# Patient Record
Sex: MALE | Race: BLACK OR AFRICAN AMERICAN | NOT HISPANIC OR LATINO | Employment: UNEMPLOYED | URBAN - METROPOLITAN AREA
[De-identification: names, ages, dates, MRNs, and addresses within clinical notes are randomized per-mention and may not be internally consistent; named-entity substitution may affect disease eponyms.]

---

## 2017-01-02 ENCOUNTER — AMBULATORY - HEALTHEAST (OUTPATIENT)
Dept: ADDICTION MEDICINE | Facility: CLINIC | Age: 37
End: 2017-01-02

## 2018-01-11 ENCOUNTER — RECORDS - HEALTHEAST (OUTPATIENT)
Dept: ADMINISTRATIVE | Facility: OTHER | Age: 38
End: 2018-01-11

## 2018-01-18 ENCOUNTER — TELEPHONE (OUTPATIENT)
Dept: FAMILY MEDICINE | Facility: CLINIC | Age: 38
End: 2018-01-18

## 2018-01-18 NOTE — TELEPHONE ENCOUNTER
[facility] phone call message- general phone call:    Reason for call: He has a bad dog bite on his hand and wants to come in and be seen now.    Return call needed: Yes    OK to leave a message on voice mail? Yes    Primary language: English      needed? No    Call taken on January 18, 2018 at 11:24 AM by Lily Gardiner

## 2018-01-18 NOTE — TELEPHONE ENCOUNTER
Pt was seen in ED 3-4 days ago for a dog bite injury to his arm. Today, their is drainage from the wound and it is open again. Pt rating pain over 10/10. Hand is swollen and would like to see MD right away. Advised pt that it would be best for him to be seen in ED again since pain is so severe. Pt states he was not prescribed any abx after he was discharged from ED. Phone disconnected. I called the pt again and was unable to leave a vm.     /WILMA Burger

## 2018-05-01 ENCOUNTER — OFFICE VISIT - HEALTHEAST (OUTPATIENT)
Dept: ADDICTION MEDICINE | Facility: CLINIC | Age: 38
End: 2018-05-01

## 2018-05-01 DIAGNOSIS — F12.20 SEVERE CANNABIS USE DISORDER (H): ICD-10-CM

## 2018-05-31 ENCOUNTER — AMBULATORY - HEALTHEAST (OUTPATIENT)
Dept: ADDICTION MEDICINE | Facility: CLINIC | Age: 38
End: 2018-05-31

## 2018-06-12 ENCOUNTER — AMBULATORY - HEALTHEAST (OUTPATIENT)
Dept: ADDICTION MEDICINE | Facility: CLINIC | Age: 38
End: 2018-06-12

## 2018-06-13 ENCOUNTER — OFFICE VISIT - HEALTHEAST (OUTPATIENT)
Dept: ADDICTION MEDICINE | Facility: CLINIC | Age: 38
End: 2018-06-13

## 2018-06-13 DIAGNOSIS — F12.20 SEVERE CANNABIS USE DISORDER (H): ICD-10-CM

## 2021-05-31 ENCOUNTER — RECORDS - HEALTHEAST (OUTPATIENT)
Dept: ADMINISTRATIVE | Facility: CLINIC | Age: 41
End: 2021-05-31

## 2021-06-08 NOTE — PROGRESS NOTES
[facility] CHEMICAL DEPENDENCY  DISCHARGE SUMMARY            NAME:  Macario Delatorre   Physician:     MRN:  [id number] :  Dioxn Hernandez   #:  [id number] Funding Source:  [employer], [employer]   Admit Date:  2016 Discharge Date: 12/15/16   :  1980 Days Completed: 24 of 90  hours of tx completed   Initial Diagnosis:    Patient Active Problem List   Diagnosis     Hypermobility Syndrome     Osteoarthrosis Of Multiple Sites     Pes Planus     Limb Pain     CKD (chronic kidney disease)     Arteriosclerosis of coronary artery     Gout     BP (high blood pressure)     Pharyngitis, acute     Severe cannabis use disorder    Final Diagnosis:    Same as initial   Discharge Address:  General delivery      Discharge Type:  At Staff Request (ASR)    Reasons for and circumstances of service termination:  Pt was non compliant with tx. His attendance was poor, he refused to maintain abstinence and he reported no interest in learning recovery skills and stated only attending due to court mandate. Due to lack of engagement and non compliance he was discharged ASR. Prognosis for cont recovery is poor.     Dimension/Course of Treatment/Individualized Care:   1.  Withdrawal Potential - Risk level - 1-minor at intake and Risk level-1-minor at Discharge  Pt made no efforts to become abstinent and was a daily cannabis user. No progress in this dimension.             2.  Biomedical Conditions and Complications - Risk level - 1-minor at intake and Risk level-1-minor at Discharge  Pt is dealing with multiple medical problems and has access to medical care. His medical problems were not a barrier to tx. Med care deferred to med doctors.              3.  Emotional/Behavioral/Cognitive Conditions and Complications - Risk level -   2-moderate at intake and Risk level-2-moderate at Discharge  Pt did not make progress in this dimension and failed to make any changes in his life. He was unwilling to folllow  staff recommendations.           4.  Treatment Acceptance/Resistance - Risk Level - Risk level -   3-serious at intake and Risk level-3-serious at Discharge  Pt appears to be in precontemplation stage of change and was unwilling to meet tx expectations.            5.  Relapse/Continued Use/Continued Problem Potential - Risk level -Risk level -   3-serious at intake and Risk level-3-serious at Discharge  Pt did not make any changes in life and is not willing to give up drug use. No effort in this dimension made during tx.            6.  Recovery Environment - Risk level - Risk level -   3-serious at intake and Risk level-3-serious at Discharge    No changes in this dimension, no effort was made by pt to meet court recommendations or be law-abiding and no effort was made to build sober support group         Strengths: Youth, employment   and Needs and Services Provided:  [facility] outpt, psych care, court services, abstinence.              Program Involvement: Poor  Attendance: Poor  Ability to relate in group/   Other program activities: Poor  Assignment Completion: Poor  Overall Behavior: Poor  Reported Family/Significant   Other Involvement: Poor    Prognosis: Poor      Recommendations       Attend 12 Step Meetings, Obtain/Retain 12 Step Program Sponsor, Discharged to Other CD Services, Identify and Maintain a Sober Social, Network of Friends and Attend Aftercare    Mental Health Referral  Individual Therapy      Physical Health Referral:  Personal Physician                Counselor Name and Title:  Dixon Jurado Wawro          Date:  1/2/2017  Time:  4:35 PM

## 2021-06-17 NOTE — PROGRESS NOTES
Rule 25 Assessment  Background Information   1. Date of Assessment Request  2. Date of Assessment  5/1/2018   3. Date Service Authorized     4.   RANJANA Shahid MA   5.  Phone Number [phone]    6. Referent  [employer] 7. Assessment Site  [facility]     8. Client Name Macario Delatorre 9. Date of Birth  1980 Age  37 y.o. 10. Gender  male  11. PMI/ Insurance No.  [id number]   12. Client's Primary Language:  english 13. Do you require special accommodations, such as an  or assistance with written material? No   14. Current Address: [address] [place] [address]   15. Client Phone Numbers: [phone] (home)    16.  Alternate (cell) Phone Number:       17. Tell me what has happened to bring you here today.     I threw water on my PCA - then she reports me as an Alcoholic - I admit that I might smoke some weed, but I'm not an alcoholic.      18. Have you had other rule 25 assessments? Yes - I can here in [facility] about 1.5 ago    DIMENSION I - Acute Intoxication /Withdrawal Potential   1. Chemical use most recent 12 months outside a facility and other significant use history (client self-report)             X = Primary Drug Used   Age of First Use Most Recent Pattern of Use and Duration   Need enough information to show pattern (both frequency and amounts) and to show tolerance for each chemical that has a diagnosis   Date of last use and time, if needed   Withdrawal Potential? Requiring special care Method of use  (oral, smoked, snort, IV, etc)   [] Alcohol  denies      [x] Marijuana/Hashish 18 I some some weed - sometimes daily  Sometimes I might have an edible - I am trying to get on the medical cannabis program  I some to deal with chronic pain  few weeks ago na smoke   [] Cocaine/Crack        [] Meth/Amphetamines        [] Heroin        [] Other Opiates/Synthetics        [] Inhalants        [] Benzodiazepines        [] Hallucinogens        []  Barbiturates/Sedatives/Hypnotics        [] Over-the-Counter Drugs        [] Other        [x] Nicotine 18 4 cig per day today none smoke     2. Do you use greater amounts of alcohol/other drugs to feel intoxicated or achieve the desired effect? no.  Or use the same amount and get less of an effect? No (DSM)  Example: na    3A. Have you ever been to detox? no    3B. When was the first time? na    3C. How many times since then? na    3D. Date of most recent detox: na      4.  Withdrawal symptoms: Have you had any of the following withdrawal symptoms?  no  Past 12 months Recent (past 30 days)   None None     Notes:      's Visual Observations and Symptoms: cooperative  Based on the above information, is withdrawal likely to require attention as part of treatment participation?  no    Dimension I Ratings   Acute intoxication/Withdrawal potential - The placing authority must use the criteria in Dimension I to determine a client s acute intoxication and withdrawal potential.    RISK DESCRIPTIONS - Severity ratin  Client displays full functioning with good ability to tolerate and cope with withdrawal discomfort.  No signs or symptoms of intoxication or withdrawal or resolving signs or symptoms    REASONS SEVERITY WAS ASSIGNED (What about the amount of the person s use and date of most recent use and history of withdrawal problems suggests the potential of withdrawal symptoms requiring professional assistance? )    Ct cites no signs or symptoms of withdrawal and is able to manage any discomfort.        DIMENSION II - Biomedical Complications and Conditions   1. Do you have any current health/medical conditions?(Include any infectious diseases, allergies, or chronic or acute pain, history of chronic conditions)    I've had Gout since I was 17 yo  Diabetes - renal complications  Joint disorder  scoliosis  HBP    2. Do you have a health care provider? When was your most recent appointment? What concerns were  identified?    [facility]    3. If indicated by answers to items 1 or 2: How do you deal with these concerns? Is that working for you? If you are not receiving care for this problem, why not?    I can't get my medication because I had an old SW from the [facility] and he entered my dead fathers SS# and it has screwed things up  I can't get this straightened out      4A. List current medication(s) including over-the-counter or herbal supplements--including pain management:      Current Outpatient Prescriptions:      amitriptyline (ELAVIL) 10 MG tablet, Take 20 mg by mouth daily as needed (for gout). , Disp: , Rfl:      benzocaine-menthol (CEPACOL) 15-3.6 mg, Take 1 lozenge by mouth every hour as needed., Disp: 18 lozenge, Rfl: 0     chlorthalidone (HYGROTEN) 25 MG tablet, Take 1 tablet (25 mg total) by mouth daily., Disp: 30 tablet, Rfl: 0     clotrimazole (LOTRIMIN AF) 1 % cream, Apply to affected area twice daily for 14 days., Disp: 30 g, Rfl: 0     furosemide (LASIX) 20 MG tablet, Take 1 tablet (20 mg total) by mouth daily., Disp: 3 tablet, Rfl: 0     GABAPENTIN ORAL, Take 600 mg by mouth daily as needed (for gout). , Disp: , Rfl:      HYDROcodone-acetaminophen 5-325 mg per tablet, Take 1 tablet by mouth every 6 (six) hours as needed for pain., Disp: 12 tablet, Rfl: 0     HYDROcodone-acetaminophen 5-325 mg per tablet, Take 1 tablet by mouth every 4 (four) hours as needed for pain., Disp: 10 tablet, Rfl: 0     ketorolac (TORADOL) 10 mg tablet, Take 1 tablet (10 mg total) by mouth every 6 (six) hours as needed for pain., Disp: 8 tablet, Rfl: 0     losartan (COZAAR) 25 MG tablet, Take 1 tablet (25 mg total) by mouth daily., Disp: 30 tablet, Rfl: 0     oxyCODONE (ROXICODONE) 5 MG immediate release tablet, Take 1 tablet (5 mg total) by mouth every 6 (six) hours as needed for pain., Disp: 12 tablet, Rfl: 0     potassium chloride (KLOR-CON) 10 MEQ CR tablet, Take 1 tablet (10 mEq total) by  mouth daily., Disp: 3 tablet, Rfl: 0      4B. Do you follow current medical recommendations/take medications as prescribed?   no    4C. When did you last take your medication?  I can't get all of them.      5. Has a health care provider/healer ever recommended that you reduce or quit alcohol/drug use?  No (DSM)    6. Are you pregnant?  NA      6B.  Receiving prenatal care?  na      6C.  When is your baby due?      7. Have you had any injuries, assaults/violence towards you, accidents, health related issues, overdose(s) or hospitalizations related to your use of alcohol or other drugs:  no    8. Do you have any specific physical needs/accommodations? no    Dimension II Ratings   Biomedical Conditions and Complications - The placing authority must use the criteria in Dimension II to determine a client s biomedical conditions and complications.   RISK DESCRIPTIONS - Severity ratin  Client has difficulty tolerating and coping with physical problems or has other biomedical problems that interfere with recovery and treatment.  Client neglects or does not seek care for serious biomedical problems.    REASONS SEVERITY WAS ASSIGNED (What physical/medical problems does this person have that would inhibit his or her ability to participate in treatment? What issues does he or she have that require assistance to address?)    Patient reports multiple health concerns, reports having a primary care physician (Dr. Moreira, [facility]), but is unclear about how he is currently treating these concerns (other than reporting self-medicating with marijuana). Patient is unclear if he is currently taking medication for physical health concerns, reports history of hospitalization for physical health concerns. Ct states that carmen can no longer access his Rx plan because his former SW put his  father SS# on his record                           DIMENSION III - Emotional, Behavioral, Cognitive Conditions and  "Complications   1. (Optional) Tell me what it was like growing up in your family. (substance use, mental health, discipline, abuse, support)     difficult    2.  When was the last time that you had significant problems  Past month 2-12 months ago 1+ years ago Never   A. With feeling very trapped, lonely, sad, blue, depressed or hopelessabout the future? []  []  [x] []     B. With sleep trouble, such as bad dreams, sleeping restlessly, or fallingasleep during the day? [] [] [] [x]   C. With feeling very anxious, nervous, tense, scared, panicked, or likesomething bad was going to happen? [x] [x] [] []   D. With becoming very distressed and upset when something remindedyou of the past? [x] [] [] []   E. With thinking about ending your life or committing suicide?  [] [] [x] []     3.  When was the last time that you did the following things two or more times? Past month 2-12 months ago 1+ years ago Never   A. Lied or conned to get things you wanted or to avoid having to do something? [] [] [x] []   B. Had a hard time paying attention at school, work, or home? [] [] [x] []   C. Had a hard time listening to instructions at school, work, or home?  [] [] [x] []   D. Were a bully or threatened other people? [] [] [] [x]   E. Started physical fights with other people? [] [x] [] []     Note: These questions are from the Global Appraisal of Individual Needs--Short Screener. Any item marked  past month  or  2 to 12 months ago  will be scored with a severity rating of at least 2.  For each item that has occurred in the past month or past year ask follow up questions to determine how often the person has felt this way or has the behavior occurred? How recently? How has it affected their daily living? And, whether they were using or in withdrawal at the time?    \"I hate dumb people\"  Subsequent to mental health issues - currently un medicated  Subsequent to perceived threats    4A. If the person has answered item 2E with  in the " past year  or  the past month , ask about frequency and history of suicide in the family or someone close and whether they were under the influence.    Any history of suicide in your family? Or someone close to you?  no      4B. If the person answered item 2E  in the past month  ask about  intent, plan, means and access and any other follow-up information  to determine imminent risk. Document any actions taken to intervene  on any identified imminent risk.     PANSI administered - low risk for suicide.  No thoughts, means or plan noted at the time of evaluation       5A. Have you ever been diagnosed with a mental health problem?  yes  5B. Are you receiving care for any mental health issues? no  If yes, what is the focus of that care or treatment?  Are you satisfied with the service?  Most recent appointment?  How has it been helpful?    Prior - Schizophrenia  Bipolar Disorder - anger issues    6A.  Have you been prescribed medications for emotional/psychological problems?  yes  6B.  Current mental health medication(s) If these medications are listed for Dimension II, reference item II-5.   6C.  Are you taking your medications as instructed?  No  I can't get them  7A. Does your MH provider know about your use?  No provider  7B.  What does he or she have to say about it? (DSM)  na    8A. Have you ever been verbally, emotionally, physically or sexually abused? no   Follow up questions to learn current risk, continuing emotional impact.  na  8B. Have you received counseling for abuse?  no    9A. Have you ever experienced or been part of a group that experienced community violence, historical trauma, rape or assault? no  9B.  How has that affected you?    9C.  Have you received counseling for that?  no    10A. : no  10B.  Exposure to Combat?  no    11. Do you have problems with any of the following things in your daily life?  In relationships with others      Note: If the person has any of the above problems, how  do they deal with them, have they developed coping mechanisms?  Have they received treatment?  Follow up with items 12, 13, and 14. If none of the issues in item 11 are a problem for the person, skip to item 15.    Some some weed and chill out    12. Have you been diagnosed with traumatic brain injury or Alzheimer s?  no    13.  If the answer to #12 is no, ask the following questions:    Have you ever hit your head or been hit on the head? no    Were you ever seen in the Emergency Room, hospital, or by a doctor because of an injury to your head? no    Have you had any significant illness that affected your brain (brain tumor, meningitis, West Nile Virus, stroke or seizure, heart attack, near drowning or near suffocation)?  no    14.  If the answer to # 12 is yes, ask if any of the problems identified in #11 occurred since the head injury or loss of oxygen no    15A. Highest grade of school completed:    15B. Do you have a learning disability? no  15C. Did you ever have tutoring in Math or English? no.  15D. Have you ever been diagnosed with Fetal Alcohol Effects or Fetal Alcohol Syndrome? no    Explain:      16. If yes to item 15 B, C, or D: How has this affected your use or been affected by your use?     na    Dimension III Ratings   Emotional/Behavioral/Cognitive - The placing authority must use the criteria in Dimension III to determine a client s emotional, behavioral, and cognitive conditions and complications.   RISK DESCRIPTIONS - Severity ratin  Client has difficulty with impulse control and lacks coping skills.  Client has thoughts of suicide or harm to others without means; however, the thoughts may interfere with participation in some treatment activities.  Client has difficulty functioning in significant life areas.  Client has moderate symptoms of emotional, behavioral, or cognitive problems.  Client is able to participate in most treatment activities.    REASONS SEVERITY WAS ASSIGNED - What  current issues might with thinking, feelings or behavior pose barriers to participation in a treatment program? What coping skills or other assets does the person have to offset those issues? Are these problems that can be initially accommodated by a treatment provider? If not, what specialized skills or attributes must a provider have?    Patient reports history of mental health diagnoses of schizophrenia and bipolar disorder, reports no current mental health services (initially reported having a , later stated that it was through the welfare department). Patient describes feeling that he always has to be on the defense, also describes beliefs that others can see his thoughts. Patient denies any current SI/HI.          DIMENSION IV - Readiness for Change   1. You ve told me what brought you here today. (first section) What do you think the problem really is? Legal - probation want me to stop smoking weed    2. Tell me how things are going. Ask enough questions to determine whether the person has use related problems or assets that can be built upon in the following areas: Family/friends/relationships; Legal; Financial; Emotional; Educational; Recreational/ leisure; Vocational/employment; Living arrangements (DSM)     I'm on SSDI, I have probation, PCA  And a lot of medical issues  No Case management services -     3. What activities have you engaged in when using alcohol/other drugs that could be hazardous to you or others (i.e. driving a car/motorcycle/boat, operating machinery, unsafe sex, sharing needles for drugs or tattoos, etc     none    4. How much time do you spend getting, using or getting over using alcohol or drugs? (DSM)     I smoke when my pain get too bad  I don't sit around smoking weed all day    5. Reasons for drinking/drug use (Use the space below to record answers. It may not be necessary to ask each item.)  Like the feeling    Trying to forget problems    To cope with stress y   To  relieve physical pain y   To cope with anxiety    To cope with depression    To relax or unwind    Makes it easier to talk with people    Partner encourages use    Most friends drink or use    To cope with family problems    Afraid of withdrawal symptoms/to feel better    Other (specify)      A. What concerns other people about your alcohol or drug use/Has anyone told you that you use too much? What did they say? (DSM)    PO said that it is against the law    B. What did you think about that/ do you think you have a problem with alcohol or drug use?     I don't think it a problem - it the law's problem    6. What changes are you willing to make? What substance are you willing to stop using? How are you going to do that? Have you tried that before? What interfered with your success with that goal?     I can try to stop - I'd like to get in to the medical cannabis program    7. What would be helpful to you in making this change?     I'd like to get this insurance straightened out    Dimension IV Ratings   Readiness for Change - The placing authority must use the criteria in Dimension IV to determine a client s readiness for change.   RISK DESCRIPTIONS - Severity ratin  Client displays verbal compliance, but lacks consistent behaviors, has low motivation for change; and is passively involved in treatment.    REASONS SEVERITY WAS ASSIGNED - (What information did the person provide that supports your assessment of his or her readiness to change? How aware is the person of problems caused by continued use? How willing is she or he to make changes? What does the person feel would be helpful? What has the person been able to do without help?)    Patient reports only motivation for treatment is from legal concerns, feels that marijuana is the only way that he is able to cope with physical problems. Patient does not feel that there is any problems associated with his use.          DIMENSION V - Relapse, Continued Use, and  Continued Problem Potential   1. In what ways have you tried to control, cut-down or quit your use? If you have had periods of sobriety, how did you accomplish that? What was helpful? What happened to prevent you from continuing your sobriety? (DSM)     I've had a few months in the past without use      2. Have you experienced cravings? If yes, ask follow up questions to determine if the person recognizes triggers and if the person has had any success in dealing with them.     No - it the pain that brings me back    3A. Have you been treated for alcohol/other drug abuse/dependence? yes  3B.  Number of times (Lifetime) (over what period):  2  3C.  Number of times completed treatment (Lifetime):  0  3D.  During the past 3 years have you participated in outpatient and/or residential?  yes  3E.  When and where?  Last one was here at [facility]  3F.  What was helpful?  What was not?    4. Support group participation: Have you/do you attend support group meetings to reduce/stop your alcohol/drug use? How recently? What was your experience? Are you willing to restart? If the person has not participated, is he or she willing?     No AA    5. What would assist you in staying sober/straight? [facility]     Dimension V Ratings   Relapse/Continued Use/Continued problem potential - The placing authority must use the criteria in Dimension V to determine a client s relapse, continued use, and continued problem potential.   RISK DESCRIPTIONS - Severity rating:  3  Alesha has poor recognition and understanding of relapse and recidivism issues and displays moderately high vulnerability for further substance use or mental health problems.  Client has few coping skills and rarely applies coping skills.    REASONS SEVERITY WAS ASSIGNED - (What information did the person provide that indicates his or her understanding of relapse issues? What about the person s experience indicates how prone he or she is to relapse? What coping skills does  the person have that decrease relapse potential?)      Patient reports current abstinence for past few weeks but is also unclear about his date of last use. Patient reports 2 prior treatment, state he did not complete.          DIMENSION VI - Recovery Environment   1. Are you employed/attending school? Tell me about that.     [employer]    2A. Describe a typical day; evening for you. Work, school, social, leisure, volunteer, spiritual practices. Include time spent obtaining, using, recovering from drugs or alcohol. (DSM)     Mostly dealing with my medical stuff  I visit with my family    2B. How often do you spend more time than you planned using or use more than you planned? (DSM)     I plan to when I do    3. How important is using to your social connections? Do many of your family or friends use?     many    4A. Are you currently in a significant relationship?  no  4B.  If yes, how long?    4C. Sexual Orientation:  hetro    5A. Who do you live with? alone.  5B. Tell me about their alcohol/drug use and mental health issues. na.  5C. Are you concerned for your safety there? no  5D. Are you concerned about the safety of anyone else who lives with you? no    6A. Do you have children who live with you? no  If the person lives with children, ask follow-up questions to determine the person's relationship and responsibility, both legal and care giving; and what arrangements are made for supervision for the children when the person is not available.          6B. Do you have children who do not live with you?  son  If yes, ask follow up questions to learn where the children are, who has custody and what the person't relaltionship and responsibility is with these children and what hopes the person has for his or her future with these children.    7A. Who supports you in making changes in your alcohol or drug use? What are they willing to do to support you? Who is upset or angry about you making changes in your alcohol or drug  use? How big a problem is this for you?      PO is angry about it  Wants me to get into Anger MGT groups too    7B. This table is provided to record information about the person s relationships and available support It is not necessary to ask each item; only to get a comprehensive picture of their support system.  How often can you count on the following people when you need someone?   Partner / Spouse    Parent(s)/Aunt(s)/Uncle(s)/Grandparents    Sibling(s)/Cousin(s) Usually supportive   Child(lori) Usually supportive   Other relative(s) Usually supportive   Friend(s)/neighbor(s) Usually supportive   Child(lori) s father(s)/mother(s)    Support group member(s)    Community of [employer] members    /counselor/therapist/healer    Other (specify)      8A. What is your current living situation?     [address]    8B. What is your long term plan for where you will be living?    same    8C. Tell me about your living environment/neighborhood? Ask enough follow up questions to determine safety, criminal activity, availability of alcohol and drugs, supportive or antagonistic to the person making changes.      It's OK    9. Criminal justice history: Gather current/recent history and any significant history related to substance use--Arrests? Convictions? Circumstances? Alcohol or drug involvement? Sentences? Still on probation or parole? Expectations of the court? Current court order? Any sex offenses - lifetime? What level? (DSM)    Currently on Probation through [facility]    10. What obstacles exist to participating in treatment? (Time off work, childcare, funding, transportation, pending snf time, living situation)    NA    Dimension VI Ratings   Recovery environment - The placing authority must use the criteria in Dimension VI to determine a client s recovery environment.   RISK DESCRIPTIONS - Severity rating: 3  Client is not engaged in structured, meaningful activity and the client's peers, family , significant  other, and living environment are unsupportive, or there is significant criminal justice system involvement.    REASONS SEVERITY WAS ASSIGNED - (What support does the person have for making changes? What structure/stability does the person have in his or her daily life that willincrease the likelihood that changes can be sustained? What problems exist in the person s environment that will jeopardize getting/staying clean and sober?) Ct has criminal justice involvement with current PV.  Ct is on SSDI and lives in apartment on his own through [address].  Ct states he has family and friends, but acknowledges that he does not like to be around stupid people and does not want to go to AA groups.  Ct does not currently have a  .           Client Choice/Exceptions     Would you like services specific to language, age, gender, culture, Lutheran preference, race, ethnicity, sexual orientation or disability?  no    If yes, specify:      What particular treatment choices and options would you like to have?  none    Do you have a preference for a particular treatment program?  none      .    DSM-V Criteria for Substance Abuse  Instructions  Determine whether the client currently meets the criteria for a Substance Use Disorder using the diagnostic criteria in the DSM-V, pp. 481-589. Current means during the most recent 12 months outside a facility that controls access to substances.    Category of substance Severity ICD-10 Code/DSM V Code   []  Alcohol Use Disorder [] Mild  [] Moderate  [] Severe [] (F10.10) (305.00)  [] (F10.20) (303.90)  [] (F10.20) (303.90)   [x]  Cannabis Use Disorder [] Mild  [] Moderate  [x] Severe [] (F12.10) (305.20)  [] (F12.20) (304.30)  [x] (F12.20) (304.30)   [] Hallucinogen Use Disorder [] Mild  [] Moderate  [] Severe [] (F16.10) (305.30)  [] (F16.20) (304.50)  [] (F16.20) (304.50)   []  Inhalant Use Disorder [] Mild  [] Moderate  [] Severe [] (F18.10) (305.90)  [] (F18.20)  (304.60)  [] (F18.20) (304.60)   []  Opioid Use Disorder [] Mild  [] Moderate  [] Severe [] (F11.10) (305.50)  [] (F11.20) (304.00)  [] (F11.20) (304.00)   []  Sedative, Hypnotic, or Anxiolytic Use Disorder [] Mild  [] Moderate  [] Severe [] (F13.10) (305.40)   [] (F13.20) (304.10)  [] (F13.20) (304.10)   []  Stimulant Related Disorders [] Mild  [] Moderate  [] Severe [] (F15.10) (305.70) Amphetamine type substance  [] (F14.10) (305.60) Cocaine  [] (F15.10) (305.70) Other or unspecified stimulant  [] (F15.20) (304.40) Amphetamine type substance  [] (F14.20) (304.20) Cocaine  [] (F15.20) (304.40) Other or unspecified stimulant  [] (F15.20) (304.40) Amphetamine type substance  [] (F14.20) (304.20) Cocaine  [] (F15.20) (304.40) Other or unspecified stimulant   []  Tobacco use Disorder [] Mild  [] Moderate  [] Severe [] (Z72.0) (305.1)  [] (F17.200) (305.1)  [] (F17.200) (305.1)   []  Other (or unknown) Substance Use Disorder [] Mild  [] Moderate  [] Severe [] (F19.10) (305.90)  [] (F19.20) (304.90)  [] (F19.20) (304.90)       Suggested Level of Care Necessary for Recovery  []  Inpatient  []  Extended Care []  Residential [x]  Outpatient  []  None            Collateral Contact Summary   Number of contacts made:    Contact with referring person:       If court related records were reviewed, summarize here:       []   Information from collateral contacts supported/largely agreed with information from the client and associated risk ratings.   []   Information from collateral contacts was significantly different from information from the client and lead to different risk ratings.      Summarize new information here:      Rule 25 Assessment Summary and Plan   's Recommendation    Ct meets criteria for Cannabis Use Disorder Severe  (F12.20) (304.30)  Ct is recommended to Service Coordination - [facility]            Collateral Contacts     Please duplicate this page for each contact.  If this includes  information which is sensitive and not public, separate this page from the rest of the assessment before sharing.  Retain the page in the assessment file.   Name    [facility] x 4 Relationship    Medical Records Phone Number     Releases           Information Provided:      Reviewed latest H&P, prior med list, and past treatment notes    Collateral Contacts     Name       Relationship     Phone Number     Releases           Information Provided:        Staff Name and Title:  Krista Walter MA [facility]      Date:  5/14/2018  Time:  11:48 AM

## 2021-06-18 NOTE — PROGRESS NOTES
D) Referral to CD outpatient/Service Coordination received today  A) Called patient's existing phone number but the automated tape said the number you have dialed is in correct.  Tried calling three times but unable to leave any messages.

## 2021-06-18 NOTE — PROGRESS NOTES
D[facility] said [facility]'s 651 phone number is no longer working.  PO provided with patient's new phone number [phone].    A) PO urged to scheduled intake.    R) This writer called the patient at 8:45 am to schedule intake.  Pt said he is not a morning person and cannot schedule anything.  This writer asked patient if he would like to call back later to schedule. Pt said he cannot do so because this writer's phone number was not showing up on his cell phone screen. This writer told him she would give him her office phone number.  Pt said he has no pen or paper to write.    T) This writer offered to call patient in the afternoon to schedule intake

## 2021-06-18 NOTE — PROGRESS NOTES
D) Patient came to intake 30 minutes late and walked out out.    A) Patient seemed he was guarded and he became easily agitated.  Patient said he does not like paperwork and assessment because he has done them many times.  Pt said [facility]   Pateint said he would not say things that will get him into trouble ([facility], psych unit).   While patient denied any suicidal ideation or plan, patient said he did not want to complete PANSI because he would be lying.      Patient called his  and inforemd him that he was walking out of the intake.  Updated .

## 2022-11-10 ENCOUNTER — OFFICE VISIT (OUTPATIENT)
Dept: FAMILY MEDICINE | Facility: CLINIC | Age: 42
End: 2022-11-10
Payer: MEDICARE

## 2022-11-10 VITALS
RESPIRATION RATE: 16 BRPM | OXYGEN SATURATION: 98 % | SYSTOLIC BLOOD PRESSURE: 144 MMHG | DIASTOLIC BLOOD PRESSURE: 99 MMHG | HEART RATE: 86 BPM | TEMPERATURE: 100.1 F

## 2022-11-10 DIAGNOSIS — J11.1 INFLUENZA: Primary | ICD-10-CM

## 2022-11-10 DIAGNOSIS — J12.89 OTHER VIRAL PNEUMONIA: ICD-10-CM

## 2022-11-10 DIAGNOSIS — I10 HYPERTENSION, UNSPECIFIED TYPE: ICD-10-CM

## 2022-11-10 LAB
FLUAV RNA SPEC QL NAA+PROBE: NEGATIVE
FLUBV RNA RESP QL NAA+PROBE: NEGATIVE
RSV RNA SPEC NAA+PROBE: NEGATIVE
SARS-COV-2 RNA RESP QL NAA+PROBE: POSITIVE

## 2022-11-10 PROCEDURE — 87637 SARSCOV2&INF A&B&RSV AMP PRB: CPT | Performed by: STUDENT IN AN ORGANIZED HEALTH CARE EDUCATION/TRAINING PROGRAM

## 2022-11-10 PROCEDURE — 99203 OFFICE O/P NEW LOW 30 MIN: CPT | Mod: CS | Performed by: STUDENT IN AN ORGANIZED HEALTH CARE EDUCATION/TRAINING PROGRAM

## 2022-11-10 RX ORDER — ONDANSETRON 4 MG/1
4 TABLET, FILM COATED ORAL EVERY 8 HOURS PRN
Qty: 12 TABLET | Refills: 0 | Status: ON HOLD | OUTPATIENT
Start: 2022-11-10 | End: 2024-08-08

## 2022-11-10 NOTE — PROGRESS NOTES
Assessment and Plan    harmony was seen today for flu.  Cough, runny/congested nose, arthralgia myalgia, for 1 week.  His symptoms consistent with influenza infection, but possibility of other viral infection.  His vital sign revealed high blood pressure, and temp of 100.1.  Patient does not have a history of high blood pressure.  Recommended checking blood pressure for 1 week and presented the results to the clinic.    Diagnoses and all orders for this visit:    Influenza  -     Influenza A & B Antigen - Clinic Collect  -     Symptomatic; Unknown Influenza A/B & SARS-CoV2 (COVID-19) Virus PCR Multiplex Nasopharyngeal  -     ondansetron (ZOFRAN) 4 MG tablet; Take 1 tablet (4 mg) by mouth every 8 hours as needed for nausea    Other viral pneumonia  -     Symptomatic; Unknown Influenza A/B & SARS-CoV2 (COVID-19) Virus PCR Multiplex Nasopharyngeal    Hypertension, unspecified type                 Options for treatment and follow-up care were reviewed with the patient. Patient engaged in the decision making process and verbalized understanding of the options discussed and agreed with the final plan.    Patient was staffed with attending physician MD Lianna Bradley MD PGY3           HPI       Macario Delatorre is a 42 year old year old male w/ PMH of   Patient Active Problem List   Diagnosis     Back pain     Health care maintenance     Mental retardation     Headaches, tension     Bilateral Ankle Pain    who presents for cough, postacute is vomiting, sore throat, subjective fever, runny/congested nose for 1 week.  His symptoms associated with body ache, joint pain, and loose stool.  Denies having change in his smell/taste.  Patient did not receive flu vaccine COVID vaccine.  Unknown history for illness contact.    +++++++      Problem, Medication and Allergy Lists were   reviewed and updated if needed.     Patient Active Problem List    Diagnosis Date Noted     Bilateral Ankle Pain 03/27/2014      Priority: Medium     Patient had sever bilateral ankle pain and swelling 3/27. Sees Dr. Howe.       Headaches, tension 09/23/2013     Priority: Medium     Problem list name updated by automated process. Provider to review and confirm       Mental retardation 05/16/2013     Priority: Medium     Per patient.  Is on disability for this       Back pain 05/01/2013     Priority: Medium     Health care maintenance 05/01/2013     Priority: Medium       Patient is new         Review of Systems:   10 point ROS negative other than as specified above.         Physical Exam:   BP (!) 144/99   Pulse 86   Temp 100.1  F (37.8  C) (Oral)   Resp 16   SpO2 98%     Vital signs normal except BP     Exam:  Constitutional: healthy, alert, no distress, and cooperative  Head: Normocephalic. No masses, lesions, tenderness or abnormalities  Neck: Neck supple. No adenopathy.  ENT: throat normal without erythema or exudate  Cardiovascular: RRR w/o audible murmur  Respiratory: bilateral clear lungs w/o wheezing, crackles or rhonchi; breathing comfortably on RA  Gastrointestinal: Abdomen soft, non-tender. BS normal.  : Deferred  Musculoskeletal: extremities normal- no gross deformities noted, gait normal, and normal muscle tone  Skin: no suspicious lesions or rashes  Neurologic: grossly normal CN; normal strength, sensation, & tone  Psychiatric: mentation appears normal and affect normal/bright        Results:    Results from this visitNo results found for any visits on 11/10/22.

## 2022-11-10 NOTE — PROGRESS NOTES
Preceptor Attestation:   Patient seen, evaluated and discussed with the resident. I have verified the content of the note, which accurately reflects my assessment of the patient and the plan of care.   Supervising Physician:  George Moreira MD

## 2024-01-16 ENCOUNTER — APPOINTMENT (OUTPATIENT)
Dept: ULTRASOUND IMAGING | Facility: CLINIC | Age: 44
End: 2024-01-16
Attending: EMERGENCY MEDICINE
Payer: MEDICARE

## 2024-01-16 ENCOUNTER — APPOINTMENT (OUTPATIENT)
Dept: GENERAL RADIOLOGY | Facility: CLINIC | Age: 44
End: 2024-01-16
Attending: EMERGENCY MEDICINE
Payer: MEDICARE

## 2024-01-16 ENCOUNTER — APPOINTMENT (OUTPATIENT)
Dept: CT IMAGING | Facility: CLINIC | Age: 44
End: 2024-01-16
Attending: EMERGENCY MEDICINE
Payer: MEDICARE

## 2024-01-16 ENCOUNTER — HOSPITAL ENCOUNTER (EMERGENCY)
Facility: CLINIC | Age: 44
Discharge: HOME | End: 2024-01-16
Attending: EMERGENCY MEDICINE | Admitting: EMERGENCY MEDICINE
Payer: MEDICARE

## 2024-01-16 VITALS
BODY MASS INDEX: 32.4 KG/M2 | TEMPERATURE: 99 F | DIASTOLIC BLOOD PRESSURE: 107 MMHG | RESPIRATION RATE: 16 BRPM | HEIGHT: 78 IN | OXYGEN SATURATION: 100 % | HEART RATE: 97 BPM | SYSTOLIC BLOOD PRESSURE: 169 MMHG | WEIGHT: 280 LBS

## 2024-01-16 DIAGNOSIS — K29.80 DUODENITIS: ICD-10-CM

## 2024-01-16 DIAGNOSIS — R10.11 RIGHT UPPER QUADRANT ABDOMINAL PAIN: ICD-10-CM

## 2024-01-16 LAB
ALBUMIN SERPL BCG-MCNC: 4.1 G/DL (ref 3.5–5.2)
ALP SERPL-CCNC: 47 U/L (ref 40–150)
ALT SERPL W P-5'-P-CCNC: 6 U/L (ref 0–70)
ANION GAP SERPL CALCULATED.3IONS-SCNC: 16 MMOL/L (ref 7–15)
AST SERPL W P-5'-P-CCNC: 16 U/L (ref 0–45)
BASOPHILS # BLD AUTO: 0 10E3/UL (ref 0–0.2)
BASOPHILS NFR BLD AUTO: 1 %
BILIRUB SERPL-MCNC: 0.3 MG/DL
BUN SERPL-MCNC: 35.8 MG/DL (ref 6–20)
CALCIUM SERPL-MCNC: 8.2 MG/DL (ref 8.6–10)
CHLORIDE SERPL-SCNC: 98 MMOL/L (ref 98–107)
CREAT SERPL-MCNC: 13.8 MG/DL (ref 0.67–1.17)
DEPRECATED HCO3 PLAS-SCNC: 29 MMOL/L (ref 22–29)
EGFRCR SERPLBLD CKD-EPI 2021: 4 ML/MIN/1.73M2
EOSINOPHIL # BLD AUTO: 0.2 10E3/UL (ref 0–0.7)
EOSINOPHIL NFR BLD AUTO: 4 %
ERYTHROCYTE [DISTWIDTH] IN BLOOD BY AUTOMATED COUNT: 14 % (ref 10–15)
FLUAV RNA SPEC QL NAA+PROBE: NEGATIVE
FLUBV RNA RESP QL NAA+PROBE: NEGATIVE
GLUCOSE SERPL-MCNC: 106 MG/DL (ref 70–99)
HCT VFR BLD AUTO: 32.3 % (ref 40–53)
HGB BLD-MCNC: 10 G/DL (ref 13.3–17.7)
IMM GRANULOCYTES # BLD: 0 10E3/UL
IMM GRANULOCYTES NFR BLD: 0 %
LIPASE SERPL-CCNC: 97 U/L (ref 13–60)
LYMPHOCYTES # BLD AUTO: 1.7 10E3/UL (ref 0.8–5.3)
LYMPHOCYTES NFR BLD AUTO: 34 %
MCH RBC QN AUTO: 29.3 PG (ref 26.5–33)
MCHC RBC AUTO-ENTMCNC: 31 G/DL (ref 31.5–36.5)
MCV RBC AUTO: 95 FL (ref 78–100)
MONOCYTES # BLD AUTO: 0.4 10E3/UL (ref 0–1.3)
MONOCYTES NFR BLD AUTO: 8 %
NEUTROPHILS # BLD AUTO: 2.8 10E3/UL (ref 1.6–8.3)
NEUTROPHILS NFR BLD AUTO: 53 %
NRBC # BLD AUTO: 0 10E3/UL
NRBC BLD AUTO-RTO: 0 /100
PLATELET # BLD AUTO: 233 10E3/UL (ref 150–450)
POTASSIUM SERPL-SCNC: 4.2 MMOL/L (ref 3.4–5.3)
PROT SERPL-MCNC: 7.3 G/DL (ref 6.4–8.3)
RBC # BLD AUTO: 3.41 10E6/UL (ref 4.4–5.9)
RSV RNA SPEC NAA+PROBE: NEGATIVE
SARS-COV-2 RNA RESP QL NAA+PROBE: NEGATIVE
SODIUM SERPL-SCNC: 143 MMOL/L (ref 135–145)
WBC # BLD AUTO: 5.2 10E3/UL (ref 4–11)

## 2024-01-16 PROCEDURE — 36415 COLL VENOUS BLD VENIPUNCTURE: CPT | Performed by: EMERGENCY MEDICINE

## 2024-01-16 PROCEDURE — 76705 ECHO EXAM OF ABDOMEN: CPT

## 2024-01-16 PROCEDURE — 80053 COMPREHEN METABOLIC PANEL: CPT | Performed by: EMERGENCY MEDICINE

## 2024-01-16 PROCEDURE — 71046 X-RAY EXAM CHEST 2 VIEWS: CPT | Mod: 26 | Performed by: STUDENT IN AN ORGANIZED HEALTH CARE EDUCATION/TRAINING PROGRAM

## 2024-01-16 PROCEDURE — 83690 ASSAY OF LIPASE: CPT | Performed by: EMERGENCY MEDICINE

## 2024-01-16 PROCEDURE — 71046 X-RAY EXAM CHEST 2 VIEWS: CPT

## 2024-01-16 PROCEDURE — 74176 CT ABD & PELVIS W/O CONTRAST: CPT | Mod: MG

## 2024-01-16 PROCEDURE — 85025 COMPLETE CBC W/AUTO DIFF WBC: CPT | Performed by: EMERGENCY MEDICINE

## 2024-01-16 PROCEDURE — 99284 EMERGENCY DEPT VISIT MOD MDM: CPT | Mod: 25 | Performed by: EMERGENCY MEDICINE

## 2024-01-16 PROCEDURE — 76705 ECHO EXAM OF ABDOMEN: CPT | Mod: 26 | Performed by: STUDENT IN AN ORGANIZED HEALTH CARE EDUCATION/TRAINING PROGRAM

## 2024-01-16 PROCEDURE — G1010 CDSM STANSON: HCPCS | Performed by: RADIOLOGY

## 2024-01-16 PROCEDURE — 87637 SARSCOV2&INF A&B&RSV AMP PRB: CPT | Mod: GZ | Performed by: EMERGENCY MEDICINE

## 2024-01-16 PROCEDURE — 74176 CT ABD & PELVIS W/O CONTRAST: CPT | Mod: 26 | Performed by: RADIOLOGY

## 2024-01-16 PROCEDURE — 250N000013 HC RX MED GY IP 250 OP 250 PS 637: Performed by: EMERGENCY MEDICINE

## 2024-01-16 PROCEDURE — 99284 EMERGENCY DEPT VISIT MOD MDM: CPT | Performed by: EMERGENCY MEDICINE

## 2024-01-16 RX ORDER — OXYCODONE HYDROCHLORIDE 5 MG/1
5 TABLET ORAL ONCE
Status: COMPLETED | OUTPATIENT
Start: 2024-01-16 | End: 2024-01-16

## 2024-01-16 RX ORDER — PANTOPRAZOLE SODIUM 40 MG/1
40 TABLET, DELAYED RELEASE ORAL DAILY
Qty: 30 TABLET | Refills: 0 | Status: SHIPPED | OUTPATIENT
Start: 2024-01-16 | End: 2024-02-15

## 2024-01-16 RX ORDER — PANTOPRAZOLE SODIUM 40 MG/1
40 TABLET, DELAYED RELEASE ORAL ONCE
Status: COMPLETED | OUTPATIENT
Start: 2024-01-16 | End: 2024-01-16

## 2024-01-16 RX ADMIN — PANTOPRAZOLE SODIUM 40 MG: 40 TABLET, DELAYED RELEASE ORAL at 21:34

## 2024-01-16 RX ADMIN — OXYCODONE HYDROCHLORIDE 5 MG: 5 TABLET ORAL at 20:52

## 2024-01-16 ASSESSMENT — ACTIVITIES OF DAILY LIVING (ADL)
ADLS_ACUITY_SCORE: 35
ADLS_ACUITY_SCORE: 35

## 2024-01-16 NOTE — ED TRIAGE NOTES
"Triage Assessment & Note:    BP (!) 160/114   Pulse 107   Temp 98  F (36.7  C)   Resp 16   Ht 1.981 m (6' 6\")   Wt 127 kg (280 lb)   SpO2 97%   BMI 32.36 kg/m        Patient presents with: Dialysis (T, Th, Sat) pt comes ambulatory to triage with reports of R rib pain and wanting dialysis port looked at. No reports of fever, cough, SOB, CP, or travel.     Home Treatments/Remedies: Home medications    Febrile / Afebrile: afebrile    Duration of C/o: 6 days    Shannan Bales RN  January 16, 2024            "

## 2024-01-17 NOTE — ED PROVIDER NOTES
ED Provider Note  [facility]      History     Chief Complaint   Patient presents with    Rib Pain     right     HPI  Macario Delatorre is a 43 year old male who presents to the emergency department for right upper quadrant abdominal pain is been present for the past 6 days.  He reports it has been constant.  He states it is worse when he coughs.  He states he has had an occasional cough which is largely nonproductive.  He denies any chest pain.  No fever.  No dyspnea.  He states he vomited once a few days ago.  He reports normal bowel movements.  He did have a full dialysis run today and reports minimal urine output.  He denies a history of abdominal surgery.  He denies any fevers.      Past Medical History  Past Medical History:   Diagnosis Date    Chlamydia     history of    Flatfoot     feet pes plannus    Mental retardation     mild    Migraine headache     Pain disorder 11/6/11    upset about no pain meds  incedent report filerd by RN    Tobacco abuse     Undescended testicle      History reviewed. No pertinent surgical history.  pantoprazole (PROTONIX) 40 MG EC tablet  allopurinol (ZYLOPRIM) 300 MG tablet  amitriptyline (ELAVIL) 50 MG tablet  cephALEXin (KEFLEX) 500 MG capsule  gabapentin (NEURONTIN) 300 MG capsule  hydrOXYzine (VISTARIL) 50 MG capsule  LIDODERM 5 % patch  omeprazole 20 MG tablet  ondansetron (ZOFRAN) 4 MG tablet  ORDER FOR DME  ORDER FOR DME  OXYCODONE HCL PO      Allergies   Allergen Reactions    Bee Venom      Bee stings swelling and short of breath    Pcn [Penicillins]      Noted in 8/20/08 ER,hives,headaches,throat irritation    Trazodone      Abdominal pain    Tylenol [Acetaminophen] GI Disturbance     Noted in 8/20/08 ER    Ibuprofen GI Disturbance     Noted in 8/20/08 ER  Headaches and abdominal pain     Family History  Family History   Problem Relation Age of Onset    Diabetes No family hx of     Cancer No family hx of     Heart Disease No family hx of      "Diabetes Mother     Heart Failure Father     Diabetes Type 2  Father     Kidney Disease Father      Social History   Social History     Tobacco Use    Smoking status: Some Days     Packs/day: .1     Types: Cigarettes    Smokeless tobacco: Never    Tobacco comments:     smokes about 1 cigarette a day   Substance Use Topics    Alcohol use: No     Comment: holidays and birthday - wine    Drug use: No     Comment: Drug use: Chart review shows + THC, pt denies         A medically appropriate review of systems was performed with pertinent positives and negatives noted in the HPI, and all other systems negative.    Physical Exam   BP: (!) 160/114  Pulse: 107  Temp: 98  F (36.7  C)  Resp: 16  Height: 198.1 cm (6' 6\")  Weight: 127 kg (280 lb)  SpO2: 97 %  Physical Exam  Vitals and nursing note reviewed.   Constitutional:       General: He is not in acute distress.     Appearance: Normal appearance. He is not diaphoretic.   HENT:      Head: Atraumatic.   Eyes:      General: No scleral icterus.     Pupils: Pupils are equal, round, and reactive to light.   Cardiovascular:      Rate and Rhythm: Normal rate and regular rhythm.      Pulses: Normal pulses.      Heart sounds: Normal heart sounds.   Pulmonary:      Effort: No respiratory distress.      Breath sounds: Normal breath sounds.   Abdominal:      General: Bowel sounds are normal.      Palpations: Abdomen is soft.      Tenderness: There is abdominal tenderness in the right upper quadrant.   Musculoskeletal:         General: No tenderness. Normal range of motion.   Skin:     General: Skin is warm.      Findings: No rash.   Neurological:      General: No focal deficit present.      Mental Status: He is alert.      Motor: No weakness.      Coordination: Coordination normal.           ED Course, Procedures, & Data      Procedures                      Results for orders placed or performed during the hospital encounter of 01/16/24   XR Chest 2 Views     Status: None    Narrative "    EXAM: XR CHEST 2 VIEWS 1/16/2024 6:28 PM     HISTORY: cough       COMPARISON: 11/3/2011     FINDINGS: Right IJ central venous catheter tip projects over the low  SVC. Cardiomediastinal silhouette is within normal limits. Streaky  bibasilar opacities. No pleural effusion or pneumothorax.       Impression    IMPRESSION: Streaky bibasilar opacities may represent atelectasis,  edema versus atypical infection    I have personally reviewed the examination and initial interpretation  and I agree with the findings.    AISHA WHITFIELD MD         SYSTEM ID:  [id number]   US Abdomen Limited (RUQ)     Status: None (Preliminary result)    Impression    RESIDENT PRELIMINARY INTERPRETATION  IMPRESSION:   1. Normal sonographic appearance of the liver and gallbladder. No  evidence of cholelithiasis or cholecystitis.  2. Small echogenic right kidney consistent with medical renal disease.   CT Abdomen Pelvis w/o Contrast     Status: None    Narrative    EXAM: CT ABDOMEN PELVIS W/O CONTRAST  LOCATION: [facility]  DATE: 1/16/2024    INDICATION: Right-sided abdominal pain.  COMPARISON: 07/23/2017.  TECHNIQUE: CT scan of the abdomen and pelvis was performed without IV contrast. Multiplanar reformats were obtained. Dose reduction techniques were used.  CONTRAST: None.    FINDINGS:      Absence of intravenous contrast limits the sensitivity of this examination for detection of infectious/inflammatory change, post traumatic abnormalities, vascular abnormalities, and visceral lesions.    LOWER CHEST: Unchanged 4 mm right lower lobe pulmonary nodule; no further follow-up recommended. Small left lower lobe calcified granuloma. Central venous catheter tip was imaged in the right atrium. Atherosclerosis of coronary arteries.    HEPATOBILIARY: Liver is normal in attenuation and size.    Gallbladder is normal.    No intrahepatic or intrahepatic biliary ductal dilatation.    PANCREAS: Normal  attenuation. No peripancreatic inflammatory fat stranding.    SPLEEN: Normal attenuation. Normal size.    ADRENAL GLANDS: Normal.    KIDNEYS: Mild bilateral renal atrophy, new from prior. Low-attenuation subcentimeter renal lesion(s). These are compatible with small benign cysts and no specific imaging evaluation or follow-up is recommended.    No nephroureterolithiasis.    Mildly thick-walled urinary bladder, likely due to incomplete distention.    PELVIC ORGANS: Mild prostatomegaly.    BOWEL: Possible mild wall thickening of the second and third portions of the duodenum, poorly assessed by the noncontrast technique. Normal appendix. Colonic diverticulosis. Mild distention of the stomach with ingested content.    No intraperitoneal free fluid or free air.    LYMPH NODES: No suspicious abdominal or pelvic lymphadenopathy.    VASCULATURE: No abdominal aortic aneurysm. Minimal atherosclerotic calcifications.    MUSCULOSKELETAL: No suspicious abnormality.    OTHER: No additionally significant abnormalities.      Impression    IMPRESSION:   1.  Possible mild wall thickening of the second and third portions of the duodenum, poorly assessed by the noncontrast technique. A mild duodenitis could be considered.  2.  Normal appendix.  3.  Colonic diverticulosis.  4.  Mild bilateral renal atrophy, new from prior.       Comprehensive metabolic panel     Status: Abnormal   Result Value Ref Range    Sodium 143 135 - 145 mmol/L    Potassium 4.2 3.4 - 5.3 mmol/L    Carbon Dioxide (CO2) 29 22 - 29 mmol/L    Anion Gap 16 (H) 7 - 15 mmol/L    Urea Nitrogen 35.8 (H) 6.0 - 20.0 mg/dL    Creatinine 13.80 (H) 0.67 - 1.17 mg/dL    GFR Estimate 4 (L) >60 mL/min/1.73m2    Calcium 8.2 (L) 8.6 - 10.0 mg/dL    Chloride 98 98 - 107 mmol/L    Glucose 106 (H) 70 - 99 mg/dL    Alkaline Phosphatase 47 40 - 150 U/L    AST 16 0 - 45 U/L    ALT 6 0 - 70 U/L    Protein Total 7.3 6.4 - 8.3 g/dL    Albumin 4.1 3.5 - 5.2 g/dL    Bilirubin Total 0.3 <=1.2  mg/dL   Lipase     Status: Abnormal   Result Value Ref Range    Lipase 97 (H) 13 - 60 U/L   Symptomatic Influenza A/B, RSV, & SARS-CoV2 PCR (COVID-19) Nasopharyngeal     Status: Normal    Specimen: Nasopharyngeal; Swab   Result Value Ref Range    Influenza A PCR Negative Negative    Influenza B PCR Negative Negative    RSV PCR Negative Negative    SARS CoV2 PCR Negative Negative    Narrative    Testing was performed using the Xpert Xpress CoV2/Flu/RSV Assay on the [employer] Gene[employer]pert Instrument. This test should be ordered for the detection of SARS-CoV-2, influenza, and RSV viruses in individuals who meet clinical and/or epidemiological criteria. Test performance is unknown in asymptomatic patients. This test is for in vitro diagnostic use under the FDA EUA for laboratories certified under CLIA to perform high or moderate complexity testing. This test has not been FDA cleared or approved. A negative result does not rule out the presence of PCR inhibitors in the specimen or target RNA in concentration below the limit of detection for the assay. If only one viral target is positive but coinfection with multiple targets is suspected, the sample should be re-tested with another FDA cleared, approved, or authorized test, if coinfection would change clinical management. This test was validated by the [facility]. These laboratories are certified under the Clinical Laboratory Improvement Amendments of 1988 (CLIA-88) as qualified to perform high complexity laboratory testing.   CBC with platelets and differential     Status: Abnormal   Result Value Ref Range    WBC Count 5.2 4.0 - 11.0 10e3/uL    RBC Count 3.41 (L) 4.40 - 5.90 10e6/uL    Hemoglobin 10.0 (L) 13.3 - 17.7 g/dL    Hematocrit 32.3 (L) 40.0 - 53.0 %    MCV 95 78 - 100 fL    MCH 29.3 26.5 - 33.0 pg    MCHC 31.0 (L) 31.5 - 36.5 g/dL    RDW 14.0 10.0 - 15.0 %    Platelet Count 233 150 - 450 10e3/uL    % Neutrophils 53 %    % Lymphocytes 34 %    %  Monocytes 8 %    % Eosinophils 4 %    % Basophils 1 %    % Immature Granulocytes 0 %    NRBCs per 100 WBC 0 <1 /100    Absolute Neutrophils 2.8 1.6 - 8.3 10e3/uL    Absolute Lymphocytes 1.7 0.8 - 5.3 10e3/uL    Absolute Monocytes 0.4 0.0 - 1.3 10e3/uL    Absolute Eosinophils 0.2 0.0 - 0.7 10e3/uL    Absolute Basophils 0.0 0.0 - 0.2 10e3/uL    Absolute Immature Granulocytes 0.0 <=0.4 10e3/uL    Absolute NRBCs 0.0 10e3/uL   CBC with platelets differential     Status: Abnormal    Narrative    The following orders were created for panel order CBC with platelets differential.  Procedure                               Abnormality         Status                     ---------                               -----------         ------                     CBC with platelets and d...[[id number]]  Abnormal            Final result                 Please view results for these tests on the individual orders.     Medications   oxyCODONE (ROXICODONE) tablet 5 mg (5 mg Oral $Given 1/16/24 2052)   pantoprazole (PROTONIX) EC tablet 40 mg (40 mg Oral $Given 1/16/24 2134)     Labs Ordered and Resulted from Time of ED Arrival to Time of ED Departure   COMPREHENSIVE METABOLIC PANEL - Abnormal       Result Value    Sodium 143      Potassium 4.2      Carbon Dioxide (CO2) 29      Anion Gap 16 (*)     Urea Nitrogen 35.8 (*)     Creatinine 13.80 (*)     GFR Estimate 4 (*)     Calcium 8.2 (*)     Chloride 98      Glucose 106 (*)     Alkaline Phosphatase 47      AST 16      ALT 6      Protein Total 7.3      Albumin 4.1      Bilirubin Total 0.3     LIPASE - Abnormal    Lipase 97 (*)    CBC WITH PLATELETS AND DIFFERENTIAL - Abnormal    WBC Count 5.2      RBC Count 3.41 (*)     Hemoglobin 10.0 (*)     Hematocrit 32.3 (*)     MCV 95      MCH 29.3      MCHC 31.0 (*)     RDW 14.0      Platelet Count 233      % Neutrophils 53      % Lymphocytes 34      % Monocytes 8      % Eosinophils 4      % Basophils 1      % Immature Granulocytes 0      NRBCs per 100  WBC 0      Absolute Neutrophils 2.8      Absolute Lymphocytes 1.7      Absolute Monocytes 0.4      Absolute Eosinophils 0.2      Absolute Basophils 0.0      Absolute Immature Granulocytes 0.0      Absolute NRBCs 0.0     INFLUENZA A/B, RSV, & SARS-COV2 PCR - Normal    Influenza A PCR Negative      Influenza B PCR Negative      RSV PCR Negative      SARS CoV2 PCR Negative       CT Abdomen Pelvis w/o Contrast   Final Result   IMPRESSION:    1.  Possible mild wall thickening of the second and third portions of the duodenum, poorly assessed by the noncontrast technique. A mild duodenitis could be considered.   2.  Normal appendix.   3.  Colonic diverticulosis.   4.  Mild bilateral renal atrophy, new from prior.            US Abdomen Limited (RUQ)   Preliminary Result   RESIDENT PRELIMINARY INTERPRETATION   IMPRESSION:    1. Normal sonographic appearance of the liver and gallbladder. No   evidence of cholelithiasis or cholecystitis.   2. Small echogenic right kidney consistent with medical renal disease.      XR Chest 2 Views   Final Result   IMPRESSION: Streaky bibasilar opacities may represent atelectasis,   edema versus atypical infection      I have personally reviewed the examination and initial interpretation   and I agree with the findings.      AISHA WHITFIELD MD            SYSTEM ID:  [id number]         Reviewed dialysis encounter from earlier today  Reviewed interventional radiology note for tunneled dialysis catheter placement in the [facility] system.  Reviewed previous lab results including CBC, comprehensive metabolic panel    Critical care was not performed.     Medical Decision Making  The patient's presentation was of moderate complexity (an undiagnosed new problem with uncertain diagnosis).    The patient's evaluation involved:  review of external note(s) from 2 sources (see separate area of note for details)  review of 2 test result(s) ordered prior to this encounter (see separate area of note for  details)  ordering and/or review of 3+ test(s) in this encounter (see separate area of note for details)    The patient's management necessitated moderate risk (prescription drug management including medications given in the ED).    Assessment & Plan    43 year old male with history of dialysis dependent renal failure to the emergency department with right upper quadrant abdominal pain for the past several days.  He has focal tenderness in the right upper quadrant without guarding or rebound on exam.  Differential includes occult cholecystitis, biliary obstruction including choledocholithiasis, acute pancreatitis, gastritis, perforated viscus, peptic ulcer disease, bowel obstruction.  Patient's labs are baseline.  He does not have leukocytosis or significantly elevated liver enzymes.  His lipase is mildly but not significantly elevated.  Right upper quadrant ultrasound does not reveal any evidence for acute cholecystitis or gallstones or biliary obstruction.  Subsequent noncontrast abdomen/pelvis CT reveals findings concerning for mild duodenitis.  Patient reports that he is not currently on any PPIs or H2 blockers.  Protonix initiated here in the emergency department.  Patient be discharged home on a 1 month course of Protonix.  Primary care follow-up in the next week.  May need GI referral if ongoing symptoms.  Return precautions provided.    I have reviewed the nursing notes. I have reviewed the findings, diagnosis, plan and need for follow up with the patient.    Discharge Medication List as of 1/16/2024  9:23 PM        START taking these medications    Details   pantoprazole (PROTONIX) 40 MG EC tablet Take 1 tablet (40 mg) by mouth daily for 30 days, Disp-30 tablet, R-0, Local Print             Final diagnoses:   Right upper quadrant abdominal pain   Duodenitis     Chart documentation was completed with Dragon voice-recognition software. Even though reviewed, this chart may still contain some grammatical,  spelling, and word errors.     Darion Stock Md    [facility] EMERGENCY DEPARTMENT  1/16/2024     Darion Stock MD  01/16/24 5037

## 2024-07-10 ENCOUNTER — APPOINTMENT (OUTPATIENT)
Dept: CT IMAGING | Facility: HOSPITAL | Age: 44
DRG: 640 | End: 2024-07-10
Attending: EMERGENCY MEDICINE
Payer: MEDICARE

## 2024-07-10 ENCOUNTER — HOSPITAL ENCOUNTER (INPATIENT)
Facility: HOSPITAL | Age: 44
LOS: 8 days | Discharge: NURSING, REHAB OR LONG-TERM CARE | DRG: 640 | End: 2024-07-18
Attending: EMERGENCY MEDICINE | Admitting: FAMILY MEDICINE
Payer: MEDICARE

## 2024-07-10 ENCOUNTER — APPOINTMENT (OUTPATIENT)
Dept: RADIOLOGY | Facility: HOSPITAL | Age: 44
DRG: 640 | End: 2024-07-10
Attending: EMERGENCY MEDICINE
Payer: MEDICARE

## 2024-07-10 DIAGNOSIS — I10 PRIMARY HYPERTENSION: ICD-10-CM

## 2024-07-10 DIAGNOSIS — Z91.158: ICD-10-CM

## 2024-07-10 DIAGNOSIS — E11.42 DIABETIC POLYNEUROPATHY ASSOCIATED WITH TYPE 2 DIABETES MELLITUS (H): ICD-10-CM

## 2024-07-10 DIAGNOSIS — R55 SYNCOPE AND COLLAPSE: ICD-10-CM

## 2024-07-10 DIAGNOSIS — N18.6 ESRD (END STAGE RENAL DISEASE) ON DIALYSIS (H): ICD-10-CM

## 2024-07-10 DIAGNOSIS — M54.50 BILATERAL LOW BACK PAIN, UNSPECIFIED CHRONICITY, UNSPECIFIED WHETHER SCIATICA PRESENT: ICD-10-CM

## 2024-07-10 DIAGNOSIS — E87.5 HYPERKALEMIA: ICD-10-CM

## 2024-07-10 DIAGNOSIS — R07.9 CHEST PAIN, UNSPECIFIED TYPE: ICD-10-CM

## 2024-07-10 DIAGNOSIS — K05.10 GINGIVITIS: Primary | ICD-10-CM

## 2024-07-10 DIAGNOSIS — Z99.2 ESRD (END STAGE RENAL DISEASE) ON DIALYSIS (H): ICD-10-CM

## 2024-07-10 LAB
ABO/RH(D): NORMAL
ABO/RH(D): NORMAL
ALBUMIN UR-MCNC: 200 MG/DL
AMPHETAMINES UR QL SCN: NORMAL
ANION GAP SERPL CALCULATED.3IONS-SCNC: 24 MMOL/L (ref 7–15)
ANTIBODY SCREEN: NEGATIVE
APPEARANCE UR: CLEAR
BARBITURATES UR QL SCN: NORMAL
BASOPHILS # BLD AUTO: 0 10E3/UL (ref 0–0.2)
BASOPHILS NFR BLD AUTO: 0 %
BENZODIAZ UR QL SCN: NORMAL
BILIRUB UR QL STRIP: NEGATIVE
BUN SERPL-MCNC: 81 MG/DL (ref 6–20)
BZE UR QL SCN: NORMAL
CALCIUM SERPL-MCNC: 9 MG/DL (ref 8.6–10)
CANNABINOIDS UR QL SCN: NORMAL
CHLORIDE SERPL-SCNC: 99 MMOL/L (ref 98–107)
COLOR UR AUTO: COLORLESS
CREAT SERPL-MCNC: 30.16 MG/DL (ref 0.67–1.17)
DEPRECATED HCO3 PLAS-SCNC: 21 MMOL/L (ref 22–29)
EGFRCR SERPLBLD CKD-EPI 2021: 2 ML/MIN/1.73M2
EOSINOPHIL # BLD AUTO: 0.1 10E3/UL (ref 0–0.7)
EOSINOPHIL NFR BLD AUTO: 2 %
ERYTHROCYTE [DISTWIDTH] IN BLOOD BY AUTOMATED COUNT: 15.5 % (ref 10–15)
ETHANOL SERPL-MCNC: <0.01 G/DL
FENTANYL UR QL: NORMAL
GLUCOSE BLDC GLUCOMTR-MCNC: 100 MG/DL (ref 70–99)
GLUCOSE BLDC GLUCOMTR-MCNC: 102 MG/DL (ref 70–99)
GLUCOSE BLDC GLUCOMTR-MCNC: 122 MG/DL (ref 70–99)
GLUCOSE BLDC GLUCOMTR-MCNC: 124 MG/DL (ref 70–99)
GLUCOSE BLDC GLUCOMTR-MCNC: 62 MG/DL (ref 70–99)
GLUCOSE BLDC GLUCOMTR-MCNC: 77 MG/DL (ref 70–99)
GLUCOSE BLDC GLUCOMTR-MCNC: 84 MG/DL (ref 70–99)
GLUCOSE BLDC GLUCOMTR-MCNC: 89 MG/DL (ref 70–99)
GLUCOSE BLDC GLUCOMTR-MCNC: 97 MG/DL (ref 70–99)
GLUCOSE SERPL-MCNC: 88 MG/DL (ref 70–99)
GLUCOSE UR STRIP-MCNC: 150 MG/DL
HBA1C MFR BLD: 4.4 %
HCT VFR BLD AUTO: 29.9 % (ref 40–53)
HGB BLD-MCNC: 9.6 G/DL (ref 13.3–17.7)
HGB UR QL STRIP: ABNORMAL
HYALINE CASTS: 3 /LPF
IMM GRANULOCYTES # BLD: 0 10E3/UL
IMM GRANULOCYTES NFR BLD: 0 %
KETONES UR STRIP-MCNC: NEGATIVE MG/DL
LACTATE SERPL-SCNC: 1.1 MMOL/L (ref 0.7–2)
LEUKOCYTE ESTERASE UR QL STRIP: NEGATIVE
LYMPHOCYTES # BLD AUTO: 1.4 10E3/UL (ref 0.8–5.3)
LYMPHOCYTES NFR BLD AUTO: 21 %
MAGNESIUM SERPL-MCNC: 2.7 MG/DL (ref 1.7–2.3)
MCH RBC QN AUTO: 28.7 PG (ref 26.5–33)
MCHC RBC AUTO-ENTMCNC: 32.1 G/DL (ref 31.5–36.5)
MCV RBC AUTO: 89 FL (ref 78–100)
MONOCYTES # BLD AUTO: 0.4 10E3/UL (ref 0–1.3)
MONOCYTES NFR BLD AUTO: 6 %
NEUTROPHILS # BLD AUTO: 4.6 10E3/UL (ref 1.6–8.3)
NEUTROPHILS NFR BLD AUTO: 70 %
NITRATE UR QL: NEGATIVE
NRBC # BLD AUTO: 0 10E3/UL
NRBC BLD AUTO-RTO: 0 /100
OPIATES UR QL SCN: NORMAL
PCP QUAL URINE (ROCHE): NORMAL
PH UR STRIP: 8 [PH] (ref 5–7)
PLATELET # BLD AUTO: 234 10E3/UL (ref 150–450)
POTASSIUM SERPL-SCNC: 5.6 MMOL/L (ref 3.4–5.3)
POTASSIUM SERPL-SCNC: 6.3 MMOL/L (ref 3.4–5.3)
PROCALCITONIN SERPL IA-MCNC: 0.64 NG/ML
RBC # BLD AUTO: 3.35 10E6/UL (ref 4.4–5.9)
RBC URINE: 1 /HPF
SODIUM SERPL-SCNC: 144 MMOL/L (ref 135–145)
SP GR UR STRIP: 1.01 (ref 1–1.03)
SPECIMEN EXPIRATION DATE: NORMAL
SPECIMEN EXPIRATION DATE: NORMAL
SQUAMOUS EPITHELIAL: 1 /HPF
TROPONIN T SERPL HS-MCNC: 76 NG/L
TROPONIN T SERPL HS-MCNC: 77 NG/L
UROBILINOGEN UR STRIP-MCNC: <2 MG/DL
WBC # BLD AUTO: 6.5 10E3/UL (ref 4–11)
WBC URINE: 2 /HPF

## 2024-07-10 PROCEDURE — 85041 AUTOMATED RBC COUNT: CPT | Performed by: EMERGENCY MEDICINE

## 2024-07-10 PROCEDURE — 5A1D70Z PERFORMANCE OF URINARY FILTRATION, INTERMITTENT, LESS THAN 6 HOURS PER DAY: ICD-10-PCS | Performed by: INTERNAL MEDICINE

## 2024-07-10 PROCEDURE — 84484 ASSAY OF TROPONIN QUANT: CPT | Performed by: EMERGENCY MEDICINE

## 2024-07-10 PROCEDURE — 250N000013 HC RX MED GY IP 250 OP 250 PS 637

## 2024-07-10 PROCEDURE — 36415 COLL VENOUS BLD VENIPUNCTURE: CPT

## 2024-07-10 PROCEDURE — 250N000012 HC RX MED GY IP 250 OP 636 PS 637: Performed by: EMERGENCY MEDICINE

## 2024-07-10 PROCEDURE — 80307 DRUG TEST PRSMV CHEM ANLYZR: CPT | Performed by: EMERGENCY MEDICINE

## 2024-07-10 PROCEDURE — 84145 PROCALCITONIN (PCT): CPT | Performed by: EMERGENCY MEDICINE

## 2024-07-10 PROCEDURE — 90935 HEMODIALYSIS ONE EVALUATION: CPT

## 2024-07-10 PROCEDURE — 99223 1ST HOSP IP/OBS HIGH 75: CPT | Mod: AI

## 2024-07-10 PROCEDURE — 70450 CT HEAD/BRAIN W/O DYE: CPT | Mod: MA

## 2024-07-10 PROCEDURE — 80048 BASIC METABOLIC PNL TOTAL CA: CPT | Performed by: EMERGENCY MEDICINE

## 2024-07-10 PROCEDURE — 36415 COLL VENOUS BLD VENIPUNCTURE: CPT | Performed by: EMERGENCY MEDICINE

## 2024-07-10 PROCEDURE — 258N000003 HC RX IP 258 OP 636: Performed by: INTERNAL MEDICINE

## 2024-07-10 PROCEDURE — 71045 X-RAY EXAM CHEST 1 VIEW: CPT

## 2024-07-10 PROCEDURE — 84132 ASSAY OF SERUM POTASSIUM: CPT | Performed by: EMERGENCY MEDICINE

## 2024-07-10 PROCEDURE — 82077 ASSAY SPEC XCP UR&BREATH IA: CPT | Performed by: EMERGENCY MEDICINE

## 2024-07-10 PROCEDURE — 82962 GLUCOSE BLOOD TEST: CPT

## 2024-07-10 PROCEDURE — 258N000003 HC RX IP 258 OP 636: Performed by: EMERGENCY MEDICINE

## 2024-07-10 PROCEDURE — 120N000004 HC R&B MS OVERFLOW

## 2024-07-10 PROCEDURE — 258N000001 HC RX 258: Performed by: EMERGENCY MEDICINE

## 2024-07-10 PROCEDURE — 83036 HEMOGLOBIN GLYCOSYLATED A1C: CPT

## 2024-07-10 PROCEDURE — 999N000248 HC STATISTIC IV INSERT WITH US BY RN

## 2024-07-10 PROCEDURE — 83605 ASSAY OF LACTIC ACID: CPT

## 2024-07-10 PROCEDURE — 93005 ELECTROCARDIOGRAM TRACING: CPT | Performed by: STUDENT IN AN ORGANIZED HEALTH CARE EDUCATION/TRAINING PROGRAM

## 2024-07-10 PROCEDURE — 99291 CRITICAL CARE FIRST HOUR: CPT

## 2024-07-10 PROCEDURE — 81001 URINALYSIS AUTO W/SCOPE: CPT | Performed by: EMERGENCY MEDICINE

## 2024-07-10 PROCEDURE — 999N000285 HC STATISTIC VASC ACCESS LAB DRAW WITH PIV START

## 2024-07-10 PROCEDURE — 86900 BLOOD TYPING SEROLOGIC ABO: CPT | Performed by: EMERGENCY MEDICINE

## 2024-07-10 PROCEDURE — 83735 ASSAY OF MAGNESIUM: CPT | Performed by: EMERGENCY MEDICINE

## 2024-07-10 RX ORDER — DEXTROSE MONOHYDRATE 25 G/50ML
25-50 INJECTION, SOLUTION INTRAVENOUS
Status: DISCONTINUED | OUTPATIENT
Start: 2024-07-10 | End: 2024-07-18 | Stop reason: HOSPADM

## 2024-07-10 RX ORDER — CALCITRIOL 0.25 UG/1
0.25 CAPSULE, LIQUID FILLED ORAL DAILY
Status: ON HOLD | COMMUNITY
Start: 2024-05-30 | End: 2024-08-08

## 2024-07-10 RX ORDER — PREGABALIN 25 MG/1
25 CAPSULE ORAL DAILY
Status: ON HOLD | COMMUNITY
End: 2024-07-18

## 2024-07-10 RX ORDER — PREGABALIN 25 MG/1
25 CAPSULE ORAL DAILY
Status: DISCONTINUED | OUTPATIENT
Start: 2024-07-10 | End: 2024-07-18 | Stop reason: HOSPADM

## 2024-07-10 RX ORDER — ONDANSETRON 2 MG/ML
4 INJECTION INTRAMUSCULAR; INTRAVENOUS EVERY 6 HOURS PRN
Status: DISCONTINUED | OUTPATIENT
Start: 2024-07-10 | End: 2024-07-18 | Stop reason: HOSPADM

## 2024-07-10 RX ORDER — ONDANSETRON 4 MG/1
4 TABLET, ORALLY DISINTEGRATING ORAL EVERY 6 HOURS PRN
Status: DISCONTINUED | OUTPATIENT
Start: 2024-07-10 | End: 2024-07-18 | Stop reason: HOSPADM

## 2024-07-10 RX ORDER — POLYETHYLENE GLYCOL 3350 17 G/17G
17 POWDER, FOR SOLUTION ORAL 2 TIMES DAILY PRN
Status: DISCONTINUED | OUTPATIENT
Start: 2024-07-10 | End: 2024-07-18 | Stop reason: HOSPADM

## 2024-07-10 RX ORDER — METOPROLOL SUCCINATE 25 MG/1
25 TABLET, EXTENDED RELEASE ORAL 2 TIMES DAILY
Status: ON HOLD | COMMUNITY
Start: 2024-05-30 | End: 2024-07-18

## 2024-07-10 RX ORDER — CALCIUM ACETATE 667 MG/1
1334 CAPSULE ORAL
Status: ON HOLD | COMMUNITY
End: 2024-08-08

## 2024-07-10 RX ORDER — DOXYCYCLINE 100 MG/1
100 CAPSULE ORAL 2 TIMES DAILY
Status: DISCONTINUED | OUTPATIENT
Start: 2024-07-10 | End: 2024-07-18 | Stop reason: HOSPADM

## 2024-07-10 RX ORDER — PROCHLORPERAZINE 25 MG
25 SUPPOSITORY, RECTAL RECTAL EVERY 12 HOURS PRN
Status: DISCONTINUED | OUTPATIENT
Start: 2024-07-10 | End: 2024-07-18 | Stop reason: HOSPADM

## 2024-07-10 RX ORDER — NICOTINE POLACRILEX 4 MG
15-30 LOZENGE BUCCAL
Status: DISCONTINUED | OUTPATIENT
Start: 2024-07-10 | End: 2024-07-18 | Stop reason: HOSPADM

## 2024-07-10 RX ORDER — DOXYCYCLINE 100 MG/1
100 CAPSULE ORAL 2 TIMES DAILY
Status: ON HOLD | COMMUNITY
Start: 2024-07-08 | End: 2024-07-18

## 2024-07-10 RX ORDER — AMLODIPINE BESYLATE 10 MG/1
10 TABLET ORAL DAILY
COMMUNITY

## 2024-07-10 RX ORDER — PROCHLORPERAZINE MALEATE 5 MG
10 TABLET ORAL EVERY 6 HOURS PRN
Status: DISCONTINUED | OUTPATIENT
Start: 2024-07-10 | End: 2024-07-18 | Stop reason: HOSPADM

## 2024-07-10 RX ORDER — CLINDAMYCIN HCL 150 MG
300 CAPSULE ORAL 3 TIMES DAILY
Status: COMPLETED | OUTPATIENT
Start: 2024-07-10 | End: 2024-07-15

## 2024-07-10 RX ORDER — AMLODIPINE BESYLATE 5 MG/1
10 TABLET ORAL DAILY
Status: DISCONTINUED | OUTPATIENT
Start: 2024-07-10 | End: 2024-07-18 | Stop reason: HOSPADM

## 2024-07-10 RX ORDER — AMOXICILLIN 250 MG
2 CAPSULE ORAL 2 TIMES DAILY PRN
Status: DISCONTINUED | OUTPATIENT
Start: 2024-07-10 | End: 2024-07-18 | Stop reason: HOSPADM

## 2024-07-10 RX ORDER — RISPERIDONE 1 MG/1
1 TABLET ORAL AT BEDTIME
COMMUNITY

## 2024-07-10 RX ORDER — DEXTROSE MONOHYDRATE 25 G/50ML
25 INJECTION, SOLUTION INTRAVENOUS ONCE
Status: COMPLETED | OUTPATIENT
Start: 2024-07-10 | End: 2024-07-10

## 2024-07-10 RX ORDER — CLINDAMYCIN HCL 300 MG
300 CAPSULE ORAL 3 TIMES DAILY
Status: ON HOLD | COMMUNITY
Start: 2024-07-08 | End: 2024-07-18

## 2024-07-10 RX ORDER — LIDOCAINE 40 MG/G
CREAM TOPICAL
Status: DISCONTINUED | OUTPATIENT
Start: 2024-07-10 | End: 2024-07-18 | Stop reason: HOSPADM

## 2024-07-10 RX ORDER — METOPROLOL SUCCINATE 25 MG/1
25 TABLET, EXTENDED RELEASE ORAL 2 TIMES DAILY
Status: DISCONTINUED | OUTPATIENT
Start: 2024-07-10 | End: 2024-07-18 | Stop reason: HOSPADM

## 2024-07-10 RX ORDER — B COMPLEX, C NO.20/FOLIC ACID 1 MG
1 CAPSULE ORAL DAILY
COMMUNITY
Start: 2024-05-30

## 2024-07-10 RX ORDER — AMOXICILLIN 250 MG
1 CAPSULE ORAL 2 TIMES DAILY PRN
Status: DISCONTINUED | OUTPATIENT
Start: 2024-07-10 | End: 2024-07-18 | Stop reason: HOSPADM

## 2024-07-10 RX ADMIN — METOPROLOL SUCCINATE 25 MG: 25 TABLET, EXTENDED RELEASE ORAL at 21:57

## 2024-07-10 RX ADMIN — SODIUM CHLORIDE 10 UNITS: 9 INJECTION, SOLUTION INTRAVENOUS at 13:59

## 2024-07-10 RX ADMIN — CLINDAMYCIN HYDROCHLORIDE 300 MG: 150 CAPSULE ORAL at 22:59

## 2024-07-10 RX ADMIN — DEXTROSE MONOHYDRATE 25 ML: 25 INJECTION, SOLUTION INTRAVENOUS at 15:44

## 2024-07-10 RX ADMIN — DEXTROSE MONOHYDRATE 300 ML: 100 INJECTION, SOLUTION INTRAVENOUS at 14:02

## 2024-07-10 RX ADMIN — CLINDAMYCIN HYDROCHLORIDE 300 MG: 150 CAPSULE ORAL at 16:05

## 2024-07-10 RX ADMIN — DEXTROSE MONOHYDRATE 25 G: 25 INJECTION, SOLUTION INTRAVENOUS at 13:56

## 2024-07-10 RX ADMIN — SODIUM CHLORIDE 300 ML: 9 INJECTION, SOLUTION INTRAVENOUS at 20:29

## 2024-07-10 RX ADMIN — PREGABALIN 25 MG: 25 CAPSULE ORAL at 16:06

## 2024-07-10 ASSESSMENT — ACTIVITIES OF DAILY LIVING (ADL)
ADLS_ACUITY_SCORE: 35
DEPENDENT_IADLS:: CLEANING;COOKING;LAUNDRY;SHOPPING;MEAL PREPARATION;MEDICATION MANAGEMENT;TRANSPORTATION
ADLS_ACUITY_SCORE: 35
ADLS_ACUITY_SCORE: 21

## 2024-07-10 NOTE — ED NOTES
Bed: [place] [address]  Expected date:   Expected time:   Means of arrival: Ambulance  Comments:  SPFL Chest pain, weak legs

## 2024-07-10 NOTE — PHARMACY-ADMISSION MEDICATION HISTORY
Pharmacist Admission Medication History    Admission medication history is complete. The information provided in this note is only as accurate as the sources available at the time of the update.    Information Source(s): Patient, Hospital records, and CareEverywhere/SureScripts via in-person    Pertinent Information: It appears patient unfortunately has some social issues that make access to care difficult. Patient reported that he has no PCP or preferred pharmacy. He stated that he takes some medications but does not know the names of them. I reviewed a recent hospitalization at [facility] and an ED visit to [facility] as well. I used those two visits to update medication list. It is also noteworthy that the patient does not have any record of filling any of these medications for the last year. Suspect that patient struggles with compliance. At recent ER visit he received clindamycin for a dental infection and doxycycline for anal warts. He reported that he got some antibiotics and was not sure what they are and wasn't able to tell me if he was taking them.     Changes made to PTA medication list:  Added: amlodipine, calcitriol, phoslo, clinda, doxy, toprol, renalvite, lyrica, risperdal  Deleted: gabapentin, atarax, allopurinol  Changed: None    Allergies reviewed with patient and updates made in EHR: unable to assess    Medication History Completed By: ADIN VARGHESE RPH 7/10/2024 1:04 PM    PTA Med List   Medication Sig Note Last Dose    amLODIPine (NORVASC) 10 MG tablet Take 10 mg by mouth daily  Unknown    calcitRIOL (ROCALTROL) 0.25 MCG capsule Take 0.25 mcg by mouth daily  Unknown    calcium acetate (PHOSLO) 667 MG CAPS capsule Take 1,334 mg by mouth 3 times daily (with meals)  Unknown    clindamycin (CLEOCIN) 300 MG capsule Take 300 mg by mouth 3 times daily 7/10/2024: Prescribed 7/8 for dental infection Unknown    doxycycline hyclate (VIBRAMYCIN) 100 MG capsule Take 100 mg by  mouth 2 times daily 7/10/2024: Prescribed 7/8 for Anal warts Unknown    metoprolol succinate ER (TOPROL XL) 25 MG 24 hr tablet Take 25 mg by mouth 2 times daily  Unknown    multivitamin RENAL (TRIPHROCAPS) 1 capsule capsule Take 1 capsule by mouth daily  Unknown    ondansetron (ZOFRAN) 4 MG tablet Take 1 tablet (4 mg) by mouth every 8 hours as needed for nausea  Unknown    pregabalin (LYRICA) 25 MG capsule Take 25 mg by mouth daily  Unknown    risperiDONE (RISPERDAL) 1 MG tablet Take 1 mg by mouth at bedtime  Unknown

## 2024-07-10 NOTE — CONSULTS
Care Management Initial Consult    General Information  Assessment completed with: Patient, pt  Type of CM/SW Visit: Initial Assessment    Primary Care Provider verified and updated as needed: Yes   Readmission within the last 30 days: no previous admission in last 30 days      Reason for Consult: discharge planning  Advance Care Planning: Advance Care Planning Reviewed: no concerns identified, verified with patient     General Information Comments: living w/brother but can not return there unless he is able to care for himself, he would like to go to TCU or NH until he can care for himself    Communication Assessment  Patient's communication style: spoken language (English or Bilingual)             Cognitive  Cognitive/Neuro/Behavioral:                        Living Environment:   People in home: sibling(s)     Current living Arrangements: house      Able to return to prior arrangements: no  Living Arrangement Comments: homeless, staying w/brother but he can't return if he can't care for himself, which is not clear, as he has a PCA and RN visiting him at home. Pt states he doesn't have a doctor but has homecare svcs.    Family/Social Support:  Care provided by: self, other (see comments) (PCA)  Provides care for: no one  Marital Status: Single  PCA          Description of Support System: Involved, Supportive    Support Assessment: Adequate family and caregiver support, Adequate social supports, Lacks necessary supervision and assistance, Lacks adequate physical care, Lacks adequate emotional support    Current Resources:   Patient receiving home care services: No     Community Resources: PCA  Equipment currently used at home: wheelchair, manual  Supplies currently used at home: None    Employment/Financial:  Employment Status:          Financial Concerns: none   Referral to Financial Worker: No       Does the patient's insurance plan have a 3 day qualifying hospital stay waiver?  No    Lifestyle & Psychosocial  Needs:  Social Determinants of Health     Food Insecurity: No Food Insecurity (4/23/2024)    [facility] [employer] [facility]    Food Insecurity     Worried About Running Out of Food in the Last Year: 1   Depression: Not on file   Housing Stability: Low Risk  (4/23/2024)    Received from [facility] [employer] [facility]    Housing Stability     Unable to Pay for Housing in the Last Year: 1   Recent Concern: Housing Stability - Medium Risk (3/27/2024)    Received from [facility] [employer] [facility], [facility] [employer] [facility]    Housing Stability     Unable to Pay for Housing in the Last Year: 2   Tobacco Use: High Risk (1/16/2024)    Patient History     Smoking Tobacco Use: Some Days     Smokeless Tobacco Use: Never     Passive Exposure: Not on file   Financial Resource Strain: Low Risk  (4/23/2024)    Received from [facility] [employer] [facility]    Financial Resource Strain     Difficulty of Paying Living Expenses: 3     Difficulty of Paying Living Expenses: Not on file   Alcohol Use: Not on file   Transportation Needs: Unmet Transportation Needs (5/6/2024)    Received from [facility] [employer] [facility]    Transportation Needs     Lack of Transportation (Medical): 2   Physical Activity: Not on file   Interpersonal Safety: Not on file   Stress: Not on file   Social Connections: Socially Integrated (10/19/2023)    Received from [facility] [employer] [facility], [facility] [employer] [facility] [employer] [facility]    Social Connections     Frequency of Communication with Friends and Family: 0   Health Literacy: Not on file       Functional Status:  Prior to admission patient needed assistance:   Dependent ADLs:: Wheelchair-with assist, Transfers  Dependent IADLs:: Cleaning, Cooking, Laundry, Shopping, Meal Preparation, Medication Management, Transportation  Assesssment of Functional Status: Not at baseline with ADL  Functioning, Not at baseline with mobility, Not at  functional baseline, Needs placement in a SNF/TCF for rehabilitation, Needs assistance with transportation    Mental Health Status:  Mental Health Status: No Current Concerns       Chemical Dependency Status:                Values/Beliefs:  Spiritual, Cultural Beliefs, Anabaptist Practices, Values that affect care:                 Additional Information:  Assessed, living w/brother but can not return there unless he is able to care for himself, he would like to go to TCU or NH until he can care for himself. CM to follow for discharge planning and maybe placement.    Pt states he may need to go to TCU or NH. Unclear why he doesn't have a PCP and yet has homecare. States he is able to pivot but not stand or walk. States he uses wheelchair and when hospitalized he is transferred w/tavo lift or hover mat. States he needs help to find PCP. States he is not allow back to the [facility] TCU. Agreeable to TCU placement if recommended. States he will need transportation at discharge.    Hermelindo Cervantes RN

## 2024-07-10 NOTE — ED NOTES
Patients meal tray has arrived. Patient is being transported to dialysis now. Dialysis RN aware. P3 monitor tech notified.

## 2024-07-10 NOTE — ED NOTES
Pt BS second check was 77, asymptomatic- given иван crackers will recheck in 15 minutes. MD notified

## 2024-07-10 NOTE — ED PROVIDER NOTES
EMERGENCY DEPARTMENT ENCOUNTER      NAME: Macario Delatorre  AGE: 43 year old male  YOB: 1980  MRN: [id number]  EVALUATION DATE & TIME: 7/10/2024 11:27 AM    PCP: Elvi Marx    ED PROVIDER: Anita Ferguson M.D.      Chief Complaint   Patient presents with    Chest Pain         FINAL IMPRESSION:  1. Syncope and collapse    2. Chest pain, unspecified type    3. Noncompliance of patient with renal dialysis (H24)    4. ESRD (end stage renal disease) on dialysis (H)    5. Hyperkalemia          ED COURSE & MEDICAL DECISION MAKING:    ED Course as of 07/10/24 1432   Wed Jul 10, 2024   1148 Pt with exertional syncope event with last dialysis either last Friday (5d ago) or Saturday (4d ago) and followed by [facility] with examination WNL and normal VS, no rases or JVD, stating he doesn't have wheelchair friendly transportation method to get him to dialysis thus he misses it frequently, QRS slightly widened, on cardiac monitor and pending magnesium, electrolytes, troponin, will dialyze if hyperkalemic, tox panel pending and will admit for cardiac telemetry with syncope   1313 CT head and CXR reassuringly WNL and patient requesting food tray   1345 Per lab, K 6.3.    1345 [facility] paged to discuss   1357 Troponiin = 77 as expected with poor renal clearance, repeat 2h delta troponin ordered to trend for ACS r/o. I spoke with hosptialist with hyperkalemia with insulin and dextrose adminsitered and they accept admission to cardiac tele for ACS r/o and cardiac telemetry and pending nephrology consultation and likely dialysis while admitted   1422 I spoke [facility] nephrology Dr Boucher who will make sure patient can get onto dialysis schedule   1426 I spoke with Dr Louise from nephrology who will arrange dialysis       Pertinent Labs & Imaging studies reviewed. (See chart for details)      At the conclusion of the encounter I discussed the results of all of the tests and the disposition. The questions  "were answered. The patient or family acknowledged understanding and was agreeable with the care plan.     Critical Care time was 45 minutes for this patient excluding procedures.      MEDICATIONS GIVEN IN THE EMERGENCY:  Medications   glucose gel 15-30 g (has no administration in time range)     Or   dextrose 50 % injection 25-50 mL (has no administration in time range)     Or   glucagon injection 1 mg (has no administration in time range)   dextrose 10% BOLUS 300 mL (300 mLs Intravenous $New Bag 7/10/24 1402)   dextrose 50 % injection 25 g (25 g Intravenous $Given 7/10/24 7987)   insulin regular 1 unit/mL injection 10 Units (10 Units Intravenous $Given 7/10/24 0698)       NEW PRESCRIPTIONS STARTED AT TODAY'S ER VISIT  New Prescriptions    No medications on file          =================================================================    HPI      Macario Delatorre is a 43 year old male with PMHx of obesity, HTN, intellectual disability, ESRD on dialysis, and non compliant with dialysis, and CAD, who presents to the ED today via ambulance with chest pain.    Per chart review, the patient presented to [place] ED for 2 days of left-sided mouth pain and 2 months of pain under his testicles on 7/8/2024. Patient stated he was unsure if he came in to contact with \"something\" at [facility] or after using the bathroom. He was diagnosed with general / anal warts. He was given a dermatology follow up and was prescribed 100 mg of doxycycline.    Per EMS, patient reports chest pain that started at 10 AM and rated the pain at 8/10. EMS given patient 1 nitro and 324 mg of aspirin. Patient declined IV. Patient complains of bilateral leg weakness ongoing for 2-3 days and chest irritation. He has dialysis M/W/F and missed his dialysis treatment 2 days ago.    Patient reports he tried to get off his brother's couch last night to stand up and he collapsed, stating his legs gave out. He attempted to pull himself up to get back on the couch using " "both his arms, and reports he \"passed out\" from the exertion. States he attempted to pull himself up onto the couch using all his strength for about 7 minutes prior to syncopal episode. He isn't sure if he ended up falling asleep rather than passing out. States he woke up on the floor at ~ 0930 to 1000 this morning and realized he was still on the floor in the same position. He mentions as he was pulling himself up, he felt a strain to his chest wall and back. He endorses having back spasms and left-sided frontal chest wall irritation and pain that radiates to his left arm. Rates pain a 8 or 9 out of 10. He hasn't taken any medications prior to arrival for his symptoms. Reports he called EMS and notes he had his phone on him. Notes he was able to get off the floor by himself this morning. He denies new shortness of breath.     States he missed dialysis on Monday (7/8) and on Friday (7/5) and doesn't indicate the last time he received dialysis. He usually receives dialysis M/W/F. States he missed dialysis this week and last week because he doesn't have access to a vehicle with a wheelchair ramp and has contacted his insurance company regarding this, noting they stated they don't have vehicles with this accommodation. He gets around with a electric scooter and notes it is hard to get ~ with this because he was told not to use it when it rains. He has dialysis today scheduled at 3 PM to 7 PM today. He denies a hx of abnormal heart rhythm.    Patient's Nephrologist is Dr. Kruger.    No other reported complaints or concerns at this time.      REVIEW OF SYSTEMS   All other systems reviewed and are negative except as noted above in HPI.    PAST MEDICAL HISTORY:  Past Medical History:   Diagnosis Date    Chlamydia     history of    Flatfoot     feet pes plannus    Mental retardation     mild    Migraine headache     Pain disorder 11/6/11    upset about no pain meds  incedent report filerd by RN    Tobacco abuse     " Undescended testicle        PAST SURGICAL HISTORY:  No past surgical history on file.    CURRENT MEDICATIONS:    amLODIPine (NORVASC) 10 MG tablet  calcitRIOL (ROCALTROL) 0.25 MCG capsule  calcium acetate (PHOSLO) 667 MG CAPS capsule  clindamycin (CLEOCIN) 300 MG capsule  doxycycline hyclate (VIBRAMYCIN) 100 MG capsule  metoprolol succinate ER (TOPROL XL) 25 MG 24 hr tablet  multivitamin RENAL (TRIPHROCAPS) 1 capsule capsule  ondansetron (ZOFRAN) 4 MG tablet  pregabalin (LYRICA) 25 MG capsule  risperiDONE (RISPERDAL) 1 MG tablet        ALLERGIES:  Allergies   Allergen Reactions    Bee Venom      Bee stings swelling and short of breath    Pcn [Penicillins]      Noted in 8/20/08 ER,hives,headaches,throat irritation    Trazodone      Abdominal pain    Tylenol [Acetaminophen] GI Disturbance     Noted in 8/20/08 ER    Ibuprofen GI Disturbance     Noted in 8/20/08 ER  Headaches and abdominal pain       FAMILY HISTORY:  Family History   Problem Relation Age of Onset    Diabetes No family hx of     Cancer No family hx of     Heart Disease No family hx of     Diabetes Mother     Heart Failure Father     Diabetes Type 2  Father     Kidney Disease Father        SOCIAL HISTORY:   Social History     Socioeconomic History    Marital status: Single   Tobacco Use    Smoking status: Some Days     Current packs/day: 0.10     Types: Cigarettes    Smokeless tobacco: Never    Tobacco comments:     smokes about 1 cigarette a day   Substance and Sexual Activity    Alcohol use: No     Comment: holidays and birthday - wine    Drug use: No     Comment: Drug use: Chart review shows + THC, pt denies    Sexual activity: Yes   Social History Narrative    5/21/2016 - Works at an [employer]     Social Determinants of Health     Financial Resource Strain: Low Risk  (4/23/2024)    Received from [facility]    Financial Resource Strain     Difficulty of Paying Living Expenses: 3   Food Insecurity: No Food  "Insecurity (4/23/2024)    Received from [facility]    Food Insecurity     Worried About Running Out of Food in the Last Year: 1   Transportation Needs: Unmet Transportation Needs (5/6/2024)    Received from [facility]    Transportation Needs     Lack of Transportation (Medical): 2   Social Connections: Socially Integrated (10/19/2023)    Received from [facility], [facility]    Social Connections     Frequency of Communication with Friends and Family: 0   Housing Stability: Low Risk  (4/23/2024)    Received from [facility]    Housing Stability     Unable to Pay for Housing in the Last Year: 1   Recent Concern: Housing Stability - Medium Risk (3/27/2024)    Received from [facility], [facility]    Housing Stability     Unable to Pay for Housing in the Last Year: 2       VITALS:  Patient Vitals for the past 24 hrs:   BP Temp Temp src Pulse Resp SpO2 Height   07/10/24 1423 -- -- -- 87 -- 100 % --   07/10/24 1349 -- -- -- 79 11 100 % --   07/10/24 1334 -- -- -- 83 (!) 50 100 % --   07/10/24 1331 -- -- -- 82 (!) 36 (!) 84 % --   07/10/24 1315 (!) 171/101 -- -- 85 28 100 % --   07/10/24 1131 (!) 157/90 -- -- 86 16 100 % --   07/10/24 1130 -- -- -- -- 14 -- 1.981 m (6' 6\")   07/10/24 1127 -- 98.1  F (36.7  C) Oral 85 -- 100 % --       PHYSICAL EXAM    GENERAL: Awake, alert.  In no acute distress.   HEENT: Normocephalic, atraumatic.  Pupils equal, round and reactive.  Conjunctiva normal.  EOMI. Dy cracked lips.  NECK: No stridor or apparent deformity.  PULMONARY: Symmetrical breath sounds without distress.  Lungs clear to auscultation bilaterally without wheezes, rhonchi or rales.  CARDIO: Regular rate and rhythm.  No significant murmur, rub or gallop.  Radial pulses strong and " symmetrical.  ABDOMINAL: Abdomen soft, non-distended and non-tender to palpation.  No CVAT, no palpable hepatosplenomegaly.  EXTREMITIES: No lower extremity swelling or edema. Left upper arm AV fistula present.  NEURO: Alert and oriented to person, place and time.  Cranial nerves grossly intact.  No focal motor deficit.  PSYCH: Normal mood and affect  SKIN: No rashes      LAB:  All pertinent labs reviewed and interpreted.  Results for orders placed or performed during the hospital encounter of 07/10/24   XR Chest Port 1 View    Impression    IMPRESSION: Low lung volumes with bibasilar atelectasis. No pleural effusion or pneumothorax. Stable heart size and mediastinal contours.   CT Head w/o Contrast    Impression    IMPRESSION:  1.  No acute intracranial injury, hemorrhage, mass, or CT evidence of recent ischemia.  2.  Opacification of the right maxillary sinus probably due to a polyp, but is not specific.     Basic metabolic panel   Result Value Ref Range    Sodium 144 135 - 145 mmol/L    Potassium 6.3 (HH) 3.4 - 5.3 mmol/L    Chloride 99 98 - 107 mmol/L    Carbon Dioxide (CO2) 21 (L) 22 - 29 mmol/L    Anion Gap 24 (H) 7 - 15 mmol/L    Urea Nitrogen 81.0 (H) 6.0 - 20.0 mg/dL    Creatinine 30.16 (H) 0.67 - 1.17 mg/dL    GFR Estimate 2 (L) >60 mL/min/1.73m2    Calcium 9.0 8.6 - 10.0 mg/dL    Glucose 88 70 - 99 mg/dL   Result Value Ref Range    Procalcitonin 0.64 (H) <0.50 ng/mL   Result Value Ref Range    Troponin T, High Sensitivity 77 (H) <=22 ng/L   Result Value Ref Range    Magnesium 2.7 (H) 1.7 - 2.3 mg/dL   Ethyl Alcohol Level   Result Value Ref Range    Alcohol ethyl <0.01 <=0.01 g/dL   CBC with platelets and differential   Result Value Ref Range    WBC Count 6.5 4.0 - 11.0 10e3/uL    RBC Count 3.35 (L) 4.40 - 5.90 10e6/uL    Hemoglobin 9.6 (L) 13.3 - 17.7 g/dL    Hematocrit 29.9 (L) 40.0 - 53.0 %    MCV 89 78 - 100 fL    MCH 28.7 26.5 - 33.0 pg    MCHC 32.1 31.5 - 36.5 g/dL    RDW 15.5 (H) 10.0 - 15.0 %     Platelet Count 234 150 - 450 10e3/uL    % Neutrophils 70 %    % Lymphocytes 21 %    % Monocytes 6 %    % Eosinophils 2 %    % Basophils 0 %    % Immature Granulocytes 0 %    NRBCs per 100 WBC 0 <1 /100    Absolute Neutrophils 4.6 1.6 - 8.3 10e3/uL    Absolute Lymphocytes 1.4 0.8 - 5.3 10e3/uL    Absolute Monocytes 0.4 0.0 - 1.3 10e3/uL    Absolute Eosinophils 0.1 0.0 - 0.7 10e3/uL    Absolute Basophils 0.0 0.0 - 0.2 10e3/uL    Absolute Immature Granulocytes 0.0 <=0.4 10e3/uL    Absolute NRBCs 0.0 10e3/uL   Glucose by meter   Result Value Ref Range    GLUCOSE BY METER POCT 124 (H) 70 - 99 mg/dL   Adult Type and Screen   Result Value Ref Range    ABO/RH(D) O POS     Antibody Screen Negative Negative    SPECIMEN EXPIRATION DATE [id number]        RADIOLOGY:  Reviewed all pertinent imaging. Please see official radiology report.  XR Chest Port 1 View   Final Result   IMPRESSION: Low lung volumes with bibasilar atelectasis. No pleural effusion or pneumothorax. Stable heart size and mediastinal contours.      CT Head w/o Contrast   Final Result   IMPRESSION:   1.  No acute intracranial injury, hemorrhage, mass, or CT evidence of recent ischemia.   2.  Opacification of the right maxillary sinus probably due to a polyp, but is not specific.               EKG:    Reviewed and interpreted as: 7/10/2024. 11:29:27. Rate: 86 BPM. QRS duration: 108 ms. Sinus rhythm. When compared with ECG of 7/23/2017 19:27, similar appearing except QRS wider.      I have independently reviewed and interpreted the EKG(s) documented above.        I, Anali Chaparro, am serving as a scribe to document services personally performed by Dr. Anita Ferguson based on my observation and the provider's statements to me. I, Anita Ferguson MD attest that Anali Chaparro is acting in a scribe capacity, has observed my performance of the services and has documented them in accordance with my direction.       Anita Ferguson MD  07/10/24 0831

## 2024-07-10 NOTE — ED NOTES
Pt desats to 76% on room air while sleeping, pt states he has a hx of sleep apnea. Placed on 2 LNC while sleeping sats > 92 % at this time.

## 2024-07-10 NOTE — ED NOTES
BG now 102. Meal tray ordered. Will transport to dialysis and bring tray to patient there when delivered.

## 2024-07-10 NOTE — ED NOTES
Pt reports he fell last night, he felt his legs gave out he fell to his knees, than pulled with his arms and that is when his chest started to hurt; pt reports that the pain is on the left side but it was on the right. Pt also complains of pain in his spine.    details… regular rate and rhythm/S1 S2 present/no gallops/no murmur

## 2024-07-10 NOTE — ED NOTES
[facility] paging house regarding BG of 62. Patient is asymptomatic.  House officer returned page and notified of BG and treatment

## 2024-07-10 NOTE — ED NOTES
RN attempted IV start. Pt repeatedly states he needs IV US, Pt skin is hypersensitive, painful to touch.

## 2024-07-10 NOTE — H&P
[facility]    History and Physical - Hospitalist Service       Date of Admission:  7/10/2024    Assessment & Plan      Macario Delatorre is a 43 year old male admitted on 7/10/2024 for syncopal episode and chest pain. He has a PMH of ESRD on M/W/F dialysis, HTN, DM2, sleep apnea, bipolar/schizophrenia, and cognitive impairment. Found to have K of 6.3 in setting of missed dialysis. Planning dialysis run tonight.     ESRD on dialysis M/W/F  Hyperkalemia  Started dialysis 11/2023. Follows with Dr. Kruger/[facility]. Typically dialyzes MWF at [facility] using left arm AVF. Missed at least 7/8/24 and 7/10/24 sessions of dialysis. Has history of frequently missed sessions and shortened treatments. In ED, K 6.3 and Cr 30.16. Given 10 units of insulin with dextrose in the ED to shift.   - dialysis run today  - low K diet  - daily BMP    Possible syncopal episode  Chest pain  Leg weakness  Reports possible syncopal episode vs mechanical slump from couch due to leg weakness (uses scooter at baseline) on 7/9. Also with bilateral rib pain and then central chest pain extending to dialysis fistula after pulling himself up from the ground. No shortness of breath. EKG unremarkable. Trop 77 in setting of ESRD. Unable to complete MSK exam though story most consistent with MSK strain.   - repeat trop pending  - continue tele  - PT/OT/CM    Anion gap metabolic acidosis  Anion gap of 24 with CO2 of 21. Most likely in setting of ESRD though considering other causes. EtOH negative.   - lactate pending    Normocytic anemia, chronic  Hgb of 9.6 initially with prior lab 10 in 1/2024. MCV of 89. Suspect anemia of chronic disease. Of note also some blood in urine and report of episode of blood when wiping so could consider iron deficiency anemia secondary to blood loss.   - daily CBC    HTN, uncontrolled  Unclear what he takes for PTA medications. -170s/90s-100s on admission. Suspect some  component of hypervolemia 2/2 missed dialysis run.   - dialysis as above  - PTA amlodipine 10 daily  - PTA metoprolol 25 BID    DM2  Hypoglycemia 2/2 insulin  Diabetic peripheral neuropathy  Reports history of diabetes. Does not check blood sugar at home nor take any medications for it. Glucose and albumin noted in his urine so I suspect poor control. Received 10 units of insulin in ED to shift K and had BG of 62. Given 25 of dextrose with resolution.    - Hgb A1c pending  - Lyrica 25 mg daily     Possible sleep apnea  Reports history of sleep apnea. Desat to 76 while sleeping that improved with 2L.  - NC PRN\    Gingival infection  Anal warts  Seen at [place] [facility] on 7/8/24. Was noted to have gingival infection and genital warts. Prescribed clindamycin and doxycycline at that time.   - PTA clindamycin 300 TID  - PTA doxycycline 100 BID     Bipolar disorder  Schizophrenia  Cognitive impairment  Patient reports history of bipolar and schizophrenia. Does not endorse routine psychiatric follow up or regular medication use. In [facility] system was started on risperidone 1 mg at bedtime though has not taken consistently. Stated he can escalate quickly if upset. 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         Diet: Combination Diet Regular Diet Adult; Renal Diet (dialysis)    DVT Prophylaxis: Pneumatic Compression Devices  Raphael Catheter: Not present  Fluids: PO  Lines: None     Cardiac Monitoring: ACTIVE order. Indication: Electrolyte Imbalance (24 hours)- Magnesium <1.3 mg/ml; Potassium < =2.8 or > 5.5 mg/ml  Code Status: Full Code    Clinically Significant Risk Factors Present on Admission        # Hyperkalemia: Highest K = 6.3 mmol/L in last 2 days, will monitor as appropriate      # Anion Gap Metabolic Acidosis: Highest Anion Gap = 24 mmol/L in last 2 days, will monitor and treat as appropriate           # Anemia: based on hgb <11           # Overweight: Estimated  "body mass index is 28.08 kg/m  as calculated from the following:    Height as of this encounter: 1.981 m (6' 6\").    Weight as of this encounter: 110.2 kg (242 lb 15.2 oz).       # Financial/Environmental Concerns: none         Disposition Plan      Expected Discharge Date: 07/12/2024                The patient's care was discussed with the Attending Physician, Dr. Roth .    Gisela Cooper MD  Hospitalist Service  [facility]  Securely message with [employer] (more info)  Text page via [employer] Paging/Directory   ______________________________________________________________________    Chief Complaint   Syncope, chest pain    History is obtained from the patient and chart review    History of Present Illness   Macario Delatorre is a 43 year old male with self reported history of ESRD on M/W/F dialysis, HTN, DM2, sleep apnea, bipolar/schizophrenia, and cognitive impairment who presents after syncopal episode and with chest pain.     Reports he tried to get up from the couch on 7/9 though felt his legs were weak. He then \"collapsed\" to the floor. Neither him nor his brother had the strength to get him up. He then remembers waking up on the floor this morning. He called EMS. While waiting for EMS, he pulled himself up somewhat on the edge of the couch. He then experienced a bilateral 8/10 rib pain and central chest pain that extended into his dialysis fistula. EMS gave 1 nitroglycerin and 324 of aspirin. He has not had this chest pain before. No shortness of breath. No nausea, vomiting, or diarrhea.     ESRD: He typically dialyzes M/W/F at [facility] in [facility]. He missed his typical 7/8 and 7/10 sessions due to transportation issues and [place]. He reports a narrowing of his fistula.     He says he does not check his blood sugars or take any medications for diabetes. When he feels shaky, he eats something. He knows he has a history of HTN though does not know what medications he takes, " if any. \\    He also did mention having an episode of blood when he wiped after a bowel movement.     Of note was recently seen at [place] ED on 7/8. Was noted to have gingival infection and genital warts. Prescribed clindamycin and doxycycline and given a dermatology referral.     [facility] ED course:  Vitally stable upon presentation. Desatted to 76 while sleeping though resolved with 2L. Lab work revealed K of 6.3, Cr of 30.16, Hgb of 9.6, Mg 2.7. EKG unremarkable. Troponin 77 with repeat pending. Also with procal of 0.64. UA with glucose, albumin, blood, and hyaline casts. CT head and chest xray unremarkable. UDS and alcohol negative. Started insulin and dextrose to shift K. Spoke with Nephrology who is coordinating dialysis run.     Past Medical History    Past Medical History:   Diagnosis Date    Chlamydia     history of    Flatfoot     feet pes plannus    Mental retardation     mild    Migraine headache     Pain disorder 11/6/11    upset about no pain meds  incedent report filerd by RN    Tobacco abuse     Undescended testicle      Past Surgical History   No past surgical history on file.    Prior to Admission Medications   Prior to Admission Medications   Prescriptions Last Dose Informant Patient Reported? Taking?   amLODIPine (NORVASC) 10 MG tablet Unknown  Yes Yes   Sig: Take 10 mg by mouth daily   calcitRIOL (ROCALTROL) 0.25 MCG capsule Unknown  Yes Yes   Sig: Take 0.25 mcg by mouth daily   calcium acetate (PHOSLO) 667 MG CAPS capsule Unknown  Yes Yes   Sig: Take 1,334 mg by mouth 3 times daily (with meals)   clindamycin (CLEOCIN) 300 MG capsule Unknown  Yes Yes   Sig: Take 300 mg by mouth 3 times daily   doxycycline hyclate (VIBRAMYCIN) 100 MG capsule Unknown  Yes Yes   Sig: Take 100 mg by mouth 2 times daily   metoprolol succinate ER (TOPROL XL) 25 MG 24 hr tablet Unknown  Yes Yes   Sig: Take 25 mg by mouth 2 times daily   multivitamin RENAL (TRIPHROCAPS) 1 capsule capsule Unknown  Yes Yes   Sig: Take  1 capsule by mouth daily   ondansetron (ZOFRAN) 4 MG tablet Unknown  No Yes   Sig: Take 1 tablet (4 mg) by mouth every 8 hours as needed for nausea   pregabalin (LYRICA) 25 MG capsule Unknown  Yes Yes   Sig: Take 25 mg by mouth daily   risperiDONE (RISPERDAL) 1 MG tablet Unknown  Yes Yes   Sig: Take 1 mg by mouth at bedtime      Facility-Administered Medications: None      Social History     Macario is staying with his brother. Notes his brother said he needs some space and may kick him out if he cannot take care of himself. Does not work as he is disabled per his report. Does not have a regular primary care doctor and does not always have access to medications. Transportation is a barrier for him as he uses a motorized scooter and doesn't have a vehicle that can accommodate this.     He reports no tobacco use or alcohol use. Admits to occasional marijuana use. No other recreational drug use.     Family History   I have reviewed this patient's family history and updated it with pertinent information if needed.  Family History   Problem Relation Age of Onset    Diabetes No family hx of     Cancer No family hx of     Heart Disease No family hx of     Diabetes Mother     Heart Failure Father     Diabetes Type 2  Father     Kidney Disease Father      Allergies   Allergies   Allergen Reactions    Bee Venom      Bee stings swelling and short of breath    Pcn [Penicillins]      Noted in 8/20/08 ER,hives,headaches,throat irritation    Trazodone      Abdominal pain    Tylenol [Acetaminophen] GI Disturbance     Noted in 8/20/08 ER    Ibuprofen GI Disturbance     Noted in 8/20/08 ER  Headaches and abdominal pain        Physical Exam   Vital Signs: Temp: 98.1  F (36.7  C) Temp src: Oral BP: (!) 171/101 Pulse: 90   Resp: 11 SpO2: 99 % O2 Device: Nasal cannula Oxygen Delivery: 2 LPM  Weight: 242 lbs 15.15 oz    Limited exam due to patient preference - does not like to be touched    Constitutional: awake, alert, conversant,  appears older than stated age  HEENT: poor dentition, moist mucous membranes  Eyes: lids and lashes normal, pupils equal, round and reactive to light, extra ocular muscles intact  Hematologic/Lymphatic: patient deferred exam  Respiratory: clear to auscultation bilaterally, no crackles or wheezing  Cardiovascular: regular rate and rhythm, normal S1 and S2, no S3 or S4, and no murmur noted  GI: normal bowel sounds; unable to palpate due to patient preference  Skin: right foot with band aid with possible underlying sore  Musculoskeletal: left arm with AV fistula; unable to assess further per patient preference  Neurologic: Awake, alert, oriented to name, place and time. Cranial nerves II-XII are grossly intact.      Medical Decision Making   Please see A&P for additional details of medical decision making.      Data   ------------------------- PAST 24 HR DATA REVIEWED -----------------------------------------------    I have personally reviewed the following data over the past 24 hrs:  Results for orders placed or performed during the hospital encounter of 07/10/24 (from the past 24 hour(s))   CT Head w/o Contrast    Narrative    EXAM: CT HEAD W/O CONTRAST  LOCATION: [facility]  DATE: 7/10/2024    INDICATION: Fall yesterday, unconsciousness.  COMPARISON: None.  TECHNIQUE: Routine CT Head without IV contrast. Multiplanar reformats. Dose reduction techniques were used.    FINDINGS:  INTRACRANIAL CONTENTS: No intracranial hemorrhage, extraaxial collection, or mass effect. No CT evidence of acute infarct. Normal parenchymal attenuation. Normal ventricles and sulci.     VISUALIZED ORBITS/SINUSES/MASTOIDS: No intraorbital abnormality. Soft tissue opacification of the right maxillary sinus with expansion of the antrum. No middle ear or mastoid effusion.    BONES/SOFT TISSUES: No acute abnormality.      Impression    IMPRESSION:  1.  No acute intracranial injury, hemorrhage, mass, or CT evidence of  recent ischemia.  2.  Opacification of the right maxillary sinus probably due to a polyp, but is not specific.     XR Chest Port 1 View    Narrative    EXAM: XR CHEST PORT 1 VIEW  LOCATION: [facility]  DATE: 7/10/2024    INDICATION: fall yesterday with chest discomfort  COMPARISON: Chest radiograph 4/14/2024      Impression    IMPRESSION: Low lung volumes with bibasilar atelectasis. No pleural effusion or pneumothorax. Stable heart size and mediastinal contours.   CBC with platelets differential    Narrative    The following orders were created for panel order CBC with platelets differential.  Procedure                               Abnormality         Status                     ---------                               -----------         ------                     CBC with platelets and d...[[id number]]  Abnormal            Final result                 Please view results for these tests on the individual orders.   ABO/Rh type and screen    Narrative    The following orders were created for panel order ABO/Rh type and screen.  Procedure                               Abnormality         Status                     ---------                               -----------         ------                     Adult Type and Screen[[id number]]                            Final result                 Please view results for these tests on the individual orders.   Basic metabolic panel   Result Value Ref Range    Sodium 144 135 - 145 mmol/L    Potassium 6.3 (HH) 3.4 - 5.3 mmol/L    Chloride 99 98 - 107 mmol/L    Carbon Dioxide (CO2) 21 (L) 22 - 29 mmol/L    Anion Gap 24 (H) 7 - 15 mmol/L    Urea Nitrogen 81.0 (H) 6.0 - 20.0 mg/dL    Creatinine 30.16 (H) 0.67 - 1.17 mg/dL    GFR Estimate 2 (L) >60 mL/min/1.73m2    Calcium 9.0 8.6 - 10.0 mg/dL    Glucose 88 70 - 99 mg/dL   Procalcitonin   Result Value Ref Range    Procalcitonin 0.64 (H) <0.50 ng/mL   Troponin T, High Sensitivity   Result Value Ref Range     Troponin T, High Sensitivity 77 (H) <=22 ng/L   Magnesium   Result Value Ref Range    Magnesium 2.7 (H) 1.7 - 2.3 mg/dL   Ethyl Alcohol Level   Result Value Ref Range    Alcohol ethyl <0.01 <=0.01 g/dL   CBC with platelets and differential   Result Value Ref Range    WBC Count 6.5 4.0 - 11.0 10e3/uL    RBC Count 3.35 (L) 4.40 - 5.90 10e6/uL    Hemoglobin 9.6 (L) 13.3 - 17.7 g/dL    Hematocrit 29.9 (L) 40.0 - 53.0 %    MCV 89 78 - 100 fL    MCH 28.7 26.5 - 33.0 pg    MCHC 32.1 31.5 - 36.5 g/dL    RDW 15.5 (H) 10.0 - 15.0 %    Platelet Count 234 150 - 450 10e3/uL    % Neutrophils 70 %    % Lymphocytes 21 %    % Monocytes 6 %    % Eosinophils 2 %    % Basophils 0 %    % Immature Granulocytes 0 %    NRBCs per 100 WBC 0 <1 /100    Absolute Neutrophils 4.6 1.6 - 8.3 10e3/uL    Absolute Lymphocytes 1.4 0.8 - 5.3 10e3/uL    Absolute Monocytes 0.4 0.0 - 1.3 10e3/uL    Absolute Eosinophils 0.1 0.0 - 0.7 10e3/uL    Absolute Basophils 0.0 0.0 - 0.2 10e3/uL    Absolute Immature Granulocytes 0.0 <=0.4 10e3/uL    Absolute NRBCs 0.0 10e3/uL   Adult Type and Screen   Result Value Ref Range    ABO/RH(D) O POS     Antibody Screen Negative Negative    SPECIMEN EXPIRATION DATE [id number]    Glucose by meter   Result Value Ref Range    GLUCOSE BY METER POCT 124 (H) 70 - 99 mg/dL   Urine Drug Screen    Narrative    The following orders were created for panel order Urine Drug Screen.  Procedure                               Abnormality         Status                     ---------                               -----------         ------                     Urine Drug Screen Panel[[id number]]      Normal              Final result                 Please view results for these tests on the individual orders.   UA with Microscopic reflex to Culture    Specimen: Urine, Clean Catch   Result Value Ref Range    Color Urine Colorless Colorless, Straw, Light Yellow, Yellow    Appearance Urine Clear Clear    Glucose Urine 150 (A) Negative mg/dL     Bilirubin Urine Negative Negative    Ketones Urine Negative Negative mg/dL    Specific Gravity Urine 1.009 1.001 - 1.030    Blood Urine 0.03 mg/dL (A) Negative    pH Urine 8.0 (H) 5.0 - 7.0    Protein Albumin Urine 200 (A) Negative mg/dL    Urobilinogen Urine <2.0 <2.0 mg/dL    Nitrite Urine Negative Negative    Leukocyte Esterase Urine Negative Negative    RBC Urine 1 <=2 /HPF    WBC Urine 2 <=5 /HPF    Squamous Epithelials Urine 1 <=1 /HPF    Hyaline Casts Urine 3 (H) <=2 /LPF    Narrative    Urine Culture not indicated   Urine Drug Screen Panel   Result Value Ref Range    Amphetamines Urine Screen Negative Screen Negative    Barbituates Urine Screen Negative Screen Negative    Benzodiazepine Urine Screen Negative Screen Negative    Cannabinoids Urine Screen Negative Screen Negative    Cocaine Urine Screen Negative Screen Negative    Fentanyl Qual Urine Screen Negative Screen Negative    Opiates Urine Screen Negative Screen Negative    PCP Urine Screen Negative Screen Negative   Glucose by meter   Result Value Ref Range    GLUCOSE BY METER POCT 77 70 - 99 mg/dL   Glucose by meter   Result Value Ref Range    GLUCOSE BY METER POCT 62 (L) 70 - 99 mg/dL

## 2024-07-10 NOTE — CONSULTS
NEPHROLOGY CONSULTATION - [facility]        REASON FOR CONSULTATION: ESRD    HISTORY OF PRESENT ILLNESS: 42 yo male with ESRD on dialysis MWF at [facility] using left arm AVF, HTN, CAD, cognitive impairment admit with chest pain.  Reports last dialysis was Wed, is poorly compliant with attending dialysis and frequently shortens treatments.  Also reports syncope last night.  He denies any sob.  BP high here.  He says he was told by dialysis unit to schedule a fistulagram for himself but when I called [facility] they denied any problems with fistula but did report he is particular about which staff cannulates him; they did not have concerns that warrant a fistulagram.    REVIEW OF SYSTEMS:Comprehensive review of systems obtained and otherwise negative.    Past Medical History:   Diagnosis Date    Chlamydia     history of    Flatfoot     feet pes plannus    Mental retardation     mild    Migraine headache     Pain disorder 11/6/11    upset about no pain meds  incedent report filerd by RN    Tobacco abuse     Undescended testicle        Social History     Socioeconomic History    Marital status: Single     Spouse name: Not on file    Number of children: Not on file    Years of education: Not on file    Highest education level: Not on file   Occupational History    Not on file   Tobacco Use    Smoking status: Some Days     Current packs/day: 0.10     Types: Cigarettes    Smokeless tobacco: Never    Tobacco comments:     smokes about 1 cigarette a day   Substance and Sexual Activity    Alcohol use: No     Comment: holidays and birthday - wine    Drug use: No     Comment: Drug use: Chart review shows + THC, pt denies    Sexual activity: Yes   Other Topics Concern    Not on file   Social History Narrative    5/21/2016 - Works at an [employer]     Social Determinants of Health     Financial Resource Strain: Low Risk  (4/23/2024)    Received from [facility]     Financial Resource Strain     Difficulty of Paying Living Expenses: 3     Difficulty of Paying Living Expenses: Not on file   Food Insecurity: No Food Insecurity (4/23/2024)    [facility]    Food Insecurity     Worried About Running Out of Food in the Last Year: 1   Transportation Needs: Unmet Transportation Needs (5/6/2024)    [facility]    Transportation Needs     Lack of Transportation (Medical): 2   Physical Activity: Not on file   Stress: Not on file   Social Connections: Socially Integrated (10/19/2023)    [facility]    Social Connections     Frequency of Communication with Friends and Family: 0   Interpersonal Safety: Not on file   Housing Stability: Low Risk  (4/23/2024)    Received from [facility]    Housing Stability     Unable to Pay for Housing in the Last Year: 1   Recent Concern: Housing Stability - Medium Risk (3/27/2024)    Received from [facility], [facility]    Housing Stability     Unable to Pay for Housing in the Last Year: 2       Family History   Problem Relation Age of Onset    Diabetes No family hx of     Cancer No family hx of     Heart Disease No family hx of     Diabetes Mother     Heart Failure Father     Diabetes Type 2  Father     Kidney Disease Father        Allergies   Allergen Reactions    Bee Venom      Bee stings swelling and short of breath    Pcn [Penicillins]      Noted in 8/20/08 ER,hives,headaches,throat irritation    Trazodone      Abdominal pain    Tylenol [Acetaminophen] GI Disturbance     Noted in 8/20/08 ER    Ibuprofen GI Disturbance     Noted in 8/20/08 ER  Headaches and abdominal pain       MEDICATIONS:  Current Facility-Administered Medications   Medication Dose Route Frequency  "Provider Last Rate Last Admin    dextrose 10% BOLUS 300 mL  300 mL Intravenous Once Anita Fergsuon MD 75 mL/hr at 07/10/24 1402 300 mL at 07/10/24 1402         PHYSICAL EXAM    BP (!) 171/101   Pulse 87   Temp 98.1  F (36.7  C) (Oral)   Resp 11   Ht 1.981 m (6' 6\")   SpO2 100%   BMI 32.36 kg/m          Gen: NAD  HEENT: No icterus, NC/AT  CARDIOVASCULAR: RR, no rub,  1+ edema  PULMONARY: CTA ant No cyanosis  GASTROINTESTINAL: soft, NT/ND, obese  MSK: Diffuse muscle atrophy, warm  NEURO: Alert, no gross focal findings  PSYCHIATRIC: Normal affect, answers questions appropriately  SKIN: dystrophic toenails, no jaundice, no rash.  Left arm AVF  LABORATORIES  Recent Labs   Lab 07/10/24  1253      POTASSIUM 6.3*   CHLORIDE 99   CO2 21*   BUN 81.0*   CR 30.16*   GFRESTIMATED 2*   LOVELY 9.0   MAG 2.7*       ASSESSMENT:   42 yo male with ESRD on dialysis MWF at [facility] using left arm AVF, HTN, CAD, cognitive impairment admit with chest pain.    PLAN:  ESRD - on dialysis MWF at [facility] using left arm AVF.  Missed dialysis for a week. History of very poor compliance with dialysis.  Will dialyze here today.  I discussed with dialysis nurse.  Hyperkalemia - due to missed dialysis  -will correct with dialysis today  -needs low K diet when eating  3. HTN - uncontrolled, hypervolemia from missed dialysis contributing  -re-eval bp after UF with dialysis   -resume home meds  4. Syncope - unclear if weakness then fell asleep on floor vs true syncope.    -cont telemetry  5. Chest pain - after pulling himself off floor  -cont with serial exam and troponin but seems likely musculoskeletal (although certainly many risk factors for CAD)      Carol Louise MD  [facility]  [phone]       "

## 2024-07-10 NOTE — ED TRIAGE NOTES
The patient arrives via [facility] from home where he lives independently. Pt receives dialysis M/W/F 245 to 1900, he missed Mondays dialysis. Pt had a loss of transportation. Awoke 10:00 am with chest pain.  mg given via EMS and one nitroglycerin with no change in his chest pain. EKG was inremarkable.no cardiac hx. Pt c/o bilateral leg weakness x 2-3 days.

## 2024-07-11 ENCOUNTER — APPOINTMENT (OUTPATIENT)
Dept: PHYSICAL THERAPY | Facility: HOSPITAL | Age: 44
DRG: 640 | End: 2024-07-11
Payer: MEDICARE

## 2024-07-11 ENCOUNTER — APPOINTMENT (OUTPATIENT)
Dept: OCCUPATIONAL THERAPY | Facility: HOSPITAL | Age: 44
DRG: 640 | End: 2024-07-11
Payer: MEDICARE

## 2024-07-11 PROCEDURE — 99232 SBSQ HOSP IP/OBS MODERATE 35: CPT | Mod: GC

## 2024-07-11 PROCEDURE — 250N000013 HC RX MED GY IP 250 OP 250 PS 637

## 2024-07-11 PROCEDURE — 97165 OT EVAL LOW COMPLEX 30 MIN: CPT | Mod: GO

## 2024-07-11 PROCEDURE — 250N000013 HC RX MED GY IP 250 OP 250 PS 637: Performed by: STUDENT IN AN ORGANIZED HEALTH CARE EDUCATION/TRAINING PROGRAM

## 2024-07-11 PROCEDURE — 250N000013 HC RX MED GY IP 250 OP 250 PS 637: Performed by: FAMILY MEDICINE

## 2024-07-11 PROCEDURE — 250N000011 HC RX IP 250 OP 636

## 2024-07-11 PROCEDURE — 97161 PT EVAL LOW COMPLEX 20 MIN: CPT | Mod: GP

## 2024-07-11 PROCEDURE — 120N000004 HC R&B MS OVERFLOW

## 2024-07-11 RX ORDER — NALOXONE HYDROCHLORIDE 0.4 MG/ML
0.4 INJECTION, SOLUTION INTRAMUSCULAR; INTRAVENOUS; SUBCUTANEOUS
Status: DISCONTINUED | OUTPATIENT
Start: 2024-07-11 | End: 2024-07-18 | Stop reason: HOSPADM

## 2024-07-11 RX ORDER — NALOXONE HYDROCHLORIDE 0.4 MG/ML
0.2 INJECTION, SOLUTION INTRAMUSCULAR; INTRAVENOUS; SUBCUTANEOUS
Status: DISCONTINUED | OUTPATIENT
Start: 2024-07-11 | End: 2024-07-18 | Stop reason: HOSPADM

## 2024-07-11 RX ORDER — HYDROMORPHONE HYDROCHLORIDE 2 MG/1
2 TABLET ORAL ONCE
Status: COMPLETED | OUTPATIENT
Start: 2024-07-11 | End: 2024-07-11

## 2024-07-11 RX ADMIN — HYDROMORPHONE HYDROCHLORIDE 2 MG: 2 TABLET ORAL at 00:35

## 2024-07-11 RX ADMIN — DOXYCYCLINE HYCLATE 100 MG: 100 CAPSULE ORAL at 20:08

## 2024-07-11 RX ADMIN — METOPROLOL SUCCINATE 25 MG: 25 TABLET, EXTENDED RELEASE ORAL at 09:20

## 2024-07-11 RX ADMIN — CLINDAMYCIN HYDROCHLORIDE 300 MG: 150 CAPSULE ORAL at 20:08

## 2024-07-11 RX ADMIN — AMLODIPINE BESYLATE 10 MG: 5 TABLET ORAL at 09:20

## 2024-07-11 RX ADMIN — METOPROLOL SUCCINATE 25 MG: 25 TABLET, EXTENDED RELEASE ORAL at 20:08

## 2024-07-11 RX ADMIN — DOXYCYCLINE HYCLATE 100 MG: 100 CAPSULE ORAL at 00:35

## 2024-07-11 RX ADMIN — DOXYCYCLINE HYCLATE 100 MG: 100 CAPSULE ORAL at 09:20

## 2024-07-11 RX ADMIN — CLINDAMYCIN HYDROCHLORIDE 300 MG: 150 CAPSULE ORAL at 13:08

## 2024-07-11 RX ADMIN — PREGABALIN 25 MG: 25 CAPSULE ORAL at 09:20

## 2024-07-11 RX ADMIN — CLINDAMYCIN HYDROCHLORIDE 300 MG: 150 CAPSULE ORAL at 09:25

## 2024-07-11 RX ADMIN — ONDANSETRON 4 MG: 4 TABLET, ORALLY DISINTEGRATING ORAL at 00:35

## 2024-07-11 ASSESSMENT — ACTIVITIES OF DAILY LIVING (ADL)
ADLS_ACUITY_SCORE: 30
ADLS_ACUITY_SCORE: 22
ADLS_ACUITY_SCORE: 30
ADLS_ACUITY_SCORE: 23
ADLS_ACUITY_SCORE: 30
ADLS_ACUITY_SCORE: 23
ADLS_ACUITY_SCORE: 30
ADLS_ACUITY_SCORE: 23
ADLS_ACUITY_SCORE: 30
ADLS_ACUITY_SCORE: 23
ADLS_ACUITY_SCORE: 30
ADLS_ACUITY_SCORE: 23
ADLS_ACUITY_SCORE: 30
ADLS_ACUITY_SCORE: 23

## 2024-07-11 NOTE — PROGRESS NOTES
"[facility]    Medicine Progress Note - Hospitalist Service    Date of Admission:  7/10/2024    Assessment & Plan      Macario Delatorre is a 43 year old male admitted on 7/10/2024 for syncopal episode and chest pain, fth have hyperkalemia likely 2/2 hypervolemia in setting of missing HD x2. He has a PMH of ESRD on M/W/F dialysis, HTN, DM2, sleep apnea, bipolar/schizophrenia, and cognitive impairment. HD completed 7/10 overnight with improvement of K.    Hyperkalemia, improving  ESRD on dialysis M/W/F  K 5.6 this AM, down from admission where it was 6.3. Patient was initially shifted w/insulin 10u w/dextrose in ED. Now s/p HD completed urgently 7/10, where 2L were also taken off. Missed at least 7/8/24 and 7/10/24 sessions of dialysis. Of note, started dialysis 11/2023 and follows with Dr. Kruger/[facility]. Typically dialyzes MWF at [facility] using left arm AVF. Has history of frequently missed sessions and shortened treatments. No events on cardiac tele.   - Nephrology following recs appreciated  - Daily BMP    Possible syncopal episode  Chest pain  Leg weakness  Reports possible syncopal episode vs mechanical slump after his legs gave out when he was trying to lift his brother's couch due to leg weakness (uses scooter at baseline) on 7/9. He also reports sudden onset of changing sternal chest pain radiating down his L arm when this happened. Persists currently. Trop 77 and 76 on repeat, EKG on admission w/o acute ischemic changes. He reports having \"several heart attacks and strokes\" between the ages of [age], but did not seek care. Patient reported hx of heart failure, but has not been worked up per chart rvw and he is not on medications for this. Sats wnl, lungs clear on auscultation - low concerns for pulmonary etiology. Could possibly be costochondritis from overexertion.   - Consider cardiac workup once hyperkalemia stable   - continue tele  - PT/OT consult, recs " "appreciated     Inconsistent healthcare   Homelessness  Transportation insecurity  Financial insecurity  Patient reports he has been \"out on the streets.\" May have a vehicle though not clear. Previously living with his brother, but was asked to leave d/t brother not being able to keep up with cares. He has PCA's but they have not been coming to see him. Of note, he has had little to no contact w/healthcare throughout his life d/t psychosocial struggles (homelessness, mistrust in healthcare system, financial insecurity, transportation insecurity, lack of support system).  - CM/SW consult for placement, recs appreciated    Anion gap metabolic acidosis  Anion gap of 24 with CO2 of 21. Most likely in setting of ESRD though considering other causes. EtOH negative. Lactate wnl. Afebrile, WBC wnl - low concerns for infection at this time. Patient refused labs this AM.   - BMP daily as able     Normocytic anemia, chronic  Hgb of 9.6 initially with prior lab 10 in 1/2024. MCV of 89. Suspect anemia of chronic disease in setting of ESRD. Denies hematuria this AM though this was reports of this on admission. Possible component of iron deficiency as well given difficult living situation. Patient refused labs this AM. No si/sx of acute hemorrhaging on exam.   - daily CBC as able     HTN, uncontrolled  Patient unsure what he takes for PTA medications. BP's persistently in the 150-170s/80s-100s. Suspect some component of hypervolemia 2/2 missed dialysis run.   - dialysis as above  - PTA amlodipine 10 daily  - PTA metoprolol 25 BID    T2DM  Diabetic peripheral neuropathy  Reports history of diabetes. Does not check blood sugar at home nor take any medications for it. A1c on admission 4.4, but unclear how accurate this is w/ESRD and ACD. Received 10 units of insulin in ED to shift K and had BG of 62. Given 25 of dextrose with resolution.    - Lyrica 25 mg daily   - Holding off on sliding scale at this time     Possible sleep " "apnea  Reports history of sleep apnea. Desat to 76 while sleeping that improved with 2L.  - NC PRN    Gingival infection  Anal warts  Seen at [place] ED on 7/8/24. Was noted to have gingival infection and genital warts. Prescribed clindamycin and doxycycline at that time.   - Continue PTA clindamycin 300 TID (7/8 - 7/15)  - Continue PTA doxycycline 100 BID (7/8 - 7/18)    Bipolar disorder  Schizophrenia  Cognitive impairment  Patient reports history of bipolar and schizophrenia. Does not endorse routine psychiatric follow up or regular medication use. In [facility] system was started on risperidone 1 mg at bedtime though has not taken consistently. Stated he can escalate quickly if upset. 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           Diet: Combination Diet Regular Diet Adult; Renal Diet (dialysis)    DVT Prophylaxis: Pneumatic Compression Devices  Raphael Catheter: Not present  Lines: None     Cardiac Monitoring: ACTIVE order. Indication: Electrolyte Imbalance (24 hours)- Magnesium <1.3 mg/ml; Potassium < =2.8 or > 5.5 mg/ml  Code Status: Full Code      Clinically Significant Risk Factors Present on Admission        # Hyperkalemia: Highest K = 6.3 mmol/L in last 2 days, will monitor as appropriate      # Anion Gap Metabolic Acidosis: Highest Anion Gap = 24 mmol/L in last 2 days, will monitor and treat as appropriate         # Anemia: based on hgb <11           # Overweight: Estimated body mass index is 26.39 kg/m  as calculated from the following:    Height as of this encounter: 1.981 m (6' 6\").    Weight as of this encounter: 103.6 kg (228 lb 6.3 oz).       # Financial/Environmental Concerns: none         Disposition Plan     Medically Ready for Discharge: Anticipated in 2-4 Days           The patient's care was discussed with the Attending Physician, Dr. Roth .    Gurjit Mensah MD  Hospitalist Service  [facility]  Securely " "message with [employer] (more info)  Text page via [facility] Paging/Directory   ______________________________________________________________________    Interval History   NAOE. Refusing labs. Easily agitated, but verbally redirectable.     Expresses a lot of frustration of not being heard w/healthcare professionals. Endorses chest pain radiating to L arm that is changing in nature. Denies dyspnea, abdominal pain, n/v. Reports hx of heart failure, multiple heart attacks/strokes in his 20s - 30s, but never sought care for this. Endorses BLE weakness - uses a scooter, his walker is \"messed up.\"    Was previously living with his brother, but he is no longer to keep up w/patient's cares and asked to leave. Has PCA's, but they have not been coming. He reports having 2 trucks and living out of them, but for some reason struggles w/transportation as too.     Physical Exam   Vital Signs: Temp: 98.6  F (37  C) Temp src: Oral BP: (!) 142/93 (RN NOTIFIED) Pulse: 83   Resp: 16 SpO2: 99 % O2 Device: None (Room air) Oxygen Delivery: 2 LPM  Weight: 228 lbs 6.34 oz    Physical Exam  Constitutional:       General: He is not in acute distress.     Appearance: Normal appearance.   HENT:      Head: Normocephalic and atraumatic.   Eyes:      General: No scleral icterus.     Pupils: Pupils are equal, round, and reactive to light.   Cardiovascular:      Rate and Rhythm: Normal rate and regular rhythm.      Heart sounds: No murmur heard.     No friction rub. No gallop.      Comments: LUE AVF w/bruit and palpable thrill  Pulmonary:      Effort: Pulmonary effort is normal. No respiratory distress.      Breath sounds: Normal breath sounds. No stridor. No wheezing or rales.   Abdominal:      Comments: Protuberant   Musculoskeletal:      Right lower leg: Edema present.      Left lower leg: Edema present.   Skin:     General: Skin is dry.   Neurological:      General: No focal deficit present.      Mental Status: He is alert and oriented to person, " place, and time. Mental status is at baseline.   Psychiatric:      Comments: Labile mood, calm, conversing appropriately          Data   ------------------------- PAST 24 HR DATA REVIEWED -----------------------------------------------

## 2024-07-11 NOTE — PROGRESS NOTES
Hemodialysis Treatment Note:    Total UF removed:   3000 ml    Total Treatment Time  3 hours    Access:   AV Fistula L upper arm: Thrill and bruit preesnt. No s/s of infections. Cannulated with 15g needles without complications. Access patent.  achieved.     Run Summary:   Urgent HD for high K of 6.3.  K2 bath 3hr treatment with goal initially set for 2.5kg per pt's demand, but adjusted by monitoring BP.  Ended up pulling 3kg. BP stayed high for the whole tx. Completed dialysis treatment without issues.  Report given to primary nurse, Swetha DILLON     Access (post dialysis) :   AV Fistula continued to be patent although venous pressure slightly elevated.  maintained during dialysis. Needles pulled and gauze applied with direct pressure, then secured with tape.  Hemostasis achieved within 10 min.     Interventions:  VS check q15min     Plans:  Per renal team.    WIL[facility] ....................7/10/2024 9:04 PM

## 2024-07-11 NOTE — PROGRESS NOTES
"CLINICAL NUTRITION SERVICES - ASSESSMENT NOTE     Nutrition Prescription    RECOMMENDATIONS FOR MDs/PROVIDERS TO ORDER:    Malnutrition Status:    Patient does not meet two of the criteria necessary for diagnosing malnutrition    Recommendations already ordered by Registered Dietitian (RD):    Future/Additional Recommendations:  Monitor po intake, weight, I/Os, labs.   Provide renal diet education as able      REASON FOR ASSESSMENT  Macario Delatorre is a/an 43 year old male assessed by the dietitian for Admission Nutrition Risk Screen for unintentional weight loss.     HPI: Pt admitted on 7/10/2024 for syncopal episode and chest pain, fth have hyperkalemia likely 2/2 hypervolemia in setting of missing HD x2. He has a PMH of ESRD on M/W/F dialysis, HTN, DM2, sleep apnea, bipolar/schizophrenia, and cognitive impairment. HD completed 7/10 overnight with improvement of K.     NUTRITION HISTORY  Met with pt at bedside this afternoon. Pt reports variable appetite and oral intakes. Pt notes times of poor appetite and times of good appetite over the last few months. Pt reports dislike for renal diet, states he would rather not eat at times. Pt reports usual weight of 220-260 lb.  Pt notes some nausea today.   NKFA    CURRENT NUTRITION ORDERS  Diet: Renal  Intake/Tolerance: Fair   Pt reporting po intakes variable PTA, good appetite and good intakes at times, will skip meals occasionally     LABS  Reviewed  7/10: K 5.6(H), BUN 81.0(H), Creat 30.16(H), Mg 2.7(H), BG     MEDICATIONS  Reviewed   Scheduled norvasc, cleocin, vibramycin, lyrica    ANTHROPOMETRICS  Height: 198.1 cm (6' 6\")  Most Recent Weight: 103.6 kg (228 lb 6.3 oz)    IBW: 97.3 kg  BMI: Overweight BMI 25-29.9  Weight History: Weight changes likely r/t fluid status with dialysis   Wt Readings from Last 10 Encounters:   07/11/24 103.6 kg (228 lb 6.3 oz)   01/16/24 127 kg (280 lb) - likely stated    10/23/16 99.8 kg (220 lb) - likely stated    09/27/16 126.6 kg " (279 lb) - likely stated    04/09/16 104.3 kg (230 lb) - likely stated    08/12/13 128.7 kg (283 lb 12.8 oz)   5/31/24  110.5 kg (243 lb 9.7 oz)  4/25/24  100.9 kg (222 lb 7.1 oz)   2/27/24  108.8 kg (239 lb 13.4 oz)   1/23/24  105.7 kg (233 lb) - likely stated   10/29/23  103.2 kg (227 lb 8 oz)   12/28/22  118.1 kg (260 lb 5.8 oz)     Dosing Weight: 98.8 kg adjusted wt    ASSESSED NUTRITION NEEDS  Estimated Energy Needs: 7405-6119 kcals/day (25 - 30 kcals/kg)  Justification: Maintenance  Estimated Protein Needs: 98 +/- grams protein/day (1 +/- grams of pro/kg)  Justification: Dialysis  Estimated Fluid Needs: Per provider pending fluid status    PHYSICAL FINDINGS/GI CONCERNS  See malnutrition section below.  Per Flowsheets:   Edema: Mild ankles, feet    MALNUTRITION:  % Weight Loss:  Weight loss does not meet criteria for malnutrition   % Intake:  Decreased intake does not meet criteria for malnutrition, variable po   Subcutaneous Fat Loss:  None observed  Muscle Loss:  None observed  Fluid Retention:  Mild ankles, feet    Malnutrition Diagnosis: Patient does not meet two of the above criteria necessary for diagnosing malnutrition    NUTRITION DIAGNOSIS   Food- and Nutrition- knowledge deficit related to ESRD as evidence by need for renal diet education.     INTERVENTIONS  Implementation  Emphasized adequate oral intakes to meet nutrition needs   Discussed process of ordering system, facility menu     Provide renal diet education as able     Goals  Pt to meet >75% nutritional needs   Electrolytes WNL   BG < 180      Monitoring/Evaluation  Progress toward goals will be monitored and evaluated per protocol.

## 2024-07-11 NOTE — PLAN OF CARE
Problem: Chronic Kidney Disease  Goal: Optimal Coping with Chronic Illness  Outcome: Progressing   Goal Outcome Evaluation:  Patient alert and oriented x 4. Pleasant and calm. He is very talkative with the hospitalist earlier today. Patient with no SOB. Slept on and off. No dialysis today.  involved since patient is homeless. NSR with 1st degree AVB. Good appetite. Call light in reach.

## 2024-07-11 NOTE — PROGRESS NOTES
NEPHROLOGY PROGRESS NOTE    Date of service: 07/11/24     CC: esrd      ASSESSMENT/RECOMMENDATIONS:  42 yo male with ESRD on dialysis MWF at [facility] using left arm AVF, HTN, CAD, cognitive impairment admit with chest pain.     ESRD - on dialysis MWF at [facility] using left arm AVF.  Missed dialysis for a week. History of very poor compliance with dialysis.  Will dialyze here today.  I discussed with dialysis nurse.  Hyperkalemia - due to missed dialysis  -HD MWF  -needs low K diet when eating  3. HTN - uncontrolled on admit, hypervolemia from missed dialysis contributing  -Bps better now  -resumed home meds and UF with HD ongoing  4. Syncope - unclear if weakness then fell asleep on floor vs true syncope.    -cont telemetry  5. Chest pain - after pulling himself off floor  -cont with serial exam and troponin but seems likely musculoskeletal (although certainly many risk factors for CAD)    Chao Lua MD  [facility]   Office: [phone]      S: Since yesterday, had HD yesterday. No acute events. Does not want to talk with me. Care discussed with RN.      Current Facility-Administered Medications   Medication Dose Route Frequency Provider Last Rate Last Admin    amLODIPine (NORVASC) tablet 10 mg  10 mg Oral Daily Gisela Cooper MD   10 mg at 07/11/24 0920    clindamycin (CLEOCIN) capsule 300 mg  300 mg Oral TID Gurjit Mensah MD   300 mg at 07/11/24 0925    glucose gel 15-30 g  15-30 g Oral Q15 Min PRN Anita Ferguson MD        Or    dextrose 50 % injection 25-50 mL  25-50 mL Intravenous Q15 Min PRN Anita Ferguson MD   25 mL at 07/10/24 1544    Or    glucagon injection 1 mg  1 mg Subcutaneous Q15 Min PRN Anita Ferguson MD        doxycycline hyclate (VIBRAMYCIN) capsule 100 mg  100 mg Oral BID Gurjit Mensah MD   100 mg at 07/11/24 0920    lidocaine (LMX4) cream   Topical Q1H PRN Gisela Cooper MD        lidocaine 1 % 0.1-1 mL  0.1-1 mL Other Q1H PRN  "Gisela Cooper MD        metoprolol succinate ER (TOPROL XL) 24 hr tablet 25 mg  25 mg Oral BID Gisela Cooper MD   25 mg at 07/11/24 0920    naloxone (NARCAN) injection 0.2 mg  0.2 mg Intravenous Q2 Min PRN Feliciano Grande MD        Or    naloxone (NARCAN) injection 0.4 mg  0.4 mg Intravenous Q2 Min PRN Feliciano Grande MD        Or    naloxone (NARCAN) injection 0.2 mg  0.2 mg Intramuscular Q2 Min PRN Feliciano Grande MD        Or    naloxone (NARCAN) injection 0.4 mg  0.4 mg Intramuscular Q2 Min PRN Feliciano Grande MD        ondansetron (ZOFRAN ODT) ODT tab 4 mg  4 mg Oral Q6H PRN Gisela Cooper MD   4 mg at 07/11/24 0035    Or    ondansetron (ZOFRAN) injection 4 mg  4 mg Intravenous Q6H PRN Gisela Cooper MD        polyethylene glycol (MIRALAX) Packet 17 g  17 g Oral BID PRN Gisela Cooper MD        pregabalin (LYRICA) capsule 25 mg  25 mg Oral Daily Marcel Roth MD   25 mg at 07/11/24 0920    prochlorperazine (COMPAZINE) injection 10 mg  10 mg Intravenous Q6H PRN Gisela Cooper MD        Or    prochlorperazine (COMPAZINE) tablet 10 mg  10 mg Oral Q6H PRN Gisela Cooper MD        Or    prochlorperazine (COMPAZINE) suppository 25 mg  25 mg Rectal Q12H PRN Gisela Cooper MD        senalden-docusate (SENOKOT-S/PERICOLACE) 8.6-50 MG per tablet 1 tablet  1 tablet Oral BID PRN Gisela Cooper MD        Or    senna-docusate (SENOKOT-S/PERICOLACE) 8.6-50 MG per tablet 2 tablet  2 tablet Oral BID PRN Gisela Cooper MD        sodium chloride (PF) 0.9% PF flush 3 mL  3 mL Intracatheter Q8H Gisela Cooper MD   3 mL at 07/11/24 0921    sodium chloride (PF) 0.9% PF flush 3 mL  3 mL Intracatheter q1 min prn Gisela Cooper MD            No interval change in SH, FH.    Physical Exam  BP (!) 152/80 (BP Location: Right arm)   Pulse 88   Temp 99.1  F (37.3  C) (Oral)   Resp 16   Ht 1.981 m (6' 6\")   Wt 103.6 kg (228 lb 6.3 oz)   SpO2 99%   BMI 26.39 kg/m    GENERAL: NAD  HEENT: NCAT, " "pupils equal, sclerae not icteric, MMM  NECK: Trachea midline.   LUNGS: no respiratory distress,  HEART:  + leg edema.   ABDOMEN: Soft, NT  PSYCH: Alert, not answering questions  NEURO:  moves all extremities  MUSC/SKEL: normal muscle mass, no joint effusions evident  SKIN: No rash   Access: L AVF    Lab Data:  Recent Labs   Lab Test 07/10/24  1633 07/10/24  1253 01/16/24  1837   POTASSIUM 5.6* 6.3* 4.2   CHLORIDE  --  99 98   ALBUMIN  --   --  4.1     Recent Labs   Lab Test 07/10/24  1253 01/16/24  1837   BUN 81.0* 35.8*     Recent Labs   Lab Test 07/10/24  1253 01/16/24  1837   WBC 6.5 5.2   HGB 9.6* 10.0*   MCV 89 95    233     No lab results found.    Invalid input(s): \"PTHINTACT\", \"[id number]\", \"[id number]\", \"PROTCREATUR\"    I reviewed the above labs  "

## 2024-07-11 NOTE — ED NOTES
"[facility] ED Handoff Report    ED Chief Complaint: chest pain    ED Diagnosis:  (R55) Syncope and collapse  Comment: syncope vs mechanical slump from couch due to leg weakness  Plan: admit    (R07.9) Chest pain, unspecified type  Comment: central chest pain that developed after patient pulling himself up.  and nitroglycerin given by EMS did not help   Plan: admit    (Z91.158) Noncompliance of patient with renal dialysis (H24)  Comment: dialysis M W F - missed on Monday due to loss of transportation  Plan: Dialysis in progress currently    (N18.6,  Z99.2) ESRD (end stage renal disease) on dialysis (H)  Comment:   Plan:     (E87.5) Hyperkalemia  Comment: K 6.3 on arrival. Shift with meds done in ED. Recheck was 5.6 prior to dialysis.   Plan: admit       PMH:    Past Medical History:   Diagnosis Date    Chlamydia     history of    Flatfoot     feet pes plannus    Mental retardation     mild    Migraine headache     Pain disorder 11/6/11    upset about no pain meds  incedent report filerd by RN    Tobacco abuse     Undescended testicle         Code Status:  Full Code     Falls Risk: Yes Band: Applied    Elimination Status: Continent: Yes     Activity Level: patient uses scooter at baseline. Patient has not been up and out of bed with this RN.     Patients Preferred Language:  English     Needed: No    Vital Signs:  BP (!) 186/88   Pulse 91   Temp 98.9  F (37.2  C) (Oral)   Resp 18   Ht 1.981 m (6' 6\")   Wt 110.2 kg (242 lb 15.2 oz)   SpO2 100%   BMI 28.08 kg/m       Cardiac Rhythm: SR    Pain Score:     Is the Patient Confused:  No    Last Food or Drink: 07/10/24 at 1730    Focused Assessment:  Patient is alert and oriented, able to make needs known. Chest pain remains, central anterior chest. Denies SOB, lung sounds clear.     Tests Performed: Done: Labs and Imaging    Treatments Provided:  see MAR    Family Dynamics/Concerns: No    Family Updated On Visitor Policy: No    Plan of Care " Communicated to Family: No    Who Was Updated about Plan of Care: will discuss updates with patient upon return from dialysis.  --have not done this as patient will be going up straight from dialysis. Please check with patient upon arrival to unit.     Belongings Sent with Patient:     Medications sent with patient:     Additional Information: Patient will be coming straight up from dialysis. Tech will check BG prior to transport.     RN: Swetha Brewer RN   7/10/2024 7:14 PM

## 2024-07-11 NOTE — PROGRESS NOTES
07/11/24 1100   Appointment Info   Signing Clinician's Name / Credentials (PT) AMBROCIO Martinez   Living Environment   People in Home sibling(s)  (Brother)   Current Living Arrangements house   Home Accessibility stairs to enter home   Number of Stairs, Main Entrance 1   Living Environment Comments Plan to be at brother's house   Self-Care   Equipment Currently Used at Home wheelchair, manual;other (see comments)  (Power scooter)   Fall history within last six months yes   Number of times patient has fallen within last six months 1   General Information   Onset of Illness/Injury or Date of Surgery 07/10/24   Referring Physician Gisela Cooper MD   Patient/Family Therapy Goals Statement (PT) None stated   Pertinent History of Current Problem (include personal factors and/or comorbidities that impact the POC) 43 year old male admitted on 7/10/2024 for syncopal episode and chest pain. He has a PMH of ESRD on M/W/F dialysis, HTN, DM2, sleep apnea, bipolar/schizophrenia, and cognitive impairment. Found to have K of 6.3 in setting of missed dialysis. Planning dialysis run tonight.   Existing Precautions/Restrictions fall   Cognition   Cognitive Status Comments Alert and maintains conversation   Pain Assessment   Patient Currently in Pain Yes, see Vital Sign flowsheet  (Pain to light touch of BLE)   Strength (Manual Muscle Testing)   Strength (Manual Muscle Testing) Able to perform L SLR;Able to perform R SLR;No deficits observed during functional mobility   Bed Mobility   Bed Mobility supine-sit;sit-supine   Supine-Sit Navarro (Bed Mobility) independent   Sit-Supine [place] (Bed Mobility) independent   Transfers   Comment, (Transfers) Not assessed. Pt declined to try transfers during evaluation.   Gait/Stairs (Locomotion)   Comment, (Gait/Stairs) Pt does not amb at baseline.   Sensory Examination   Sensory Perception patient reports no sensory changes   Sensory Perception Comments Baseline neuropathy in  BLE   Clinical Impression   Criteria for Skilled Therapeutic Intervention Yes, treatment indicated   PT Diagnosis (PT) Impaired mobility, transfers, and ambulation   Influenced by the following impairments Baseline BLE neuropathy, pain   Functional limitations due to impairments Home environment navigation   Clinical Presentation (PT Evaluation Complexity) stable   Clinical Presentation Rationale Presents as medically diagnosed   Clinical Decision Making (Complexity) low complexity   Planned Therapy Interventions (PT) transfer training;wheelchair management/propulsion training;risk factor education;progressive activity/exercise;ROM (range of motion);strengthening;patient/family education;bed mobility training;home exercise program   Risk & Benefits of therapy have been explained patient   PT Total Evaluation Time   PT Eval, Low Complexity Minutes (98947) 15   Physical Therapy Goals   PT Frequency Daily   PT Predicted Duration/Target Date for Goal Attainment 07/18/24   PT Goals Transfers   PT: Transfers Supervision/stand-by assist;Sit to/from stand;Bed to/from chair   PT Discharge Planning   PT Plan Standing pivot transfers, WC managment   PT Discharge Recommendation (DC Rec) Transitional Care Facility   PT Rationale for DC Rec Pt unable to progress with transfer due to pain and reported weakness. Will need futher therapy to address discharge   PT Brief overview of current status Ind with bed mobility   PT Equipment Needed at Discharge wheelchair   Total Session Time   Total Session Time (sum of timed and untimed services) 15       Co-signature completed by Emily Hooks, PT on 7/11/2024

## 2024-07-11 NOTE — PLAN OF CARE
Goal Outcome Evaluation:      Plan of Care Reviewed With: patient    Overall Patient Progress: improvingOverall Patient Progress: improving         Pt arrived to [facility] this evening from ED after a dialysis run. Pt A&O with some underlying cognitive impairments. Chest pain remains, cardiac monitor placed and cardiac workup done. Pt reports pain in both feet. Per pt, d/t baseline neuropathy, gout and edema. Very tender to touch. Patient ate sandwich and applesauce upon arrival. 2 RN skin check done. Right big toe bruising and genital warts noted.    Wallet, Lighter and THC vape pen among pt belongings. Pt refused to send to security. Pt stated issue at other hospitals with belongings not getting back to him, including cash. RN offered to count sandy with pt but pt refused. Educated pt on not using vape and/or lighter while in hospital. Pt states understanding but still wants them in his room. Placed in closed in belonging bag. Pt unable to walk at baseline and bed alarm on.     Pt brought home doxycycline 100mg capsule prescription. Sent to pharmacy for use while inpatient.     Plan: recheck K in am.

## 2024-07-11 NOTE — PROGRESS NOTES
"Care Management Follow Up    Length of Stay (days): 1    Expected Discharge Date: 07/12/2024     Concerns to be Addressed:    Care progression - discharge planning   Patient plan of care discussed at interdisciplinary rounds: Yes    Anticipated Discharge Disposition:  Transitional Care     Anticipated Discharge Services:  Transitional Care  Anticipated Discharge DME:  NA    Patient/family educated on Medicare website which has current facility and service quality ratings:  NA  Education Provided on the Discharge Plan:  Yes per team  Patient/Family in Agreement with the Plan:  Yes    Referrals Placed by CM/SW:  Yes  Private pay costs discussed: Not applicable    Additional Information:  Met with patient at bedside to discuss rehab discharge rec for Transitional Care. Patient in agreement and said, \"I don't care where you sent me as long as I am accepted.\"  Patient would like to start sending referrals to the [facility].   He knows he is a difficult patient to place and is OK to expand the search if needed.    Writer provided a list of local skilled nursing facilities [facility] (which includes the medicare.gov website) for patient and family to review.     Social Hx: \"Live w/brother, but cannot return there unless he is able to care for himself. Patient would like to go to TCU/NH until he can care for himself. Rehab rec TCU, referrals sent. Will need transportation.\"     RNCM to follow for medical progression, recommendations, and final discharge plan.     Moon Hernández RN     "

## 2024-07-11 NOTE — PROGRESS NOTES
OT Vitor     07/11/24 1000   Appointment Info   Signing Clinician's Name / Credentials (OT) Drea Kartik, OTR/L   Living Environment   People in Home sibling(s)   Current Living Arrangements house   Home Accessibility stairs to enter home   Living Environment Comments Pt currently houseless, planning to live with brother after he is DCd from hospital/TCU. Per pt, brother lives alone in house w/ staiurs, unclear how many stairs.   Self-Care   Equipment Currently Used at Home wheelchair, manual;wheelchair, power;other (see comments)  (electric scooter)   Fall history within last six months yes   Number of times patient has fallen within last six months 1   Activity/Exercise/Self-Care Comment Manual and electric w/c, electric scooter. Per pt, both w/c and scooters are currently broken. He reports he hasn't walked in years. Reports he has had PCA in past to help w/ ADLs/IADLs, but has not had a PCA in recent months.   Instrumental Activities of Daily Living (IADL)   IADL Comments Pt does not have assist at this time, but has not been able to be IND w/ IADLs. Does not drive.   General Information   Onset of Illness/Injury or Date of Surgery 07/10/24   Referring Physician Gisela Cooper MD   Additional Occupational Profile Info/Pertinent History of Current Problem 43 year old male admitted on 7/10/2024 for syncopal episode and chest pain. He has a PMH of ESRD on M/W/F dialysis, HTN, DM2, sleep apnea, bipolar/schizophrenia, and cognitive impairment. Found to have K of 6.3 in setting of missed dialysis. Planning dialysis run tonight.   Existing Precautions/Restrictions fall   Limitations/Impairments safety/cognitive   Cognitive Status Examination   Orientation Status orientation to person, place and time   Behavioral Issues other (see comments)   Cognitive Status Comments Pt initially appearing withdrawn, minimal verbal responses to therapist Qs, flat affect. Pt then opening up more during session, appearing more  relaxed throughout duration.   Sensory   Sensory Quick Adds sensation intact   Range of Motion Comprehensive   General Range of Motion no range of motion deficits identified   Strength Comprehensive (MMT)   General Manual Muscle Testing (MMT) Assessment no strength deficits identified   Comment, General Manual Muscle Testing (MMT) Assessment Pt reportng generalized BLE weakness, no UB deficits noted.   Muscle Tone Assessment   Muscle Tone Quick Adds No deficits were identified   Coordination   Upper Extremity Coordination No deficits were identified   Bed Mobility   Bed Mobility supine-sit;sit-supine;scooting/bridging   Scooting/Bridging [place] (Bed Mobility) independent   Supine-Sit [place] (Bed Mobility) modified independence   Sit-Supine [place] (Bed Mobility) modified independence   Assistive Device (Bed Mobility) bed rails   Comment (Bed Mobility) Pt reporting increased pain w/ activity.   Transfers   Transfers other (see comments)   Transfer Comments Pt completed scooting on EOB SBA, to mock/ prepare for SB transfer.   Balance   Balance Assessment sitting static balance   Sitting Balance: Static supervision   Position, Sitting Balance sitting edge of bed   Activities of Daily Living   BADL Assessment/Intervention lower body dressing   Lower Body Dressing Assessment/Training   Comment, (Lower Body Dressing) Did not directly observe today, pt SBA to pull up/adjust pants from supine position. Pt reporting he does not wear socks/shoes. Reporting donning pants would be more difficult d/t pain.   Clinical Impression   Criteria for Skilled Therapeutic Interventions Met (OT) Yes, treatment indicated   OT Diagnosis Impaired ADLs/IADLs   Influenced by the following impairments FTT, deconditioning, pain   OT Problem List-Impairments impacting ADL problems related to;activity tolerance impaired;cognition;mobility;strength   Assessment of Occupational Performance 3-5 Performance Deficits   Identified  Performance Deficits Dressing, toileting, func mobility, transfers   Planned Therapy Interventions (OT) ADL retraining;IADL retraining;strengthening;transfer training;progressive activity/exercise;home program guidelines   Clinical Decision Making Complexity (OT) problem focused assessment/low complexity   Risk & Benefits of therapy have been explained evaluation/treatment results reviewed;care plan/treatment goals reviewed;patient   OT Total Evaluation Time   OT Eval, Low Complexity Minutes (89003) 19   OT Goals   Therapy Frequency (OT) 5 times/week   OT Predicted Duration/Target Date for Goal Attainment 07/18/24   OT Goals Lower Body Dressing;Toilet Transfer/Toileting   OT: Lower Body Dressing Modified independent   OT: Toilet Transfer/Toileting Modified independent   OT Discharge Planning   OT Plan Tranfers (SPT vs SB) to w/c, functional transfers to chair/commode, LB dressing? (pt typically completes while supine).   OT Discharge Recommendation (DC Rec) Transitional Care Facility   OT Rationale for DC Rec Pt does not have safe housing available at this time and is below PLOF w/ ADLs/IADLs, would benefit from cont therapy in TCU setting to improve safety and IND before pt DC's to his brother's home (has stairs).   OT Brief overview of current status Mod IND - SBA bed mobility, scooting   Total Session Time   Total Session Time (sum of timed and untimed services) 19

## 2024-07-11 NOTE — PLAN OF CARE
Problem: Chronic Kidney Disease  Goal: Acceptable Pain Control  Outcome: Progressing  Intervention: Prevent or Manage Pain  Recent Flowsheet Documentation  Taken 7/11/2024 0130 by Kimi Butler RN  Pain Management Interventions:   declines   repositioned   rest   emotional support  Taken 7/11/2024 0035 by Kimi Butler RN  Pain Management Interventions:   medication (see MAR)   emotional support   repositioned   rest     Problem: Chest Pain  Goal: Resolution of Chest Pain Symptoms  Outcome: Progressing  Intervention: Manage Acute Chest Pain  Recent Flowsheet Documentation  Taken 7/10/2024 2340 by Kimi Butler RN  Chest Pain Intervention:   cardiac monitoring continued   activity minimized   Goal Outcome Evaluation:       Pt reporting 7/10 L anterior chest pain last evening - one time dose dilaudid given and effective. No more chest pain this AM. Pt SR w/ 1st degree AVB. Pt alert and oriented, but cognitive impairment noted. Pt rocks back and forth and agitated at times. Pt nonambulatory, but able to reposition self in bed. Bps high 140's-150's/90's.

## 2024-07-12 LAB
ANION GAP SERPL CALCULATED.3IONS-SCNC: 17 MMOL/L (ref 7–15)
ATRIAL RATE - MUSE: 81 BPM
BUN SERPL-MCNC: 68.2 MG/DL (ref 6–20)
CALCIUM SERPL-MCNC: 8.6 MG/DL (ref 8.6–10)
CHLORIDE SERPL-SCNC: 98 MMOL/L (ref 98–107)
CREAT SERPL-MCNC: 22.38 MG/DL (ref 0.67–1.17)
DEPRECATED HCO3 PLAS-SCNC: 22 MMOL/L (ref 22–29)
DIASTOLIC BLOOD PRESSURE - MUSE: NORMAL MMHG
EGFRCR SERPLBLD CKD-EPI 2021: 2 ML/MIN/1.73M2
ERYTHROCYTE [DISTWIDTH] IN BLOOD BY AUTOMATED COUNT: 15.2 % (ref 10–15)
GLUCOSE SERPL-MCNC: 92 MG/DL (ref 70–99)
HCT VFR BLD AUTO: 30.2 % (ref 40–53)
HGB BLD-MCNC: 9.3 G/DL (ref 13.3–17.7)
INTERPRETATION ECG - MUSE: NORMAL
MCH RBC QN AUTO: 27.4 PG (ref 26.5–33)
MCHC RBC AUTO-ENTMCNC: 30.8 G/DL (ref 31.5–36.5)
MCV RBC AUTO: 89 FL (ref 78–100)
P AXIS - MUSE: 31 DEGREES
PLATELET # BLD AUTO: 202 10E3/UL (ref 150–450)
POTASSIUM SERPL-SCNC: 6.1 MMOL/L (ref 3.4–5.3)
PR INTERVAL - MUSE: 284 MS
QRS DURATION - MUSE: 98 MS
QT - MUSE: 402 MS
QTC - MUSE: 466 MS
R AXIS - MUSE: -37 DEGREES
RBC # BLD AUTO: 3.39 10E6/UL (ref 4.4–5.9)
SODIUM SERPL-SCNC: 137 MMOL/L (ref 135–145)
SYSTOLIC BLOOD PRESSURE - MUSE: NORMAL MMHG
T AXIS - MUSE: 8 DEGREES
VENTRICULAR RATE- MUSE: 81 BPM
WBC # BLD AUTO: 6 10E3/UL (ref 4–11)

## 2024-07-12 PROCEDURE — 250N000011 HC RX IP 250 OP 636

## 2024-07-12 PROCEDURE — 250N000013 HC RX MED GY IP 250 OP 250 PS 637

## 2024-07-12 PROCEDURE — 80048 BASIC METABOLIC PNL TOTAL CA: CPT

## 2024-07-12 PROCEDURE — 36415 COLL VENOUS BLD VENIPUNCTURE: CPT

## 2024-07-12 PROCEDURE — 250N000013 HC RX MED GY IP 250 OP 250 PS 637: Performed by: FAMILY MEDICINE

## 2024-07-12 PROCEDURE — 90935 HEMODIALYSIS ONE EVALUATION: CPT

## 2024-07-12 PROCEDURE — 99231 SBSQ HOSP IP/OBS SF/LOW 25: CPT | Mod: GC

## 2024-07-12 PROCEDURE — 85014 HEMATOCRIT: CPT

## 2024-07-12 PROCEDURE — 120N000004 HC R&B MS OVERFLOW

## 2024-07-12 PROCEDURE — 93010 ELECTROCARDIOGRAM REPORT: CPT | Mod: HIP | Performed by: INTERNAL MEDICINE

## 2024-07-12 PROCEDURE — 93005 ELECTROCARDIOGRAM TRACING: CPT

## 2024-07-12 RX ORDER — ACETAMINOPHEN 325 MG/1
650 TABLET ORAL EVERY 6 HOURS PRN
Status: DISCONTINUED | OUTPATIENT
Start: 2024-07-12 | End: 2024-07-12

## 2024-07-12 RX ORDER — LIDOCAINE 4 G/G
1 PATCH TOPICAL DAILY PRN
Status: DISCONTINUED | OUTPATIENT
Start: 2024-07-12 | End: 2024-07-18 | Stop reason: HOSPADM

## 2024-07-12 RX ADMIN — METOPROLOL SUCCINATE 25 MG: 25 TABLET, EXTENDED RELEASE ORAL at 20:36

## 2024-07-12 RX ADMIN — PREGABALIN 25 MG: 25 CAPSULE ORAL at 12:59

## 2024-07-12 RX ADMIN — DOXYCYCLINE HYCLATE 100 MG: 100 CAPSULE ORAL at 12:58

## 2024-07-12 RX ADMIN — METOPROLOL SUCCINATE 25 MG: 25 TABLET, EXTENDED RELEASE ORAL at 12:59

## 2024-07-12 RX ADMIN — HYDROMORPHONE HYDROCHLORIDE 1 MG: 2 TABLET ORAL at 21:30

## 2024-07-12 RX ADMIN — CLINDAMYCIN HYDROCHLORIDE 300 MG: 150 CAPSULE ORAL at 15:07

## 2024-07-12 RX ADMIN — CLINDAMYCIN HYDROCHLORIDE 300 MG: 150 CAPSULE ORAL at 12:58

## 2024-07-12 RX ADMIN — HYDROMORPHONE HYDROCHLORIDE 1 MG: 2 TABLET ORAL at 20:36

## 2024-07-12 RX ADMIN — CLINDAMYCIN HYDROCHLORIDE 300 MG: 150 CAPSULE ORAL at 20:36

## 2024-07-12 RX ADMIN — AMLODIPINE BESYLATE 10 MG: 5 TABLET ORAL at 13:00

## 2024-07-12 RX ADMIN — DOXYCYCLINE HYCLATE 100 MG: 100 CAPSULE ORAL at 20:37

## 2024-07-12 RX ADMIN — ONDANSETRON 4 MG: 4 TABLET, ORALLY DISINTEGRATING ORAL at 20:36

## 2024-07-12 ASSESSMENT — ACTIVITIES OF DAILY LIVING (ADL)
ADLS_ACUITY_SCORE: 29
ADLS_ACUITY_SCORE: 30
ADLS_ACUITY_SCORE: 29
ADLS_ACUITY_SCORE: 31
ADLS_ACUITY_SCORE: 30
ADLS_ACUITY_SCORE: 29
ADLS_ACUITY_SCORE: 31
ADLS_ACUITY_SCORE: 29
ADLS_ACUITY_SCORE: 30
ADLS_ACUITY_SCORE: 29
ADLS_ACUITY_SCORE: 31
ADLS_ACUITY_SCORE: 30
ADLS_ACUITY_SCORE: 29
ADLS_ACUITY_SCORE: 30
ADLS_ACUITY_SCORE: 29
ADLS_ACUITY_SCORE: 30
ADLS_ACUITY_SCORE: 29

## 2024-07-12 NOTE — PLAN OF CARE
"Goal Outcome Evaluation:             Patient alert to self, place and situation.  Uncooperative with cares.  Allowed partial set of vital signs at midnight.  Declined allow vital signs at 0545, despite staff grouping cares with lab draw.  Patient told staff \"don't fuck with me when I'm sleeping.\"  Patient reported pain, but said there was nothing staff could do for him and would not describe pain or tell where it was located.  Patient wore telemetry until 0300, when he removed it and said he was done wearing it until he was awake in the morning.  Per report, patient removed peripheral IV during evening, saying he didn't want it any more.    EKG tech attempted to complete EKG, patient patient refused.  Will re approach at a later time.      K level 6.1, MD updated.  EKG ordered for this morning.  Problem: Adult Inpatient Plan of Care  Goal: Plan of Care Review  Description: The Plan of Care Review/Shift note should be completed every shift.  The Outcome Evaluation is a brief statement about your assessment that the patient is improving, declining, or no change.  This information will be displayed automatically on your shift  note.  Outcome: Progressing  Goal: Patient-Specific Goal (Individualized)  Description: You can add care plan individualizations to a care plan. Examples of Individualization might be:  \"Parent requests to be called daily at 9am for status\", \"I have a hard time hearing out of my right ear\", or \"Do not touch me to wake me up as it startles  me\".  Outcome: Progressing  Goal: Absence of Hospital-Acquired Illness or Injury  Outcome: Progressing  Intervention: Identify and Manage Fall Risk  Recent Flowsheet Documentation  Taken 7/12/2024 0028 by Mirian Harrell RN  Safety Promotion/Fall Prevention:   safety round/check completed   clutter free environment maintained  Intervention: Prevent Skin Injury  Recent Flowsheet Documentation  Taken 7/12/2024 0028 by Mirian Harrell RN  Body Position: " position changed independently  Intervention: Prevent Infection  Recent Flowsheet Documentation  Taken 7/12/2024 0028 by Mirian Harrell, RN  Infection Prevention: single patient room provided  Goal: Optimal Comfort and Wellbeing  Outcome: Progressing  Intervention: Monitor Pain and Promote Comfort  Recent Flowsheet Documentation  Taken 7/12/2024 0028 by Mirian Harrell, RN  Pain Management Interventions: rest  Goal: Readiness for Transition of Care  Outcome: Progressing

## 2024-07-12 NOTE — PROCEDURES
Potassium   Date Value Ref Range Status   07/12/2024 6.1 (HH) 3.4 - 5.3 mmol/L Final   10/17/2014 4.5 3.4 - 5.3 mmol/L Final     Hemoglobin   Date Value Ref Range Status   07/12/2024 9.3 (L) 13.3 - 17.7 g/dL Final   03/27/2014 13.8 13.3 - 17.7 g/dL Final     Creatinine   Date Value Ref Range Status   07/12/2024 22.38 (H) 0.67 - 1.17 mg/dL Final   10/17/2014 1.6 (H) 0.8 - 1.5 mg/dL Final     Urea Nitrogen   Date Value Ref Range Status   07/12/2024 68.2 (H) 6.0 - 20.0 mg/dL Final   10/17/2014 20.0 5.0 - 24.0 mg/dL Final     Sodium   Date Value Ref Range Status   07/12/2024 137 135 - 145 mmol/L Final   10/17/2014 147.0 (H) 133.0 - 144.0 mmol/L Final     INR   Date Value Ref Range Status   03/27/2014 1.0  Final       DIALYSIS PROCEDURE NOTE  Hepatitis status of previous patient on machine log was checked and verified ok to use with this patients hepatitis status.  Patient dialyzed for 30 min. on a K2 bath with no fluid removal.  A BFR of 350 ml/min was obtained using 15 gauge needles.      The treatment plan was discussed with Dr. Lua during the treatment.    Needle cannulation sites held x 10 min.     Meds  given: Lidocaine spray during 2nd arterial needle insertion.   Complications: Difficulty with arterial needle. Occasional jerking due to pain during needle insertion, pt. Refused lidocaine. Frequent arterial alarms, blood lines recirculated. Arterial site removed and reaccessed. Failed attempt to restart dialysis as blood lines were clotted, unable to return blood. EBL 200ml. New blood line set up and dialysis treatment continued. Within 30 minutes of initiation of treatment, dialysis machine turned off due to electrical circuit outage. Pt. Agitated and requested to terminate dialysis treatment. Dr. Lua aware.     Person educated: Patient. Knowledge base moderate. Barriers to learning: readiness to learn. Educated on procedure via explanation.     ICEBOAT? Timeout performed pre-treatment  I: Patient was  identified using 2 identifiers  C:  Consent Signed Yes  E: Equipment preventative maintenance is current and dialysis delivery system OK to use  B: Hepatitis B Surface Antigen: Negative; Draw Date: 1/16/24      Hepatitis B Surface Antibody: Immune; Draw Date: 12/2/23  O: Dialysis orders present and complete prior to treatment  A: Vascular access verified and assessed prior to treatment  T: Treatment was performed at a clinically appropriate time  ?: Patient was allowed to ask questions and address concerns prior to treatment  See Adult Hemodialysis flowsheet in [facility] for further details and post assessment.  Machine water alarm in place and functioning. Transducer pods intact and checked every 15min.   Pt returned via bed.  Chlorine/Chloramine water system checked every 4 hours.    Patient repositioned every 2 hours during the treatment.  Post treatment report given to ANALY Ann RN regarding no fluid removal and pt. Refusing blood pressures at termination of treatment.

## 2024-07-12 NOTE — PROGRESS NOTES
NEPHROLOGY PROGRESS NOTE    Date of service: 07/11/24     CC: esrd      ASSESSMENT/RECOMMENDATIONS:  42 yo male with ESRD on dialysis MWF at [facility] using left arm AVF, HTN, CAD, cognitive impairment admit with chest pain.     ESRD - on dialysis MWF at [facility] using left arm AVF.  Mi   -HD MWF and again tomorrow as issues with power loss in HD unit and needle issues.   Hyperkalemia - due to missed dialysis  -HD MWF  -needs low K diet when eating  3. HTN - uncontrolled on admit, hypervolemia from missed dialysis contributing  -resumed home meds and UF with HD ongoing  4. Syncope - unclear if weakness then fell asleep on floor vs true syncope.    -cont telemetry  5. Chest pain - after pulling himself off floor  -cont with serial exam and troponin but seems likely musculoskeletal (although certainly many risk factors for CAD)    Chao Lua MD  [facility]   Office: [phone]      S: Since yesterday, had HD earlier for only about 20min as power failure and needle issues with his access. Does not want to talk with me. Care discussed with Dr. Mensah.    Current Facility-Administered Medications   Medication Dose Route Frequency Provider Last Rate Last Admin    amLODIPine (NORVASC) tablet 10 mg  10 mg Oral Daily Gisela Cooper MD   10 mg at 07/11/24 0920    clindamycin (CLEOCIN) capsule 300 mg  300 mg Oral TID Gurjit Mensah MD   300 mg at 07/11/24 2008    glucose gel 15-30 g  15-30 g Oral Q15 Min PRN Anita Ferguson MD        Or    dextrose 50 % injection 25-50 mL  25-50 mL Intravenous Q15 Min PRN Anita Ferguson MD   25 mL at 07/10/24 1544    Or    glucagon injection 1 mg  1 mg Subcutaneous Q15 Min PRN Anita Ferguson MD        doxycycline hyclate (VIBRAMYCIN) capsule 100 mg  100 mg Oral BID Gurjit Mensah MD   100 mg at 07/11/24 2008    lidocaine (LMX4) cream   Topical Q1H PRN Gisela Cooper MD        lidocaine 1 % 0.1-1 mL  0.1-1 mL Other Q1H PRN Kenneth  MD Gisela        metoprolol succinate ER (TOPROL XL) 24 hr tablet 25 mg  25 mg Oral BID Gisela Cooper MD   25 mg at 07/11/24 2008    naloxone (NARCAN) injection 0.2 mg  0.2 mg Intravenous Q2 Min PRN Feliciano Grande MD        Or    naloxone (NARCAN) injection 0.4 mg  0.4 mg Intravenous Q2 Min PRN Feliciano Grande MD        Or    naloxone (NARCAN) injection 0.2 mg  0.2 mg Intramuscular Q2 Min PRN Feliciano Grande MD        Or    naloxone (NARCAN) injection 0.4 mg  0.4 mg Intramuscular Q2 Min PRN Feliciano Grande MD        No heparin via hemodialysis machine   Does not apply Once Chao Lua MD        ondansetron (ZOFRAN ODT) ODT tab 4 mg  4 mg Oral Q6H PRN Gisela Cooper MD   4 mg at 07/11/24 0035    Or    ondansetron (ZOFRAN) injection 4 mg  4 mg Intravenous Q6H PRN Gisela Cooper MD        polyethylene glycol (MIRALAX) Packet 17 g  17 g Oral BID PRN Gisela Cooper MD        pregabalin (LYRICA) capsule 25 mg  25 mg Oral Daily Marcel Rtoh MD   25 mg at 07/11/24 0920    prochlorperazine (COMPAZINE) injection 10 mg  10 mg Intravenous Q6H PRN Gisela Cooper MD        Or    prochlorperazine (COMPAZINE) tablet 10 mg  10 mg Oral Q6H PRN Gisela Cooper MD        Or    prochlorperazine (COMPAZINE) suppository 25 mg  25 mg Rectal Q12H PRN Gisela Cooper MD        senalden-docusate (SENOKOT-S/PERICOLACE) 8.6-50 MG per tablet 1 tablet  1 tablet Oral BID PRGisela Robles MD        Or    senna-docusate (SENOKOT-S/PERICOLACE) 8.6-50 MG per tablet 2 tablet  2 tablet Oral BID PRGisela Robles MD        sodium chloride (PF) 0.9% PF flush 3 mL  3 mL Intracatheter Q8H Gisela Cooper MD   3 mL at 07/11/24 0921    sodium chloride (PF) 0.9% PF flush 3 mL  3 mL Intracatheter q1 min prn Gisela Cooper MD        sodium chloride 0.9% BOLUS 100-150 mL  100-150 mL Intravenous Q15 Min PRN Chao Lua MD            No interval change in SH, FH.    Physical Exam  BP (!) 156/86 (BP  "Location: Right arm)   Pulse 82   Temp 98.6  F (37  C) (Oral)   Resp 18   Ht 1.981 m (6' 6\")   Wt 105 kg (231 lb 7.7 oz)   SpO2 99%   BMI 26.75 kg/m    GENERAL: NAD  HEENT: NCAT, sclerae not icteric, MMM  NECK: Trachea midline.   LUNGS: no respiratory distress,  HEART:  + leg edema.   ABDOMEN: Soft, NT  PSYCH: Alert, not answering questions  NEURO:  moves all extremities  MUSC/SKEL: normal muscle mass, no joint effusions evident  SKIN: No rash   Access: L AVF    Lab Data:  Recent Labs   Lab Test 07/12/24  0542 07/10/24  1633 07/10/24  1253 01/16/24  1837   POTASSIUM 6.1* 5.6* 6.3* 4.2   CHLORIDE 98  --  99 98   ALBUMIN  --   --   --  4.1     Recent Labs   Lab Test 07/12/24  0542 07/10/24  1253 01/16/24  1837   BUN 68.2* 81.0* 35.8*     Recent Labs   Lab Test 07/12/24  0542 07/10/24  1253 01/16/24  1837   WBC 6.0 6.5 5.2   HGB 9.3* 9.6* 10.0*   MCV 89 89 95    234 233     No lab results found.    Invalid input(s): \"PTHINTACT\", \"[id number]\", \"[id number]\", \"PROTCREATUR\"    I reviewed the above labs  "

## 2024-07-12 NOTE — PROGRESS NOTES
[facility]    Medicine Progress Note - Hospitalist Service    Date of Admission:  7/10/2024    Assessment & Plan      Macario Delatorre is a 43 year old male admitted on 7/10/2024 for syncopal episode and chest pain, fth have hyperkalemia likely 2/2 hypervolemia in setting of missing HD x2. He has a PMH of ESRD on M/W/F dialysis, HTN, DM2, sleep apnea, bipolar/schizophrenia, and cognitive impairment. HD completed 7/10 overnight with improvement of K, continuing on normal MWF schedule. Remains hospitalized for TCU placement given weakness and cognitive impairment.     Hyperkalemia, improving  ESRD on dialysis M/W/F  Improving w/urgent HD and shifting w/insulin+dextrose on admission and resuming normal MWF HD. Likely result of missing at least 7/8/24 and 7/10/24 sessions of dialysis. Of note, started dialysis 11/2023 and follows with Dr. Kruger/[facility]. Typically dialyzes MWF at [facility] using left arm AVF. Has history of frequently missed sessions and shortened treatments. No events on cardiac tele.   - Nephrology following, recs appreciated   - Low K diet    - HD MWF - shortened run today per patient's request, d/t patient being frustrated w/dialysis machine issues, staff, and pain w/fistula access. Repeat run Sat 7/13   - No indications for fistulogram, no issues w/access per discussion w/Dr. Lua  - Defer BMP to Mon 7/15 as long as patient stays stable and not indicated over weekend  - Can discontinue cardiac tele     Agitation   Bipolar disorder  Schizophrenia  Cognitive impairment  Patient refusing cares and can easily get aggressive with staff overnight. Patient reports history of bipolar and schizophrenia. Does not endorse routine psychiatric follow up or regular medication use. In [facility] system was started on risperidone 1 mg at bedtime though has not taken consistently. Stated he can escalate quickly if upset. 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   - Will discontinue cardiac tele  - Vitals BID  - Will limit lab draws over the weekend, recheck on Mon 7/15    Possible syncopal episode  Chest pain  Reports possible syncopal episode vs mechanical slump after his legs gave out when he was trying to lift his brother's couch due to leg weakness (uses scooter at baseline) on 7/9. He also reports sudden onset of changing sternal chest pain radiating down his L arm when this happened. Trop 77 and 76 on repeat, EKG on admission w/o acute ischemic changes. Suspect this was related to hyperkalemia and hypervolemia in setting of missing HD; possibly component of costochondritis and psychogenic. Cardiac monitoring w/o event. He reports having \"several heart attacks and strokes\" between the ages of [age], but did not seek care. Patient reported hx of heart failure, but has not been worked up per chart rvw and he is not on medications for this. Sats wnl, lungs clear on auscultation - low concerns for pulmonary etiology.   - Consider outpatient cardiac workup once hyperkalemia stable   - PT/OT consult, recs appreciated     Leg weakness  Ongoing since 2015 per patient, has been using canes/walkers/scooters. Says it's related to peripheral neuropathy and gout, but also reports refusing medication to assist with this because he hates them. No formal neuro workup.   - PT/OT following, recs appreciated  - TCU placement for weakness and ADLs    Inconsistent healthcare   Homelessness  Transportation insecurity  Financial insecurity  Patient reports he has been \"out on the streets.\" May have a vehicle though not clear. Previously living with his brother, but was asked to leave d/t brother not being able to keep up with cares. He has PCA's but they have not been coming to see him. Of note, he has had little to no contact w/healthcare throughout his life d/t psychosocial struggles (homelessness, mistrust in healthcare system, financial " insecurity, transportation insecurity, lack of support system).  - CM/SW consult for placement, recs appreciated  - Offered patient to follow-up at [facility], but he declined. Reinforced importance of finding a clinic/PCP he likes to workup his chronic conditions/concerns.     Anion gap metabolic acidosis  Anion gap of 24 with bicarb of 21 on admission, AG improved to 17 this AM, bicarb wnl. Most likely in setting of ESRD though considering other causes. EtOH negative. Lactate wnl. Remains afebrile, WBC wnl, and asymptomatic.  - BMP Mon 7/15 as long as patient is stable     Normocytic anemia, chronic  Hgb stable, at baseline 9-10. Suspect anemia of chronic disease in setting of ESRD. Denies hematuria this AM though this was reports of this on admission. Possible component of iron deficiency - suspect malnutrition w/difficult living situation. No si/sx of acute hemorrhaging on exam.   - CBC Mon 7/15 as long as patient remains stable    HTN, uncontrolled  Patient unsure what he takes for PTA medications. BP's persistently in the 150-170s/80s-100s. Suspect some component of hypervolemia 2/2 missed dialysis run.   - dialysis as above  - PTA amlodipine 10 daily  - PTA metoprolol 25 BID    T2DM  Diabetic peripheral neuropathy  Reports history of diabetes. Does not check blood sugar at home nor take any medications for it. A1c on admission 4.4, but unclear how accurate this is w/ESRD and ACD. Received 10 units of insulin in ED to shift K and had BG of 62. Given 25 of dextrose with resolution.    - Lyrica 25 mg daily   - Holding off on sliding scale at this time     Possible sleep apnea  Reports history of sleep apnea. Desat to 76 while sleeping that improved with 2L.  - NC PRN    Gingival infection  Anal warts  Seen at [place] ED on 7/8/24. Was noted to have gingival infection and genital warts. Prescribed clindamycin and doxycycline at that time.   - Continue PTA clindamycin 300 TID (7/8 - 7/15)  - Continue PTA  "doxycycline 100 BID (7/8 - 7/18)          Diet: Combination Diet Regular Diet Adult; Renal Diet (dialysis)    DVT Prophylaxis: Pneumatic Compression Devices  Raphael Catheter: Not present  Lines: None     Cardiac Monitoring: ACTIVE order. Indication: Electrolyte Imbalance (24 hours)- Magnesium <1.3 mg/ml; Potassium < =2.8 or > 5.5 mg/ml  Code Status: Full Code      Clinically Significant Risk Factors        # Hyperkalemia: Highest K = 6.3 mmol/L in last 2 days, will monitor as appropriate      # Anion Gap Metabolic Acidosis: Highest Anion Gap = 24 mmol/L in last 2 days, will monitor and treat as appropriate                       # Overweight: Estimated body mass index is 26.75 kg/m  as calculated from the following:    Height as of this encounter: 1.981 m (6' 6\").    Weight as of this encounter: 105 kg (231 lb 7.7 oz)., PRESENT ON ADMISSION       # Financial/Environmental Concerns: none         Disposition Plan     Medically Ready for Discharge: Anticipated in 2-4 Days           The patient's care was discussed with the Attending Physician, Dr. Syed .    Gurjit Mensah MD  Hospitalist Service  [facility]  Securely message with [employer] (more info)  Text page via [employer] Paging/Directory   ______________________________________________________________________    Interval History   NAOE. Agitated and aggressive towards staff, though verbally redirectable. HD this AM, frustrated w/fistula pain.     Angry about BLE weakness today - went into great detail about this discussed above.     Physical Exam   Vital Signs: Temp: 98.6  F (37  C) Temp src: Oral BP: (!) 156/86 (RN NOTIFIED) Pulse: 82   Resp: 18 SpO2: 99 % O2 Device: None (Room air)    Weight: 231 lbs 7.73 oz    Physical Exam  Constitutional:       General: He is not in acute distress.     Appearance: Normal appearance.   HENT:      Head: Normocephalic and atraumatic.   Eyes:      General: No scleral icterus.     Pupils: Pupils are equal, round, " and reactive to light.   Cardiovascular:      Comments: Appears well-perfused. LUE AVF w/bruit and palpable thrill  Pulmonary:      Effort: Pulmonary effort is normal. No respiratory distress.      Breath sounds: Normal breath sounds. No stridor. No wheezing or rales.   Abdominal:      Comments: Protuberant   Musculoskeletal:      Comments: BLE edema present, unable to assess pitting as patient refusing to be touched   Skin:     General: Skin is dry.   Neurological:      General: No focal deficit present.      Mental Status: He is alert and oriented to person, place, and time. Mental status is at baseline.   Psychiatric:      Comments: Labile mood, calm, conversing appropriately          Data   ------------------------- PAST 24 HR DATA REVIEWED -----------------------------------------------

## 2024-07-12 NOTE — PROGRESS NOTES
"Awoke pt to prepare him for dialysis transport. Pt irritable but was awake when entered room. Explained what I was there for & option to take meds he could take before hd. He preferred to take meds when he could eat a \"full meal\" overall cooperative but concerned with too many interruptions when he is trying to sleep. Interacted with pt after transport staff meeting increased irritability and push back from pt to transport with bed.   "

## 2024-07-12 NOTE — PROGRESS NOTES
"Care Management Follow Up    Length of Stay (days): 2    Expected Discharge Date: 07/12/2024     Concerns to be Addressed:    Care progression - discharge planning   Patient plan of care discussed at interdisciplinary rounds: Yes    Anticipated Discharge Disposition:  Transitional Care - [facility]     Anticipated Discharge Services:  Transitional Care - [facility]  Anticipated Discharge DME:  NA    Patient/family educated on Medicare website which has current facility and service quality ratings:  NA  Education Provided on the Discharge Plan:  Yes per team  Patient/Family in Agreement with the Plan:  Yes    Referrals Placed by CM/SW:  Yes  Private pay costs discussed: Not applicable    Additional Information:  Rec'd a message from [facility], asking if patient has 3 days inpatient stay?  Patient does not have the 3 days stay (admit 7/10). Called [facility] medical records, they do not have record of him being there for 3 days. Will check and follow up.     Completed PAS and patient will need an OBRA Level II assessment for developmental disability.   [id number]    Ruby with [facility] will need dialysis transport secured for one week.  Patient attends [facility] MWF with chair time of 1500 for 4 hrs (transport time [id number]). Confirmed with facility.   [address], [place] [address] [phone]  Called Medicaid [id number] to set up transport for dialysis, they are in training until 1000.    0910 sent message to update provider, Dr. BRENDEN Mensah, \"Will not be able to d/c today. I completed a pre-screening for the TCU and he triggered an OBRA Level II assessment for developmental disability. Will need to wait for the county to do the assessment. [facility] said they can take him Monday if he completed the assessment.\"     Social Hx: \"Live w/brother, but cannot return unless he is able to " "care for himself. Patient would like to go to TCU/NH until he can care for himself. [facility] dialysis MWF 1445. Accepted to [facility]. PAS completed. Will need Obra level 2 assessment. [facility] WC transport.\"      RNCM to follow for medical progression, recommendations, and final discharge plan.     Moon Hernández, RN     8078 rec'd a call from [place] with [facility] [phone].   Patient currently has no DD worker. Will have internal team do the assessment.  Per Admission, they have 9 business days to complete this assessment.   Will need H&P fax to [phone] and then can assign this patient to a screener who will be in contact with RNCM.    H&P faxed    1015 called Medicaid to set up transport for dialysis, they are in training until 1100.    1210 called Medicaid to set up transport for dialysis, they are closed [phone] for lunch.    Called [employer] [phone] and spoke with Kathrine.  Kathrine checked and patient has insurance coverage for Medicaid [place], but no special transportation.  Will need special transportation certificate for WC transport  Need to call Yue [phone] to arrange for special transport certificate  Erwin Delatorre    Called Yue at [facility] and left a message to return call    1400 Yue called from [facility] and opened up a case file for this patient.  Case ID #[id number]  Patient did get an auth for 10 days back in June  [facility] to search [email or web address] for agencies  Search for provider: Transportation services  Auth extended for 1 year (end 7/13/25)  Accommodate Electric scooter    Patient lived with brother at [address] [place] [address]      [employer] [phone] private pay  [employer] ([phone]) does not take straight MA, low reimbursement  [employer] ends at 1700    [facility] approved Transportation:  1) [employer] [phone] called and left a VM  2) [facility] [phone]   3) [employer] " "TRANSPORTATION SERVICE [phone]  4) [employer] [phone]  5) [employer] [phone]  6) [employer] [phone]  7) [employer] [phone]  8) [employer] [phone]  9) [facility] [phone]  10) [facility] FIRE DEPT [phone]  11) [facility] [phone]  12) [employer] [phone]  13) [employer] [phone]  14) [facility] [phone]  15) [facility] EMERGENCY MEDICAL [phone]  16) [employer] [phone]  17) [employer] [phone]  18) [employer] [phone]  19) [employer] [phone]  20) [facility] [employer]RE AND SAFETY [phone]  21) [employer] [phone]  22) [facility] [phone]  23) [facility] [phone]  24) [employer] [phone]    Called [facility] and spoke with Vani to check for other chair time. Per Vani, [facility] is not able to accommodate a different chair time, \"No spot open.\" She will leave a message for Doris wilkes  who's been trying to secure transportation for this patient.  "

## 2024-07-12 NOTE — PLAN OF CARE
Goal Outcome Evaluation:      Plan of Care Reviewed With: patient          Outcome Evaluation: pt started the shift irritable, rude to transport staff bringing him to dialysis. explained why nurses interrupted his sleep during noc & early am, pt verbalized it is very important he be able to rest at night. was cooperative with writer for putting monitor on for dialysis. took all meds scheduled after HD & had food in his stomach. pt optimistic about going to tcu soon & not happy he will be here tomorrow on his birthday, but accepting. only tolerated half hour of dialysis run, he reports staff were not receptive to his needs there.

## 2024-07-12 NOTE — SIGNIFICANT EVENT
"RN checked in on pt and he was in bathroom. RN returned to check on pt after 5 mins and he was back in bed. RN asked pt how he got back in bed because he didn't use the red string in the bathroom to alert staff that he was finished using bathroom. Pt stated that he crawled to and from the bathroom and that is also how he gets around at home because he has weakness in his legs. Writer asked pt why he didn't use call light. Pt stated \"it takes 10-15 minutes for any staff to respond after I hit the button and I'm not going to sit in my shit or urine.\" Pt assessed, no injuries noted and pt reports chronic pain in back, legs, and chest pain from \"lifting dead weight.\" Bed alarm wasn't on but is on now. Writer suggested using a bedside commode and pt refused, said that not using a real toilet will make him become nauseous from the smell of his BM. Pt encouraged to use call light. HO notified and Dr. Hooks returned call. He said no compass report necessary since pt is not injured and doing ok.   "

## 2024-07-13 LAB
ATRIAL RATE - MUSE: 93 BPM
DIASTOLIC BLOOD PRESSURE - MUSE: NORMAL MMHG
GLUCOSE BLDC GLUCOMTR-MCNC: 110 MG/DL (ref 70–99)
INTERPRETATION ECG - MUSE: NORMAL
P AXIS - MUSE: 17 DEGREES
PHOSPHATE SERPL-MCNC: 4 MG/DL (ref 2.5–4.5)
POTASSIUM SERPL-SCNC: 4 MMOL/L (ref 3.4–5.3)
PR INTERVAL - MUSE: 280 MS
QRS DURATION - MUSE: 106 MS
QT - MUSE: 358 MS
QTC - MUSE: 445 MS
R AXIS - MUSE: -36 DEGREES
SYSTOLIC BLOOD PRESSURE - MUSE: NORMAL MMHG
T AXIS - MUSE: 42 DEGREES
VENTRICULAR RATE- MUSE: 93 BPM

## 2024-07-13 PROCEDURE — 120N000004 HC R&B MS OVERFLOW

## 2024-07-13 PROCEDURE — 84100 ASSAY OF PHOSPHORUS: CPT | Performed by: FAMILY MEDICINE

## 2024-07-13 PROCEDURE — 99232 SBSQ HOSP IP/OBS MODERATE 35: CPT | Mod: GC

## 2024-07-13 PROCEDURE — 250N000011 HC RX IP 250 OP 636

## 2024-07-13 PROCEDURE — 84132 ASSAY OF SERUM POTASSIUM: CPT | Performed by: NURSE PRACTITIONER

## 2024-07-13 PROCEDURE — 250N000013 HC RX MED GY IP 250 OP 250 PS 637

## 2024-07-13 PROCEDURE — 93010 ELECTROCARDIOGRAM REPORT: CPT | Mod: HIP | Performed by: INTERNAL MEDICINE

## 2024-07-13 PROCEDURE — 250N000013 HC RX MED GY IP 250 OP 250 PS 637: Performed by: FAMILY MEDICINE

## 2024-07-13 PROCEDURE — 90935 HEMODIALYSIS ONE EVALUATION: CPT

## 2024-07-13 RX ORDER — ACETAMINOPHEN 325 MG/1
650 TABLET ORAL EVERY 4 HOURS PRN
Status: DISCONTINUED | OUTPATIENT
Start: 2024-07-13 | End: 2024-07-18 | Stop reason: HOSPADM

## 2024-07-13 RX ORDER — ACETAMINOPHEN 650 MG/1
650 SUPPOSITORY RECTAL EVERY 4 HOURS PRN
Status: DISCONTINUED | OUTPATIENT
Start: 2024-07-13 | End: 2024-07-18 | Stop reason: HOSPADM

## 2024-07-13 RX ORDER — CALCIUM ACETATE 667 MG/1
1334 CAPSULE ORAL
Status: DISCONTINUED | OUTPATIENT
Start: 2024-07-14 | End: 2024-07-18 | Stop reason: HOSPADM

## 2024-07-13 RX ORDER — B COMPLEX, C NO.20/FOLIC ACID 1 MG
1 CAPSULE ORAL DAILY
Status: DISCONTINUED | OUTPATIENT
Start: 2024-07-13 | End: 2024-07-13

## 2024-07-13 RX ORDER — VIT B COMP NO.3/FOLIC/C/BIOTIN 1 MG-60 MG
1 TABLET ORAL DAILY
Status: DISCONTINUED | OUTPATIENT
Start: 2024-07-14 | End: 2024-07-18 | Stop reason: HOSPADM

## 2024-07-13 RX ORDER — RISPERIDONE 1 MG/1
1 TABLET ORAL AT BEDTIME
Status: DISCONTINUED | OUTPATIENT
Start: 2024-07-13 | End: 2024-07-18 | Stop reason: HOSPADM

## 2024-07-13 RX ADMIN — CLINDAMYCIN HYDROCHLORIDE 300 MG: 150 CAPSULE ORAL at 20:54

## 2024-07-13 RX ADMIN — PREGABALIN 25 MG: 25 CAPSULE ORAL at 09:55

## 2024-07-13 RX ADMIN — HYDROMORPHONE HYDROCHLORIDE 1 MG: 2 TABLET ORAL at 20:54

## 2024-07-13 RX ADMIN — RISPERIDONE 1 MG: 1 TABLET, FILM COATED ORAL at 21:40

## 2024-07-13 RX ADMIN — DOXYCYCLINE HYCLATE 100 MG: 100 CAPSULE ORAL at 09:56

## 2024-07-13 RX ADMIN — ONDANSETRON 4 MG: 4 TABLET, ORALLY DISINTEGRATING ORAL at 20:55

## 2024-07-13 RX ADMIN — METOPROLOL SUCCINATE 25 MG: 25 TABLET, EXTENDED RELEASE ORAL at 20:54

## 2024-07-13 RX ADMIN — CLINDAMYCIN HYDROCHLORIDE 300 MG: 150 CAPSULE ORAL at 09:55

## 2024-07-13 RX ADMIN — DOXYCYCLINE HYCLATE 100 MG: 100 CAPSULE ORAL at 20:54

## 2024-07-13 RX ADMIN — AMLODIPINE BESYLATE 10 MG: 5 TABLET ORAL at 09:55

## 2024-07-13 RX ADMIN — METOPROLOL SUCCINATE 25 MG: 25 TABLET, EXTENDED RELEASE ORAL at 09:55

## 2024-07-13 RX ADMIN — CLINDAMYCIN HYDROCHLORIDE 300 MG: 150 CAPSULE ORAL at 14:25

## 2024-07-13 ASSESSMENT — ACTIVITIES OF DAILY LIVING (ADL)
ADLS_ACUITY_SCORE: 29
ADLS_ACUITY_SCORE: 31
ADLS_ACUITY_SCORE: 31
ADLS_ACUITY_SCORE: 29
ADLS_ACUITY_SCORE: 31
ADLS_ACUITY_SCORE: 29
ADLS_ACUITY_SCORE: 30
ADLS_ACUITY_SCORE: 29
ADLS_ACUITY_SCORE: 30
ADLS_ACUITY_SCORE: 29
ADLS_ACUITY_SCORE: 31
ADLS_ACUITY_SCORE: 29
ADLS_ACUITY_SCORE: 31
ADLS_ACUITY_SCORE: 29
ADLS_ACUITY_SCORE: 31
ADLS_ACUITY_SCORE: 29

## 2024-07-13 NOTE — PROGRESS NOTES
[facility]    Progress Note - Hospitalist Service       Date of Admission:  7/10/2024    Assessment & Plan   Macario Delatorre is a 43 year old male admitted on 7/10/2024 for syncopal episode and chest pain, fth have hyperkalemia likely 2/2 hypervolemia in setting of missing HD x2. He has a PMH of ESRD on M/W/F dialysis, HTN, DM2, sleep apnea, bipolar/schizophrenia, and cognitive impairment. HD completed 7/10 overnight with improvement of K, continuing on normal MWF schedule. Remains hospitalized for TCU placement given weakness and cognitive impairment.      Hyperkalemia-resolved  ESRD on dialysis M/W/F  Improving w/urgent HD and shifting w/insulin+dextrose on admission and resuming normal MWF HD. Likely result of missing at least 7/8/24 and 7/10/24 sessions of dialysis. Of note, started dialysis 11/2023 and follows with Dr. Kruger/[facility]. Typically dialyzes MWF at [facility] using left arm AVF. Has history of frequently missed sessions and shortened treatments. No events on cardiac tele.   - Nephrology following, recs appreciated              - Low K diet               - HD MWF - shortened run today per patient's request, d/t patient being frustrated w/dialysis machine issues, staff, and pain w/fistula access. Repeat fluid removal sat 7/13              - No indications for fistulogram, no issues w/access per discussion w/Dr. Lua  - Defer BMP to Mon 7/15 as long as patient stays stable and not indicated over weekend  - Can discontinue cardiac tele   - Repeat potassium 4.0  -PTA calcium acetate and multivitamins       Agitation   Bipolar disorder  Schizophrenia  Cognitive impairment  Patient refusing cares and can easily get aggressive with staff overnight. Patient reports history of bipolar and schizophrenia. Does not endorse routine psychiatric follow up or regular medication use. In [facility] system was started on risperidone 1 mg at bedtime though has not taken  "consistently. Stated he can escalate quickly if upset. 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   - Will discontinue cardiac tele  - Vitals BID  - Will limit lab draws over the weekend, recheck on Mon 7/15     Possible syncopal episode  Chest pain-improved  Reports possible syncopal episode vs mechanical slump after his legs gave out when he was trying to lift his brother's couch due to leg weakness (uses scooter at baseline) on 7/9. He also reports sudden onset of changing sternal chest pain radiating down his L arm when this happened. Trop 77 and 76 on repeat, EKG on admission w/o acute ischemic changes. Suspect this was related to hyperkalemia and hypervolemia in setting of missing HD; possibly component of costochondritis and psychogenic. Cardiac monitoring w/o event. He reports having \"several heart attacks and strokes\" between the ages of [age], but did not seek care. Patient reported hx of heart failure, but has not been worked up per chart rvw and he is not on medications for this. Sats wnl, lungs clear on auscultation - low concerns for pulmonary etiology.   - Consider outpatient cardiac workup once hyperkalemia stable   - PT/OT consult, recs appreciated      Leg weakness  Generalized body aches  Ongoing since 2015 per patient, has been using canes/walkers/scooters. Says it's related to peripheral neuropathy and gout, but also reports refusing medication to assist with this because he hates them. No formal neuro workup.   - PT/OT following, recs appreciated  - TCU placement for weakness and ADLs  - Dilaudid po 0.2 PRN  - Restart PTA pregabalin     Inconsistent healthcare   Homelessness  Transportation insecurity  Financial insecurity  Patient reports he has been \"out on the streets.\" May have a vehicle though not clear. Previously living with his brother, but was asked to leave d/t brother not being able to keep up with cares. He has PCA's but they " have not been coming to see him. Of note, he has had little to no contact w/healthcare throughout his life d/t psychosocial struggles (homelessness, mistrust in healthcare system, financial insecurity, transportation insecurity, lack of support system).  - CM/SW consult for placement, recs appreciated  - Offered patient to follow-up at [facility], but he declined. Reinforced importance of finding a clinic/PCP he likes to workup his chronic conditions/concerns.      Anion gap metabolic acidosis  Anion gap of 24 with bicarb of 21 on admission, AG improved to 17 this AM, bicarb wnl. Most likely in setting of ESRD though considering other causes. EtOH negative. Lactate wnl. Remains afebrile, WBC wnl, and asymptomatic.  - BMP Mon 7/15 as long as patient is stable      Normocytic anemia, chronic  Hgb stable, at baseline 9-10. Suspect anemia of chronic disease in setting of ESRD. Denies hematuria this AM though this was reports of this on admission. Possible component of iron deficiency - suspect malnutrition w/difficult living situation. No si/sx of acute hemorrhaging on exam.   - CBC Mon 7/15 as long as patient remains stable     HTN, uncontrolled  Patient unsure what he takes for PTA medications. BP's persistently in the 150-170s/80s-100s. Suspect some component of hypervolemia 2/2 missed dialysis run.   - dialysis as above  - PTA amlodipine 10 daily  - PTA metoprolol 25 BID     T2DM  Diabetic peripheral neuropathy  Reports history of diabetes. Does not check blood sugar at home nor take any medications for it. A1c on admission 4.4, but unclear how accurate this is w/ESRD and ACD. Received 10 units of insulin in ED to shift K and had BG of 62. Given 25 of dextrose with resolution.    - Lyrica 25 mg daily   - Holding off on sliding scale at this time      Possible sleep apnea  Reports history of sleep apnea. Desat to 76 while sleeping that improved with 2L.  - NC PRN     Gingival infection  Anal warts  Seen at  "[facility] on 7/8/24. Was noted to have gingival infection and genital warts. Prescribed clindamycin and doxycycline at that time.   - Continue PTA clindamycin 300 TID (7/8 - 7/15)  - Continue PTA doxycycline 100 BID (7/8 - 7/18)           Diet: Combination Diet Regular Diet Adult; Renal Diet (dialysis)    DVT Prophylaxis: Pneumatic Compression Devices  Raphael Catheter: Not present  Fluids: po  Lines: None     Cardiac Monitoring: None  Code Status: Full Code      Clinically Significant Risk Factors        # Hyperkalemia: Highest K = 6.1 mmol/L in last 2 days, will monitor as appropriate                        # Overweight: Estimated body mass index is 27.06 kg/m  as calculated from the following:    Height as of this encounter: 1.981 m (6' 6\").    Weight as of this encounter: 106.2 kg (234 lb 2.1 oz)., PRESENT ON ADMISSION     # Financial/Environmental Concerns: none         Disposition Plan     Expected Discharge Date: 07/15/2024                The patient's care was discussed with the Attending Physician, Dr. Syed .    Nelida Damico MD  Hospitalist Service  [facility]  Securely message with [email or web address] (more info)  Text page via [email or web address] Paging/Directory   ______________________________________________________________________    Interval History   Presented endorsing chest pain workup at that time showed reassuring EKG.  Patient reports that chest pain is still feels all over his body especially his legs.  Denies shortness of breath.  Plan for repeat HD today.     Physical Exam   Vital Signs:     BP: (!) 163/96 Pulse: 82   Resp: 18 SpO2: 98 % O2 Device: None (Room air)    Weight: 234 lbs 2.06 oz    General Appearance: Alert and oriented, NAD  Respiratory: No work of breathing, clear breath sounds bilaterally, no wheezing  Cardiovascular: S1, S2, no murmur  GI: Soft, nontender, no guarding.  Skin: Normal appearance, no visible rash  MSK: Trace lower limb edema bilaterally.  Psych: Alert and oriented x " 3, flat affect, appears calm.    Medical Decision Making             Data     I have personally reviewed the following data over the past 24 hrs:    N/A  \   N/A   / N/A     N/A N/A N/A /  110 (H)   4.0 N/A N/A \       Imaging results reviewed over the past 24 hrs:   No results found for this or any previous visit (from the past 24 hour(s)).

## 2024-07-13 NOTE — PROGRESS NOTES
NEPHROLOGY PROGRESS NOTE    Date of service: 07/11/24     CC: esrd      ASSESSMENT/RECOMMENDATIONS:  42 yo male with ESRD on dialysis MWF at [facility] using left arm AVF, HTN, CAD, cognitive impairment admit with chest pain.     ESRD - on dialysis MWF at [facility] using left arm AVF.  Mi   -HD MWF, had HD again 7/13/24 due to power loss in HD center.    Hyperkalemia - due to missed dialysis  -HD MWF, HD today   -Last K was 6.1, tells me his dialysis is today at 4pm, I ordered stat potassium lab.  If potassium elevated can get lokelma 10 g.      -Patient would like to be changed to regular diet especially his today is his birthday, change to regular once K is stable, he will regulate his diet, we will monitor his lab.    -Recheck K later today after dialysis, K ordered.    3. HTN - uncontrolled on admit, hypervolemia from missed dialysis contributing  -resumed home meds and UF with HD ongoing  -Focus on fluid removal.  4. Syncope - unclear if weakness then fell asleep on floor vs true syncope.    -cont telemetry  5. Chest pain - after pulling himself off floor  -cont with serial exam and troponin but seems likely musculoskeletal (although certainly many risk factors for CAD)   -reporting generalized pain.      Cecelia Chandler, DEBBIE, CNP, TONE  [facility]  Pager: [phone]        S: Since seen by our team, had HD yesteday for only 20 minutes due to power failure and needle issues with access difficulties.  HD later today.  Wants to be changed to regular diet, today [birth year] his birthday, discussed with RN, I order potassium, if that's normal we will consider changing him to regular diet.  No sob, dizziness, fever and chills.  Reports generalized pain.  In bed resting.        Current Facility-Administered Medications   Medication Dose Route Frequency Provider Last Rate Last Admin    amLODIPine (NORVASC) tablet 10 mg  10 mg Oral Daily Gisela Cooper MD   10 mg at 07/13/24 7957     clindamycin (CLEOCIN) capsule 300 mg  300 mg Oral TID Gurjit Mensah MD   300 mg at 07/13/24 0955    glucose gel 15-30 g  15-30 g Oral Q15 Min PRN Anita Ferguson MD        Or    dextrose 50 % injection 25-50 mL  25-50 mL Intravenous Q15 Min PRN Anita Ferguson MD   25 mL at 07/10/24 1544    Or    glucagon injection 1 mg  1 mg Subcutaneous Q15 Min PRN Anita Ferguson MD        doxycycline hyclate (VIBRAMYCIN) capsule 100 mg  100 mg Oral BID Gurjit Mensah MD   100 mg at 07/13/24 0956    Lidocaine (LIDOCARE) 4 % Patch 1 patch  1 patch Transdermal Daily PRN Abdifatah Hooks MD        lidocaine (LMX4) cream   Topical Q1H PRN Gisela Cooper MD        lidocaine 1 % 0.1-1 mL  0.1-1 mL Other Q1H PRN Gisela Cooper MD        metoprolol succinate ER (TOPROL XL) 24 hr tablet 25 mg  25 mg Oral BID Gisela Cooper MD   25 mg at 07/13/24 0955    naloxone (NARCAN) injection 0.2 mg  0.2 mg Intravenous Q2 Min PRN Feliciano Grande MD        Or    naloxone (NARCAN) injection 0.4 mg  0.4 mg Intravenous Q2 Min PRN Feliciano Grande MD        Or    naloxone (NARCAN) injection 0.2 mg  0.2 mg Intramuscular Q2 Min PRN Feliciano Grande MD        Or    naloxone (NARCAN) injection 0.4 mg  0.4 mg Intramuscular Q2 Min PRN Feliciano Grande MD        No heparin via hemodialysis machine   Does not apply Once Chao Lua MD        ondansetron (ZOFRAN ODT) ODT tab 4 mg  4 mg Oral Q6H PRN Gisela Cooper MD   4 mg at 07/12/24 2036    Or    ondansetron (ZOFRAN) injection 4 mg  4 mg Intravenous Q6H PRN Gisela Cooper MD        polyethylene glycol (MIRALAX) Packet 17 g  17 g Oral BID PRN Gisela Cooper MD        pregabalin (LYRICA) capsule 25 mg  25 mg Oral Daily Marcel Roth MD   25 mg at 07/13/24 0955    prochlorperazine (COMPAZINE) injection 10 mg  10 mg Intravenous Q6H PRN Gisela Cooper MD        Or    prochlorperazine (COMPAZINE) tablet 10 mg  10 mg Oral Q6H PRN Gisela Cooper MD        Or    procmaryorperazine  "(COMPAZINE) suppository 25 mg  25 mg Rectal Q12H PRN Gisela Cooper MD        senna-docusate (SENOKOT-S/PERICOLACE) 8.6-50 MG per tablet 1 tablet  1 tablet Oral BID PRN Gisela Cooper MD        Or    senna-docusate (SENOKOT-S/PERICOLACE) 8.6-50 MG per tablet 2 tablet  2 tablet Oral BID PRN Gisela Cooper MD        sodium chloride (PF) 0.9% PF flush 3 mL  3 mL Intracatheter Q8H Gisela Cooper MD   3 mL at 07/11/24 0921    sodium chloride (PF) 0.9% PF flush 3 mL  3 mL Intracatheter q1 min prn Gisela Cooper MD        sodium chloride 0.9% BOLUS 100-150 mL  100-150 mL Intravenous Q15 Min PRN Chao Lua MD            No interval change in SH, FH.    Physical Exam  BP (!) 163/96 (BP Location: Right arm, Patient Position: Sitting, Cuff Size: Adult Regular)   Pulse 82   Temp 98.7  F (37.1  C) (Temporal)   Resp 18   Ht 1.981 m (6' 6\")   Wt 106.2 kg (234 lb 2.1 oz)   SpO2 98%   BMI 27.06 kg/m    GENERAL: NAD  HEENT: NCAT, sclerae not icteric, MMM  NECK: Trachea midline.   LUNGS: no respiratory distress,  HEART:  + leg edema.   ABDOMEN: Soft, NT  PSYCH: Alert, not answering questions  NEURO:  moves all extremities  MUSC/SKEL: normal muscle mass, no joint effusions evident  SKIN: No rash   Access: L AVF    Lab Data:  Recent Labs   Lab Test 07/12/24  0542 07/10/24  1633 07/10/24  1253 01/16/24  1837   POTASSIUM 6.1* 5.6* 6.3* 4.2   CHLORIDE 98  --  99 98   ALBUMIN  --   --   --  4.1     Recent Labs   Lab Test 07/12/24  0542 07/10/24  1253 01/16/24  1837   BUN 68.2* 81.0* 35.8*     Recent Labs   Lab Test 07/12/24  0542 07/10/24  1253 01/16/24  1837   WBC 6.0 6.5 5.2   HGB 9.3* 9.6* 10.0*   MCV 89 89 95    234 233     No lab results found.    Invalid input(s): \"PTHINTACT\", \"[id number]\", \"[id number]\", \"PROTCREATUR\"    I reviewed the above labs  "

## 2024-07-13 NOTE — PLAN OF CARE
Problem: Infection  Goal: Absence of Infection Signs and Symptoms  Outcome: Progressing     Problem: Electrolyte Imbalance  Goal: Electrolyte Balance  Outcome: Progressing     Problem: Pain Chronic (Persistent)  Goal: Optimal Pain Control and Function  Outcome: Progressing  Intervention: Manage Persistent Pain  Recent Flowsheet Documentation  Taken 7/13/2024 0500 by Neeta Dobbs RN  Sleep/Rest Enhancement:   awakenings minimized   noise level reduced   room darkened  Medication Review/Management: medications reviewed  Taken 7/12/2024 2345 by Neeta Dobbs RN  Sleep/Rest Enhancement:   awakenings minimized   noise level reduced   room darkened  Medication Review/Management: medications reviewed  Intervention: Optimize Psychosocial Wellbeing  Recent Flowsheet Documentation  Taken 7/13/2024 0500 by Neeta Dobbs RN  Supportive Measures: active listening utilized  Taken 7/12/2024 2345 by Neeta Dobbs RN  Supportive Measures: active listening utilized     Goal Outcome Evaluation:    Pt is alert & Oriented, irritable. Particular about what he wants. Writer asked pt about pain, but he states there's no point because we won't give him what he wants.Pt slept for the most part of shift. Cares clustered, bed alarm on, call light within reach

## 2024-07-13 NOTE — PROGRESS NOTES
"Saw pt just now and asked to do his head to toe assessment before he goes down to dialysis at 4 PM and pt refused, said he is \"still sleeping.\" Asked if he wanted dilaudid for pain before dialysis and he refused. Will attempt to see him again before he goes down to dialysis.   "

## 2024-07-13 NOTE — PROGRESS NOTES
Care Management Follow Up    Length of Stay (days): 3    Expected Discharge Date: 07/15/2024     Concerns to be Addressed:    Care progression - discharge planning   Patient plan of care discussed at interdisciplinary rounds: Yes    Anticipated Discharge Disposition:  Transitional Care - [facility]     Anticipated Discharge Services:  Transitional Care - [facility]  Anticipated Discharge DME:  NA    Patient/family educated on Medicare website which has current facility and service quality ratings:  NA  Education Provided on the Discharge Plan:  Yes per team  Patient/Family in Agreement with the Plan:  Yes    Referrals Placed by CM/SW:  Yes  Private pay costs discussed: Not applicable    Additional Information:  Rec'd a message from [facility] with [email or web address], can accept patient but will need:   1) medicare number   2) dialysis chair transport set up   Please return call [phone].    Returned call to update [facility], patient accepted to another facility.     Called the MD approved Transportation:  1) [employer] [phone] called and left a VM  2) [facility] [phone] declined to provide transport  3) [employer] [facility] [phone] not WC accessible  4) [employer] [facility] [phone] VM not set up  5) [facility] [phone] No answer  6) [employer] [phone] temporally not WC accessible  7) [employer] [phone] not WC accessible  8) [employer] [phone] no WC accessible  9) [facility] [phone] NA  10) [facility] [phone] NA  11) [facility] [phone] currently no ramp for WC  12) [employer] [phone] called and left a message  13) [employer] [phone] called and left a message  14) [facility] [facility] [phone] not in service  15) [facility]  "EMERGENCY MEDICAL [phone] not in service  16) [facility] [employer] [phone] called and left a message  17) [employer] [phone] called and it's an adult   18) [employer] [phone] called and manager is not available  19) [employer] [phone] called and left a message  20) [facility] [employer] AND S[employer]Y [phone] NA  21) [employer] [phone] called and left a message  22) [facility] [phone] called and last ride is 1800  23) [facility] [employer] [facility] [employer] [phone]  called and left a message  24) [facility] [employer] [phone] called and left a message    Social Hx: \"Live w/brother, but cannot return unless he is able to care for himself. Patient would like to go to TCU/NH until he can care for himself. [facility] dialysis MWF 1445. Accepted to [facility]. PAS completed. Will need Obra level 2 assessment. [facility] WC transport.\"      RNCM to follow for medical progression, recommendations, and final discharge plan.     Moon Hernández RN     "

## 2024-07-13 NOTE — PROGRESS NOTES
Pt had stat potassium lab draw and refused it since he wanted it to be draw from the same arm as hemodialysis fistula (left arm). Dr. Syed notified and orders to collect blood for potassium during dialysis at 1600.

## 2024-07-13 NOTE — PLAN OF CARE
Problem: Adult Inpatient Plan of Care  Goal: Plan of Care Review  Description: The Plan of Care Review/Shift note should be completed every shift.  The Outcome Evaluation is a brief statement about your assessment that the patient is improving, declining, or no change.  This information will be displayed automatically on your shift  note.  Outcome: Progressing     Problem: Adult Inpatient Plan of Care  Goal: Absence of Hospital-Acquired Illness or Injury  Intervention: Identify and Manage Fall Risk  Recent Flowsheet Documentation  Taken 7/12/2024 1820 by Ana Anna RN  Safety Promotion/Fall Prevention:   clutter free environment maintained   increased rounding and observation   increase visualization of patient   lighting adjusted   nonskid shoes/slippers when out of bed   patient and family education   room near nurse's station   room organization consistent   safety round/check completed   supervised activity   toileting scheduled     Problem: Adult Inpatient Plan of Care  Goal: Optimal Comfort and Wellbeing  Intervention: Monitor Pain and Promote Comfort  Recent Flowsheet Documentation  Taken 7/12/2024 1820 by Ana Anna RN  Pain Management Interventions:   MD notified (comment)   distraction   quiet environment facilitated   Goal Outcome Evaluation:    A & O x 4, VSS, on RA. Pt complained of generalized pain and chest pain, HO notified and EKG ordered. Dilaudid PO also ordered and administered. Pt now sleeping. Cares clustered, bed alarm on.

## 2024-07-13 NOTE — PROGRESS NOTES
"Dr. Hooks messaged via [email or web address] that pt reports overall pain 9/10 and had no prn pain meds. Dr. Hooks placed orders but pt refused those orders and said dilaudid works well for him, but it also makes him nauseous so he needs to take zofran for it. Dr. Hooks messaged again regarding pt's response.     Addendum:  Dr. Hooks asked if pt would be willing to try hot/cold packs and lidocaine gel first. Pt repeated that dilaudid PO is what has worked best for him. He refused other interventions and said he feels he isn't being listened to because he is a \"black man.\" Pt said he knows his body best and doesn't appreciate that we're asking him to try other things and not giving him dilaudid.  "

## 2024-07-13 NOTE — SIGNIFICANT EVENT
"Significant Event Note    Time of event: 8:01 PM July 12, 2024    Description of event:  Paged by RN for pain. Per RN, has overall pain 9/10, chest pain from \"dead lifting\".    Macario Delatorre is a 43 year old male admitted on 7/10/2024 for syncopal episode and chest pain, fth have hyperkalemia likely 2/2 hypervolemia in setting of missing HD x2. He has a PMH of ESRD on M/W/F dialysis, HTN, DM2, sleep apnea, bipolar/schizophrenia, and cognitive impairment. HD completed 7/10 overnight with improvement of K, continuing on normal MWF schedule. Remains hospitalized for TCU placement given weakness and cognitive impairment.       On chart review, patient has had this pain before during this admission, worked up without concern for ischemic changes. Required dilauid PO 2mg 2 nights ago, none needed last night. Did not have any pain meds ordered at time of page.     Recommended to RN starting with tylenol, ice/heat, and lidocaine patch if desired by patient. Patient has history of GI upset from tylenol and would not like to take it, and RN reported that he feel ice/heat and lidocaine patch do not adequately address his pain.     Again recommended to RN exhausting these options before considering riskier medications like dilaudid, especially given his kidney function. RN had discussion with patient and said he is refusing anything other than PO dilaudid and feels like he is not being listened to because of his race. Patient does not appreciate that we are asking him to try other things.    Plan:  Will plan to give one time 1 mg dose PO dilaudid to help keep patient comfortable overnight with ongoing pain and will follow closely.    Discussed with: Dr. Emily Freed DO senior resident    Abdifatah Hooks MD PGY-1  House Officer     Addendum 9:04 PM  Paged by RN for intermittent chest pain for 20 minutes.     Went to see patient. Pain located in center of chest, radiating towards left should in pectoral distribution. Describes it " as a deep pain. Patient noted that 1 mg of PO dilaudid will not be enough for him. Had discussion with patient about the importance of being careful and safe with dilaudid given his kidney function. States that 2 mg he received 2 nights ago was adequate for his pain control.     O:  Visit Vitals  BP (!) 156/103   Pulse 82   Temp 98.7  F (37.1  C) (Temporal)   Resp 18    Exam:  General: Sitting in bed in no acute distress watching TV  CV: Regular rate and rhythm. No murmurs.   Pulm: Lungs CTAB  MSK: Reproducible tenderness to palpation over sternum and diffusely over the left ribs      Stat EKG unchanged from earlier today with sinus rhythm, 1st degree AV block. No signs of acute ischemia or T wave abnormalities. Had dialysis run today.     Plan:  Pain seems consistent with previous exam, likely MSK in nature. EKG reassuring that this is not likely ACS. After thorough discussion with patient about options, will give dilaudid tonight  - One time 2 mg PO dilaudid    Discussed with Dr. Abdiaziz DO senior resident    Abdifatah Hooks MD PGY-1  House Officer

## 2024-07-13 NOTE — PLAN OF CARE
Problem: Adult Inpatient Plan of Care  Goal: Plan of Care Review  Description: The Plan of Care Review/Shift note should be completed every shift.  The Outcome Evaluation is a brief statement about your assessment that the patient is improving, declining, or no change.  This information will be displayed automatically on your shift  note.  Outcome: Progressing  Flowsheets (Taken 7/13/2024 1443)  Plan of Care Reviewed With: patient     Goal: Optimal Comfort and Wellbeing  Outcome: Progressing     Goal: Readiness for Transition of Care  Outcome: Progressing     Problem: Comorbidity Management  Goal: Blood Glucose Levels Within Targeted Range  Outcome: Progressing  Intervention: Monitor and Manage Glycemia  Recent Flowsheet Documentation  Taken 7/13/2024 0730 by Jade Dexter RN  Medication Review/Management: medications reviewed  Goal: Blood Pressure in Desired Range  Outcome: Progressing  Intervention: Maintain Blood Pressure Management  Recent Flowsheet Documentation  Taken 7/13/2024 0730 by Jade Dexter RN  Medication Review/Management: medications reviewed     Problem: Chronic Kidney Disease  Goal: Optimal Coping with Chronic Illness  Intervention: Support Psychosocial Response  Recent Flowsheet Documentation  Taken 7/13/2024 0730 by Jade Dexter RN  Supportive Measures:   active listening utilized   decision-making supported   goal-setting facilitated   problem-solving facilitated     Problem: Chronic Kidney Disease  Goal: Electrolyte Balance  Outcome: Progressing    Goal: Fluid Balance  Outcome: Progressing     Goal: Optimal Functional Ability  Outcome: Progressing  Intervention: Optimize Functional Ability  Recent Flowsheet Documentation  Taken 7/13/2024 0730 by Jade Dexter RN  Activity Management: activity adjusted per tolerance  Environment Familiarity/Consistency: daily routine followed     Goal: Acceptable Pain Control  Outcome: Progressing  Intervention: Prevent or Manage  Pain  Recent Flowsheet Documentation  Taken 7/13/2024 0730 by Jade Dexter RN  Sleep/Rest Enhancement:   awakenings minimized   noise level reduced   room darkened   comfort measures     Goal: Minimize Renal Failure Effects  Outcome: Progressing  Intervention: Monitor and Support Renal Function  Recent Flowsheet Documentation  Taken 7/13/2024 0730 by Jade Dexter RN  Medication Review/Management: medications reviewed     Problem: Electrolyte Imbalance  Goal: Electrolyte Balance  Outcome: Progressing        Problem: Pain Chronic (Persistent)  Goal: Optimal Pain Control and Function  Outcome: Progressing  Intervention: Manage Persistent Pain  Recent Flowsheet Documentation  Taken 7/13/2024 0730 by Jade Dexter RN  Sleep/Rest Enhancement:   awakenings minimized   noise level reduced   room darkened   comfort measures  Medication Review/Management: medications reviewed  Intervention: Optimize Psychosocial Wellbeing  Recent Flowsheet Documentation  Taken 7/13/2024 0730 by Jade Dexter RN  Supportive Measures:   active listening utilized   decision-making supported   goal-setting facilitated   problem-solving facilitated     Goal Outcome Evaluation:      Plan of Care Reviewed With: patient      Pt is A/O x 4. Assist x 1. RA. Pt refused temperature check. BP elevated 163/96, pt also refused BP recheck. Med-surg. Refused to answer questions about pain at beginning of shift. After second assessment pt stated feeling pain all over his body and would take only medications that were permanently at his PRN list and not a one time dose.  notified, tylenol and dilaudid PRN placed. When asked pt if he wanted to take pain medications he refused it and said will take it later. Pt refused having dialysis this morning. Plan to have dialysis today at 4pm.

## 2024-07-13 NOTE — PROVIDER NOTIFICATION
Pt reported new intermittent chest pain located in midsternum, rates it moderately uncomfortable with increasing pressure. Dr. Hooks notified. EKG ordered.

## 2024-07-13 NOTE — PROGRESS NOTES
"Nutrition services called and said they will no longer be calling pt for his meal orders because he was \"rude and used foul language.\"  "

## 2024-07-14 LAB
ANION GAP SERPL CALCULATED.3IONS-SCNC: 19 MMOL/L (ref 7–15)
BUN SERPL-MCNC: 55 MG/DL (ref 6–20)
CALCIUM SERPL-MCNC: 8.9 MG/DL (ref 8.6–10)
CHLORIDE SERPL-SCNC: 99 MMOL/L (ref 98–107)
CREAT SERPL-MCNC: 16.35 MG/DL (ref 0.67–1.17)
DEPRECATED HCO3 PLAS-SCNC: 25 MMOL/L (ref 22–29)
EGFRCR SERPLBLD CKD-EPI 2021: 3 ML/MIN/1.73M2
ERYTHROCYTE [DISTWIDTH] IN BLOOD BY AUTOMATED COUNT: 15.6 % (ref 10–15)
GLUCOSE BLDC GLUCOMTR-MCNC: 95 MG/DL (ref 70–99)
GLUCOSE SERPL-MCNC: 152 MG/DL (ref 70–99)
HCT VFR BLD AUTO: 28.4 % (ref 40–53)
HGB BLD-MCNC: 9 G/DL (ref 13.3–17.7)
MCH RBC QN AUTO: 27.9 PG (ref 26.5–33)
MCHC RBC AUTO-ENTMCNC: 31.7 G/DL (ref 31.5–36.5)
MCV RBC AUTO: 88 FL (ref 78–100)
PLATELET # BLD AUTO: 182 10E3/UL (ref 150–450)
POTASSIUM SERPL-SCNC: 5.9 MMOL/L (ref 3.4–5.3)
RBC # BLD AUTO: 3.23 10E6/UL (ref 4.4–5.9)
SODIUM SERPL-SCNC: 143 MMOL/L (ref 135–145)
WBC # BLD AUTO: 6.3 10E3/UL (ref 4–11)

## 2024-07-14 PROCEDURE — 80048 BASIC METABOLIC PNL TOTAL CA: CPT

## 2024-07-14 PROCEDURE — 99231 SBSQ HOSP IP/OBS SF/LOW 25: CPT | Mod: GC

## 2024-07-14 PROCEDURE — 36415 COLL VENOUS BLD VENIPUNCTURE: CPT

## 2024-07-14 PROCEDURE — 250N000011 HC RX IP 250 OP 636

## 2024-07-14 PROCEDURE — 120N000004 HC R&B MS OVERFLOW

## 2024-07-14 PROCEDURE — 250N000013 HC RX MED GY IP 250 OP 250 PS 637

## 2024-07-14 PROCEDURE — 85027 COMPLETE CBC AUTOMATED: CPT

## 2024-07-14 PROCEDURE — 250N000013 HC RX MED GY IP 250 OP 250 PS 637: Performed by: FAMILY MEDICINE

## 2024-07-14 RX ADMIN — HYDROMORPHONE HYDROCHLORIDE 1 MG: 2 TABLET ORAL at 20:55

## 2024-07-14 RX ADMIN — Medication 1 TABLET: at 09:01

## 2024-07-14 RX ADMIN — CLINDAMYCIN HYDROCHLORIDE 300 MG: 150 CAPSULE ORAL at 13:05

## 2024-07-14 RX ADMIN — PROCHLORPERAZINE MALEATE 10 MG: 5 TABLET ORAL at 22:21

## 2024-07-14 RX ADMIN — ONDANSETRON 4 MG: 4 TABLET, ORALLY DISINTEGRATING ORAL at 09:13

## 2024-07-14 RX ADMIN — METOPROLOL SUCCINATE 25 MG: 25 TABLET, EXTENDED RELEASE ORAL at 09:02

## 2024-07-14 RX ADMIN — ONDANSETRON 4 MG: 4 TABLET, ORALLY DISINTEGRATING ORAL at 20:55

## 2024-07-14 RX ADMIN — HYDROMORPHONE HYDROCHLORIDE 1 MG: 2 TABLET ORAL at 09:13

## 2024-07-14 RX ADMIN — CALCIUM ACETATE 1334 MG: 667 CAPSULE ORAL at 13:05

## 2024-07-14 RX ADMIN — DOXYCYCLINE HYCLATE 100 MG: 100 CAPSULE ORAL at 20:56

## 2024-07-14 RX ADMIN — CLINDAMYCIN HYDROCHLORIDE 300 MG: 150 CAPSULE ORAL at 20:55

## 2024-07-14 RX ADMIN — METOPROLOL SUCCINATE 25 MG: 25 TABLET, EXTENDED RELEASE ORAL at 20:55

## 2024-07-14 RX ADMIN — PREGABALIN 25 MG: 25 CAPSULE ORAL at 09:01

## 2024-07-14 RX ADMIN — AMLODIPINE BESYLATE 10 MG: 5 TABLET ORAL at 09:02

## 2024-07-14 RX ADMIN — CLINDAMYCIN HYDROCHLORIDE 300 MG: 150 CAPSULE ORAL at 09:03

## 2024-07-14 RX ADMIN — CALCIUM ACETATE 1334 MG: 667 CAPSULE ORAL at 09:02

## 2024-07-14 RX ADMIN — DOXYCYCLINE HYCLATE 100 MG: 100 CAPSULE ORAL at 09:01

## 2024-07-14 RX ADMIN — RISPERIDONE 1 MG: 1 TABLET, FILM COATED ORAL at 20:57

## 2024-07-14 RX ADMIN — CALCIUM ACETATE 1334 MG: 667 CAPSULE ORAL at 20:55

## 2024-07-14 ASSESSMENT — ACTIVITIES OF DAILY LIVING (ADL)
ADLS_ACUITY_SCORE: 29
ADLS_ACUITY_SCORE: 30
ADLS_ACUITY_SCORE: 30
ADLS_ACUITY_SCORE: 29
ADLS_ACUITY_SCORE: 30
ADLS_ACUITY_SCORE: 29
ADLS_ACUITY_SCORE: 30
ADLS_ACUITY_SCORE: 29
ADLS_ACUITY_SCORE: 30
ADLS_ACUITY_SCORE: 29
ADLS_ACUITY_SCORE: 29
ADLS_ACUITY_SCORE: 30
ADLS_ACUITY_SCORE: 30
ADLS_ACUITY_SCORE: 29
ADLS_ACUITY_SCORE: 30
ADLS_ACUITY_SCORE: 30

## 2024-07-14 NOTE — PLAN OF CARE
Problem: Adult Inpatient Plan of Care  Goal: Plan of Care Review  Description: The Plan of Care Review/Shift note should be completed every shift.  The Outcome Evaluation is a brief statement about your assessment that the patient is improving, declining, or no change.  This information will be displayed automatically on your shift  note.  Outcome: Progressing  Flowsheets (Taken 7/14/2024 1128)  Plan of Care Reviewed With: patient     Goal: Absence of Hospital-Acquired Illness or Injury  Intervention: Prevent Infection  Recent Flowsheet Documentation  Taken 7/14/2024 0900 by Jade Dexter RN  Infection Prevention:   hand hygiene promoted   rest/sleep promoted   single patient room provided     Goal: Optimal Comfort and Wellbeing  Outcome: Progressing  Intervention: Monitor Pain and Promote Comfort  Recent Flowsheet Documentation  Taken 7/14/2024 0913 by Jade Dexter RN  Pain Management Interventions: medication (see MAR)    Goal: Readiness for Transition of Care  Outcome: Progressing     Problem: Chronic Kidney Disease  Goal: Optimal Coping with Chronic Illness  Outcome: Progressing  Intervention: Support Psychosocial Response  Recent Flowsheet Documentation  Taken 7/14/2024 0900 by Jade Dexter RN  Supportive Measures:   active listening utilized   decision-making supported   goal-setting facilitated   problem-solving facilitated  Goal: Electrolyte Balance  Outcome: Progressing  Goal: Fluid Balance  Outcome: Progressing  Goal: Optimal Functional Ability  Outcome: Progressing  Intervention: Optimize Functional Ability  Recent Flowsheet Documentation  Taken 7/14/2024 0900 by Jade Detxer RN  Activity Management: activity adjusted per tolerance  Environment Familiarity/Consistency: daily routine followed  Goal: Acceptable Pain Control  Outcome: Progressing  Intervention: Prevent or Manage Pain  Recent Flowsheet Documentation  Taken 7/14/2024 0913 by Jade Dexter RN  Pain  Management Interventions: medication (see MAR)  Taken 7/14/2024 0900 by Jade Dexter RN  Sleep/Rest Enhancement:   awakenings minimized   noise level reduced   room darkened   comfort measures  Goal: Minimize Renal Failure Effects  Outcome: Progressing  Intervention: Monitor and Support Renal Function  Recent Flowsheet Documentation  Taken 7/14/2024 0900 by Jade Dexter RN  Medication Review/Management: medications reviewed     Problem: Electrolyte Imbalance  Goal: Electrolyte Balance  Outcome: Progressing     Problem: Pain Chronic (Persistent)  Goal: Optimal Pain Control and Function  Outcome: Progressing  Intervention: Develop Pain Management Plan  Recent Flowsheet Documentation  Taken 7/14/2024 0913 by Jade Dexter RN  Pain Management Interventions: medication (see MAR)  Intervention: Manage Persistent Pain  Recent Flowsheet Documentation  Taken 7/14/2024 0900 by Jade Dexter RN  Sleep/Rest Enhancement:   awakenings minimized   noise level reduced   room darkened   comfort measures  Medication Review/Management: medications reviewed  Intervention: Optimize Psychosocial Wellbeing  Recent Flowsheet Documentation  Taken 7/14/2024 0900 by Jade Dexter RN  Supportive Measures:   active listening utilized   decision-making supported   goal-setting facilitated   problem-solving facilitated     Goal Outcome Evaluation:      Plan of Care Reviewed With: patient      Pt is A/O x 4. RA. BP better today: 148/93.  Med-surg. Pt also stated feeling better today. However, reported 9/10 lower back pain. Dilaudid given with a dose of Zofran. Pain went down only to 8/10. Offered pt some cold or warm packs, but he refused and said wants to take dilaudid when time for next dose at 1500. Plan to keep monitoring and go to TCU when ready.

## 2024-07-14 NOTE — PLAN OF CARE
"  Problem: Adult Inpatient Plan of Care  Goal: Plan of Care Review  Description: The Plan of Care Review/Shift note should be completed every shift.  The Outcome Evaluation is a brief statement about your assessment that the patient is improving, declining, or no change.  This information will be displayed automatically on your shift  note.  Outcome: Progressing     Problem: Adult Inpatient Plan of Care  Goal: Optimal Comfort and Wellbeing  Intervention: Monitor Pain and Promote Comfort  Recent Flowsheet Documentation  Taken 7/13/2024 2156 by Ana Anna, RN  Pain Management Interventions: MD notified (comment)  Taken 7/13/2024 2051 by Ana Anna, RN  Pain Management Interventions: medication (see MAR)   Goal Outcome Evaluation:    A & O x 4, refused vitals except for BP for his medications, on RA. Went to dialysis at 1600 H and returned at 2045 H. Reports generalized body pain and wants more dilaudid, HO notified (see my other note). Easily agitated, feels that his pain is not well controlled while inpatient and looking forward to go to TCU so he can \"get his medications in order.\" Bed alarm on.       "

## 2024-07-14 NOTE — PROGRESS NOTES
NEPHROLOGY PROGRESS NOTE    Date of service: 07/11/24     CC: esrd      ASSESSMENT/RECOMMENDATIONS:  42 yo male with ESRD on dialysis MWF at [facility] using left arm AVF, HTN, CAD, cognitive impairment admit with chest pain.     ESRD - on dialysis MWF at [facility] using left arm AVF.  Mi   -HD MWF, had HD again 7/13/24 due to power loss in HD center.    Hyperkalemia - due to missed dialysis  -HD MWF   -Resolved after dialysis.      -Patient insist on changing to regular diet especially since [birth year] is his birthday, changed to regular today, I educated him.      -Monitor.  Bmp tomorrow.    3. HTN - uncontrolled on admit, hypervolemia from missed dialysis contributing  -resumed home meds and UF with HD ongoing  -Focus on fluid removal, bp better today.  4. Syncope - unclear if weakness then fell asleep on floor vs true syncope.    -cont telemetry  -resolved.   5. Generalized pain - after pulling himself off floor  -cont with serial exam and troponin but seems likely musculoskeletal (although certainly many risk factors for CAD)   -Restarted lyrica and prn dilaudid.      Cecelia Chandler, DNP, CNP, TONE  [facility]  Pager: [phone]        S: Since last seen by our team, hyperkalemia has resolved.  Had HD for 3 hr with 3.5 kg of fluid removed.  In a better mood today, tells me he is feeling better.  No chest pain, dizziness, weakness, abd pain, fever and chills.             Current Facility-Administered Medications   Medication Dose Route Frequency Provider Last Rate Last Admin    acetaminophen (TYLENOL) tablet 650 mg  650 mg Oral Q4H PRN Nelida Damico MD        Or    acetaminophen (TYLENOL) Suppository 650 mg  650 mg Rectal Q4H PRN Nelida Damico MD        amLODIPine (NORVASC) tablet 10 mg  10 mg Oral Daily Gisela Cooper MD   10 mg at 07/14/24 0902    calcium acetate (PHOSLO) capsule 1,334 mg  1,334 mg Oral TID w/meals Nelida Damico MD   1,334 mg at 07/14/24 0902     clindamycin (CLEOCIN) capsule 300 mg  300 mg Oral TID Gurjit Mensah MD   300 mg at 07/14/24 0903    glucose gel 15-30 g  15-30 g Oral Q15 Min PRN Anita Ferguson MD        Or    dextrose 50 % injection 25-50 mL  25-50 mL Intravenous Q15 Min PRN Anita Ferguson MD   25 mL at 07/10/24 1544    Or    glucagon injection 1 mg  1 mg Subcutaneous Q15 Min PRN Anita Ferguson MD        doxycycline hyclate (VIBRAMYCIN) capsule 100 mg  100 mg Oral BID Gurjit Mensah MD   100 mg at 07/14/24 0901    HYDROmorphone (DILAUDID) half-tab 1 mg  1 mg Oral Q6H PRN Nelida Damico MD   1 mg at 07/14/24 0913    Lidocaine (LIDOCARE) 4 % Patch 1 patch  1 patch Transdermal Daily PRN Abdifatah Hooks MD        lidocaine (LMX4) cream   Topical Q1H PRN Gisela Cooper MD        lidocaine 1 % 0.1-1 mL  0.1-1 mL Other Q1H PRN Gisela Cooper MD        metoprolol succinate ER (TOPROL XL) 24 hr tablet 25 mg  25 mg Oral BID Gisela Cooper MD   25 mg at 07/14/24 0902    multivitamin RENAL (RENAVITE RX/NEPHROVITE) tablet 1 tablet  1 tablet Oral Daily Nelida Damico MD   1 tablet at 07/14/24 0901    naloxone (NARCAN) injection 0.2 mg  0.2 mg Intravenous Q2 Min PRN Feliciano Grande MD        Or    naloxone (NARCAN) injection 0.4 mg  0.4 mg Intravenous Q2 Min PRN Feliciano Grande MD        Or    naloxone (NARCAN) injection 0.2 mg  0.2 mg Intramuscular Q2 Min PRN Feliciano Grande MD        Or    naloxone (NARCAN) injection 0.4 mg  0.4 mg Intramuscular Q2 Min PRN Feliciano Grande MD        ondansetron (ZOFRAN ODT) ODT tab 4 mg  4 mg Oral Q6H PRN Gisela Cooper MD   4 mg at 07/14/24 0913    Or    ondansetron (ZOFRAN) injection 4 mg  4 mg Intravenous Q6H PRN Gisela Cooper MD        polyethylene glycol (MIRALAX) Packet 17 g  17 g Oral BID PRN Gisela Cooper MD        pregabalin (LYRICA) capsule 25 mg  25 mg Oral Daily Marcel Roth MD   25 mg at 07/14/24 0901    prochlorperazine (COMPAZINE) injection 10 mg  10 mg Intravenous Q6H PRN  "Gisela Cooper MD        Or    prochlorperazine (COMPAZINE) tablet 10 mg  10 mg Oral Q6H PRN Gisela Cooper MD        Or    prochlorperazine (COMPAZINE) suppository 25 mg  25 mg Rectal Q12H PRN Gisela Cooper MD        risperiDONE (risperDAL) tablet 1 mg  1 mg Oral At Bedtime Nelida Damico MD   1 mg at 07/13/24 2140    senna-docusate (SENOKOT-S/PERICOLACE) 8.6-50 MG per tablet 1 tablet  1 tablet Oral BID PRN Gisela Cooper MD        Or    senna-docusate (SENOKOT-S/PERICOLACE) 8.6-50 MG per tablet 2 tablet  2 tablet Oral BID PRGisela Robles MD        sodium chloride (PF) 0.9% PF flush 3 mL  3 mL Intracatheter Q8H Gisela Cooper MD   3 mL at 07/11/24 0921    sodium chloride (PF) 0.9% PF flush 3 mL  3 mL Intracatheter q1 min prn Gisela Cooper MD            No interval change in SH, FH.    Physical Exam  BP (!) 148/93   Pulse 81   Temp 98.6  F (37  C) (Temporal)   Resp 15   Ht 1.981 m (6' 6\")   Wt 106.2 kg (234 lb 2.1 oz)   SpO2 98%   BMI 27.06 kg/m    GENERAL: NAD  HEENT: NCAT, sclerae not icteric, MMM  NECK: Trachea midline.   LUNGS: no respiratory distress,  HEART:  + leg edema.   ABDOMEN: Soft, NT  PSYCH: Alert, not answering questions  NEURO:  moves all extremities  MUSC/SKEL: normal muscle mass, no joint effusions evident  SKIN: No rash   Access: L AVF    Lab Data:  Recent Labs   Lab Test 07/13/24  1919 07/12/24  0542 07/10/24  1633 07/10/24  1253 01/16/24  1837   POTASSIUM 4.0 6.1* 5.6* 6.3* 4.2   CHLORIDE  --  98  --  99 98   ALBUMIN  --   --   --   --  4.1     Recent Labs   Lab Test 07/12/24  0542 07/10/24  1253 01/16/24  1837   BUN 68.2* 81.0* 35.8*     Recent Labs   Lab Test 07/14/24  1018 07/12/24  0542 07/10/24  1253   WBC 6.3 6.0 6.5   HGB 9.0* 9.3* 9.6*   MCV 88 89 89    202 234     No lab results found.    Invalid input(s): \"PTHINTACT\", \"[id number]\", \"[id number]\", \"PROTCREATUR\"    I reviewed the above labs  "

## 2024-07-14 NOTE — PROGRESS NOTES
Hemodialysis Treatment Note:    Total UF removed:  3500 ml     Total Treatment Time:  3 hours    Access:   AV Fistula L upper arm: Thrill and bruit preesnt. No s/s of infections. Cannulated with 15g needles without complications. Access patent. Prescribed  achieved. Venous pressure elevated.     Run Summary:   K2 bath 3hr treatment with goal 3.5kg. Pt was hemodynamically stable during dialysis. 3.5 kg pulled as planned. Completed dialysis treatment without issues. K+ lab drawn at the end of dialysis (pt has been refusing lab draw)  - advised the primary nurse that K+ value will probably be lower than it is since it was drawn at dialysis. Report given to primary nurse, Taylor LOCKETT RN.      Access (post dialysis) :   AV Fistula continued to be patent.  maintained during dialysis. Needles pulled and gauze applied with direct pressure, then secured with tape.  Hemostasis achieved within 10 min.     Interventions:  VS check q15min     Plans:  Per renal team.    Rhett Khalil RN  [facility] ....................7/13/2024 8:10 PM

## 2024-07-14 NOTE — PROGRESS NOTES
[facility]    Medicine Progress Note - Hospitalist Service    Date of Admission:  7/10/2024    Assessment & Plan   Macario Delatorre is a 43 year old male admitted on 7/10/2024 for syncopal episode and chest pain, fth have hyperkalemia likely 2/2 hypervolemia in setting of missing HD x2. He has a PMH of ESRD on M/W/F dialysis, HTN, DM2, sleep apnea, bipolar/schizophrenia, and cognitive impairment. HD completed 7/10 overnight with improvement of K, continuing on normal MWF schedule. Remains hospitalized for TCU placement given weakness and cognitive impairment.      Hyperkalemia-resolved  ESRD on dialysis M/W/F  Improving w/urgent HD and shifting w/insulin+dextrose on admission and resuming normal MWF HD. Likely result of missing at least 7/8/24 and 7/10/24 sessions of dialysis. Of note, started dialysis 11/2023 and follows with Dr. Kruger/[facility]. Typically dialyzes MWF at [facility] using left arm AVF. Has history of frequently missed sessions and shortened treatments. No events on cardiac tele.   - Nephrology following, recs appreciated              - Low K diet               - HD MWF - Repeat fluid removal sat 7/13              - No indications for fistulogram, no issues w/access per discussion w/ Dr. Lua  - Defer BMP to Mon 7/15 as long as patient stays stable and not indicated over weekend  -PTA calcium acetate and multivitamins    Agitation   Bipolar disorder  Schizophrenia  Cognitive impairment  Patient refusing cares and can easily get aggressive with staff overnight. Patient reports history of bipolar and schizophrenia. Does not endorse routine psychiatric follow up or regular medication use. In [facility] system was started on risperidone 1 mg at bedtime though has not taken consistently. Stated he can escalate quickly if upset. 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   - Will discontinue cardiac tele  - Vitals  "BID  - Will limit lab draws over the weekend, recheck on Mon 7/15     Possible syncopal episode  Chest pain-improved  Reports possible syncopal episode vs mechanical slump after his legs gave out when he was trying to lift his brother's couch due to leg weakness (uses scooter at baseline) on 7/9. He also reports sudden onset of changing sternal chest pain radiating down his L arm when this happened. Trop 77 and 76 on repeat, EKG on admission w/o acute ischemic changes. Suspect this was related to hyperkalemia and hypervolemia in setting of missing HD; possibly component of costochondritis and psychogenic. Cardiac monitoring w/o event. He reports having \"several heart attacks and strokes\" between the ages of [age], but did not seek care. Patient reported hx of heart failure, but has not been worked up per chart rvw and he is not on medications for this. Sats wnl, lungs clear on auscultation - low concerns for pulmonary etiology.   - Consider outpatient cardiac workup once hyperkalemia stable   - PT/OT consult, recs appreciated      Leg weakness  Generalized body aches  Ongoing since 2015 per patient, has been using canes/walkers/scooters. Says it's related to peripheral neuropathy and gout, but also reports refusing medication to assist with this because he hates them. No formal neuro workup.   - PT/OT following, recs appreciated  - TCU placement for weakness and ADLs  - Dilaudid po 0.2 PRN  - Restart PTA pregabalin     Inconsistent healthcare   Homelessness  Transportation insecurity  Financial insecurity  Patient reports he has been \"out on the streets.\" May have a vehicle though not clear. Previously living with his brother, but was asked to leave d/t brother not being able to keep up with cares. He has PCA's but they have not been coming to see him. Of note, he has had little to no contact w/healthcare throughout his life d/t psychosocial struggles (homelessness, mistrust in healthcare system, financial " insecurity, transportation insecurity, lack of support system).  - CM/SW consult for placement, recs appreciated  - Offered patient to follow-up at [facility], but he declined. Reinforced importance of finding a clinic/PCP he likes to workup his chronic conditions/concerns.      Anion gap metabolic acidosis  Anion gap of 24 with bicarb of 21 on admission, AG improved to 17 this AM, bicarb wnl. Most likely in setting of ESRD though considering other causes. EtOH negative. Lactate wnl. Remains afebrile, WBC wnl, and asymptomatic.  - BMP Mon 7/15 as long as patient is stable      Normocytic anemia, chronic  Hgb stable, at baseline 9-10. Suspect anemia of chronic disease in setting of ESRD. Denies hematuria this AM though this was reports of this on admission. Possible component of iron deficiency - suspect malnutrition w/difficult living situation. No si/sx of acute hemorrhaging on exam.   - CBC Mon 7/15 as long as patient remains stable     HTN, uncontrolled  Patient unsure what he takes for PTA medications. BP's persistently in the 150-170s/80s-100s. Suspect some component of hypervolemia 2/2 missed dialysis run.   - dialysis as above  - PTA amlodipine 10 daily  - PTA metoprolol 25 BID     T2DM  Diabetic peripheral neuropathy  Reports history of diabetes. Does not check blood sugar at home nor take any medications for it. A1c on admission 4.4, but unclear how accurate this is w/ESRD and ACD. Received 10 units of insulin in ED to shift K and had BG of 62. Given 25 of dextrose with resolution.    - Lyrica 25 mg daily   - Holding off on sliding scale at this time      Possible sleep apnea  Reports history of sleep apnea. Desat to 76 while sleeping that improved with 2L.  - NC PRN     Gingival infection  Anal warts  Seen at [facility] on 7/8/24. Was noted to have gingival infection and genital warts. Prescribed clindamycin and doxycycline at that time.   - Continue PTA clindamycin 300 TID (7/8 - 7/15)  -  "Continue PTA doxycycline 100 BID (7/8 - 7/18)           Diet: Regular Diet Adult    DVT Prophylaxis: Pneumatic Compression Devices  Raphael Catheter: Not present  Lines: None     Cardiac Monitoring: None  Code Status: Full Code      Clinically Significant Risk Factors        # Hyperkalemia: Highest K = 5.9 mmol/L in last 2 days, will monitor as appropriate      # Anion Gap Metabolic Acidosis: Highest Anion Gap = 19 mmol/L in last 2 days, will monitor and treat as appropriate                   # Overweight: Estimated body mass index is 27.06 kg/m  as calculated from the following:    Height as of this encounter: 1.981 m (6' 6\").    Weight as of this encounter: 106.2 kg (234 lb 2.1 oz).      # Financial/Environmental Concerns: none         Disposition Plan     Medically Ready for Discharge: Anticipated in 2-4 Days           The patient's care was discussed with the Attending Physician, Dr. Syed .    Terrance Watkins MD  Hospitalist Service  [facility]  Securely message with [employer] (more info)  Text page via [employer] Paging/Directory   ______________________________________________________________________    Interval History   Uneventful night. Patient still endorsing all over body aches. No significant changes.     Physical Exam   Vital Signs: Temp: 98.6  F (37  C) Temp src: Temporal BP: (!) 148/93 Pulse: 81   Resp: 15 SpO2: 98 % O2 Device: None (Room air)    Weight: 234 lbs 2.06 oz    General Appearance:  Alert and oriented, NAD  Respiratory: No work of breathing, clear breath sounds bilaterally, no wheezing  Cardiovascular: S1, S2, no murmur  GI: Soft, nontender, no guarding.  Skin: Normal appearance, no visible rash  MSK:   Trace lower limb edema bilaterally.  Psych: Alert and oriented x 3, flat affect, appears calm.      Data   ------------------------- PAST 24 HR DATA REVIEWED -----------------------------------------------    I have personally reviewed the following data over the past 24 " hrs:    6.3  \   9.0 (L)   / 182     143 99 55.0 (H) /  152 (H)   5.9 (H) 25 16.35 (H) \       Imaging results reviewed over the past 24 hrs:   No results found for this or any previous visit (from the past 24 hour(s)).

## 2024-07-14 NOTE — PROVIDER NOTIFICATION
MARIJA paged about pt's chronic pain. Pt has 1 mg dilaudid PO PRN, he says he wants 2 mg because 1 mg doesn't do anything for his generalized body pain. He says if he doesn't receive regular 2 mg dilaudid orders for his pain, he's going to stop taking his meds altogether. He feels that his pain is undertreated and being ignored. He doesn't want to keep being given one-time doses of 2 mg dilaudid only

## 2024-07-15 LAB
ANION GAP SERPL CALCULATED.3IONS-SCNC: 18 MMOL/L (ref 7–15)
ATRIAL RATE - MUSE: 86 BPM
BUN SERPL-MCNC: 67.3 MG/DL (ref 6–20)
CALCIUM SERPL-MCNC: 9.3 MG/DL (ref 8.6–10)
CHLORIDE SERPL-SCNC: 98 MMOL/L (ref 98–107)
CREAT SERPL-MCNC: 18.7 MG/DL (ref 0.67–1.17)
DIASTOLIC BLOOD PRESSURE - MUSE: NORMAL MMHG
EGFRCR SERPLBLD CKD-EPI 2021: 3 ML/MIN/1.73M2
ERYTHROCYTE [DISTWIDTH] IN BLOOD BY AUTOMATED COUNT: 15.5 % (ref 10–15)
GLUCOSE SERPL-MCNC: 157 MG/DL (ref 70–99)
HCO3 SERPL-SCNC: 24 MMOL/L (ref 22–29)
HCT VFR BLD AUTO: 27 % (ref 40–53)
HGB BLD-MCNC: 8.6 G/DL (ref 13.3–17.7)
INTERPRETATION ECG - MUSE: NORMAL
MAGNESIUM SERPL-MCNC: 2.3 MG/DL (ref 1.7–2.3)
MCH RBC QN AUTO: 27.7 PG (ref 26.5–33)
MCHC RBC AUTO-ENTMCNC: 31.9 G/DL (ref 31.5–36.5)
MCV RBC AUTO: 87 FL (ref 78–100)
P AXIS - MUSE: 55 DEGREES
PLATELET # BLD AUTO: 181 10E3/UL (ref 150–450)
POTASSIUM SERPL-SCNC: 5.4 MMOL/L (ref 3.4–5.3)
PR INTERVAL - MUSE: 284 MS
QRS DURATION - MUSE: 108 MS
QT - MUSE: 394 MS
QTC - MUSE: 471 MS
R AXIS - MUSE: -48 DEGREES
RBC # BLD AUTO: 3.1 10E6/UL (ref 4.4–5.9)
SODIUM SERPL-SCNC: 140 MMOL/L (ref 135–145)
SYSTOLIC BLOOD PRESSURE - MUSE: NORMAL MMHG
T AXIS - MUSE: 72 DEGREES
VENTRICULAR RATE- MUSE: 86 BPM
WBC # BLD AUTO: 5 10E3/UL (ref 4–11)

## 2024-07-15 PROCEDURE — 90935 HEMODIALYSIS ONE EVALUATION: CPT

## 2024-07-15 PROCEDURE — 80048 BASIC METABOLIC PNL TOTAL CA: CPT

## 2024-07-15 PROCEDURE — 250N000013 HC RX MED GY IP 250 OP 250 PS 637

## 2024-07-15 PROCEDURE — 99232 SBSQ HOSP IP/OBS MODERATE 35: CPT | Mod: GC

## 2024-07-15 PROCEDURE — 120N000004 HC R&B MS OVERFLOW

## 2024-07-15 PROCEDURE — 85041 AUTOMATED RBC COUNT: CPT

## 2024-07-15 PROCEDURE — 250N000013 HC RX MED GY IP 250 OP 250 PS 637: Performed by: FAMILY MEDICINE

## 2024-07-15 PROCEDURE — 83735 ASSAY OF MAGNESIUM: CPT

## 2024-07-15 RX ADMIN — PROCHLORPERAZINE MALEATE 10 MG: 5 TABLET ORAL at 02:21

## 2024-07-15 RX ADMIN — HYDROMORPHONE HYDROCHLORIDE 1 MG: 2 TABLET ORAL at 02:17

## 2024-07-15 RX ADMIN — PREGABALIN 25 MG: 25 CAPSULE ORAL at 10:13

## 2024-07-15 RX ADMIN — Medication 1 TABLET: at 10:13

## 2024-07-15 RX ADMIN — AMLODIPINE BESYLATE 10 MG: 5 TABLET ORAL at 16:33

## 2024-07-15 RX ADMIN — CLINDAMYCIN HYDROCHLORIDE 300 MG: 150 CAPSULE ORAL at 16:31

## 2024-07-15 RX ADMIN — CALCIUM ACETATE 1334 MG: 667 CAPSULE ORAL at 10:13

## 2024-07-15 RX ADMIN — CALCIUM ACETATE 1334 MG: 667 CAPSULE ORAL at 16:33

## 2024-07-15 RX ADMIN — HYDROMORPHONE HYDROCHLORIDE 1 MG: 2 TABLET ORAL at 16:38

## 2024-07-15 RX ADMIN — DOXYCYCLINE HYCLATE 100 MG: 100 CAPSULE ORAL at 10:14

## 2024-07-15 RX ADMIN — CLINDAMYCIN HYDROCHLORIDE 300 MG: 150 CAPSULE ORAL at 10:13

## 2024-07-15 ASSESSMENT — ACTIVITIES OF DAILY LIVING (ADL)
ADLS_ACUITY_SCORE: 29
ADLS_ACUITY_SCORE: 29
ADLS_ACUITY_SCORE: 28
ADLS_ACUITY_SCORE: 29
ADLS_ACUITY_SCORE: 28
ADLS_ACUITY_SCORE: 28
ADLS_ACUITY_SCORE: 29
ADLS_ACUITY_SCORE: 28
ADLS_ACUITY_SCORE: 29
ADLS_ACUITY_SCORE: 28
ADLS_ACUITY_SCORE: 29

## 2024-07-15 NOTE — PROGRESS NOTES
Pt. Has asked to not be bothered at night. Will answer call light promptly and assess what I can then.

## 2024-07-15 NOTE — PROGRESS NOTES
[facility]    Medicine Progress Note - Hospitalist Service    Date of Admission:  7/10/2024    Assessment & Plan   Macario Delatorre is a 43 year old male admitted on 7/10/2024 for syncopal episode and chest pain, fth have hyperkalemia likely 2/2 hypervolemia in setting of missing HD x2. He has a PMH of ESRD on M/W/F dialysis, HTN, DM2, sleep apnea, bipolar/schizophrenia, and cognitive impairment. Patient has been accepted to TCU, but remains hospitalized as needs cognitive assessment by the Critical access hospital and arranging transportation prior to discharge.     Hyperkalemia-resolved  ESRD on dialysis M/W/F  Improving w/urgent HD and shifting w/insulin+dextrose on admission and resuming normal MWF HD. Likely result of missing at least 7/8/24 and 7/10/24 sessions of dialysis. Of note, started dialysis 11/2023 and follows with Dr. Kruger/[facility]. Typically dialyzes MWF at [facility] using left arm AVF. Has history of frequently missed sessions and shortened treatments. No events on cardiac tele.   - Nephrology following, recs appreciated              - Low K diet               - HD MWF - Repeat fluid removal sat 7/13              - No indications for fistulogram, no issues w/access per discussion w/ Dr. Lua  - Repeat BMP and CBC today   -PTA calcium acetate and multivitamins    Discharge planning  Leg weakness  Generalized body aches  Ongoing since 2015 per patient, has been using canes/walkers/scooters. Says it's related to peripheral neuropathy and gout, but also reports refusing medication to assist with this because he hates them. No formal neuro workup. Patient has been accepted to TCU to continue working w/PT and OT but needs cognitive assessment and arranging transportation prior to going. Awaiting assessment by Critical access hospital, could be as long as ~1wk.   - Dilaudid 1mg PO q6h PRN for severe pain, please avoid IV as able  - Continue PTA gabapentin   - CM/SW following, assistance  "appreciated   - PT/OT following, recs appreciated  - TCU placement for weakness and ADLs    Agitation   Bipolar disorder  Schizophrenia  Cognitive impairment  Patient refusing cares and can easily get aggressive with staff overnight. Patient reports history of bipolar and schizophrenia. Does not endorse routine psychiatric follow up or regular medication use. In [facility] system was started on risperidone 1 mg at bedtime though has not taken consistently. Stated he can escalate quickly if upset. 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   - Vitals BID  - Limit lab draw as able      Possible syncopal episode  Chest pain-resolved   Reports possible syncopal episode vs mechanical slump after his legs gave out when he was trying to lift his brother's couch due to leg weakness (uses scooter at baseline) on 7/9. He also reports sudden onset of changing sternal chest pain radiating down his L arm when this happened. Trop 77 and 76 on repeat, EKG on admission w/o acute ischemic changes. Suspect this was related to hyperkalemia and hypervolemia in setting of missing HD; possibly component of costochondritis and psychogenic. 48h cardiac monitoring w/o event. He reports having \"several heart attacks and strokes\" between the ages of [age], but did not seek care. Patient reported hx of heart failure, but has not been worked up per chart rvw and he is not on medications for this. Sats wnl, lungs clear on auscultation - low concerns for pulmonary etiology. Chest pain minimal this AM.   - Consider outpatient cardiac workup once hyperkalemia stable   - PT/OT following, recs appreciated      Inconsistent healthcare   Homelessness  Transportation insecurity  Financial insecurity  Patient reports he has been \"out on the streets.\" May have a vehicle though not clear. Previously living with his brother, but was asked to leave d/t brother not being able to keep up with cares. He has " PCA's but they have not been coming to see him. Of note, he has had little to no contact w/healthcare throughout his life d/t psychosocial struggles (homelessness, mistrust in healthcare system, financial insecurity, transportation insecurity, lack of support system).  - CM/SW consult for placement, recs appreciated  - Offered patient to follow-up at [facility], but he declined. Reinforced importance of finding a clinic/PCP he likes to workup his chronic conditions/concerns.      Anion gap metabolic acidosis  Anion gap of 24 with bicarb of 21 on admission, AG improved to 17 this AM, bicarb wnl. Most likely in setting of ESRD though considering other causes. EtOH negative. Lactate wnl. Remains afebrile, WBC wnl, and asymptomatic.  - Repeat BMP w/mag today      Normocytic anemia, chronic  Hgb stable, at baseline 9-10. Suspect anemia of chronic disease in setting of ESRD. Denies hematuria this AM though this was reports of this on admission. Possible component of iron deficiency - suspect malnutrition w/difficult living situation. No si/sx of acute hemorrhaging on exam.   - Repeat CBC today      HTN, uncontrolled  Patient unsure what he takes for PTA medications. BP's persistently in the 150-170s/80s-100s. Suspect some component of hypervolemia 2/2 missed dialysis run.   - dialysis as above  - PTA amlodipine 10 daily  - PTA metoprolol 25 BID     T2DM  Diabetic peripheral neuropathy  Reports history of diabetes. Does not check blood sugar at home nor take any medications for it. A1c on admission 4.4, but unclear how accurate this is w/ESRD and ACD. Received 10 units of insulin in ED to shift K and had BG of 62. Given 25 of dextrose with resolution.    - Lyrica 25 mg daily   - Holding off on sliding scale at this time      Possible sleep apnea  Reports history of sleep apnea. Desat to 76 while sleeping that improved with 2L.  - NC PRN     Gingival infection  Anal warts  Seen at [facility] on 7/8/24. Was noted  "to have gingival infection and genital warts. Prescribed clindamycin and doxycycline at that time.   - Continue PTA clindamycin 300 TID (7/8 - 7/15)  - Continue PTA doxycycline 100 BID (7/8 - 7/18)           Diet: Regular Diet Adult    DVT Prophylaxis: Pneumatic Compression Devices  Raphael Catheter: Not present  Lines: None     Cardiac Monitoring: None  Code Status: Full Code      Clinically Significant Risk Factors        # Hyperkalemia: Highest K = 5.9 mmol/L in last 2 days, will monitor as appropriate      # Anion Gap Metabolic Acidosis: Highest Anion Gap = 19 mmol/L in last 2 days, will monitor and treat as appropriate                   # Overweight: Estimated body mass index is 27.06 kg/m  as calculated from the following:    Height as of this encounter: 1.981 m (6' 6\").    Weight as of this encounter: 106.2 kg (234 lb 2.1 oz).        # Financial/Environmental Concerns: none         Disposition Plan     Medically Ready for Discharge: Anticipated in 2-4 Days           The patient's care was discussed with the Attending Physician, Dr. Roth .    Gurjit Mensah MD  Hospitalist Service  [facility]  Securely message with [employer] (more info)  Text page via [employer] Paging/Directory   ______________________________________________________________________    Interval History   NAOE. Continues to refuse cares and particular about staff. Did not want dialysis this morning. Ongoing BLE and lower back pain.     Physical Exam   Vital Signs:     BP: (!) 155/95 Pulse: 83   Resp: 18 SpO2: 98 % O2 Device: None (Room air)    Weight: 234 lbs 2.06 oz    General Appearance:  Alert and oriented, NAD  Respiratory: No work of breathing, clear breath sounds bilaterally, no wheezing  Cardiovascular: S1, S2, no murmur  GI: Protuberant   Skin: Normal appearance, no visible rash  MSK:   Trace lower limb edema bilaterally.  Psych: Alert and oriented x 3, flat affect, appears calm.      Data   ------------------------- " PAST 24 HR DATA REVIEWED -----------------------------------------------    I have personally reviewed the following data over the past 24 hrs:      Imaging results reviewed over the past 24 hrs:   No results found for this or any previous visit (from the past 24 hour(s)).

## 2024-07-15 NOTE — PROGRESS NOTES
"Care Management Follow Up    Length of Stay (days): 5    Expected Discharge Date: 07/15/2024     Concerns to be Addressed:   OBRA level II with [facility] for TCU.    Patient plan of care discussed at interdisciplinary rounds: Yes    Anticipated Discharge Disposition:  TCU     Anticipated Discharge Services:    Anticipated Discharge DME:      Patient/family educated on Medicare website which has current facility and service quality ratings:    Education Provided on the Discharge Plan:    Patient/Family in Agreement with the Plan:      Referrals Placed by CM/SW:    Private pay costs discussed: Not applicable    Additional Information:  Social Hx: \"Live w/brother, but cannot return unless he is able to care for himself. Patient would like to go to TCU/NH until he can care for himself. [facility]      Therapy recommendation is TCU. Pt in agreement with this plan.    Completed PAS and patient will need an OBRA Level II assessment for developmental disability.   [id number]. CM called Veronica from [facility] ( [phone]) and was told that the OBRA level II can take up to 9 business days which would be until 7/24. Was told that they will call CM once completed.    Pt needs transport set up to and from for Dialysis M/W/F. Pt attends [address] [address] [phone]. LISA called Yue at [facility] ([phone]) as she helps with transportation services for pt's with Medicaid [place] and was told that pt has an auth for 1 year and the Case ID is #[id number]. CM expressed to Yue that we have not found transport yet for this pt and Yue told CM to check with [facility] on the website  [facility].Counts include 234 beds at the [facility].[place]. for agencies outside of [facility]. LISA did this along with calling [facility] ([phone]), and 11 other agencies.     Called the [facility] approved Transportation:  1) [employer] [phone] called and left a , 7/15 they do not take " Medicaid.  2) [facility] [phone] declined to provide transport  3) [facility] [phone] not WC accessible  4) [facility] [employer] [phone] VM not set up  5) [facility] [phone] No answer, 7/15 no answer, no way to leave VM.  6) [facility] [phone] temporally not WC accessible  7) [employer] [phone] not WC accessible  8) [employer] [facility] [phone] no WC accessible  9) [facility] [phone] NA  10) [facility] [phone] NA  11) [facility] [phone] currently no ramp for WC  12) [employer] [phone] called and left a message, 7/15 they no longer take Medicaid.  13) [facility] [phone] called and left a message, 7/15 LVM  14) [facility] [phone] not in service  15) [facility] [phone] not in service  16) [facility] [phone] called and left a message, 7/15 they cannot accommodate WC.  17) [employer] [phone] called and it's an adult   18) [employer] [phone] called and manager is not available, 7/15 was told they will call CM back.  19) [employer] [phone] called and left a message, 7/15 was told they will check with schedule and call back.  20) [facility] [employer] [facility] [phone] NA  21) [employer] [phone] called and left a message, 7/15 unable to LVM due to VM being full.  22) [facility] [phone] called and last ride is 1800  23) [facility] [phone]  called and left a message. 7/15 LVM.  24) [employer] [phone] called and left a message. 7/15 M  25) [facility] [phone]) called and was told they would be able to accommodate this pt, but will need to email the details and they will get back to us. LISA then  emailed the details to [email or web address].      Pt has been medically accepted to [facility] TCU pending OBRA level II. 7/15 CM LVM with admissions letting them know that we did get transport but that we are still waiting on OBRA level II.    Niru Chatterjee RN

## 2024-07-15 NOTE — PLAN OF CARE
Problem: Chronic Kidney Disease  Goal: Optimal Coping with Chronic Illness  7/15/2024 1809 by Jade Smith RN  Outcome: Progressing  7/15/2024 1146 by Jade Smith RN  Outcome: Progressing  Goal: Electrolyte Balance  7/15/2024 1809 by Jade Smith RN  Outcome: Progressing  7/15/2024 1146 by Jade Smith RN  Outcome: Progressing  Goal: Fluid Balance  7/15/2024 1809 by Jade Smith RN  Outcome: Progressing  7/15/2024 1146 by Jade Smith RN  Outcome: Progressing  Goal: Optimal Functional Ability  7/15/2024 1809 by Jade Smith RN  Outcome: Progressing  7/15/2024 1146 by Jade Smith RN  Outcome: Progressing  Intervention: Optimize Functional Ability  Recent Flowsheet Documentation  Taken 7/15/2024 1641 by Jade Smith RN  Activity Management: activity adjusted per tolerance  Goal: Acceptable Pain Control  7/15/2024 1809 by Jade Smith RN  Outcome: Progressing  7/15/2024 1146 by Jade Smith RN  Outcome: Progressing  Goal: Minimize Renal Failure Effects  7/15/2024 1809 by Jade Smith RN  Outcome: Progressing  7/15/2024 1146 by Jade Smith RN  Outcome: Progressing  Intervention: Monitor and Support Renal Function  Recent Flowsheet Documentation  Taken 7/15/2024 1641 by Jade Smith RN  Medication Review/Management: medications reviewed  Taken 7/15/2024 1100 by Jade Smith RN  Medication Review/Management: medications reviewed   Goal Outcome Evaluation:             Had dialysis run as planned, tolerated well, eating well, no complaints

## 2024-07-15 NOTE — SIGNIFICANT EVENT
Woke patient to discuss dialysis schedule and do vitals, and give medications, he said no to 10am dialysis maybe later today and wouldn't let writer do vitals or take medications, Just rolled and went back to sleep.dialysis nurse updated

## 2024-07-15 NOTE — PROCEDURES
Potassium   Date Value Ref Range Status   07/15/2024 5.4 (H) 3.4 - 5.3 mmol/L Final   10/17/2014 4.5 3.4 - 5.3 mmol/L Final     Hemoglobin   Date Value Ref Range Status   07/15/2024 8.6 (L) 13.3 - 17.7 g/dL Final   03/27/2014 13.8 13.3 - 17.7 g/dL Final     Creatinine   Date Value Ref Range Status   07/15/2024 18.70 (H) 0.67 - 1.17 mg/dL Final   10/17/2014 1.6 (H) 0.8 - 1.5 mg/dL Final     Urea Nitrogen   Date Value Ref Range Status   07/15/2024 67.3 (H) 6.0 - 20.0 mg/dL Final   10/17/2014 20.0 5.0 - 24.0 mg/dL Final     Sodium   Date Value Ref Range Status   07/15/2024 140 135 - 145 mmol/L Final   10/17/2014 147.0 (H) 133.0 - 144.0 mmol/L Final     INR   Date Value Ref Range Status   03/27/2014 1.0  Final       DIALYSIS PROCEDURE NOTE  Hepatitis status of previous patient on machine log was checked and verified ok to use with this patients hepatitis status.  Patient dialyzed for 3 hrs. on a K2 bath with a net fluid removal of  3L.  A BFR of 350 ml/min was obtained using 15 gauge needles.      The treatment plan was discussed with Dr. Alvarado during the treatment.    Needle cannulation sites held (Arterial x 15 min, Venous x 10 min).      Meds  given: Lidocaine spray   Complications: Occasional arterial alarms relieved with repositioning.       Person educated: Patient. Knowledge base substantial. Barriers to learning: none. Educated on needle safety, procedure via explanation.      ICEBOAT? Timeout performed pre-treatment  I: Patient was identified using 2 identifiers  C:  Consent Signed Yes  E: Equipment preventative maintenance is current and dialysis delivery system OK to use  B: Hepatitis B Surface Antigen: Negative; Draw Date: 1/16/24      Hepatitis B Surface Antibody: Immune; Draw Date: 12/2/23  O: Dialysis orders present and complete prior to treatment  A: Vascular access verified and assessed prior to treatment  T: Treatment was performed at a clinically appropriate time  ?: Patient was allowed to ask  questions and address concerns prior to treatment  See Adult Hemodialysis flowsheet in [facility] for further details and post assessment.  Machine water alarm in place and functioning. Transducer pods intact and checked every 15min.   Chlorine/Chloramine water system checked every 4 hours.    Patient repositioned every 2 hours during the treatment.  Post treatment report given to ARMANDO Smith RN regarding 3L of fluid removed, last BP of 155/99, and patient pain rating of 7/10.

## 2024-07-15 NOTE — PLAN OF CARE
Problem: Adult Inpatient Plan of Care  Goal: Absence of Hospital-Acquired Illness or Injury  Intervention: Identify and Manage Fall Risk  Recent Flowsheet Documentation  Taken 7/14/2024 1545 by Lauren Benavides RN  Safety Promotion/Fall Prevention:   clutter free environment maintained   increased rounding and observation   increase visualization of patient   lighting adjusted   nonskid shoes/slippers when out of bed   patient and family education   room near nurse's station   room organization consistent   safety round/check completed   supervised activity   toileting scheduled  Intervention: Prevent Skin Injury  Recent Flowsheet Documentation  Taken 7/14/2024 1545 by Lauren Benavides RN  Body Position: position changed independently  Intervention: Prevent Infection  Recent Flowsheet Documentation  Taken 7/14/2024 1545 by Lauren Benavides RN  Infection Prevention:   hand hygiene promoted   rest/sleep promoted   single patient room provided     Problem: Comorbidity Management  Goal: Blood Glucose Levels Within Targeted Range  Intervention: Monitor and Manage Glycemia  Recent Flowsheet Documentation  Taken 7/14/2024 1545 by Lauren Benavides RN  Medication Review/Management: medications reviewed  Goal: Blood Pressure in Desired Range  Intervention: Maintain Blood Pressure Management  Recent Flowsheet Documentation  Taken 7/14/2024 1545 by Lauren Benavides RN  Medication Review/Management: medications reviewed     Problem: Chronic Kidney Disease  Goal: Optimal Coping with Chronic Illness  Intervention: Support Psychosocial Response  Recent Flowsheet Documentation  Taken 7/14/2024 1545 by Lauren Benavides RN  Supportive Measures:   active listening utilized   decision-making supported   goal-setting facilitated   problem-solving facilitated  Goal: Optimal Functional Ability  Intervention: Optimize Functional Ability  Recent Flowsheet Documentation  Taken 7/14/2024 1545 by Lauren Benavides RN  Activity  Management: activity adjusted per tolerance  Environment Familiarity/Consistency: daily routine followed  Goal: Absence of Anemia Signs and Symptoms  Intervention: Manage Signs of Anemia and Bleeding  Recent Flowsheet Documentation  Taken 7/14/2024 1545 by Lauren Benavides RN  Environmental Support:   calm environment promoted   personal routine supported   rest periods encouraged  Goal: Acceptable Pain Control  Intervention: Prevent or Manage Pain  Recent Flowsheet Documentation  Taken 7/14/2024 1545 by Lauren Benavides RN  Sleep/Rest Enhancement:   awakenings minimized   noise level reduced   room darkened   comfort measures  Goal: Minimize Renal Failure Effects  Intervention: Monitor and Support Renal Function  Recent Flowsheet Documentation  Taken 7/14/2024 1545 by Lauren Benavides RN  Medication Review/Management: medications reviewed     Problem: Pain Chronic (Persistent)  Goal: Optimal Pain Control and Function  Intervention: Manage Persistent Pain  Recent Flowsheet Documentation  Taken 7/14/2024 1545 by Lauren Benavides RN  Sleep/Rest Enhancement:   awakenings minimized   noise level reduced   room darkened   comfort measures  Medication Review/Management: medications reviewed  Intervention: Optimize Psychosocial Wellbeing  Recent Flowsheet Documentation  Taken 7/14/2024 1545 by Lauren Benavides RN  Supportive Measures:   active listening utilized   decision-making supported   goal-setting facilitated   problem-solving facilitated   Goal Outcome Evaluation:  VSS C/o pain x1 effectively relieved by prn analgesia. One large emesis at hs. Accepting of prn compazine. Good appetite. Utilizes call light appropriately and is able to verbalize needs effectively.

## 2024-07-15 NOTE — PROGRESS NOTES
Pt. Awake at around 0200 AM put light on for sandwich and cranberry, had pain pill and  requested nausea medication, he gets nauseated with pain medication.

## 2024-07-15 NOTE — PLAN OF CARE
Problem: Chronic Kidney Disease  Goal: Optimal Coping with Chronic Illness  Outcome: Progressing  Goal: Electrolyte Balance  Outcome: Progressing  Goal: Fluid Balance  Outcome: Progressing  Goal: Optimal Functional Ability  Outcome: Progressing  Goal: Acceptable Pain Control  Outcome: Progressing  Goal: Minimize Renal Failure Effects  Outcome: Progressing  Intervention: Monitor and Support Renal Function  Recent Flowsheet Documentation  Taken 7/15/2024 1100 by Jade Smith RN  Medication Review/Management: medications reviewed   Goal Outcome Evaluation:             Awoke at 10am, in agreement for dialysis at noon, discussed with dialysis nurse and planning for run at bedside,patient took medications and eating breakfast

## 2024-07-15 NOTE — PROGRESS NOTES
NEPHROLOGY PROGRESS NOTE    Date of service: 07/11/24     CC: esrd    Patient seen on dialysis at 1440 hrs. on 7/15/2024.  So far tolerating dialysis well, aiming for 3 L fluid removal.  Blood pressure is stable during dialysis.  Discussed with dialysis RN at the bedside during procedure.      ASSESSMENT/RECOMMENDATIONS:    42 yo male with ESRD on dialysis MWF at [facility] using left arm AVF, HTN, CAD, cognitive impairment admit with chest pain.     ESRD - on dialysis MWF at [facility] using left arm AVF.   HD MWF, had HD again 7/13/24 due to power loss in HD center.  Unfortunately, history of suboptimal dialysis compliance as outpatient.  Hyperkalemia.  Due to missed dialysis treatment.  Presented with potassium 6.3.  I will avoid potassium as long as patient continues dialysis as prescribed.  He refuses to follow dietary restrictions.  Hypertension.  Out-of-control on admission, patient was hypervolemic from missing dialysis treatment.  Medication compliance is very poor as well.  Adequate blood pressure control with ongoing medications and dialysis.  Syncopal episode.  Unclear if was agreeable syncope.  On telemetry.  No recent issues.  Schizophrenia.  Bipolar disorder.  Cognitive impairment.  Refuses care, aggressive behavior.  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  Diabetes mellitus type 2.  Multiple associated complications including end-stage renal disease, peripheral neuropathy.        Silvio Alvarado MD  Nephrology    Current Facility-Administered Medications   Medication Dose Route Frequency Provider Last Rate Last Admin    acetaminophen (TYLENOL) tablet 650 mg  650 mg Oral Q4H PRN Nelida Damico MD        Or    acetaminophen (TYLENOL) Suppository 650 mg  650 mg Rectal Q4H PRN Nelida Damico MD        amLODIPine (NORVASC) tablet 10 mg  10 mg Oral Daily Gisela Cooper MD   10 mg at 07/14/24 0902    calcium acetate (PHOSLO) capsule 1,334 mg  1,334 mg Oral  TID w/meals Nelida Damico MD   1,334 mg at 07/15/24 1013    clindamycin (CLEOCIN) capsule 300 mg  300 mg Oral TID Gurjit Mensah MD   300 mg at 07/15/24 1013    glucose gel 15-30 g  15-30 g Oral Q15 Min PRN Anita Ferguson MD        Or    dextrose 50 % injection 25-50 mL  25-50 mL Intravenous Q15 Min PRN Anita Ferguson MD   25 mL at 07/10/24 1544    Or    glucagon injection 1 mg  1 mg Subcutaneous Q15 Min PRN Anita Ferguson MD        doxycycline hyclate (VIBRAMYCIN) capsule 100 mg  100 mg Oral BID Gurjit Mensah MD   100 mg at 07/15/24 1014    HYDROmorphone (DILAUDID) half-tab 1 mg  1 mg Oral Q6H PRN Nelida Damico MD   1 mg at 07/15/24 0217    Lidocaine (LIDOCARE) 4 % Patch 1 patch  1 patch Transdermal Daily PRN Abdifatah Hooks MD        lidocaine (LMX4) cream   Topical Q1H PRN Gisela Cooper MD        lidocaine 1 % 0.1-1 mL  0.1-1 mL Other Q1H PRN Gisela Cooper MD        metoprolol succinate ER (TOPROL XL) 24 hr tablet 25 mg  25 mg Oral BID Gisela Cooper MD   25 mg at 07/14/24 2055    multivitamin RENAL (RENAVITE RX/NEPHROVITE) tablet 1 tablet  1 tablet Oral Daily Nelida Damico MD   1 tablet at 07/15/24 1013    naloxone (NARCAN) injection 0.2 mg  0.2 mg Intravenous Q2 Min PRN Feliciano Grande MD        Or    naloxone (NARCAN) injection 0.4 mg  0.4 mg Intravenous Q2 Min PRN Feliciano Grande MD        Or    naloxone (NARCAN) injection 0.2 mg  0.2 mg Intramuscular Q2 Min PRN Feliciano Grande MD        Or    naloxone (NARCAN) injection 0.4 mg  0.4 mg Intramuscular Q2 Min PRN Feliciano Grande MD        ondansetron (ZOFRAN ODT) ODT tab 4 mg  4 mg Oral Q6H PRN Gisela Cooper MD   4 mg at 07/14/24 2055    Or    ondansetron (ZOFRAN) injection 4 mg  4 mg Intravenous Q6H PRN Gisela Cooper MD        polyethylene glycol (MIRALAX) Packet 17 g  17 g Oral BID PRN Gisela Cooper MD        pregabalin (LYRICA) capsule 25 mg  25 mg Oral Daily Marcel Roth MD   25 mg at 07/15/24 1013    prochlorperazine  "(COMPAZINE) injection 10 mg  10 mg Intravenous Q6H PRGisela Robles MD        Or    prochlorperazine (COMPAZINE) tablet 10 mg  10 mg Oral Q6H PRGisela Robles MD   10 mg at 07/15/24 0221    Or    prochlorperazine (COMPAZINE) suppository 25 mg  25 mg Rectal Q12H PRGisela Robles MD        risperiDONE (risperDAL) tablet 1 mg  1 mg Oral At Bedtime Nelida Damico MD   1 mg at 07/14/24 2057    senna-docusate (SENOKOT-S/PERICOLACE) 8.6-50 MG per tablet 1 tablet  1 tablet Oral BID PRGisela Robles MD        Or    senna-docusate (SENOKOT-S/PERICOLACE) 8.6-50 MG per tablet 2 tablet  2 tablet Oral BID PRGisela Robles MD        sodium chloride (PF) 0.9% PF flush 3 mL  3 mL Intracatheter Q8H Gisela Cooper MD   3 mL at 07/11/24 0921    sodium chloride (PF) 0.9% PF flush 3 mL  3 mL Intracatheter q1 min prGisela Robles MD            Physical Exam  BP (!) 155/93   Pulse 87   Temp 98.4  F (36.9  C) (Axillary)   Resp 18   Ht 1.981 m (6' 6\")   Wt 106.2 kg (234 lb 2.1 oz)   SpO2 97%   BMI 27.06 kg/m    GENERAL: NAD  HEENT: NCAT, sclerae not icteric, MMM  NECK: Trachea midline.   LUNGS: no respiratory distress,  HEART:  + leg edema.   ABDOMEN: Soft, NT  PSYCH: Alert, not answering questions  NEURO:  moves all extremities  MUSC/SKEL: normal muscle mass, no joint effusions evident  SKIN: No rash   Access: L AVF    Lab Data:  Recent Labs   Lab Test 07/15/24  1245 07/14/24  1018 07/13/24  1919 07/12/24  0542 07/10/24  1253 01/16/24  1837   POTASSIUM 5.4* 5.9* 4.0 6.1*   < > 4.2   CHLORIDE 98 99  --  98   < > 98   ALBUMIN  --   --   --   --   --  4.1    < > = values in this interval not displayed.     Recent Labs   Lab Test 07/15/24  1245 07/14/24  1018 07/12/24  0542 07/10/24  1253   BUN 67.3* 55.0* 68.2* 81.0*     Recent Labs   Lab Test 07/15/24  1245 07/14/24  1018 07/12/24  0542   WBC 5.0 6.3 6.0   HGB 8.6* 9.0* 9.3*   MCV 87 88 89    182 202     No lab results found.    Invalid input(s): " "\"PTHINTACT\", \"[id number]\", \"[id number]\", \"PROTCREATUR\"    I reviewed the above labs  "

## 2024-07-15 NOTE — PLAN OF CARE
Goal Outcome Evaluation:    Pt. Has been  left undisturbed per agreement unless he wants to be bothered.  Labs will wait to draw or ask if can be drawn at dialysis.

## 2024-07-16 ENCOUNTER — APPOINTMENT (OUTPATIENT)
Dept: PHYSICAL THERAPY | Facility: HOSPITAL | Age: 44
DRG: 640 | End: 2024-07-16
Payer: MEDICARE

## 2024-07-16 PROCEDURE — 97542 WHEELCHAIR MNGMENT TRAINING: CPT | Mod: GP

## 2024-07-16 PROCEDURE — 250N000013 HC RX MED GY IP 250 OP 250 PS 637: Performed by: FAMILY MEDICINE

## 2024-07-16 PROCEDURE — 99232 SBSQ HOSP IP/OBS MODERATE 35: CPT | Mod: GC

## 2024-07-16 PROCEDURE — 250N000013 HC RX MED GY IP 250 OP 250 PS 637

## 2024-07-16 PROCEDURE — 120N000004 HC R&B MS OVERFLOW

## 2024-07-16 PROCEDURE — 97530 THERAPEUTIC ACTIVITIES: CPT | Mod: GP

## 2024-07-16 RX ADMIN — CALCIUM ACETATE 1334 MG: 667 CAPSULE ORAL at 20:47

## 2024-07-16 RX ADMIN — METOPROLOL SUCCINATE 25 MG: 25 TABLET, EXTENDED RELEASE ORAL at 11:29

## 2024-07-16 RX ADMIN — Medication 1 TABLET: at 11:28

## 2024-07-16 RX ADMIN — PROCHLORPERAZINE MALEATE 10 MG: 5 TABLET ORAL at 21:03

## 2024-07-16 RX ADMIN — DOXYCYCLINE HYCLATE 100 MG: 100 CAPSULE ORAL at 11:29

## 2024-07-16 RX ADMIN — METOPROLOL SUCCINATE 25 MG: 25 TABLET, EXTENDED RELEASE ORAL at 20:49

## 2024-07-16 RX ADMIN — HYDROMORPHONE HYDROCHLORIDE 1 MG: 2 TABLET ORAL at 21:03

## 2024-07-16 RX ADMIN — PROCHLORPERAZINE MALEATE 10 MG: 5 TABLET ORAL at 03:02

## 2024-07-16 RX ADMIN — CALCIUM ACETATE 1334 MG: 667 CAPSULE ORAL at 11:28

## 2024-07-16 RX ADMIN — DOXYCYCLINE HYCLATE 100 MG: 100 CAPSULE ORAL at 20:48

## 2024-07-16 RX ADMIN — HYDROMORPHONE HYDROCHLORIDE 1 MG: 2 TABLET ORAL at 03:02

## 2024-07-16 RX ADMIN — AMLODIPINE BESYLATE 10 MG: 5 TABLET ORAL at 11:29

## 2024-07-16 RX ADMIN — PREGABALIN 25 MG: 25 CAPSULE ORAL at 11:28

## 2024-07-16 RX ADMIN — RISPERIDONE 1 MG: 1 TABLET, FILM COATED ORAL at 20:54

## 2024-07-16 ASSESSMENT — ACTIVITIES OF DAILY LIVING (ADL)
ADLS_ACUITY_SCORE: 28

## 2024-07-16 NOTE — PLAN OF CARE
Problem: Adult Inpatient Plan of Care  Goal: Absence of Hospital-Acquired Illness or Injury  Intervention: Identify and Manage Fall Risk  Recent Flowsheet Documentation  Taken 7/16/2024 1208 by Jade Smith RN  Safety Promotion/Fall Prevention:   activity supervised   clutter free environment maintained   increased rounding and observation   lighting adjusted   nonskid shoes/slippers when out of bed   patient and family education   room organization consistent   supervised activity   Goal Outcome Evaluation:         Slept until noon, took morning medication when awake, does not want to be bothered until he awake, talk to dialysis about not scheduling until around noon tomorrow if he is still inpatient as that is his normal time he awakes

## 2024-07-16 NOTE — PLAN OF CARE
Goal Outcome Evaluation:      Plan of Care Reviewed With: patient    Overall Patient Progress: no changeOverall Patient Progress: no change       Irritable all shift. Refused all scheduled meds and assessment. Pt was left undisturbed as requested.

## 2024-07-16 NOTE — PROGRESS NOTES
"Care Management Follow Up    Length of Stay (days): 6    Expected Discharge Date: 07/18/2024     Concerns to be Addressed:    discharge planning, OBRA level II   Patient plan of care discussed at interdisciplinary rounds: Yes    Anticipated Discharge Disposition:  [facility] TCU.     Anticipated Discharge Services:  TCU  Anticipated Discharge DME:      Patient/family educated on Medicare website which has current facility and service quality ratings:    Education Provided on the Discharge Plan:    Patient/Family in Agreement with the Plan:      Referrals Placed by CM/SW:  TCU  Private pay costs discussed: Not applicable    Additional Information:  Social Hx: \"Live w/brother, but cannot return unless he is able to care for himself. Patient would like to go to TCU/NH until he can care for himself. [facility] dialysis MWF 1445.       Therapy recommendation is TCU. Pt in agreement with this plan.     Completed PAS and patient will need an OBRA Level II assessment for developmental disability.   [id number]. LISA called Veronica from [employer] ( [phone]) and was told that the OBRA level II can take up to 9 business days to complete. Was called this morning by Alysia from [employer] and was told that she will be out tomorrow at some point to do the assessment and she will schedule it at 0930 but it could be anytime tomorrow.    LISA also go a call from [employer] [phone]) and was told they can start on Friday 7/19 with a  time of 1430 and return time [id number]. LISA confirmed that this was still the plan and gave them the number to the TCU.    LISA then spoke to [facility] TCU admissions and was told they will have a bed for the pt on Thursday as long as they get the OBRA level II. And to call 7/17 with transport time. LISA also gave the TCU the to and from dialysis transport information.     Niru Chatterjee RN      "

## 2024-07-16 NOTE — PROGRESS NOTES
Pt requested for pain med and compazine around 0300, which was given (see MAR). Offered to check pt vitals and do assessments, pt refused. Writer told pt about coming back to his room to reassess his pain after given the pain med, pt told writer not to come back and to be left alone.

## 2024-07-16 NOTE — PROGRESS NOTES
[facility]    Medicine Progress Note - Hospitalist Service    Date of Admission:  7/10/2024    Assessment & Plan   Macario Delatorre is a 43 year old male admitted on 7/10/2024 for syncopal episode and chest pain, fth have hyperkalemia likely 2/2 hypervolemia in setting of missing HD x2. He has a PMH of ESRD on M/W/F dialysis, HTN, DM2, sleep apnea, bipolar/schizophrenia, and cognitive impairment. Patient has been accepted to TCU, but remains hospitalized as needs cognitive assessment by the [facility] and arranging transportation prior to discharge.     Hyperkalemia - stable  ESRD on dialysis M/W/F  Improving w/urgent HD and shifting w/insulin+dextrose on admission and resuming normal MWF HD. Likely result of missing at least 7/8/24 and 7/10/24 sessions of dialysis. Of note, started dialysis 11/2023 and follows with Dr. Kruger/[facility]. Typically dialyzes MWF at [facility] using left arm AVF. Has history of frequently missed sessions and shortened treatments. No events on cardiac tele.   - Nephrology following, recs appreciated              - Low K diet               - HD MWF              - No indications for fistulogram, no issues w/access per discussion w/ Dr. Lua  - [facility] MWF w/dialysis   -PTA calcium acetate and multivitamins    Discharge planning  Leg weakness  Generalized body aches  Ongoing since 2015 per patient, has been using canes/walkers/scooters. Says it's related to peripheral neuropathy and gout, but also reports refusing medication to assist with this because he hates them. No formal neuro workup. Patient has been accepted to TCU to continue working w/PT and OT but needs cognitive assessment and arranging transportation prior to going. Awaiting assessment by [facility], could be as long as ~1wk.   - Dilaudid 1mg PO q6h PRN for severe pain, please avoid IV as able  - Continue PTA gabapentin   - CM/SW following, assistance appreciated   - PT/OT following, recs  "appreciated  - TCU placement for weakness and ADLs    Agitation   Bipolar disorder  Schizophrenia  Cognitive impairment  Patient refusing cares and can easily get aggressive with staff overnight. Patient reports history of bipolar and schizophrenia. Does not endorse routine psychiatric follow up or regular medication use. In [facility] system was started on risperidone 1 mg at bedtime though has not taken consistently. Stated he can escalate quickly if upset. 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   - Vitals BID  - Limit lab draw as able      Possible syncopal episode  Chest pain-resolved   Reports possible syncopal episode vs mechanical slump after his legs gave out when he was trying to lift his brother's couch due to leg weakness (uses scooter at baseline) on 7/9. He also reports sudden onset of changing sternal chest pain radiating down his L arm when this happened. Trop 77 and 76 on repeat, EKG on admission w/o acute ischemic changes. Suspect this was related to hyperkalemia and hypervolemia in setting of missing HD; possibly component of costochondritis and psychogenic. 48h cardiac monitoring w/o event. He reports having \"several heart attacks and strokes\" between the ages of [age], but did not seek care. Patient reported hx of heart failure, but has not been worked up per chart rvw and he is not on medications for this. Sats wnl, lungs clear on auscultation - low concerns for pulmonary etiology. Chest pain minimal this AM.   - Consider outpatient cardiac workup once hyperkalemia stable   - PT/OT following, recs appreciated      Inconsistent healthcare   Homelessness  Transportation insecurity  Financial insecurity  Patient reports he has been \"out on the streets.\" May have a vehicle though not clear. Previously living with his brother, but was asked to leave d/t brother not being able to keep up with cares. He has PCA's but they have not been coming to " see him. Of note, he has had little to no contact w/healthcare throughout his life d/t psychosocial struggles (homelessness, mistrust in healthcare system, financial insecurity, transportation insecurity, lack of support system).  - CM/SW following for placement, recs appreciated  - Offered patient to follow-up at [facility], but he declined. Reinforced importance of finding a clinic/PCP he likes to workup his chronic conditions/concerns.      Anion gap metabolic acidosis, resolved   Anion gap of 24 with bicarb of 21 on admission, AG stable at 18 this AM, bicarb wnl. Most likely in setting of ESRD though considering other causes. EtOH negative. Lactate wnl. Remains afebrile, WBC wnl, and asymptomatic.  - BMP MWF w/dialysis      Normocytic anemia, chronic  Hgb stable, at baseline 9-10. Suspect anemia of chronic disease in setting of ESRD. Denies hematuria this AM though this was reports of this on admission. Possible component of iron deficiency - suspect malnutrition w/difficult living situation. No si/sx of acute hemorrhaging on exam.   - Repeat CBC today      HTN, uncontrolled  Patient unsure what he takes for PTA medications. BP's persistently in the 150-170s/80s-100s. Suspect some component of hypervolemia 2/2 missed dialysis run.   - dialysis as above  - PTA amlodipine 10 daily  - PTA metoprolol 25 BID     T2DM  Diabetic peripheral neuropathy  Reports history of diabetes. Does not check blood sugar at home nor take any medications for it. A1c on admission 4.4, but unclear how accurate this is w/ESRD and ACD. Received 10 units of insulin in ED to shift K and had BG of 62. Given 25 of dextrose with resolution.    - Lyrica 25 mg daily   - Holding off on sliding scale at this time      Possible sleep apnea  Reports history of sleep apnea. Desat to 76 while sleeping that improved with 2L.  - NC PRN     Gingival infection  Anal warts  Seen at [place] ED on 7/8/24. Was noted to have gingival infection and  "genital warts. Prescribed clindamycin and doxycycline at that time.   - Completed PTA clindamycin 300 TID (7/8 - 7/15)  - Continue PTA doxycycline 100 BID (7/8 - 7/18)           Diet: Regular Diet Adult    DVT Prophylaxis: Pneumatic Compression Devices  Raphael Catheter: Not present  Lines: None     Cardiac Monitoring: None  Code Status: Full Code      Clinically Significant Risk Factors        # Hyperkalemia: Highest K = 5.4 mmol/L in last 2 days, will monitor as appropriate                        # Overweight: Estimated body mass index is 27.06 kg/m  as calculated from the following:    Height as of this encounter: 1.981 m (6' 6\").    Weight as of this encounter: 106.2 kg (234 lb 2.1 oz).        # Financial/Environmental Concerns: none         Disposition Plan     Medically Ready for Discharge: Anticipated in 2-4 Days           The patient's care was discussed with the Attending Physician, Dr. Roth .    Gurjit Mensah MD  Hospitalist Service  [facility]  Securely message with [employer] (more info)  Text page via [phone] Paging/Directory   ______________________________________________________________________    Interval History   NAOE. Continues to refuse cares. Patient wanted to rest and did not want to talk this AM.      Physical Exam   Vital Signs: Temp: 98.8  F (37.1  C) Temp src: Axillary BP: (!) 155/99 Pulse: 88   Resp: 22 SpO2: 100 % O2 Device: None (Room air)    Weight: 234 lbs 2.06 oz    General Appearance:  Alert and oriented, NAD  Respiratory: No work of breathing, clear breath sounds bilaterally, no wheezing  Cardiovascular: S1, S2, no murmur  GI: Protuberant   Skin: Normal appearance, no visible rash  MSK:   Trace lower limb edema bilaterally.  Psych: Alert and oriented x 3, flat affect, appears calm.      Data   ------------------------- PAST 24 HR DATA REVIEWED -----------------------------------------------    I have personally reviewed the following data over the past 24 " hrs:    5.0  \   8.6 (L)   / 181     140 98 67.3 (H) /  157 (H)   5.4 (H) 24 18.70 (H) \       Imaging results reviewed over the past 24 hrs:   No results found for this or any previous visit (from the past 24 hour(s)).

## 2024-07-16 NOTE — PROGRESS NOTES
NEPHROLOGY PROGRESS NOTE    Date of service: 07/11/24     CC: esrd    Dialysis on 7/15 well tolerated,  a total of 3 liters removed during hemodialysis.     No new issues.       ASSESSMENT/RECOMMENDATIONS:    42 yo male with ESRD on dialysis MWF at [facility] using left arm AVF, HTN, CAD, cognitive impairment admit with chest pain.     ESRD - on dialysis MWF at [facility] using left arm AVF.   Continue MWF dialysis.  Unfortunately, history of suboptimal dialysis compliance as outpatient.  Hyperkalemia.  Due to missed dialysis treatment.  Presented with potassium 6.3.  I will avoid potassium as long as patient continues dialysis as prescribed.  He refuses to follow dietary restrictions.  Hypertension.  Out-of-control on admission, patient was hypervolemic from missing dialysis treatment.  Medication compliance is very poor as well.  Adequate blood pressure control with ongoing medications and dialysis.  Syncopal episode.  Unclear if was agreeable syncope.  On telemetry.  No recent issues.  Schizophrenia.  Bipolar disorder.  Cognitive impairment.  Refuses care, aggressive behavior.  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  Diabetes mellitus type 2.  Multiple associated complications including end-stage renal disease, peripheral neuropathy.        Silvio Alvarado MD  Nephrology    Current Facility-Administered Medications   Medication Dose Route Frequency Provider Last Rate Last Admin    acetaminophen (TYLENOL) tablet 650 mg  650 mg Oral Q4H PRN Nelida Damico MD        Or    acetaminophen (TYLENOL) Suppository 650 mg  650 mg Rectal Q4H PRN Nelida Damico MD        amLODIPine (NORVASC) tablet 10 mg  10 mg Oral Daily Gisela Cooper MD   10 mg at 07/16/24 1129    calcium acetate (PHOSLO) capsule 1,334 mg  1,334 mg Oral TID w/meals Nelida Damico MD   1,334 mg at 07/16/24 1128    glucose gel 15-30 g  15-30 g Oral Q15 Min PRN Anita Ferguson MD        Or    dextrose 50 %  injection 25-50 mL  25-50 mL Intravenous Q15 Min PRN Anita Ferguson MD   25 mL at 07/10/24 1544    Or    glucagon injection 1 mg  1 mg Subcutaneous Q15 Min PRN Anita Ferguson MD        doxycycline hyclate (VIBRAMYCIN) capsule 100 mg  100 mg Oral BID Gurjit Mensah MD   100 mg at 07/16/24 1129    HYDROmorphone (DILAUDID) half-tab 1 mg  1 mg Oral Q6H PRN Nelida Damico MD   1 mg at 07/16/24 0302    Lidocaine (LIDOCARE) 4 % Patch 1 patch  1 patch Transdermal Daily PRN Abdifatah Hooks MD        lidocaine (LMX4) cream   Topical Q1H PRN Gisela Cooper MD        lidocaine 1 % 0.1-1 mL  0.1-1 mL Other Q1H PRN Gisela Cooper MD        metoprolol succinate ER (TOPROL XL) 24 hr tablet 25 mg  25 mg Oral BID Gisela Cooper MD   25 mg at 07/16/24 1129    multivitamin RENAL (RENAVITE RX/NEPHROVITE) tablet 1 tablet  1 tablet Oral Daily Nelida Damico MD   1 tablet at 07/16/24 1128    naloxone (NARCAN) injection 0.2 mg  0.2 mg Intravenous Q2 Min PRN Feliciano Grande MD        Or    naloxone (NARCAN) injection 0.4 mg  0.4 mg Intravenous Q2 Min PRN Feliciano Grande MD        Or    naloxone (NARCAN) injection 0.2 mg  0.2 mg Intramuscular Q2 Min PRN Feliciano Grande MD        Or    naloxone (NARCAN) injection 0.4 mg  0.4 mg Intramuscular Q2 Min PRN Feliciano Grande MD        ondansetron (ZOFRAN ODT) ODT tab 4 mg  4 mg Oral Q6H PRN Gisela Cooper MD   4 mg at 07/14/24 2055    Or    ondansetron (ZOFRAN) injection 4 mg  4 mg Intravenous Q6H PRN Gisela Cooper MD        polyethylene glycol (MIRALAX) Packet 17 g  17 g Oral BID PRN Gisela Cooper MD        pregabalin (LYRICA) capsule 25 mg  25 mg Oral Daily Marcel Roth MD   25 mg at 07/16/24 1128    prochlorperazine (COMPAZINE) injection 10 mg  10 mg Intravenous Q6H PRN Gisela Cooper MD        Or    prochlorperazine (COMPAZINE) tablet 10 mg  10 mg Oral Q6H PRN Gisela Cooper MD   10 mg at 07/16/24 0302    Or    prochlorperazine (COMPAZINE) suppository 25 mg   "25 mg Rectal Q12H PRN Gisela Cooper MD        risperiDONE (risperDAL) tablet 1 mg  1 mg Oral At Bedtime Nelida Damico MD   1 mg at 07/14/24 2057    senna-docusate (SENOKOT-S/PERICOLACE) 8.6-50 MG per tablet 1 tablet  1 tablet Oral BID PRGisela Yoo MD        Or    senna-docusate (SENOKOT-S/PERICOLACE) 8.6-50 MG per tablet 2 tablet  2 tablet Oral BID PRGisela Yoo MD        sodium chloride (PF) 0.9% PF flush 3 mL  3 mL Intracatheter Q8H Gisela Cooper MD   3 mL at 07/11/24 0921    sodium chloride (PF) 0.9% PF flush 3 mL  3 mL Intracatheter q1 min prGisela Yoo MD            Physical Exam  BP (!) 155/99   Pulse 88   Temp 98.8  F (37.1  C) (Axillary)   Resp 22   Ht 1.981 m (6' 6\")   Wt 106.2 kg (234 lb 2.1 oz)   SpO2 100%   BMI 27.06 kg/m    GENERAL: NAD  HEENT: NCAT, sclerae not icteric, MMM  NECK: Trachea midline.   LUNGS: no respiratory distress,  HEART:  + leg edema.   ABDOMEN: Soft, NT  PSYCH: Alert, not answering questions  NEURO:  moves all extremities  MUSC/SKEL: normal muscle mass, no joint effusions evident  SKIN: No rash   Access: L AVF    Lab Data:  Recent Labs   Lab Test 07/15/24  1245 07/14/24 1018 07/13/24  1919 07/12/24  0542 07/10/24  1253 01/16/24  1837   POTASSIUM 5.4* 5.9* 4.0 6.1*   < > 4.2   CHLORIDE 98 99  --  98   < > 98   ALBUMIN  --   --   --   --   --  4.1    < > = values in this interval not displayed.     Recent Labs   Lab Test 07/15/24  1245 07/14/24  1018 07/12/24  0542 07/10/24  1253   BUN 67.3* 55.0* 68.2* 81.0*     Recent Labs   Lab Test 07/15/24  1245 07/14/24  1018 07/12/24  0542   WBC 5.0 6.3 6.0   HGB 8.6* 9.0* 9.3*   MCV 87 88 89    182 202     No lab results found.    Invalid input(s): \"PTHINTACT\", \"KNHC864CLKUT\", \"VHVB14AOQLMC\", \"PROTCREATUR\"    I reviewed the above labs  "

## 2024-07-17 VITALS
WEIGHT: 233.6 LBS | RESPIRATION RATE: 18 BRPM | HEART RATE: 79 BPM | HEIGHT: 78 IN | BODY MASS INDEX: 27.03 KG/M2 | SYSTOLIC BLOOD PRESSURE: 148 MMHG | DIASTOLIC BLOOD PRESSURE: 99 MMHG | OXYGEN SATURATION: 98 % | TEMPERATURE: 97.4 F

## 2024-07-17 LAB
ANION GAP SERPL CALCULATED.3IONS-SCNC: 19 MMOL/L (ref 7–15)
BUN SERPL-MCNC: 92.2 MG/DL (ref 6–20)
CALCIUM SERPL-MCNC: 9 MG/DL (ref 8.8–10.4)
CHLORIDE SERPL-SCNC: 99 MMOL/L (ref 98–107)
CREAT SERPL-MCNC: 18.84 MG/DL (ref 0.67–1.17)
EGFRCR SERPLBLD CKD-EPI 2021: 3 ML/MIN/1.73M2
ERYTHROCYTE [DISTWIDTH] IN BLOOD BY AUTOMATED COUNT: 15.2 % (ref 10–15)
GLUCOSE SERPL-MCNC: 89 MG/DL (ref 70–99)
HCO3 SERPL-SCNC: 20 MMOL/L (ref 22–29)
HCT VFR BLD AUTO: 27.4 % (ref 40–53)
HGB BLD-MCNC: 8.8 G/DL (ref 13.3–17.7)
MCH RBC QN AUTO: 28.1 PG (ref 26.5–33)
MCHC RBC AUTO-ENTMCNC: 32.1 G/DL (ref 31.5–36.5)
MCV RBC AUTO: 88 FL (ref 78–100)
PLATELET # BLD AUTO: 192 10E3/UL (ref 150–450)
POTASSIUM SERPL-SCNC: 6.7 MMOL/L (ref 3.4–5.3)
RBC # BLD AUTO: 3.13 10E6/UL (ref 4.4–5.9)
SODIUM SERPL-SCNC: 138 MMOL/L (ref 135–145)
WBC # BLD AUTO: 5.7 10E3/UL (ref 4–11)

## 2024-07-17 PROCEDURE — 90935 HEMODIALYSIS ONE EVALUATION: CPT

## 2024-07-17 PROCEDURE — 250N000013 HC RX MED GY IP 250 OP 250 PS 637: Performed by: FAMILY MEDICINE

## 2024-07-17 PROCEDURE — 99232 SBSQ HOSP IP/OBS MODERATE 35: CPT | Mod: GC

## 2024-07-17 PROCEDURE — 250N000013 HC RX MED GY IP 250 OP 250 PS 637

## 2024-07-17 PROCEDURE — 120N000004 HC R&B MS OVERFLOW

## 2024-07-17 PROCEDURE — 36415 COLL VENOUS BLD VENIPUNCTURE: CPT

## 2024-07-17 PROCEDURE — 80048 BASIC METABOLIC PNL TOTAL CA: CPT

## 2024-07-17 PROCEDURE — 85027 COMPLETE CBC AUTOMATED: CPT

## 2024-07-17 RX ORDER — HEPARIN SODIUM 1000 [USP'U]/ML
1000 INJECTION, SOLUTION INTRAVENOUS; SUBCUTANEOUS CONTINUOUS
Status: CANCELLED | OUTPATIENT
Start: 2024-07-17

## 2024-07-17 RX ADMIN — PREGABALIN 25 MG: 25 CAPSULE ORAL at 10:50

## 2024-07-17 RX ADMIN — CALCIUM ACETATE 1334 MG: 667 CAPSULE ORAL at 17:43

## 2024-07-17 RX ADMIN — DOXYCYCLINE HYCLATE 100 MG: 100 CAPSULE ORAL at 20:02

## 2024-07-17 RX ADMIN — METOPROLOL SUCCINATE 25 MG: 25 TABLET, EXTENDED RELEASE ORAL at 20:02

## 2024-07-17 RX ADMIN — CALCIUM ACETATE 1334 MG: 667 CAPSULE ORAL at 10:50

## 2024-07-17 RX ADMIN — RISPERIDONE 1 MG: 1 TABLET, FILM COATED ORAL at 22:33

## 2024-07-17 RX ADMIN — PROCHLORPERAZINE MALEATE 10 MG: 5 TABLET ORAL at 22:38

## 2024-07-17 RX ADMIN — Medication 1 TABLET: at 10:49

## 2024-07-17 RX ADMIN — HYDROMORPHONE HYDROCHLORIDE 1 MG: 2 TABLET ORAL at 22:38

## 2024-07-17 RX ADMIN — METOPROLOL SUCCINATE 25 MG: 25 TABLET, EXTENDED RELEASE ORAL at 10:58

## 2024-07-17 RX ADMIN — DOXYCYCLINE HYCLATE 100 MG: 100 CAPSULE ORAL at 06:49

## 2024-07-17 RX ADMIN — AMLODIPINE BESYLATE 10 MG: 5 TABLET ORAL at 10:49

## 2024-07-17 ASSESSMENT — ACTIVITIES OF DAILY LIVING (ADL)
ADLS_ACUITY_SCORE: 28
ADLS_ACUITY_SCORE: 27
ADLS_ACUITY_SCORE: 28
ADLS_ACUITY_SCORE: 27
ADLS_ACUITY_SCORE: 28
ADLS_ACUITY_SCORE: 28
ADLS_ACUITY_SCORE: 27
ADLS_ACUITY_SCORE: 28
ADLS_ACUITY_SCORE: 27
ADLS_ACUITY_SCORE: 28
ADLS_ACUITY_SCORE: 27
ADLS_ACUITY_SCORE: 27
ADLS_ACUITY_SCORE: 28
ADLS_ACUITY_SCORE: 28
ADLS_ACUITY_SCORE: 27
ADLS_ACUITY_SCORE: 28
ADLS_ACUITY_SCORE: 28

## 2024-07-17 NOTE — PROGRESS NOTES
[facility]    Medicine Progress Note - Hospitalist Service    Date of Admission:  7/10/2024    Assessment & Plan   Macario Delatorre is a 43 year old male admitted on 7/10/2024 for syncopal episode and chest pain, fth have hyperkalemia likely 2/2 hypervolemia in setting of missing HD x2. He has a PMH of ESRD on M/W/F dialysis, HTN, DM2, sleep apnea, bipolar/schizophrenia, and cognitive impairment. Likely discharge tomorrow - [facility] completed, patient has been accepted to TCU.      Hyperkalemia - stable  ESRD on dialysis M/W/F  Improving w/urgent HD and shifting w/insulin+dextrose on admission and resuming normal MWF HD. Likely result of missing at least 7/8/24 and 7/10/24 sessions of dialysis. Of note, started dialysis 11/2023 and follows with Dr. Kruger/[facility]. Typically dialyzes MWF at [facility] using left arm AVF. Has history of frequently missed sessions and shortened treatments. No events on 24h cardiac tele.   - Nephrology following, recs appreciated              - Low K diet               - HD MWF              - No indications for fistulogram, no issues w/access per discussion w/ Dr. Lua  - [facility] MWF w/dialysis   -PTA calcium acetate and multivitamins    Discharge planning  Leg weakness  Generalized body aches  Ongoing since 2015 per patient, has been using canes/walkers/scooters. Says it's related to peripheral neuropathy and gout, but also reports refusing medication to assist with this because he hates them. No formal neuro workup. Patient has been accepted to TCU to continue working w/PT and OT. [facility] completed today 7/17 - plans to discharge tomorrow.   - Dilaudid 1mg PO q6h PRN for severe pain, please avoid IV as able  - Continue PTA gabapentin   - CM/SW following, assistance appreciated   - PT/OT following, recs appreciated  - TCU placement for weakness and ADLs    Agitation   Bipolar disorder  Schizophrenia  Cognitive impairment  Patient refusing  "cares and can easily get aggressive with staff overnight. Patient reports history of bipolar and schizophrenia. Does not endorse routine psychiatric follow up or regular medication use. In [facility] system was started on risperidone 1 mg at bedtime though has not taken consistently. Stated he can escalate quickly if upset. 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   - Vitals BID  - Limit lab draw as able      Possible syncopal episode  Chest pain-resolved   Reports possible syncopal episode vs mechanical slump after his legs gave out when he was trying to lift his brother's couch due to leg weakness (uses scooter at baseline) on 7/9. He also reports sudden onset of changing sternal chest pain radiating down his L arm when this happened. Trop 77 and 76 on repeat, EKG on admission w/o acute ischemic changes. Suspect this was related to hyperkalemia and hypervolemia in setting of missing HD; possibly component of costochondritis and psychogenic. 48h cardiac monitoring w/o event. He reports having \"several heart attacks and strokes\" between the ages of [age], but did not seek care. Patient reported hx of heart failure, but has not been worked up per chart rvw and he is not on medications for this. Sats wnl, lungs clear on auscultation - low concerns for pulmonary etiology. Chest pain minimal this AM.   - Consider outpatient cardiac workup once hyperkalemia stable   - PT/OT following, recs appreciated      Inconsistent healthcare   Homelessness  Transportation insecurity  Financial insecurity  Patient reports he has been \"out on the streets.\" May have a vehicle though not clear. Previously living with his brother, but was asked to leave d/t brother not being able to keep up with cares. He has PCA's but they have not been coming to see him. Of note, he has had little to no contact w/healthcare throughout his life d/t psychosocial struggles (homelessness, mistrust in " healthcare system, financial insecurity, transportation insecurity, lack of support system).  - CM/SW following for placement, recs appreciated  - Offered patient to follow-up at [facility], but he declined. Reinforced importance of finding a clinic/PCP he likes to workup his chronic conditions/concerns.      Anion gap metabolic acidosis, resolved   Anion gap of 24 with bicarb of 21 on admission, AG stable at 18 this AM, bicarb wnl. Most likely in setting of ESRD though considering other causes. EtOH negative. Lactate wnl. Remains afebrile, WBC wnl, and asymptomatic.  - BMP MWF w/dialysis      Normocytic anemia, chronic  Hgb stable, at baseline 9-10. Suspect anemia of chronic disease in setting of ESRD. Denies hematuria this AM though this was reports of this on admission. Possible component of iron deficiency - suspect malnutrition w/difficult living situation. No si/sx of acute hemorrhaging on exam.   - Repeat CBC today      HTN, uncontrolled  Patient unsure what he takes for PTA medications. BP's persistently in the 150-170s/80s-100s. Suspect some component of hypervolemia 2/2 missed dialysis run.   - dialysis as above  - PTA amlodipine 10 daily  - PTA metoprolol 25 BID     T2DM  Diabetic peripheral neuropathy  Reports history of diabetes. Does not check blood sugar at home nor take any medications for it. A1c on admission 4.4, but unclear how accurate this is w/ESRD and ACD. Received 10 units of insulin in ED to shift K and had BG of 62. Given 25 of dextrose with resolution.    - Lyrica 25 mg daily   - Holding off on sliding scale at this time      Possible sleep apnea  Reports history of sleep apnea. Desat to 76 while sleeping that improved with 2L.  - NC PRN     Gingival infection  Anal warts  Seen at [place] ED on 7/8/24. Was noted to have gingival infection and genital warts. Prescribed clindamycin and doxycycline at that time.   - Completed PTA clindamycin 300 TID (7/8 - 7/15)  - Continue PTA  "doxycycline 100 BID (7/8 - 7/18)           Diet: Regular Diet Adult    DVT Prophylaxis: Pneumatic Compression Devices  Raphael Catheter: Not present  Lines: None     Cardiac Monitoring: None  Code Status: Full Code      Clinically Significant Risk Factors        # Hyperkalemia: Highest K = 6.7 mmol/L in last 2 days, will monitor as appropriate      # Anion Gap Metabolic Acidosis: Highest Anion Gap = 19 mmol/L in last 2 days, will monitor and treat as appropriate                   # Overweight: Estimated body mass index is 27 kg/m  as calculated from the following:    Height as of this encounter: 1.981 m (6' 6\").    Weight as of this encounter: 106 kg (233 lb 9.6 oz).        # Financial/Environmental Concerns: none         Disposition Plan     Medically Ready for Discharge: Anticipated in 2-4 Days           The patient's care was discussed with the Attending Physician, Dr. Roth .    Gurjit Mensah MD  Hospitalist Service  [facility]  Securely message with [employer] (more info)  Text page via [employer] Paging/Directory   ______________________________________________________________________    Interval History   NAOE. Continues to refuse cares. Continues to complain about back, leg, and chest pain - no acute changes. Asked for increase in pain medications, told him we were unable to do so w/o further workup.     Physical Exam   Vital Signs:     BP: (!) 162/103 Pulse: 92     SpO2: 95 % O2 Device: None (Room air)    Weight: 233 lbs 9.6 oz    General Appearance:  Alert and oriented, NAD, eating \"brunch, conversing appropriately, doing sit ups in bed   Respiratory: No work of breathing, no respiratory distress. Declined lung auscultation.   Cardiovascular: Appears well-perfused. Declined cardiac auscultatoin.   GI: Protuberant   Skin: Normal appearance, no visible rash  MSK:   Trace lower limb edema bilaterally.  Psych: Alert and oriented x 3, annoyed     Data   ------------------------- PAST 24 HR " DATA REVIEWED -----------------------------------------------    I have personally reviewed the following data over the past 24 hrs:    5.7  \   8.8 (L)   / 192     138 99 92.2 (H) /  89   6.7 (HH) 20 (L) 18.84 (H) \       Imaging results reviewed over the past 24 hrs:   No results found for this or any previous visit (from the past 24 hour(s)).

## 2024-07-17 NOTE — PROGRESS NOTES
"Care Management Follow Up    Length of Stay (days): 7    Expected Discharge Date: 07/18/2024     Concerns to be Addressed:    Care progression - discharge planning   Patient plan of care discussed at interdisciplinary rounds: Yes    Anticipated Discharge Disposition:  Transitional Care     Anticipated Discharge Services:  Transitional Care  Anticipated Discharge DME:  NA    Patient/family educated on Medicare website which has current facility and service quality ratings:  Yes  Education Provided on the Discharge Plan:  Yes per team  Patient/Family in Agreement with the Plan:  Yes    Referrals Placed by CM/SW:  Yes  Private pay costs discussed: Transportation costs    Additional Information:  Called Veronica at [facility] ([phone]) and left a message to confirm Obra level 2 assessment for today and time.     Social Hx: \"Live w/brother, but cannot return unless he is able to care for himself. Patient would like to go to TCU/NH until he can care for himself. [facility] dialysis MWF chair [phone]. Accepted to [facility]. PAS completed. Will need Obra level 2 assessment which has been scheduled for 7/17 at 0930. Rides for dialysis set up with [employer] [phone]). [facility] WC transport.\"     RNCM to follow for medical progression, recommendations, and final discharge plan.     Moon Hernández, RN     6234 Veronica called and said she is not qualify to do the Obra level 2 assessment. She is not a QDDP. Guillermina MORALES will be coming and will call to confirm a date and time.     0920 Guillermina called [phone]. She's here for the Obra level 2 assessment and wants to check if patient is available.   Paged bedside nurse, Kody العراقي and patient is available. HD has not call for patient yet.    Sent message to update [facility]    1125 per provider, Dr. BRENDEN Mensah, patient is ready pending Obra level 2 assessment.     1220 rec'd the OBRA Level 2 Evaluative Report Signature Sheet from ALEISHA Gonzalez    "    III - [facility]      Health and [facility]  [phone]      Met patient at bedside to review with him, but he is having dialysis and requested RNCM to come back after 3-3:30 PM    1535 met with patient at bedside to sign the OBRA Level 2 Evaluative Report Signature Sheet.  Patient requested a [employer] [facility] set up  Discussed out of pocket cost of [facility] medical transportation by wheelchair with patient. Patient agreed with the plan to have transportation arranged by [employer] transport.    Faxed the signature form to [facility]    Called [employer] to set up a WC ride between [phone].

## 2024-07-17 NOTE — PLAN OF CARE
"  Problem: Pain Acute  Goal: Optimal Pain Control and Function  Outcome: Not Progressing  Intervention: Develop Pain Management Plan  Recent Flowsheet Documentation  Taken 7/17/2024 0005 by Cherelle Dillon RN  Pain Management Interventions: (Too early for pain meds, offered to call MD but refused.) emotional support  Intervention: Prevent or Manage Pain  Recent Flowsheet Documentation  Taken 7/17/2024 0005 by Cherelle Dillon RN  Medication Review/Management: medications reviewed     Problem: Hemodialysis  Goal: Safe, Effective Therapy Delivery  Outcome: Progressing  Intervention: Optimize Device Care and Function  Recent Flowsheet Documentation  Taken 7/17/2024 0005 by Cherelle Dillon RN  Medication Review/Management: medications reviewed  Goal: Effective Tissue Perfusion  Outcome: Progressing  Goal: Absence of Infection Signs and Symptoms  Outcome: Progressing     Goal Outcome Evaluation:         Patient refusing assessment tonight. \"I don't wanna do that right now\". Has flat affect. Has back pain, rated 9 out of 10. Too early to give PRN pain medication. Offered to call MD but declined. Also offered to come when PRN pain medication is available but declined as well. Awaiting developmental disability assessment today. Then TCU. Also today is his regular dialysis day.                        "

## 2024-07-17 NOTE — PLAN OF CARE
Problem: Adult Inpatient Plan of Care  Goal: Absence of Hospital-Acquired Illness or Injury  Intervention: Identify and Manage Fall Risk  Recent Flowsheet Documentation  Taken 7/16/2024 1700 by Abena Brewer RN  Safety Promotion/Fall Prevention:   activity supervised   clutter free environment maintained     Problem: Pain Chronic (Persistent)  Goal: Optimal Pain Control and Function  Intervention: Manage Persistent Pain  Recent Flowsheet Documentation  Taken 7/16/2024 1700 by Abena Brewer RN  Medication Review/Management: medications reviewed   Goal Outcome Evaluation:         Pt is progressing with these goals.  Pt's pain is controlled with po prn meds.  Awaiting possible discontinue tomorrow to tcu.  Pt lets needs be known, cooperative with cares and med compliant.

## 2024-07-17 NOTE — PROGRESS NOTES
NEPHROLOGY PROGRESS NOTE    Date of service: 07/11/24     CC: esrd  Dialysis today.  Patient denies chest pain, no shortness of breath.  No uremic symptoms.    Seen on dialysis at 1345 hrs. on 7/17.  So far tolerating dialysis well, aiming for 2 to 3 L fluid removal as tolerated.  Discussed with dialysis RN at the bedside during procedure.      ASSESSMENT/RECOMMENDATIONS:    44 yo male with ESRD on dialysis MWF at [facility] using left arm AVF, HTN, CAD, cognitive impairment admit with chest pain.     ESRD - on dialysis MWF at [facility] using left arm AVF.   Continue MWF dialysis.  Unfortunately, history of suboptimal dialysis compliance as outpatient.  Hyperkalemia.  Due to missed dialysis treatment.  Presented with potassium 6.3.  I will avoid potassium as long as patient continues dialysis as prescribed.  He refuses to follow dietary restrictions.  Hypertension.  Out-of-control on admission, patient was hypervolemic from missing dialysis treatment.  Medication compliance is very poor as well.  Adequate blood pressure control with ongoing medications and dialysis.  Syncopal episode.  Unclear if was agreeable syncope.  On telemetry.  No recent issues.  Schizophrenia.  Bipolar disorder.  Cognitive impairment.  Refuses care, aggressive behavior.  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  Diabetes mellitus type 2.  Multiple associated complications including end-stage renal disease, peripheral neuropathy.        Silvio Alvarado MD  Nephrology    Current Facility-Administered Medications   Medication Dose Route Frequency Provider Last Rate Last Admin    acetaminophen (TYLENOL) tablet 650 mg  650 mg Oral Q4H PRN Nelida Damico MD        Or    acetaminophen (TYLENOL) Suppository 650 mg  650 mg Rectal Q4H PRN Nelida Damico MD        amLODIPine (NORVASC) tablet 10 mg  10 mg Oral Daily Gisela Cooper MD   10 mg at 07/17/24 1049    calcium acetate (PHOSLO) capsule 1,334 mg   1,334 mg Oral TID w/meals Nelida Damico MD   1,334 mg at 07/17/24 1050    glucose gel 15-30 g  15-30 g Oral Q15 Min PRN Anita Ferguson MD        Or    dextrose 50 % injection 25-50 mL  25-50 mL Intravenous Q15 Min PRN Anita Ferguson MD   25 mL at 07/10/24 1544    Or    glucagon injection 1 mg  1 mg Subcutaneous Q15 Min PRN Anita Ferguson MD        doxycycline hyclate (VIBRAMYCIN) capsule 100 mg  100 mg Oral BID Gurjit Mensah MD   100 mg at 07/17/24 0649    HYDROmorphone (DILAUDID) half-tab 1 mg  1 mg Oral Q6H PRN Nelida Damico MD   1 mg at 07/16/24 2103    Lidocaine (LIDOCARE) 4 % Patch 1 patch  1 patch Transdermal Daily PRN Abdifatah Hooks MD        lidocaine (LMX4) cream   Topical Q1H PRN Gisela Cooper MD        lidocaine 1 % 0.1-1 mL  0.1-1 mL Other Q1H PRN Gisela Cooper MD        metoprolol succinate ER (TOPROL XL) 24 hr tablet 25 mg  25 mg Oral BID Gisela Cooper MD   25 mg at 07/17/24 1058    multivitamin RENAL (RENAVITE RX/NEPHROVITE) tablet 1 tablet  1 tablet Oral Daily Nelida Damico MD   1 tablet at 07/17/24 1049    naloxone (NARCAN) injection 0.2 mg  0.2 mg Intravenous Q2 Min PRN Feliciano Grande MD        Or    naloxone (NARCAN) injection 0.4 mg  0.4 mg Intravenous Q2 Min PRN Feliciano Grande MD        Or    naloxone (NARCAN) injection 0.2 mg  0.2 mg Intramuscular Q2 Min PRN Feliciano Grande MD        Or    naloxone (NARCAN) injection 0.4 mg  0.4 mg Intramuscular Q2 Min PRN Feliciano Grande MD        ondansetron (ZOFRAN ODT) ODT tab 4 mg  4 mg Oral Q6H PRN Gisela Cooper MD   4 mg at 07/14/24 2055    Or    ondansetron (ZOFRAN) injection 4 mg  4 mg Intravenous Q6H PRN Gisela Cooper MD        polyethylene glycol (MIRALAX) Packet 17 g  17 g Oral BID PRN Gisela Cooper MD        pregabalin (LYRICA) capsule 25 mg  25 mg Oral Daily Marcel Roth MD   25 mg at 07/17/24 1050    prochlorperazine (COMPAZINE) injection 10 mg  10 mg Intravenous Q6H PRN Gisela Cooper MD        Or     "prochlorperazine (COMPAZINE) tablet 10 mg  10 mg Oral Q6H PRGisela Robles MD   10 mg at 07/16/24 2103    Or    prochlorperazine (COMPAZINE) suppository 25 mg  25 mg Rectal Q12H PRGisela Robles MD        risperiDONE (risperDAL) tablet 1 mg  1 mg Oral At Bedtime Nelida Damico MD   1 mg at 07/16/24 2054    senna-docusate (SENOKOT-S/PERICOLACE) 8.6-50 MG per tablet 1 tablet  1 tablet Oral BID PRGisela Robles MD        Or    senna-docusate (SENOKOT-S/PERICOLACE) 8.6-50 MG per tablet 2 tablet  2 tablet Oral BID PRGisela Robles MD        sodium chloride (PF) 0.9% PF flush 3 mL  3 mL Intracatheter Q8H Gisela Cooper MD   3 mL at 07/11/24 0921    sodium chloride (PF) 0.9% PF flush 3 mL  3 mL Intracatheter q1 min prGisela Robles MD            Physical Exam  BP (!) 154/96   Pulse 88   Temp 98.2  F (36.8  C) (Axillary)   Resp 22   Ht 1.981 m (6' 6\")   Wt 106 kg (233 lb 9.6 oz)   SpO2 100%   BMI 27.00 kg/m    GENERAL: NAD  HEENT: NCAT, sclerae not icteric, MMM  NECK: Trachea midline.   LUNGS: no respiratory distress,  HEART:  + leg edema.   ABDOMEN: Soft, NT  PSYCH: Alert, not answering questions  NEURO:  moves all extremities  MUSC/SKEL: normal muscle mass, no joint effusions evident  SKIN: No rash   Access: L AVF    Lab Data:  Recent Labs   Lab Test 07/17/24  0856 07/15/24  1245 07/14/24  1018 07/10/24  1253 01/16/24  1837   POTASSIUM 6.7* 5.4* 5.9*   < > 4.2   CHLORIDE 99 98 99   < > 98   ALBUMIN  --   --   --   --  4.1    < > = values in this interval not displayed.     Recent Labs   Lab Test 07/17/24  0856 07/15/24  1245 07/14/24  1018 07/12/24  0542   BUN 92.2* 67.3* 55.0* 68.2*     Recent Labs   Lab Test 07/17/24  0856 07/15/24  1245 07/14/24  1018   WBC 5.7 5.0 6.3   HGB 8.8* 8.6* 9.0*   MCV 88 87 88    181 182     No lab results found.    Invalid input(s): \"PTHINTACT\", \"PTFC266JVQKM\", \"CCDJ78FVYHVR\", \"PROTCREATUR\"    I reviewed the above labs  "

## 2024-07-17 NOTE — PROCEDURES
Potassium   Date Value Ref Range Status   07/17/2024 6.7 (HH) 3.4 - 5.3 mmol/L Final   10/17/2014 4.5 3.4 - 5.3 mmol/L Final     Hemoglobin   Date Value Ref Range Status   07/17/2024 8.8 (L) 13.3 - 17.7 g/dL Final   03/27/2014 13.8 13.3 - 17.7 g/dL Final     Creatinine   Date Value Ref Range Status   07/17/2024 18.84 (H) 0.67 - 1.17 mg/dL Final   10/17/2014 1.6 (H) 0.8 - 1.5 mg/dL Final     Urea Nitrogen   Date Value Ref Range Status   07/17/2024 92.2 (H) 6.0 - 20.0 mg/dL Final   10/17/2014 20.0 5.0 - 24.0 mg/dL Final     Sodium   Date Value Ref Range Status   07/17/2024 138 135 - 145 mmol/L Final   10/17/2014 147.0 (H) 133.0 - 144.0 mmol/L Final     INR   Date Value Ref Range Status   03/27/2014 1.0  Final       DIALYSIS PROCEDURE NOTE  Hepatitis status of previous patient on machine log was checked and verified ok to use with this patients hepatitis status.  Patient dialyzed for 3 hrs. on a K1 bath with a net fluid removal of  1.6L.  A BFR of 400 ml/min was obtained using 15 gauge needles.      The treatment plan was discussed with Dr. Alvarado during the treatment.    Needle cannulation sites held x 5 min.     Meds  given: None   Complications: Pt. Reporting dizziness at end of treatment without relief from UF off. Pt. Requested to terminate treatment early, MD aware. Pt. Refusing monitoring at end of treatment, primary RN notified.     Person educated: Patient. Knowledge base moderate. Barriers to learning: none. Educated on hyperkalemia, procedure via explanation.      ICEBOAT? Timeout performed pre-treatment  I: Patient was identified using 2 identifiers  C:  Consent Signed Yes  E: Equipment preventative maintenance is current and dialysis delivery system OK to use  B: Hepatitis B Surface Antigen: Negative; Draw Date: 1/16/24      Hepatitis B Surface Antibody: Immune; Draw Date: 12/2/23  O: Dialysis orders present and complete prior to treatment  A: Vascular access verified and assessed prior to treatment  T:  Treatment was performed at a clinically appropriate time  ?: Patient was allowed to ask questions and address concerns prior to treatment  See Adult Hemodialysis flowsheet in [facility] for further details and post assessment.  Machine water alarm in place and functioning. Transducer pods intact and checked every 15min.   Pt returned via bed.  Chlorine/Chloramine water system checked every 4 hours.    Patient repositioned every 2 hours during the treatment.  Post treatment report given to MARLENY Purcell RN regarding 1.6L of fluid removed, and last BP of 144/75.

## 2024-07-17 NOTE — PROGRESS NOTES
"CLINICAL NUTRITION SERVICES - REASSESSMENT NOTE     Nutrition Prescription    RECOMMENDATIONS FOR MDs/PROVIDERS TO ORDER:    Malnutrition Status:    Not noted    Recommendations already ordered by Registered Dietitian (RD):  None at this time    Future/Additional Recommendations:  Monitor po intake, weight, labs     EVALUATION OF THE PROGRESS TOWARD GOALS   Diet: Regular ( He refuses to follow dietary restrictions)  Intake: 100% at those meals recorded. 100% lunch and supper 7/16= (2360 Calories and 123 g protein),  100% lunch and supper on 7/15 (=2384 Calories and 131 g protein): average: 2372 Calories and 127 g protein-meeting ~96% estimated energy needs and 100% protein needs     NEW FINDINGS   Per RN usually wakes up around noon and does not want to be bothered    ANTHROPOMETRICS  Height: 198.1 cm (6' 6\")  Most Recent Weight: 106 kg (233 lb 9.6 oz)    Weight History:   Wt Readings from Last 10 Encounters:   07/17/24 106 kg (233 lb 9.6 oz)   01/16/24 127 kg (280 lb)   10/23/16 99.8 kg (220 lb)   09/27/16 126.6 kg (279 lb)   04/09/16 104.3 kg (230 lb)   08/12/13 128.7 kg (283 lb 12.8 oz)   ? 1/16 weight error    GI CONCERNS  Abdominal discomfort ( some cramping with activity)-7/15  WDL  Last BM 7/17 ( loose brown) stool incontinence    LABS  Reviewed: Na 138, K 6.7 (H), urea nitrogen 92.2 (H), Cr 18.84 (H), Ca 9, Glu 89    MEDICATIONS  Reviewed: norvasc, phoslo, vibramycin, toprol XL, renal multi vitamin, lyrica, resperidone    CURRENT NUTRITION DIAGNOSIS  Altered nutrition-related laboratory values related to ESRD as evidenced by elevated renal labs      INTERVENTIONS  Implementation  Encourage low potassium diet    Goals  Normal potassium levels-not met  Meet 75% or more of estimated nutrition needs-met  BG < 180-met    Monitoring/Evaluation  Progress toward goals will be monitored and evaluated per protocol.   "

## 2024-07-17 NOTE — PLAN OF CARE
Problem: Pain Acute  Goal: Optimal Pain Control and Function  Outcome: Progressing  Intervention: Prevent or Manage Pain  Recent Flowsheet Documentation  Taken 7/17/2024 0800 by Veronica Purcell RN  Medication Review/Management: medications reviewed     Problem: Hemodialysis  Goal: Safe, Effective Therapy Delivery  Outcome: Progressing  Intervention: Optimize Device Care and Function  Recent Flowsheet Documentation  Taken 7/17/2024 0800 by Veronica Purcell, RN  Medication Review/Management: medications reviewed   Goal Outcome Evaluation:       Tolerating Dialysis this afternoon with out problems. Denied any pain. Resting comfortably, watching TV this afternoon.

## 2024-07-17 NOTE — PROGRESS NOTES
Pt sleeping late, did not want vitals done this am.  from [facility].here to do Obra Level 2 assessment at 9:30 am. Ate late breakfast prior to Dialysis session at 11:30AM. K+ 6.7, note to MD. Plan TCU tomorrow if approved by [facility] assessment.

## 2024-07-18 ENCOUNTER — LAB REQUISITION (OUTPATIENT)
Dept: LAB | Facility: CLINIC | Age: 44
End: 2024-07-18
Payer: MEDICARE

## 2024-07-18 DIAGNOSIS — N18.6 END STAGE RENAL DISEASE (H): ICD-10-CM

## 2024-07-18 LAB — POTASSIUM SERPL-SCNC: 5.7 MMOL/L (ref 3.4–5.3)

## 2024-07-18 PROCEDURE — 99238 HOSP IP/OBS DSCHRG MGMT 30/<: CPT | Mod: GC

## 2024-07-18 PROCEDURE — 250N000013 HC RX MED GY IP 250 OP 250 PS 637

## 2024-07-18 PROCEDURE — 250N000013 HC RX MED GY IP 250 OP 250 PS 637: Performed by: FAMILY MEDICINE

## 2024-07-18 PROCEDURE — 36415 COLL VENOUS BLD VENIPUNCTURE: CPT

## 2024-07-18 PROCEDURE — 84132 ASSAY OF SERUM POTASSIUM: CPT

## 2024-07-18 RX ORDER — DOXYCYCLINE 100 MG/1
100 CAPSULE ORAL 2 TIMES DAILY
Qty: 1 CAPSULE | Refills: 0 | Status: SHIPPED | OUTPATIENT
Start: 2024-07-18 | End: 2024-07-19

## 2024-07-18 RX ORDER — METOPROLOL SUCCINATE 25 MG/1
25 TABLET, EXTENDED RELEASE ORAL 2 TIMES DAILY
Qty: 30 TABLET | Refills: 0 | Status: SHIPPED | OUTPATIENT
Start: 2024-07-18

## 2024-07-18 RX ORDER — PREGABALIN 25 MG/1
25 CAPSULE ORAL DAILY
Qty: 30 CAPSULE | Refills: 0 | Status: SHIPPED | OUTPATIENT
Start: 2024-07-18

## 2024-07-18 RX ADMIN — DOXYCYCLINE HYCLATE 100 MG: 100 CAPSULE ORAL at 13:15

## 2024-07-18 RX ADMIN — AMLODIPINE BESYLATE 10 MG: 5 TABLET ORAL at 13:16

## 2024-07-18 RX ADMIN — METOPROLOL SUCCINATE 25 MG: 25 TABLET, EXTENDED RELEASE ORAL at 13:15

## 2024-07-18 RX ADMIN — HYDROMORPHONE HYDROCHLORIDE 1 MG: 2 TABLET ORAL at 13:13

## 2024-07-18 RX ADMIN — PREGABALIN 25 MG: 25 CAPSULE ORAL at 13:14

## 2024-07-18 RX ADMIN — PROCHLORPERAZINE MALEATE 10 MG: 5 TABLET ORAL at 13:20

## 2024-07-18 ASSESSMENT — ACTIVITIES OF DAILY LIVING (ADL)
ADLS_ACUITY_SCORE: 27

## 2024-07-18 NOTE — PLAN OF CARE
Occupational Therapy Discharge Summary    Reason for therapy discharge:    Discharged to transitional care facility.    Progress towards therapy goal(s). See goals on Care Plan in [facility] electronic health record for goal details.  Goals partially met.  Barriers to achieving goals:   discharge from facility.    Therapy recommendation(s):    Continued therapy is recommended.  Rationale/Recommendations:  maximize ADL IND.

## 2024-07-18 NOTE — PLAN OF CARE
Physical Therapy Discharge Summary    Reason for therapy discharge:    Discharged to transitional care facility.    Progress towards therapy goal(s). See goals on Care Plan in [facility] electronic health record for goal details.  Goals not met.  Barriers to achieving goals:   Due to dizziness and weakness. .    Therapy recommendation(s):    Continued therapy is recommended.  Rationale/Recommendations:  To improve mobility and strength. .

## 2024-07-18 NOTE — PROGRESS NOTES
"Care Management Follow Up    Length of Stay (days): 8    Expected Discharge Date: 07/18/2024     Concerns to be Addressed:    Care progression - discharge planning   Patient plan of care discussed at interdisciplinary rounds: Yes    Anticipated Discharge Disposition:  Transitional Care - [facility]     Anticipated Discharge Services:  Transitional Care - [facility]  Anticipated Discharge DME:  NA    Patient/family educated on Medicare website which has current facility and service quality ratings:  Yes  Education Provided on the Discharge Plan:  Yes per team  Patient/Family in Agreement with the Plan:  Yes    Referrals Placed by CM/SW:  Yes  Private pay costs discussed: Transportation costs    Additional Information:  0804 sent message to Ruby at [facility] regarding discharge today.    0851 sent message to Dr. TYSON Cuenca regarding the discharge     0852 called and spoke with Ruby at [facility] and she OK for patient to arrive today between [phone].   Informed Ruby, patient has a ride with [employer] WC ride between [phone].    Paged to inform bedside nurse, Aniyah RODRIGUEZ and Nory HINOJOSA    0900 called and left a message for [employer] [phone] to return call.    Social Hx: \"Live w/brother, but cannot return unless he is able to care for himself. Patient would like to go to TCU/NH until he can care for himself. [facility] dialysis MWF chair [phone]. Accepted to [facility]. PAS completed. Obra level 2 assessment completed 7/17. Rides for dialysis set up with [employer] [phone]). [facility] WC transport set up.\"     RNCM to follow for medical progression, recommendations, and final discharge plan.     Moon Hernández, RN     1113 Eva called from [employer] [phone]. Updated Eva, patient is being discharge to TCU today and will need WC transport to start tomorrow 7/19 to/from [place] [facility] with dialysis chair time at 1500 MWF.

## 2024-07-18 NOTE — PROGRESS NOTES
Care Management Discharge Note    Discharge Date: 07/18/2024       Discharge Disposition: Transitional Care - [facility]    Discharge Services: Transportation Services, Transitional Care - [facility]    Discharge DME: None    Discharge Transportation:  [employer] WC ride between [phone].     Private pay costs discussed: transportation costs    Does the patient's insurance plan have a 3 day qualifying hospital stay waiver?  No    PAS Confirmation Code: [id number]  Patient/family educated on Medicare website which has current facility and service quality ratings: yes    Education Provided on the Discharge Plan: Yes per team  Persons Notified of Discharge Plans: Patient  Patient/Family in Agreement with the Plan: yes    Handoff Referral Completed: Yes    Additional Information:  Patient discharge to [facility] via [employer] WC ride between [phone].     Orders faxed to [facility]    Moon Hernández RN

## 2024-07-18 NOTE — DISCHARGE SUMMARY
"[facility]  Discharge Summary - Medicine & Pediatrics       Date of Admission:  7/10/2024  Date of Discharge:  7/18/2024  Discharging Provider: Dr. Cuenca, Dr. Roth  Discharge Service: Hospitalist Service    Discharge Diagnoses   Syncopal episode  Chest pain - resolved  Hyperkalemia  ESRD on HD MWF  Leg weakness  Generalized body aches  Bipolar disorder  Schizophrenia  Cognitive impairment  Anion gap metabolic acidosis - resolved  Normocytic anemia, chronic  HTN  T2DM  Peripheral neuropathy  Homelessness  Financial insecurity    Clinically Significant Risk Factors     # Overweight: Estimated body mass index is 27 kg/m  as calculated from the following:    Height as of this encounter: 1.981 m (6' 6\").    Weight as of this encounter: 106 kg (233 lb 9.6 oz).       Follow-ups Needed After Discharge   Follow-up Appointments     Follow Up and recommended labs and tests      Follow up with custodial physician.  The following labs/tests are   recommended: BMP.  Follow up with your nephrology team on Friday for regularly scheduled   dialysis.            Unresulted Labs Ordered in the Past 30 Days of this Admission       No orders found from 6/10/2024 to 7/11/2024.        These results will be followed up by the patient's PCP    Discharge Disposition   Discharged to nursing home  Condition at discharge: Stable    Hospital Course   Macario Delatorre was admitted on 7/10/2024 for a syncopal episode and chest pain found to have hyperkalemia likely 2/2 hypervolemia in setting of missing HD x2 . He has a PMH of ESRD on M/W/F dialysis, HTN, DM2, sleep apnea, bipolar/schizophrenia, and cognitive impairment .  The following problems were addressed during his hospitalization:    Possible syncopal episode  Chest pain-resolved   Presented with possible syncopal episode vs mechanical slump after his legs gave out when he was trying to lift his brother's couch due to leg weakness (uses scooter at baseline) on " "7/9. He also reports sudden onset of changing sternal chest pain radiating down his L arm when this happened. Trop 77 and 76 on repeat, EKG on admission w/o acute ischemic changes. Suspect this was related to hyperkalemia and hypervolemia in setting of missing HD; possibly component of costochondritis and psychogenic. 48h cardiac monitoring w/o event. He reports having \"several heart attacks and strokes\" between the ages of [age], but did not seek care. Patient reported hx of heart failure, but has not been worked up per chart rvw and he is not on medications for this. Sats wnl, lungs clear on auscultation - low concerns for pulmonary etiology. Chest pain minimal. Stable for discharge  - Consider outpatient cardiac workup  - PT/OT    Hyperkalemia - stable  ESRD on dialysis M/W/F  Improving w/urgent HD and shifting w/insulin+dextrose on admission and resuming normal MWF HD. Likely result of missing at least 7/8/24 and 7/10/24 sessions of dialysis. Of note, started dialysis 11/2023 and follows with Dr. Kruger/[facility]. Typically dialyzes MWF at [facility] using left arm AVF. Has history of frequently missed sessions and shortened treatments. No events on 24h cardiac tele.   - Nephrology consulted, recs appreciated              - Low K diet    - needs to remain on HD MWF              - No indications for fistulogram, no issues w/access per discussion w/ Dr. Lua  - follow up BMP in outpatient setting    Agitation   Bipolar disorder  Schizophrenia  Cognitive impairment  Patient refusing cares and can easily get aggressive with staff overnight. Patient reports history of bipolar and schizophrenia. Does not endorse routine psychiatric follow up or regular medication use. In [facility] system was started on risperidone 1 mg at bedtime though has not taken consistently. Stated he can escalate quickly if upset. 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 " "Overall stable at discharge.  - follow-up per facility staff    Inconsistent healthcare   Homelessness  Transportation insecurity  Financial insecurity  Patient reports he has been \"out on the streets.\" May have a vehicle though not clear. Previously living with his brother, but was asked to leave d/t brother not being able to keep up with cares. He has PCA's but they have not been coming to see him. Of note, he has had little to no contact w/healthcare throughout his life d/t psychosocial struggles (homelessness, mistrust in healthcare system, financial insecurity, transportation insecurity, lack of support system).  - Offered patient to follow-up at [facility], but he declined. Reinforced importance of finding a clinic/PCP he likes to workup his chronic conditions/concerns.      Anion gap metabolic acidosis, resolved   Anion gap of 24 with bicarb of 21 on admission, AG stable at 18 this AM, bicarb wnl. Most likely in setting of ESRD though considering other causes. EtOH negative. Lactate wnl. Remains afebrile, WBC wnl, and asymptomatic. Now resolved.     Normocytic anemia, chronic  Hgb stable, at baseline 9-10. Suspect anemia of chronic disease in setting of ESRD. Denies hematuria this AM though this was reports of this on admission. Possible component of iron deficiency - suspect malnutrition w/difficult living situation. No si/sx of acute hemorrhaging on exam. Stable by discharge.     HTN, uncontrolled  Patient unsure what he takes for PTA medications. BP's persistently in the 150-170s/80s-100s. Suspect some component of hypervolemia 2/2 missed dialysis run. Will continue at discharge   - PTA amlodipine 10 daily  - PTA metoprolol 25 BID     T2DM  Diabetic peripheral neuropathy  Reports history of diabetes. Does not check blood sugar at home nor take any medications for it. A1c on admission 4.4, but unclear how accurate this is w/ESRD and ACD. Received 10 units of insulin in ED to shift K and had BG of " 62. Given 25 of dextrose with resolution.    - Lyrica 25 mg daily   - Holding off on sliding scale at this time, defer management to facility staff     Possible sleep apnea  Reports history of sleep apnea. Desat to 76 while sleeping that improved with 2L.  - consider outpatient sleep study     Gingival infection  Anal warts  Seen at [facility] ED on 7/8/24. Was noted to have gingival infection and genital warts. Prescribed clindamycin and doxycycline at that time. Completed course of clindamycin. Doxycycline resumed, one dose left for discharge.  - Completed PTA clindamycin 300 TID (7/8 - 7/15), discontinued  - Continue PTA doxycycline 100 BID (7/8 - 7/18), ONE DOSE LEFT for 7/18      Consultations This Hospital Stay   NEPHROLOGY IP CONSULT  PHYSICAL THERAPY ADULT IP CONSULT  OCCUPATIONAL THERAPY ADULT IP CONSULT  CARE MANAGEMENT / SOCIAL WORK IP CONSULT  CARE MANAGEMENT / SOCIAL WORK IP CONSULT  PHYSICAL THERAPY ADULT IP CONSULT  OCCUPATIONAL THERAPY ADULT IP CONSULT    Code Status   Full Code       The patient was discussed with Dr. Ira Cuenca MD  [facility] [address] [address]  Phone: [phone]  Fax: [phone]  ______________________________________________________________________    Physical Exam   Vital Signs: Temp: 97.4  F (36.3  C) Temp src: Axillary BP: (!) 148/99 Pulse: 79   Resp: 18 SpO2: 98 % O2 Device: None (Room air)    Weight: 233 lbs 9.6 oz    General: Alert, no acute distress  Skin: clean, dry, and intact  HEENT: normocephalic, atraumatic, spontaneous eye movement, no injection or icterus, ears and nose normal, no neck masses  Resp: normal work of breathing, no wheezing  Cardio: RRR, S1 and S2 present  Abdomen: nondistended, no masses  Extremities: no erythema, no edema  Psych: sleepy, agitated, but appropriately interactive and clear thought process        Primary Care Physician   Physician No Ref-Primary    Discharge  Orders      General info for SNF    Length of Stay Estimate: Long Term Care  Condition at Discharge: Stable  Level of care:skilled   Rehabilitation Potential: Good  Admission H&P remains valid and up-to-date: Yes  Recent Chemotherapy: N/A  Use Nursing Home Standing Orders: Yes     Reason for your hospital stay    You were hosptialized for your hyperkalemia. This was in the setting of not going to dialysis often. You have now been dialyzed and are safe to discharge.     Follow Up and recommended labs and tests    Follow up with shelter physician.  The following labs/tests are recommended: BMP.  Follow up with your nephrology team on Friday for regularly scheduled dialysis.     Activity - Up with nursing assistance     Full Code     Physical Therapy Adult Consult    Evaluate and treat as clinically indicated.    Reason:  deconditioning     Occupational Therapy Adult Consult    Evaluate and treat as clinically indicated.    Reason:  deconditioning     Diet    Follow this diet upon discharge: Low Consistent Carb (45 g CHO per Meal) Diet and Low salt       Significant Results and Procedures   Most Recent 3 CBC's:  Recent Labs   Lab Test 07/17/24  0856 07/15/24  1245 07/14/24  1018   WBC 5.7 5.0 6.3   HGB 8.8* 8.6* 9.0*   MCV 88 87 88    181 182     Most Recent 3 BMP's:  Recent Labs   Lab Test 07/17/24  0856 07/15/24  1245 07/14/24  1018    140 143   POTASSIUM 6.7* 5.4* 5.9*   CHLORIDE 99 98 99   CO2 20* 24 25   BUN 92.2* 67.3* 55.0*   CR 18.84* 18.70* 16.35*   ANIONGAP 19* 18* 19*   LOVELY 9.0 9.3 8.9   GLC 89 157* 152*     Most Recent 2 LFT's:  Recent Labs   Lab Test 01/16/24  1837   AST 16   ALT 6   ALKPHOS 47   BILITOTAL 0.3     Most Recent 3 INR's:No lab results found.  Most Recent INR's and Anticoagulation Dosing History:  Anticoagulation Dose History          3/27/2014   Recent Dosing and Labs   INR 1.0       Details                 Most Recent 3 Creatinines:  Recent Labs   Lab Test 07/17/24  0856  07/15/24  1245 07/14/24  1018   CR 18.84* 18.70* 16.35*     Most Recent 3 Hemoglobins:  Recent Labs   Lab Test 07/17/24  0856 07/15/24  1245 07/14/24  1018   HGB 8.8* 8.6* 9.0*     Most Recent 3 Troponin's:No lab results found.  Most Recent 3 BNP's:No lab results found.  Most Recent D-dimer:No lab results found.  Most Recent Cholesterol Panel:No lab results found.  7-Day Micro Results       No results found for the last 168 hours.          Most Recent TSH and T4:No lab results found.  Most Recent Hemoglobin A1c:  Recent Labs   Lab Test 07/10/24  1633   A1C 4.4     Most Recent 6 glucoses:  Recent Labs   Lab Test 07/17/24  0856 07/15/24  1245 07/14/24  1018 07/14/24  0900 07/13/24  1221 07/12/24  0542   GLC 89 157* 152* 95 110* 92     Most Recent Urinalysis:  Recent Labs   Lab Test 07/10/24  1427   COLOR Colorless   APPEARANCE Clear   URINEGLC 150*   URINEBILI Negative   URINEKETONE Negative   SG 1.009   UBLD 0.03 mg/dL*   URINEPH 8.0*   PROTEIN 200*   NITRITE Negative   LEUKEST Negative   RBCU 1   WBCU 2     Most Recent ABG:No lab results found.  Most Recent ESR & CRP:No lab results found.  Most Recent Anemia Panel:  Recent Labs   Lab Test 07/17/24  0856   WBC 5.7   HGB 8.8*   HCT 27.4*   MCV 88        Most Recent CPK:No lab results found.,   Results for orders placed or performed during the hospital encounter of 07/10/24   XR Chest Port 1 View    Narrative    EXAM: XR CHEST PORT 1 VIEW  LOCATION: [facility]  DATE: 7/10/2024    INDICATION: fall yesterday with chest discomfort  COMPARISON: Chest radiograph 4/14/2024      Impression    IMPRESSION: Low lung volumes with bibasilar atelectasis. No pleural effusion or pneumothorax. Stable heart size and mediastinal contours.   CT Head w/o Contrast    Narrative    EXAM: CT HEAD W/O CONTRAST  LOCATION: [facility]  DATE: 7/10/2024    INDICATION: Fall yesterday, unconsciousness.  COMPARISON: None.  TECHNIQUE: Routine CT  Head without IV contrast. Multiplanar reformats. Dose reduction techniques were used.    FINDINGS:  INTRACRANIAL CONTENTS: No intracranial hemorrhage, extraaxial collection, or mass effect. No CT evidence of acute infarct. Normal parenchymal attenuation. Normal ventricles and sulci.     VISUALIZED ORBITS/SINUSES/MASTOIDS: No intraorbital abnormality. Soft tissue opacification of the right maxillary sinus with expansion of the antrum. No middle ear or mastoid effusion.    BONES/SOFT TISSUES: No acute abnormality.      Impression    IMPRESSION:  1.  No acute intracranial injury, hemorrhage, mass, or CT evidence of recent ischemia.  2.  Opacification of the right maxillary sinus probably due to a polyp, but is not specific.         Discharge Medications   Current Discharge Medication List        CONTINUE these medications which have CHANGED    Details   doxycycline hyclate (VIBRAMYCIN) 100 MG capsule Take 1 capsule (100 mg) by mouth 2 times daily for 1 dose  Qty: 1 capsule, Refills: 0    Associated Diagnoses: Gingivitis      metoprolol succinate ER (TOPROL XL) 25 MG 24 hr tablet Take 1 tablet (25 mg) by mouth 2 times daily  Qty: 30 tablet, Refills: 0    Associated Diagnoses: Primary hypertension      pregabalin (LYRICA) 25 MG capsule Take 1 capsule (25 mg) by mouth daily  Qty: 30 capsule, Refills: 0    Associated Diagnoses: Diabetic polyneuropathy associated with type 2 diabetes mellitus (H)           CONTINUE these medications which have NOT CHANGED    Details   amLODIPine (NORVASC) 10 MG tablet Take 10 mg by mouth daily      calcitRIOL (ROCALTROL) 0.25 MCG capsule Take 0.25 mcg by mouth daily      calcium acetate (PHOSLO) 667 MG CAPS capsule Take 1,334 mg by mouth 3 times daily (with meals)      multivitamin RENAL (TRIPHROCAPS) 1 capsule capsule Take 1 capsule by mouth daily      ondansetron (ZOFRAN) 4 MG tablet Take 1 tablet (4 mg) by mouth every 8 hours as needed for nausea  Qty: 12 tablet, Refills: 0    Associated  Diagnoses: Influenza      risperiDONE (RISPERDAL) 1 MG tablet Take 1 mg by mouth at bedtime           STOP taking these medications       clindamycin (CLEOCIN) 300 MG capsule Comments:   Reason for Stopping:             Allergies   Allergies   Allergen Reactions    Bee Venom      Bee stings swelling and short of breath    Pcn [Penicillins]      Noted in 8/20/08 ER,hives,headaches,throat irritation    Trazodone      Abdominal pain    Ibuprofen GI Disturbance     Noted in 8/20/08 ER  Headaches and abdominal pain    Tylenol [Acetaminophen] GI Disturbance     Noted in 8/20/08 ER

## 2024-07-18 NOTE — PLAN OF CARE
Goal Outcome Evaluation:    Pt c/o lower back pain 10/10. Gave dilaudid with relief, pt slept. Refused vitals and assessments overnight. Did not want to be woken up til later morning.

## 2024-07-18 NOTE — PROGRESS NOTES
Pt left with his belongings from the closet.  Pt dressed himself, he did not have shoes/socks.  Pt had 2 cell phones, a  and cord, headphones, and a pink e-cig.    Pt was given socks to wear, being discharged via wheelchair transport to facility.    Discharge instructions were reviewed that pt is going to [facility].  Pt has been pleasant since waking up, did take the meds he selected, as well as prn dilaudid.

## 2024-07-19 ENCOUNTER — PATIENT OUTREACH (OUTPATIENT)
Dept: CARE COORDINATION | Facility: CLINIC | Age: 44
End: 2024-07-19
Payer: MEDICARE

## 2024-07-19 LAB
ANION GAP SERPL CALCULATED.3IONS-SCNC: 22 MMOL/L (ref 7–15)
BUN SERPL-MCNC: 98.6 MG/DL (ref 6–20)
CALCIUM SERPL-MCNC: 9.1 MG/DL (ref 8.8–10.4)
CHLORIDE SERPL-SCNC: 101 MMOL/L (ref 98–107)
CREAT SERPL-MCNC: 17.6 MG/DL (ref 0.67–1.17)
EGFRCR SERPLBLD CKD-EPI 2021: 3 ML/MIN/1.73M2
GLUCOSE SERPL-MCNC: 113 MG/DL (ref 70–99)
HCO3 SERPL-SCNC: 19 MMOL/L (ref 22–29)
POTASSIUM SERPL-SCNC: 5.5 MMOL/L (ref 3.4–5.3)
SODIUM SERPL-SCNC: 142 MMOL/L (ref 135–145)

## 2024-07-19 PROCEDURE — P9603 ONE-WAY ALLOW PRORATED MILES: HCPCS | Performed by: NURSE PRACTITIONER

## 2024-07-19 PROCEDURE — 80048 BASIC METABOLIC PNL TOTAL CA: CPT | Performed by: NURSE PRACTITIONER

## 2024-07-19 PROCEDURE — 36415 COLL VENOUS BLD VENIPUNCTURE: CPT | Performed by: NURSE PRACTITIONER

## 2024-07-19 NOTE — PROGRESS NOTES
Connected Care Resource Center: Connected [facility] Resource [place]    Background: Transitional Care Management program identified per system criteria and reviewed by Connected Care Resource Center team for possible outreach.    Assessment: Upon chart review, CCRC Team member will not proceed with patient outreach related to this episode of Transitional Care Management program due to reason below:    Non-[facility] TCU: CCRC team member noted patient discharged to TCU/ARU/LTACH. Patient is not established with a [facility] currently supported by Primary Care-Care Coordination therefore handoff to Primary Care-Care Coordination is not appropriate at this time.    Plan: Transitional Care Management episode addressed appropriately per reason noted above.      KASEY Johns  [facility], [facility]    *Connected Care Resource Team does NOT follow patient ongoing. Referrals are identified based on internal discharge reports and the outreach is to ensure patient has an understanding of their discharge instructions.

## 2024-07-22 ENCOUNTER — APPOINTMENT (OUTPATIENT)
Dept: RADIOLOGY | Facility: HOSPITAL | Age: 44
DRG: 640 | End: 2024-07-22
Attending: EMERGENCY MEDICINE
Payer: MEDICARE

## 2024-07-22 ENCOUNTER — HOSPITAL ENCOUNTER (INPATIENT)
Facility: HOSPITAL | Age: 44
LOS: 10 days | Discharge: LEFT BEFORE DISCHARGE | DRG: 640 | End: 2024-08-02
Attending: EMERGENCY MEDICINE | Admitting: INTERNAL MEDICINE
Payer: MEDICARE

## 2024-07-22 DIAGNOSIS — E87.5 HYPERKALEMIA: ICD-10-CM

## 2024-07-22 DIAGNOSIS — M54.50 ACUTE LOW BACK PAIN WITHOUT SCIATICA, UNSPECIFIED BACK PAIN LATERALITY: ICD-10-CM

## 2024-07-22 LAB
ANION GAP SERPL CALCULATED.3IONS-SCNC: 21 MMOL/L (ref 7–15)
BUN SERPL-MCNC: 146.9 MG/DL (ref 6–20)
CALCIUM SERPL-MCNC: 8.5 MG/DL (ref 8.8–10.4)
CHLORIDE SERPL-SCNC: 100 MMOL/L (ref 98–107)
CREAT SERPL-MCNC: 23.68 MG/DL (ref 0.67–1.17)
EGFRCR SERPLBLD CKD-EPI 2021: 2 ML/MIN/1.73M2
ERYTHROCYTE [DISTWIDTH] IN BLOOD BY AUTOMATED COUNT: 15.8 % (ref 10–15)
GLUCOSE SERPL-MCNC: 99 MG/DL (ref 70–99)
HCO3 SERPL-SCNC: 19 MMOL/L (ref 22–29)
HCT VFR BLD AUTO: 28 % (ref 40–53)
HGB BLD-MCNC: 9 G/DL (ref 13.3–17.7)
MCH RBC QN AUTO: 28.1 PG (ref 26.5–33)
MCHC RBC AUTO-ENTMCNC: 32.1 G/DL (ref 31.5–36.5)
MCV RBC AUTO: 88 FL (ref 78–100)
PLATELET # BLD AUTO: 265 10E3/UL (ref 150–450)
POTASSIUM SERPL-SCNC: 8.1 MMOL/L (ref 3.4–5.3)
RBC # BLD AUTO: 3.2 10E6/UL (ref 4.4–5.9)
SODIUM SERPL-SCNC: 140 MMOL/L (ref 135–145)
TROPONIN T SERPL HS-MCNC: 72 NG/L
WBC # BLD AUTO: 6.3 10E3/UL (ref 4–11)

## 2024-07-22 PROCEDURE — 72072 X-RAY EXAM THORAC SPINE 3VWS: CPT

## 2024-07-22 PROCEDURE — 72100 X-RAY EXAM L-S SPINE 2/3 VWS: CPT

## 2024-07-22 PROCEDURE — 85027 COMPLETE CBC AUTOMATED: CPT | Performed by: EMERGENCY MEDICINE

## 2024-07-22 PROCEDURE — 99285 EMERGENCY DEPT VISIT HI MDM: CPT | Mod: 25

## 2024-07-22 PROCEDURE — 93005 ELECTROCARDIOGRAM TRACING: CPT | Performed by: EMERGENCY MEDICINE

## 2024-07-22 PROCEDURE — 80048 BASIC METABOLIC PNL TOTAL CA: CPT | Performed by: EMERGENCY MEDICINE

## 2024-07-22 PROCEDURE — 84484 ASSAY OF TROPONIN QUANT: CPT | Performed by: EMERGENCY MEDICINE

## 2024-07-22 PROCEDURE — 36415 COLL VENOUS BLD VENIPUNCTURE: CPT | Performed by: EMERGENCY MEDICINE

## 2024-07-22 RX ORDER — NICOTINE POLACRILEX 4 MG
15-30 LOZENGE BUCCAL
Status: DISCONTINUED | OUTPATIENT
Start: 2024-07-22 | End: 2024-08-02 | Stop reason: HOSPADM

## 2024-07-22 RX ORDER — DEXTROSE MONOHYDRATE 25 G/50ML
25-50 INJECTION, SOLUTION INTRAVENOUS
Status: DISCONTINUED | OUTPATIENT
Start: 2024-07-22 | End: 2024-08-02 | Stop reason: HOSPADM

## 2024-07-22 RX ORDER — CALCIUM GLUCONATE 20 MG/ML
1 INJECTION, SOLUTION INTRAVENOUS ONCE
Status: COMPLETED | OUTPATIENT
Start: 2024-07-23 | End: 2024-07-23

## 2024-07-22 RX ORDER — ALBUTEROL SULFATE 5 MG/ML
10 SOLUTION RESPIRATORY (INHALATION) ONCE
Status: COMPLETED | OUTPATIENT
Start: 2024-07-23 | End: 2024-07-23

## 2024-07-22 RX ORDER — DEXTROSE MONOHYDRATE 25 G/50ML
25 INJECTION, SOLUTION INTRAVENOUS ONCE
Status: COMPLETED | OUTPATIENT
Start: 2024-07-23 | End: 2024-07-23

## 2024-07-22 ASSESSMENT — COLUMBIA-SUICIDE SEVERITY RATING SCALE - C-SSRS
1. IN THE PAST MONTH, HAVE YOU WISHED YOU WERE DEAD OR WISHED YOU COULD GO TO SLEEP AND NOT WAKE UP?: NO
6. HAVE YOU EVER DONE ANYTHING, STARTED TO DO ANYTHING, OR PREPARED TO DO ANYTHING TO END YOUR LIFE?: NO
2. HAVE YOU ACTUALLY HAD ANY THOUGHTS OF KILLING YOURSELF IN THE PAST MONTH?: NO

## 2024-07-22 ASSESSMENT — ACTIVITIES OF DAILY LIVING (ADL)
ADLS_ACUITY_SCORE: 37
ADLS_ACUITY_SCORE: 37

## 2024-07-23 PROBLEM — M54.50 ACUTE LOW BACK PAIN WITHOUT SCIATICA, UNSPECIFIED BACK PAIN LATERALITY: Status: ACTIVE | Noted: 2024-07-23

## 2024-07-23 LAB
ALBUMIN SERPL BCG-MCNC: 3.6 G/DL (ref 3.5–5.2)
ALP SERPL-CCNC: 48 U/L (ref 40–150)
ALT SERPL W P-5'-P-CCNC: 12 U/L (ref 0–70)
ANION GAP SERPL CALCULATED.3IONS-SCNC: 17 MMOL/L (ref 7–15)
ANION GAP SERPL CALCULATED.3IONS-SCNC: 25 MMOL/L (ref 7–15)
AST SERPL W P-5'-P-CCNC: 12 U/L (ref 0–45)
ATRIAL RATE - MUSE: 84 BPM
BASOPHILS # BLD AUTO: 0 10E3/UL (ref 0–0.2)
BASOPHILS NFR BLD AUTO: 0 %
BILIRUB SERPL-MCNC: 0.3 MG/DL
BUN SERPL-MCNC: 147.3 MG/DL (ref 6–20)
BUN SERPL-MCNC: 88.3 MG/DL (ref 6–20)
CALCIUM SERPL-MCNC: 8.2 MG/DL (ref 8.8–10.4)
CALCIUM SERPL-MCNC: 8.3 MG/DL (ref 8.8–10.4)
CHLORIDE SERPL-SCNC: 100 MMOL/L (ref 98–107)
CHLORIDE SERPL-SCNC: 102 MMOL/L (ref 98–107)
CREAT SERPL-MCNC: 15.1 MG/DL (ref 0.67–1.17)
CREAT SERPL-MCNC: 23.82 MG/DL (ref 0.67–1.17)
DIASTOLIC BLOOD PRESSURE - MUSE: 88 MMHG
EGFRCR SERPLBLD CKD-EPI 2021: 2 ML/MIN/1.73M2
EGFRCR SERPLBLD CKD-EPI 2021: 4 ML/MIN/1.73M2
EOSINOPHIL # BLD AUTO: 0.1 10E3/UL (ref 0–0.7)
EOSINOPHIL NFR BLD AUTO: 2 %
ERYTHROCYTE [DISTWIDTH] IN BLOOD BY AUTOMATED COUNT: 15.6 % (ref 10–15)
FERRITIN SERPL-MCNC: 689 NG/ML (ref 31–409)
GLUCOSE BLDC GLUCOMTR-MCNC: 100 MG/DL (ref 70–99)
GLUCOSE BLDC GLUCOMTR-MCNC: 101 MG/DL (ref 70–99)
GLUCOSE BLDC GLUCOMTR-MCNC: 108 MG/DL (ref 70–99)
GLUCOSE BLDC GLUCOMTR-MCNC: 108 MG/DL (ref 70–99)
GLUCOSE BLDC GLUCOMTR-MCNC: 109 MG/DL (ref 70–99)
GLUCOSE BLDC GLUCOMTR-MCNC: 80 MG/DL (ref 70–99)
GLUCOSE BLDC GLUCOMTR-MCNC: 93 MG/DL (ref 70–99)
GLUCOSE SERPL-MCNC: 167 MG/DL (ref 70–99)
GLUCOSE SERPL-MCNC: 96 MG/DL (ref 70–99)
HBV SURFACE AB SERPL IA-ACNC: 503 M[IU]/ML
HBV SURFACE AB SERPL IA-ACNC: REACTIVE M[IU]/ML
HBV SURFACE AG SERPL QL IA: NONREACTIVE
HCO3 SERPL-SCNC: 18 MMOL/L (ref 22–29)
HCO3 SERPL-SCNC: 24 MMOL/L (ref 22–29)
HCT VFR BLD AUTO: 24.4 % (ref 40–53)
HGB BLD-MCNC: 7.8 G/DL (ref 13.3–17.7)
HOLD SPECIMEN: NORMAL
IMM GRANULOCYTES # BLD: 0 10E3/UL
IMM GRANULOCYTES NFR BLD: 0 %
INTERPRETATION ECG - MUSE: NORMAL
IRON BINDING CAPACITY (ROCHE): 184 UG/DL (ref 240–430)
IRON SATN MFR SERPL: 47 % (ref 15–46)
IRON SERPL-MCNC: 86 UG/DL (ref 61–157)
LYMPHOCYTES # BLD AUTO: 1.6 10E3/UL (ref 0.8–5.3)
LYMPHOCYTES NFR BLD AUTO: 28 %
MAGNESIUM SERPL-MCNC: 2.2 MG/DL (ref 1.7–2.3)
MCH RBC QN AUTO: 27.2 PG (ref 26.5–33)
MCHC RBC AUTO-ENTMCNC: 32 G/DL (ref 31.5–36.5)
MCV RBC AUTO: 85 FL (ref 78–100)
MONOCYTES # BLD AUTO: 0.4 10E3/UL (ref 0–1.3)
MONOCYTES NFR BLD AUTO: 7 %
NEUTROPHILS # BLD AUTO: 3.4 10E3/UL (ref 1.6–8.3)
NEUTROPHILS NFR BLD AUTO: 62 %
NRBC # BLD AUTO: 0 10E3/UL
NRBC BLD AUTO-RTO: 0 /100
P AXIS - MUSE: NORMAL DEGREES
PHOSPHATE SERPL-MCNC: 7.4 MG/DL (ref 2.5–4.5)
PLATELET # BLD AUTO: 221 10E3/UL (ref 150–450)
POTASSIUM SERPL-SCNC: 4.3 MMOL/L (ref 3.4–5.3)
POTASSIUM SERPL-SCNC: 6.5 MMOL/L (ref 3.4–5.3)
POTASSIUM SERPL-SCNC: 8.1 MMOL/L (ref 3.4–5.3)
PR INTERVAL - MUSE: NORMAL MS
PROT SERPL-MCNC: 6.1 G/DL (ref 6.4–8.3)
QRS DURATION - MUSE: 126 MS
QT - MUSE: 424 MS
QTC - MUSE: 498 MS
R AXIS - MUSE: -52 DEGREES
RBC # BLD AUTO: 2.87 10E6/UL (ref 4.4–5.9)
SODIUM SERPL-SCNC: 141 MMOL/L (ref 135–145)
SODIUM SERPL-SCNC: 145 MMOL/L (ref 135–145)
SYSTOLIC BLOOD PRESSURE - MUSE: 148 MMHG
T AXIS - MUSE: 51 DEGREES
VENTRICULAR RATE- MUSE: 83 BPM
WBC # BLD AUTO: 5.5 10E3/UL (ref 4–11)

## 2024-07-23 PROCEDURE — 90935 HEMODIALYSIS ONE EVALUATION: CPT

## 2024-07-23 PROCEDURE — 83550 IRON BINDING TEST: CPT | Performed by: INTERNAL MEDICINE

## 2024-07-23 PROCEDURE — 96361 HYDRATE IV INFUSION ADD-ON: CPT

## 2024-07-23 PROCEDURE — 250N000013 HC RX MED GY IP 250 OP 250 PS 637: Performed by: HOSPITALIST

## 2024-07-23 PROCEDURE — 250N000012 HC RX MED GY IP 250 OP 636 PS 637: Performed by: STUDENT IN AN ORGANIZED HEALTH CARE EDUCATION/TRAINING PROGRAM

## 2024-07-23 PROCEDURE — 96374 THER/PROPH/DIAG INJ IV PUSH: CPT

## 2024-07-23 PROCEDURE — 258N000003 HC RX IP 258 OP 636: Performed by: STUDENT IN AN ORGANIZED HEALTH CARE EDUCATION/TRAINING PROGRAM

## 2024-07-23 PROCEDURE — 84132 ASSAY OF SERUM POTASSIUM: CPT | Performed by: INTERNAL MEDICINE

## 2024-07-23 PROCEDURE — 99223 1ST HOSP IP/OBS HIGH 75: CPT | Performed by: INTERNAL MEDICINE

## 2024-07-23 PROCEDURE — 250N000009 HC RX 250: Performed by: STUDENT IN AN ORGANIZED HEALTH CARE EDUCATION/TRAINING PROGRAM

## 2024-07-23 PROCEDURE — 84100 ASSAY OF PHOSPHORUS: CPT | Performed by: STUDENT IN AN ORGANIZED HEALTH CARE EDUCATION/TRAINING PROGRAM

## 2024-07-23 PROCEDURE — 36415 COLL VENOUS BLD VENIPUNCTURE: CPT | Performed by: INTERNAL MEDICINE

## 2024-07-23 PROCEDURE — 99207 PR APP CREDIT; MD BILLING SHARED VISIT: CPT | Performed by: HOSPITALIST

## 2024-07-23 PROCEDURE — 82310 ASSAY OF CALCIUM: CPT | Performed by: INTERNAL MEDICINE

## 2024-07-23 PROCEDURE — 258N000001 HC RX 258: Performed by: STUDENT IN AN ORGANIZED HEALTH CARE EDUCATION/TRAINING PROGRAM

## 2024-07-23 PROCEDURE — 86706 HEP B SURFACE ANTIBODY: CPT | Performed by: INTERNAL MEDICINE

## 2024-07-23 PROCEDURE — 250N000013 HC RX MED GY IP 250 OP 250 PS 637: Performed by: INTERNAL MEDICINE

## 2024-07-23 PROCEDURE — 36415 COLL VENOUS BLD VENIPUNCTURE: CPT | Performed by: STUDENT IN AN ORGANIZED HEALTH CARE EDUCATION/TRAINING PROGRAM

## 2024-07-23 PROCEDURE — 5A1D70Z PERFORMANCE OF URINARY FILTRATION, INTERMITTENT, LESS THAN 6 HOURS PER DAY: ICD-10-PCS | Performed by: INTERNAL MEDICINE

## 2024-07-23 PROCEDURE — 83735 ASSAY OF MAGNESIUM: CPT | Performed by: INTERNAL MEDICINE

## 2024-07-23 PROCEDURE — 258N000003 HC RX IP 258 OP 636: Performed by: INTERNAL MEDICINE

## 2024-07-23 PROCEDURE — 120N000004 HC R&B MS OVERFLOW

## 2024-07-23 PROCEDURE — 250N000011 HC RX IP 250 OP 636: Performed by: STUDENT IN AN ORGANIZED HEALTH CARE EDUCATION/TRAINING PROGRAM

## 2024-07-23 PROCEDURE — 85025 COMPLETE CBC W/AUTO DIFF WBC: CPT | Performed by: INTERNAL MEDICINE

## 2024-07-23 PROCEDURE — 87340 HEPATITIS B SURFACE AG IA: CPT | Performed by: INTERNAL MEDICINE

## 2024-07-23 PROCEDURE — 82962 GLUCOSE BLOOD TEST: CPT

## 2024-07-23 PROCEDURE — 999N000157 HC STATISTIC RCP TIME EA 10 MIN

## 2024-07-23 PROCEDURE — 80048 BASIC METABOLIC PNL TOTAL CA: CPT | Performed by: STUDENT IN AN ORGANIZED HEALTH CARE EDUCATION/TRAINING PROGRAM

## 2024-07-23 PROCEDURE — 94640 AIRWAY INHALATION TREATMENT: CPT

## 2024-07-23 PROCEDURE — 99418 PROLNG IP/OBS E/M EA 15 MIN: CPT | Performed by: INTERNAL MEDICINE

## 2024-07-23 PROCEDURE — 80053 COMPREHEN METABOLIC PANEL: CPT | Performed by: STUDENT IN AN ORGANIZED HEALTH CARE EDUCATION/TRAINING PROGRAM

## 2024-07-23 PROCEDURE — 82728 ASSAY OF FERRITIN: CPT | Performed by: INTERNAL MEDICINE

## 2024-07-23 PROCEDURE — 250N000011 HC RX IP 250 OP 636: Performed by: EMERGENCY MEDICINE

## 2024-07-23 PROCEDURE — 96375 TX/PRO/DX INJ NEW DRUG ADDON: CPT

## 2024-07-23 PROCEDURE — 250N000011 HC RX IP 250 OP 636: Performed by: INTERNAL MEDICINE

## 2024-07-23 RX ORDER — CALCITRIOL 0.25 UG/1
0.25 CAPSULE, LIQUID FILLED ORAL DAILY
Status: DISCONTINUED | OUTPATIENT
Start: 2024-07-23 | End: 2024-08-02 | Stop reason: HOSPADM

## 2024-07-23 RX ORDER — NALOXONE HYDROCHLORIDE 0.4 MG/ML
0.4 INJECTION, SOLUTION INTRAMUSCULAR; INTRAVENOUS; SUBCUTANEOUS
Status: DISCONTINUED | OUTPATIENT
Start: 2024-07-23 | End: 2024-08-02 | Stop reason: HOSPADM

## 2024-07-23 RX ORDER — HYDROMORPHONE HYDROCHLORIDE 2 MG/1
2 TABLET ORAL
Status: COMPLETED | OUTPATIENT
Start: 2024-07-23 | End: 2024-07-24

## 2024-07-23 RX ORDER — VIT B COMP NO.3/FOLIC/C/BIOTIN 1 MG-60 MG
1 TABLET ORAL DAILY
Status: DISCONTINUED | OUTPATIENT
Start: 2024-07-23 | End: 2024-08-02 | Stop reason: HOSPADM

## 2024-07-23 RX ORDER — NALOXONE HYDROCHLORIDE 0.4 MG/ML
0.2 INJECTION, SOLUTION INTRAMUSCULAR; INTRAVENOUS; SUBCUTANEOUS
Status: DISCONTINUED | OUTPATIENT
Start: 2024-07-23 | End: 2024-08-02 | Stop reason: HOSPADM

## 2024-07-23 RX ORDER — METOPROLOL SUCCINATE 25 MG/1
25 TABLET, EXTENDED RELEASE ORAL 2 TIMES DAILY
Status: DISCONTINUED | OUTPATIENT
Start: 2024-07-23 | End: 2024-08-02 | Stop reason: HOSPADM

## 2024-07-23 RX ORDER — B COMPLEX, C NO.20/FOLIC ACID 1 MG
1 CAPSULE ORAL DAILY
Status: DISCONTINUED | OUTPATIENT
Start: 2024-07-23 | End: 2024-07-23

## 2024-07-23 RX ORDER — ONDANSETRON 4 MG/1
4 TABLET, FILM COATED ORAL EVERY 8 HOURS PRN
Status: DISCONTINUED | OUTPATIENT
Start: 2024-07-23 | End: 2024-07-23

## 2024-07-23 RX ORDER — PREGABALIN 25 MG/1
25 CAPSULE ORAL DAILY
Status: DISCONTINUED | OUTPATIENT
Start: 2024-07-23 | End: 2024-08-02 | Stop reason: HOSPADM

## 2024-07-23 RX ORDER — RISPERIDONE 1 MG/1
1 TABLET ORAL AT BEDTIME
Status: DISCONTINUED | OUTPATIENT
Start: 2024-07-23 | End: 2024-08-01

## 2024-07-23 RX ORDER — AMOXICILLIN 250 MG
1 CAPSULE ORAL 2 TIMES DAILY PRN
Status: DISCONTINUED | OUTPATIENT
Start: 2024-07-23 | End: 2024-08-02 | Stop reason: HOSPADM

## 2024-07-23 RX ORDER — CALCIUM ACETATE 667 MG/1
1334 CAPSULE ORAL
Status: DISCONTINUED | OUTPATIENT
Start: 2024-07-23 | End: 2024-08-02 | Stop reason: HOSPADM

## 2024-07-23 RX ORDER — PROCHLORPERAZINE MALEATE 5 MG
5 TABLET ORAL EVERY 6 HOURS PRN
Status: DISCONTINUED | OUTPATIENT
Start: 2024-07-23 | End: 2024-08-02 | Stop reason: HOSPADM

## 2024-07-23 RX ORDER — PROCHLORPERAZINE 25 MG
25 SUPPOSITORY, RECTAL RECTAL EVERY 12 HOURS PRN
Status: DISCONTINUED | OUTPATIENT
Start: 2024-07-23 | End: 2024-08-02 | Stop reason: HOSPADM

## 2024-07-23 RX ORDER — CALCIUM CARBONATE 500 MG/1
1000 TABLET, CHEWABLE ORAL 4 TIMES DAILY PRN
Status: DISCONTINUED | OUTPATIENT
Start: 2024-07-23 | End: 2024-08-02 | Stop reason: HOSPADM

## 2024-07-23 RX ORDER — MANNITOL 20 G/100ML
25 INJECTION, SOLUTION INTRAVENOUS
Status: COMPLETED | OUTPATIENT
Start: 2024-07-23 | End: 2024-07-23

## 2024-07-23 RX ORDER — ONDANSETRON 2 MG/ML
4 INJECTION INTRAMUSCULAR; INTRAVENOUS EVERY 6 HOURS PRN
Status: DISCONTINUED | OUTPATIENT
Start: 2024-07-23 | End: 2024-08-02 | Stop reason: HOSPADM

## 2024-07-23 RX ORDER — AMOXICILLIN 250 MG
2 CAPSULE ORAL 2 TIMES DAILY PRN
Status: DISCONTINUED | OUTPATIENT
Start: 2024-07-23 | End: 2024-08-02 | Stop reason: HOSPADM

## 2024-07-23 RX ORDER — LIDOCAINE 40 MG/G
CREAM TOPICAL
Status: DISCONTINUED | OUTPATIENT
Start: 2024-07-23 | End: 2024-08-02 | Stop reason: HOSPADM

## 2024-07-23 RX ORDER — ONDANSETRON 4 MG/1
4 TABLET, ORALLY DISINTEGRATING ORAL EVERY 6 HOURS PRN
Status: DISCONTINUED | OUTPATIENT
Start: 2024-07-23 | End: 2024-08-02 | Stop reason: HOSPADM

## 2024-07-23 RX ORDER — AMLODIPINE BESYLATE 5 MG/1
10 TABLET ORAL DAILY
Status: DISCONTINUED | OUTPATIENT
Start: 2024-07-23 | End: 2024-08-02 | Stop reason: HOSPADM

## 2024-07-23 RX ADMIN — MANNITOL 25 G: 20 INJECTION, SOLUTION INTRAVENOUS at 03:33

## 2024-07-23 RX ADMIN — CALCITRIOL CAPSULES 0.25 MCG 0.25 MCG: 0.25 CAPSULE ORAL at 18:45

## 2024-07-23 RX ADMIN — DEXTROSE MONOHYDRATE 25 G: 25 INJECTION, SOLUTION INTRAVENOUS at 00:07

## 2024-07-23 RX ADMIN — AMLODIPINE BESYLATE 10 MG: 5 TABLET ORAL at 18:45

## 2024-07-23 RX ADMIN — CALCIUM GLUCONATE 1 G: 20 INJECTION, SOLUTION INTRAVENOUS at 00:06

## 2024-07-23 RX ADMIN — SODIUM CHLORIDE 10 UNITS: 9 INJECTION, SOLUTION INTRAVENOUS at 00:21

## 2024-07-23 RX ADMIN — PROCHLORPERAZINE EDISYLATE 10 MG: 5 INJECTION INTRAMUSCULAR; INTRAVENOUS at 00:29

## 2024-07-23 RX ADMIN — Medication 1 TABLET: at 18:45

## 2024-07-23 RX ADMIN — DEXTROSE MONOHYDRATE 300 ML: 100 INJECTION, SOLUTION INTRAVENOUS at 00:51

## 2024-07-23 RX ADMIN — RISPERIDONE 1 MG: 1 TABLET, FILM COATED ORAL at 20:45

## 2024-07-23 RX ADMIN — SODIUM BICARBONATE 50 MEQ: 84 INJECTION, SOLUTION INTRAVENOUS at 00:24

## 2024-07-23 RX ADMIN — PREGABALIN 25 MG: 25 CAPSULE ORAL at 18:45

## 2024-07-23 RX ADMIN — HYDROMORPHONE HYDROCHLORIDE 2 MG: 2 TABLET ORAL at 07:58

## 2024-07-23 RX ADMIN — METOPROLOL SUCCINATE 25 MG: 25 TABLET, EXTENDED RELEASE ORAL at 20:45

## 2024-07-23 RX ADMIN — ALBUTEROL SULFATE 10 MG: 2.5 SOLUTION RESPIRATORY (INHALATION) at 00:13

## 2024-07-23 RX ADMIN — SODIUM CHLORIDE, PRESERVATIVE FREE 200 ML: 5 INJECTION INTRAVENOUS at 05:08

## 2024-07-23 RX ADMIN — SODIUM CHLORIDE 200 ML: 9 INJECTION, SOLUTION INTRAVENOUS at 02:38

## 2024-07-23 RX ADMIN — Medication: at 03:31

## 2024-07-23 ASSESSMENT — ACTIVITIES OF DAILY LIVING (ADL)
ADLS_ACUITY_SCORE: 37
ADLS_ACUITY_SCORE: 26
ADLS_ACUITY_SCORE: 37
DOING_ERRANDS_INDEPENDENTLY_DIFFICULTY: NO
ADLS_ACUITY_SCORE: 37
TOILETING_ISSUES: NO
ADLS_ACUITY_SCORE: 37
DIFFICULTY_EATING/SWALLOWING: NO
NUMBER_OF_TIMES_PATIENT_HAS_FALLEN_WITHIN_LAST_SIX_MONTHS: 2
ADLS_ACUITY_SCORE: 37
ADLS_ACUITY_SCORE: 39
ADLS_ACUITY_SCORE: 31
ADLS_ACUITY_SCORE: 37
ADLS_ACUITY_SCORE: 37
CONCENTRATING,_REMEMBERING_OR_MAKING_DECISIONS_DIFFICULTY: YES
HEARING_DIFFICULTY_OR_DEAF: NO
ADLS_ACUITY_SCORE: 39
WEAR_GLASSES_OR_BLIND: NO
ADLS_ACUITY_SCORE: 26
ADLS_ACUITY_SCORE: 37
FALL_HISTORY_WITHIN_LAST_SIX_MONTHS: YES
ADLS_ACUITY_SCORE: 37
ADLS_ACUITY_SCORE: 26
WALKING_OR_CLIMBING_STAIRS: AMBULATION DIFFICULTY, ASSISTANCE 1 PERSON;AMBULATION DIFFICULTY, REQUIRES EQUIPMENT
ADLS_ACUITY_SCORE: 37
ADLS_ACUITY_SCORE: 26
WALKING_OR_CLIMBING_STAIRS_DIFFICULTY: YES
DEPENDENT_IADLS:: CLEANING;LAUNDRY;COOKING;SHOPPING;MEAL PREPARATION;MEDICATION MANAGEMENT;TRANSPORTATION
ADLS_ACUITY_SCORE: 37
CHANGE_IN_FUNCTIONAL_STATUS_SINCE_ONSET_OF_CURRENT_ILLNESS/INJURY: NO
DIFFICULTY_COMMUNICATING: NO
DRESSING/BATHING_DIFFICULTY: NO
ADLS_ACUITY_SCORE: 37
ADLS_ACUITY_SCORE: 31
ADLS_ACUITY_SCORE: 37

## 2024-07-23 NOTE — H&P
[facility]    History and Physical - Hospitalist Service       Date of Admission:  7/22/2024    Assessment & Plan   Patient is a 44-year-old male with medical history significant for end-stage renal disease on hemodialysis Mondays Wednesdays and Fridays, chronic pain syndrome, paroxysmal SVT, chronic gout, hypertension, hyperlipidemia, coronary artery disease, intellectual disability, reportedly noncompliant with treatment plan and other medical comorbidities who was admitted with severe hyperkalemia with possible presyncope.  Patient is getting dialysis tonight.      Patient Active Problem List   Diagnosis    Back pain    Health care maintenance    Mental retardation    Headaches, tension    Bilateral Ankle Pain    Syncope and collapse    Hyperkalemia    ESRD (end stage renal disease) on dialysis (H)    Noncompliance of patient with renal dialysis (H24)    Chest pain, unspecified type       Chest pain-in the setting of hyperkalemia.  Initial troponin was 72.  EKG with mild T wave prominence in leads II and III.  Patient is being admitted to ICU for cardiac telemetry.  He is also being managed medically with a hyperkalemia protocol.  Nephrology consulted for further cares.    Dizziness-possibly related to arrhythmia in the setting of hypokalemia.  Patient is on telemetry for now.  Rule out ACS and arrhythmias    Acute hyperkalemia-patient started on dialysis and medical management of hyperkalemia.  Close monitoring of potassium.    End-stage renal disease-dialyzes Mondays Wednesdays and Fridays.  Unfortunately unable to get to dialysis because of transportation problems.   consulted to assist with his transportation problems.    History of mental health problems-patient reports that auditory hallucinations.  Mental health specialist will be consulted for further guidance.  AM team to follow-up prior to discharge.     Chronic back pain-Tylenol as needed.  Avoid NSAIDs.   Low-dose Dilaudid may be helpful if Tylenol is not efficient    Social determinants of health-with ongoing reported none adherence with treatment plans.  This is likely related to his inability to obtain transportation.  Patient will need education and will need help with facilitation transportation for his dialysis.   has been consulted.  Patient reports what would be beneficial for the bleeding and said that he has dialysis line.  It does not wake well when he goes to the facility and he is discharged.  He appears to be send called about how the system can help him as it appears he has gone through several cycles of starting the process for it to be discontinued and is losing antionette in the system.    Chronic pain syndrome-plan as above.  Tylenol or low-dose Dilaudid    Hypertension-currently remains fairly well-controlled.  Patient is on amlodipine and metoprolol.  Will reevaluate after dialysis.  Medications not reconciled however.  A.m. team to follow    Hyperlipidemia-I do not see a statin on patient's medications list.  Follow-up with pharmacy after medication reconciliation.    Coronary artery disease-rule out ACS.    ?  Cognitive impairment-consider cognitive evaluation to determine how much help patient's to be needed.  This may be addressed when patient is medically stable and prior to discharge.  Consider PT OT.  A.m. team to follow    Rest of patient's medical problems management remains unchanged.  Patient also did not want me to touch or examine his legs.     Diet:  Renal diet/regular diet.  Patient did not want a renal diet  DVT Prophylaxis: Pneumatic Compression Devices  Raphael Catheter: Not present  Lines: Peripheral  Cardiac Monitoring: Yes  Code Status:  Full code    Clinically Significant Risk Factors Present on Admission        # Hyperkalemia: Highest K = 8.1 mmol/L in last 2 days, will monitor as appropriate      # Anion Gap Metabolic Acidosis: Highest Anion Gap = 21 mmol/L in last  "2 days, will monitor and treat as appropriate         # Anemia: based on hgb <11           # Overweight: Estimated body mass index is 26.58 kg/m  as calculated from the following:    Height as of this encounter: 1.981 m (6' 6\").    Weight as of this encounter: 104.3 kg (230 lb).       # Financial/Environmental Concerns:           Disposition Plan     Medically Ready for Discharge: Possibly in 2 to 3 days           Lilliana Carmona MD  Hospitalist Service  [facility]  Securely message with [employer] (more info)  Text page via [employer] Paging/Directory     ______________________________________________________________________    Chief Complaint   Status post fall, hypokalemia, chest pain        History of Present Illness   Macario Delatorre is a 44 year old male  with medical history significant for end-stage renal disease on hemodialysis Mondays Wednesdays and Fridays,, chronic pain syndrome, paroxysmal SVT, chronic gout, hypertension, hyperlipidemia, coronary artery disease, intellectual disability, reportedly noncompliant with treatment plan and other medical comorbidities who was admitted with severe hyperkalemia with possible presyncope.  Patient is getting dialysis tonight.          Per history, patient presented to the ER  via EMS for evaluation of chest pain and dizziness.    Patient reported that earlier in the evening, he heard voices in his head telling him to get out of his chair. He was sitting in a recliner when he stood up and suddenly experienced \"room-spinning\" dizziness and saw \"stars,\" which caused him to fall backward. He describes an immediate jolt of pain shooting up his spine, feeling as though his back spasmed and locked up. While attempting to pull himself up from the floor, he experienced chest pain localized to the \"heart area.\" This prompted him to call 911. He states that the voices indicated the \"building wasn't for me.\" EMS applied a hot pack to his chest, but it was " ineffective because it was placed over his shirt.    The patient notes that he missed his dialysis session today because he was in a meeting and has had difficulty attending dialysis sessions due to transportation issues, specifically the lack of wheelchairs for transfers.    In the ED, the patient reported some chest pain and dizziness. He mentions that the dizziness is not new and typically occurs when he stands up. He is more concerned about the pain following his fall rather than the voices. He also reported a long-standing history of mental illness.    Preliminary labs done in the ER showed a potassium of 8.1, sodium 140, bicarb 19, , creatinine 23.68.  GFR was 2.  Anion gap 21.  Troponin 72, CBC showed a white count of 6.3, hemoglobin at baseline 9.0, hematocrit 28.  Platelets 265, EKG done in the ER did not show concerning changes of diffuse tall T waves or wide-complex tachycardia.  He was noted to have tall T waves in 2 and 3 however.        X-ray of lumbar spine done showed results below  Narrative & Impression   EXAM: XR LUMBAR SPINE 2/3 VIEWS, XR THORACIC SPINE 3 VIEWS  LOCATION: [facility]  DATE: 7/22/2024     INDICATION: low back pain  COMPARISON: CT abdomen and pelvis 1/16/2024                                                                      IMPRESSION:   The exam are significantly limited due to patient body habitus.     Thoracic spine: There is moderate upper thoracic levoscoliosis with a Redman angle of approximately 22 degrees. Corresponding lower thoracic dextroscoliosis. Lateral alignment is preserved. Disc spaces are preserved without definite acute fracture or   subluxation..     Lumbar spine:  5 lumbar type vertebra. Vertebral body height and alignment is preserved. There is mild levoscoliosis. Disc spaces are preserved. No acute fracture or subluxation.       Patient also had x-ray of the thoracic spine which showed results below    EXAM: XR LUMBAR SPINE 2/3  VIEWS, XR THORACIC SPINE 3 VIEWS  LOCATION: [facility]  DATE: 7/22/2024     INDICATION: low back pain  COMPARISON: CT abdomen and pelvis 1/16/2024                                                                      IMPRESSION:   The exam are significantly limited due to patient body habitus.     Thoracic spine: There is moderate upper thoracic levoscoliosis with a Redman angle of approximately 22 degrees. Corresponding lower thoracic dextroscoliosis. Lateral alignment is preserved. Disc spaces are preserved without definite acute fracture or   subluxation..     Lumbar spine:  5 lumbar type vertebra. Vertebral body height and alignment is preserved. There is mild levoscoliosis. Disc spaces are preserved. No acute fracture or subluxation.    ER intervention included starting hyperkalemia medical management per protocol and also calling nephrology for dialysis.  Intensivist was also paged for an ICU bed.  Patient is being admitted for further inpatient cares.    Patient is being admitted for:  Hyperkalemia management by medical management and by dialysis  Close monitoring on telemetry in the ICU   assessment and management of his social determinants of health and his inability to go to dialysis due to transportation problems  Mental health support given the reported auditory hallucinations and longstanding mental illness.  Unclear how much metabolic encephalopathy is playing a role in his hallucinations currently.    Patient remains a full code.      Patient was seen after dialysis.  He was initially not very happy that I had disturbed him while he was sleeping.  When I spoke to him about his social determinants of health and how we were going to try to assist him, patient started talking about how the system has failed multiple times and said there was nothing I could do about it.  I asked him if we could help him in any way, and he reports that what he needs is to be in a  nursing home and for the nursing home people to listen to him and respect his wishes.  He also reports that he does not want a renal diet because it messes him up.  He would rather have a regular diet.      Past Medical History    Past Medical History:   Diagnosis Date    Chlamydia     history of    Flatfoot     feet pes plannus    Mental retardation     mild    Migraine headache     Pain disorder 11/6/11    upset about no pain meds  incedent report filerd by RN    Tobacco abuse     Undescended testicle        Past Surgical History   History reviewed. No pertinent surgical history.    Prior to Admission Medications   Prior to Admission Medications   Prescriptions Last Dose Informant Patient Reported? Taking?   amLODIPine (NORVASC) 10 MG tablet   Yes No   Sig: Take 10 mg by mouth daily   calcitRIOL (ROCALTROL) 0.25 MCG capsule   Yes No   Sig: Take 0.25 mcg by mouth daily   calcium acetate (PHOSLO) 667 MG CAPS capsule   Yes No   Sig: Take 1,334 mg by mouth 3 times daily (with meals)   metoprolol succinate ER (TOPROL XL) 25 MG 24 hr tablet   No No   Sig: Take 1 tablet (25 mg) by mouth 2 times daily   multivitamin RENAL (TRIPHROCAPS) 1 capsule capsule   Yes No   Sig: Take 1 capsule by mouth daily   ondansetron (ZOFRAN) 4 MG tablet   No No   Sig: Take 1 tablet (4 mg) by mouth every 8 hours as needed for nausea   pregabalin (LYRICA) 25 MG capsule   No No   Sig: Take 1 capsule (25 mg) by mouth daily   risperiDONE (RISPERDAL) 1 MG tablet   Yes No   Sig: Take 1 mg by mouth at bedtime      Facility-Administered Medications: None        Review of Systems    The 10 point Review of Systems is negative other than noted in the HPI or here.     Social History   I have reviewed this patient's social history and updated it with pertinent information if needed.  Social History     Tobacco Use    Smoking status: Some Days     Current packs/day: 0.10     Types: Cigarettes    Smokeless tobacco: Never    Tobacco comments:     smokes about  1 cigarette a day   Substance Use Topics    Alcohol use: No     Comment: holidays and birthday - wine    Drug use: No     Comment: Drug use: Chart review shows + THC, pt denies         Family History   I have reviewed this patient's family history and updated it with pertinent information if needed.  Family History   Problem Relation Age of Onset    Diabetes No family hx of     Cancer No family hx of     Heart Disease No family hx of     Diabetes Mother     Heart Failure Father     Diabetes Type 2  Father     Kidney Disease Father          Allergies   Allergies   Allergen Reactions    Bee Venom      Bee stings swelling and short of breath    Pcn [Penicillins]      Noted in 8/20/08 ER,hives,headaches,throat irritation    Trazodone      Abdominal pain    Ibuprofen GI Disturbance     Noted in 8/20/08 ER  Headaches and abdominal pain    Tylenol [Acetaminophen] GI Disturbance     Noted in 8/20/08 ER        Physical Exam   Vital Signs: Temp: 98.3  F (36.8  C) Temp src: Oral BP: 137/78 Pulse: 79   Resp: 23 SpO2: 98 % O2 Device: None (Room air)    Weight: 230 lbs 0 oz      General Aox3, appropriate affect, NAD, on RA  HEENT  MMM, EOMI, PERRL  Chest decreased air entry at lung bases bilaterally   b/l, No wheezing  Heart RRR, + M/R/G  Abd- Soft, NT, BS+  - Deferred,   Extremity- Moving all extremities, No digital clubbing,   +edema,   Neuro- Aox3, CN II- XII intact, No peripheral vision loss  P5/5, T-N, reflexes 2+, plantar reflex downwards,  gait not checked  Skin  Has no tattoo, dry skin, tender legs  + HD cath    Medical Decision Making       90 MINUTES SPENT BY ME on the date of service doing chart review, history, exam, documentation & further activities per the note.      ------------------ MEDICAL DECISION MAKING ------------------------------------------------------------------------------------------------------  MANAGEMENT DISCUSSED with the following over the past 24 hours: Patient and care team       Data    ------------------------- PAST 24 HR DATA REVIEWED -----------------------------------------------    I have personally reviewed the following data over the past 24 hrs:    6.3  \   9.0 (L)   / 265     140 100 146.9 (H) /  108 (H)   8.1 (HH) 19 (L) 23.68 (H) \     Trop: 72 (H) BNP: N/A       Imaging results reviewed over the past 24 hrs:   Recent Results (from the past 24 hour(s))   XR Lumbar Spine 2/3 Views    Narrative    EXAM: XR LUMBAR SPINE 2/3 VIEWS, XR THORACIC SPINE 3 VIEWS  LOCATION: [facility]  DATE: 7/22/2024    INDICATION: low back pain  COMPARISON: CT abdomen and pelvis 1/16/2024      Impression    IMPRESSION:   The exam are significantly limited due to patient body habitus.    Thoracic spine: There is moderate upper thoracic levoscoliosis with a Redman angle of approximately 22 degrees. Corresponding lower thoracic dextroscoliosis. Lateral alignment is preserved. Disc spaces are preserved without definite acute fracture or   subluxation..    Lumbar spine:  5 lumbar type vertebra. Vertebral body height and alignment is preserved. There is mild levoscoliosis. Disc spaces are preserved. No acute fracture or subluxation.   XR Thoracic Spine 3 Views    Narrative    EXAM: XR LUMBAR SPINE 2/3 VIEWS, XR THORACIC SPINE 3 VIEWS  LOCATION: [facility]  DATE: 7/22/2024    INDICATION: low back pain  COMPARISON: CT abdomen and pelvis 1/16/2024      Impression    IMPRESSION:   The exam are significantly limited due to patient body habitus.    Thoracic spine: There is moderate upper thoracic levoscoliosis with a Redman angle of approximately 22 degrees. Corresponding lower thoracic dextroscoliosis. Lateral alignment is preserved. Disc spaces are preserved without definite acute fracture or   subluxation..    Lumbar spine:  5 lumbar type vertebra. Vertebral body height and alignment is preserved. There is mild levoscoliosis. Disc spaces are preserved. No acute fracture or  subluxation.

## 2024-07-23 NOTE — PROGRESS NOTES
[facility]    Medicine Progress Note - Hospitalist Service    Date of Admission:  7/22/2024    Assessment & Plan   Acute hyperkalemia  Chest pain in the setting of hyperkalemia  -Improving.  -Patient free of chest pain at this time.  -Initial cardiac troponin 72.  EKG with mild T wave prominence in leads II and III.  -Nephrology consulted and patient was dialyzed while in the ED.  Potassium improved to 4.3  -Plan to continue monitoring.    End-stage renal disease.  On hemodialysis MWF.  -Apparently patient noncompliant with dialysis he has missed dialysis session.  According to him he had trouble with transportation.  - consulted to assist with transportation problems  -Nephrology on board.  Continue dialysis per nephrology    History of mental health problems with hallucinations.  Will consult psychiatry for further evaluation      Chronic pain syndrome.  Fairly stable at this time.  Continue pain medication    Hypertension.  Chronic.  BP slightly on the higher side at 153/98.    -Will offer her PTA amlodipine and metoprolol and recheck BP.  -Adjust medications if need be.  -Hydralazine 10 mg as needed for SBP>160    Diet: Regular Diet Adult    DVT Prophylaxis: Low Risk/Ambulatory with no VTE prophylaxis indicated  Raphael Catheter: Not present  Lines: None     Cardiac Monitoring: ACTIVE order. Indication: Tachyarrhythmias, acute (48 hours)  Code Status: Full Code      Clinically Significant Risk Factors Present on Admission        # Hyperkalemia: Highest K = 8.1 mmol/L in last 2 days, will monitor as appropriate   # Hypocalcemia: Lowest Ca = 8.2 mg/dL in last 2 days, will monitor and replace as appropriate    # Anion Gap Metabolic Acidosis: Highest Anion Gap = 25 mmol/L in last 2 days, will monitor and treat as appropriate         # Anemia: based on hgb <11           # Overweight: Estimated body mass index is 26.58 kg/m  as calculated from the following:    Height as of this  "encounter: 1.981 m (6' 6\").    Weight as of this encounter: 104.3 kg (230 lb).       # Financial/Environmental Concerns:           Disposition Plan     Medically Ready for Discharge: Anticipated in 2-4 Days      Omar Mitchell MD  Hospitalist Service  [facility]  Securely message with [employer] (more info)  Text page via [employer] Paging/Directory   ______________________________________________________________________    Interval History   Patient just dialyzed emergently this morning.His repeat potassium is 4.3. He has another scheduled dialysis run later this afternoon. He states he feels tired and would like to be left alone to sleep.    Physical Exam   Vital Signs: Temp: 97.8  F (36.6  C) Temp src: Temporal BP: (!) 153/98 Pulse: 95   Resp: 11 SpO2: 100 % O2 Device: None (Room air)    Weight: 230 lbs 0 oz    Constitutional: Patient is resting in bed comfortably sleepy but easily arousable  Eyes: Eyes pupils are equal round and reactive to light  Respiratory: He has a good respiratory effort on auscultation good air entry is noted  Cardiovascular: He has a good S1 and S2 no obvious murmurs peripheral pulses are palpable  GI: Abdomen is soft nontender nondistended bowel sounds are noted  Skin: No obvious rashes on exposed areas warm to touch please refer to nursing/wound care note for complete skin exam  Musculoskeletal: Good muscle tone send mild muscular atrophy noted in upper extremities  Neuropsychiatric: Alert and oriented to person    Medical Decision Making       50 MINUTES SPENT BY ME on the date of service doing chart review, history, exam, documentation & further activities per the note.  ------------------ MEDICAL DECISION MAKING ------------------------------------------------------------------------------------------------------  MANAGEMENT DISCUSSED with the following over the past 24 hours: Staff RN, and patient      Data   Recent Labs   Lab 07/23/24  1230 07/23/24  0921 " 07/23/24  0745 07/23/24  0534 07/23/24  0533 07/23/24  0216 07/23/24  0130 07/23/24  0004 07/22/24  2255 07/18/24  1306 07/17/24  0856   WBC  --   --   --   --  5.5  --   --   --  6.3  --  5.7   HGB  --   --   --   --  7.8*  --   --   --  9.0*  --  8.8*   MCV  --   --   --   --  85  --   --   --  88  --  88   PLT  --   --   --   --  221  --   --   --  265  --  192   NA  --   --   --   --  141 145  --   --  140   < > 138   POTASSIUM  --   --   --   --  4.3 6.5*  --  8.1* 8.1*   < > 6.7*   CHLORIDE  --   --   --   --  100 102  --   --  100   < > 99   CO2  --   --   --   --  24 18*  --   --  19*   < > 20*   BUN  --   --   --   --  88.3* 147.3*  --   --  146.9*   < > 92.2*   CR  --   --   --   --  15.10* 23.82*  --   --  23.68*   < > 18.84*   ANIONGAP  --   --   --   --  17* 25*  --   --  21*   < > 19*   LOVELY  --   --   --   --  8.3* 8.2*  --   --  8.5*   < > 9.0   GLC 80 101* 93   < > 167* 96   < >  --  99   < > 89   ALBUMIN  --   --   --   --  3.6  --   --   --   --   --   --    PROTTOTAL  --   --   --   --  6.1*  --   --   --   --   --   --    BILITOTAL  --   --   --   --  0.3  --   --   --   --   --   --    ALKPHOS  --   --   --   --  48  --   --   --   --   --   --    ALT  --   --   --   --  12  --   --   --   --   --   --    AST  --   --   --   --  12  --   --   --   --   --   --     < > = values in this interval not displayed.       Most Recent 3 CBC's:  Recent Labs   Lab Test 07/23/24 0533 07/22/24 2255 07/17/24  0856   WBC 5.5 6.3 5.7   HGB 7.8* 9.0* 8.8*   MCV 85 88 88    265 192     Most Recent 3 BMP's:  Recent Labs   Lab Test 07/23/24  1230 07/23/24  0921 07/23/24  0745 07/23/24  0534 07/23/24  0533 07/23/24  0216 07/23/24  0130 07/23/24  0004 07/22/24 2255   NA  --   --   --   --  141 145  --   --  140   POTASSIUM  --   --   --   --  4.3 6.5*  --  8.1* 8.1*   CHLORIDE  --   --   --   --  100 102  --   --  100   CO2  --   --   --   --  24 18*  --   --  19*   BUN  --   --   --   --  88.3* 147.3*  --    --  146.9*   CR  --   --   --   --  15.10* 23.82*  --   --  23.68*   ANIONGAP  --   --   --   --  17* 25*  --   --  21*   LOVELY  --   --   --   --  8.3* 8.2*  --   --  8.5*   GLC 80 101* 93   < > 167* 96   < >  --  99    < > = values in this interval not displayed.     Most Recent 2 LFT's:  Recent Labs   Lab Test 07/23/24  0533 01/16/24  1837   AST 12 16   ALT 12 6   ALKPHOS 48 47   BILITOTAL 0.3 0.3     Most Recent 3 INR's:No lab results found.

## 2024-07-23 NOTE — PROGRESS NOTES
Patient presents to ER after fall. He has a hx of non-adherence with OP HD and his labs show severe, life-threatening HYPERkalemia with k of 8.1 mmol/L. HD has been ordered and shifting agents will be administered by the ER with insulin and dextrose and calcium gluconate for cardiac stabilization. I would recommend EKG to evaluate for QRS duration and p waves. Likely will require daily HD to reinitiate HD for the next 2-3 days. I have ordered 25 g of mannitol 1 hour into dialysis to reduce risk of dialysis dysequilibrium given his markedly elevated BUN.    Orders placed and full consult to come in the AM.

## 2024-07-23 NOTE — ED PROVIDER NOTES
EMERGENCY DEPARTMENT ENCOUnter      NAME: Macario Delatorre  AGE: 44 year old male  YOB: 1980  MRN: [id number]  EVALUATION DATE & TIME: 2024  9:43 PM    PCP: No Ref-Primary, Physician    ED PROVIDER: Brett Hilton DO      Chief Complaint   Patient presents with    Chest Pain    Back Pain    Tailbone Pain         FINAL IMPRESSION:  1. Hyperkalemia    2. Acute low back pain without sciatica, unspecified back pain laterality          ED COURSE & MEDICAL DECISION MAKIN:50 PM I met with the patient, obtained history, performed an initial exam, and discussed options and plan for diagnostics and treatment here in the ED.    The patient presented to the emergency department today after having a mild fall at home.  He describes some low back pain.  He also states that he was due for dialysis earlier today but was not able to make this appointment.  Upon review of the chart, he has now has been compliant with dialysis appointments.  Potassium was severely elevated today at 8.1.  He has had several hospitalizations in the past related to hyperkalemia.  EKG does not show any significant changes.  In addition to his hyperkalemia, his BUN and creatinine are severely elevated today.  Findings were discussed with nephrology.  They will place dialysis orders and the patient will be admitted to the hospital for further treatment.  In the meantime, hyperkalemia orders have been placed.    The patient is critically ill and has required 45 minutes of critical care time exclusive of procedures. This includes time spent interviewing the patient, ordering tests and medications, monitoring vital signs, reviewing results, patient updates, discussing the case with family and consultants, and admission.        Medical Decision Making  Obtained supplemental history:Supplemental history obtained?: Documented in chart  Reviewed external records: External records reviewed?: Documented in chart  Care impacted by chronic  illness:Other: ERSD  Care significantly affected by social determinants of health:Medication Noncompliance  Did you consider but not order tests?: Work up considered but not performed and documented in chart, if applicable  Did you interpret images independently?: Independent interpretation of ECG and images noted in documentation, when applicable.  Consultation discussion with other provider:Did you involve another provider (consultant, , pharmacy, etc.)?: I discussed the care with another health care provider, see documentation for details.  Admit.    At the conclusion of the encounter I discussed the results of all of the tests and the disposition. The questions were answered. The patient or family acknowledged understanding and was agreeable with the care plan.         MEDICATIONS GIVEN IN THE EMERGENCY:  Medications   glucose gel 15-30 g (has no administration in time range)     Or   dextrose 50 % injection 25-50 mL (has no administration in time range)     Or   glucagon injection 1 mg (has no administration in time range)   dextrose 10% BOLUS 300 mL (300 mLs Intravenous $New Bag 7/23/24 0051)   calcium gluconate 1 g in 50 mL in sodium chloride intermittent infusion (1 g Intravenous $Given 7/23/24 0006)   sodium bicarbonate 8.4 % injection 50 mEq (50 mEq Intravenous $Given 7/23/24 0024)   dextrose 50 % injection 25 g (25 g Intravenous $Given 7/23/24 0007)   insulin regular 1 unit/mL injection 10 Units (10 Units Intravenous $Given 7/23/24 0021)   albuterol (PROVENTIL) neb solution 10 mg (10 mg Nebulization $Given 7/23/24 0013)   prochlorperazine (COMPAZINE) injection 10 mg (10 mg Intravenous $Given 7/23/24 0029)       =================================================================    HPI        Macario Delatorre is a 44 year old male with a pertinent history of ERSD, noncompliance with renal dialysis, paroxysmal SVT, hypertensive disorder, intellectual disability who presents to this ED via EMS for evaluation of  "chest pain. Patient reports he heard voices in his head that told him to get out of his chair tonight. He was sitting in the recliner when he got up and developed \"room-spinning\" dizziness and saw \"stars\" which caused him to fall backwards. Patient reports an immediate jolt of pain shooting up his spine. States it felt like his back spasmed and locked up. He then tried to pull himself up from the floor and experienced chest pain in the \"heart area\". He decided to call 911. He states that the voices knew that the \"building wasn't for me\". Patient reports EMS put a hot pack on his chest but it didn't work because it was over his shirt. Patient notes he missed dialysis today because he was in a meeting. Patient also states he hasn't been able to go to dialysis because the medical rides don't have wheel chairs to transfer him.     Patient currently has some chest pain and dizziness. He notes the dizziness isn't new and happens when he stands. Patient is concerned about his pain following the fall and not about the voices. He reports a long-standing history of a \"mental illness\".    PAST MEDICAL HISTORY:  Past Medical History:   Diagnosis Date    Chlamydia     history of    Flatfoot     feet pes plannus    Mental retardation     mild    Migraine headache     Pain disorder 11/6/11    upset about no pain meds  incedent report filerd by RN    Tobacco abuse     Undescended testicle        PAST SURGICAL HISTORY:  History reviewed. No pertinent surgical history.        CURRENT MEDICATIONS:    amLODIPine (NORVASC) 10 MG tablet  calcitRIOL (ROCALTROL) 0.25 MCG capsule  calcium acetate (PHOSLO) 667 MG CAPS capsule  metoprolol succinate ER (TOPROL XL) 25 MG 24 hr tablet  multivitamin RENAL (TRIPHROCAPS) 1 capsule capsule  ondansetron (ZOFRAN) 4 MG tablet  pregabalin (LYRICA) 25 MG capsule  risperiDONE (RISPERDAL) 1 MG tablet        ALLERGIES:  Allergies   Allergen Reactions    Bee Venom      Bee stings swelling and short of breath "    Pcn [Penicillins]      Noted in 8/20/08 ER,hives,headaches,throat irritation    Trazodone      Abdominal pain    Ibuprofen GI Disturbance     Noted in 8/20/08 ER  Headaches and abdominal pain    Tylenol [Acetaminophen] GI Disturbance     Noted in 8/20/08 ER       FAMILY HISTORY:  Family History   Problem Relation Age of Onset    Diabetes No family hx of     Cancer No family hx of     Heart Disease No family hx of     Diabetes Mother     Heart Failure Father     Diabetes Type 2  Father     Kidney Disease Father        SOCIAL HISTORY:   Social History     Socioeconomic History    Marital status: Single     Spouse name: None    Number of children: None    Years of education: None    Highest education level: None   Tobacco Use    Smoking status: Some Days     Current packs/day: 0.10     Types: Cigarettes    Smokeless tobacco: Never    Tobacco comments:     smokes about 1 cigarette a day   Substance and Sexual Activity    Alcohol use: No     Comment: holidays and birthday - wine    Drug use: No     Comment: Drug use: Chart review shows + THC, pt denies    Sexual activity: Yes   Social History Narrative    5/21/2016 - Works at an [employer]-.     Social Determinants of Health     Financial Resource Strain: Low Risk  (4/23/2024)    [facility]    Financial Resource Strain     Difficulty of Paying Living Expenses: 3   Food Insecurity: No Food Insecurity (4/23/2024)    [facility]    Food Insecurity     Worried About Running Out of Food in the Last Year: 1   Transportation Needs: Unmet Transportation Needs (5/6/2024)    Received from [facility] [employer] [facility]    Transportation Needs     Lack of Transportation (Medical): 2   Social Connections: Socially Integrated (10/19/2023)    Received [facility]    Social Connections      "Frequency of Communication with Friends and Family: 0   Housing Stability: Low Risk  (4/23/2024)    Received from [employer] [facility]    Housing Stability     Unable to Pay for Housing in the Last Year: 1   Recent Concern: Housing Stability - Medium Risk (3/27/2024)    [facility]    Housing Stability     Unable to Pay for Housing in the Last Year: 2       VITALS:  Patient Vitals for the past 24 hrs:   BP Temp Temp src Pulse Resp SpO2 Height Weight   07/23/24 0013 -- -- -- -- -- 98 % -- --   07/22/24 2240 137/78 -- -- 79 23 -- -- --   07/22/24 2230 (!) 148/88 -- -- 84 30 99 % -- --   07/22/24 2220 137/88 -- -- 79 22 100 % -- --   07/22/24 2202 -- -- -- -- -- -- 1.981 m (6' 6\") 104.3 kg (230 lb)   07/22/24 2200 (!) 144/93 -- -- 83 19 97 % -- --   07/22/24 2153 (!) 145/91 98.3  F (36.8  C) Oral 81 16 100 % -- --       PHYSICAL EXAM    Constitutional:  Well developed, Well nourished,  HENT:  Normocephalic, Atraumatic, Oropharynx moist, Nose normal.   Eyes:  EOMI, Conjunctiva normal, No discharge.   Respiratory:  Normal breath sounds, No respiratory distress, No wheezing, No chest tenderness.   Cardiovascular:  Normal heart rate, Normal rhythm, No murmurs  GI:  Soft, No tenderness, No guarding, No CVA tenderness.   Musculoskeletal: Mild midline lumbar and thoracic tenderness.  No bony deformities.  Extremities: Dialysis fistula in the LUE  Neurologic:  Alert & oriented x 3, No focal deficits noted.   Psychiatric:  Affect normal, Judgment normal, Mood normal.        LAB:  All pertinent labs reviewed and interpreted.  Results for orders placed or performed during the hospital encounter of 07/22/24                         CBC with platelets   Result Value Ref Range    WBC Count 6.3 4.0 - 11.0 10e3/uL    RBC Count 3.20 (L) 4.40 - 5.90 10e6/uL    Hemoglobin 9.0 (L) 13.3 - 17.7 g/dL    Hematocrit 28.0 (L) 40.0 " - 53.0 %    MCV 88 78 - 100 fL    MCH 28.1 26.5 - 33.0 pg    MCHC 32.1 31.5 - 36.5 g/dL    RDW 15.8 (H) 10.0 - 15.0 %    Platelet Count 265 150 - 450 10e3/uL   Basic metabolic panel   Result Value Ref Range    Sodium 140 135 - 145 mmol/L    Potassium 8.1 (HH) 3.4 - 5.3 mmol/L    Chloride 100 98 - 107 mmol/L    Carbon Dioxide (CO2) 19 (L) 22 - 29 mmol/L    Anion Gap 21 (H) 7 - 15 mmol/L    Urea Nitrogen 146.9 (H) 6.0 - 20.0 mg/dL    Creatinine 23.68 (H) 0.67 - 1.17 mg/dL    GFR Estimate 2 (L) >60 mL/min/1.73m2    Calcium 8.5 (L) 8.8 - 10.4 mg/dL    Glucose 99 70 - 99 mg/dL   Result Value Ref Range    Troponin T, High Sensitivity 72 (H) <=22 ng/L   Result Value Ref Range    Potassium 8.1 (HH) 3.4 - 5.3 mmol/L       RADIOLOGY:  I have independently reviewed and interpreted the above imaging, pending the final radiology read.  XR Thoracic Spine 3 Views   Final Result   IMPRESSION:    The exam are significantly limited due to patient body habitus.      Thoracic spine: There is moderate upper thoracic levoscoliosis with a Redman angle of approximately 22 degrees. Corresponding lower thoracic dextroscoliosis. Lateral alignment is preserved. Disc spaces are preserved without definite acute fracture or    subluxation..      Lumbar spine:   5 lumbar type vertebra. Vertebral body height and alignment is preserved. There is mild levoscoliosis. Disc spaces are preserved. No acute fracture or subluxation.      XR Lumbar Spine 2/3 Views   Final Result   IMPRESSION:    The exam are significantly limited due to patient body habitus.      Thoracic spine: There is moderate upper thoracic levoscoliosis with a Redman angle of approximately 22 degrees. Corresponding lower thoracic dextroscoliosis. Lateral alignment is preserved. Disc spaces are preserved without definite acute fracture or    subluxation..      Lumbar spine:   5 lumbar type vertebra. Vertebral body height and alignment is preserved. There is mild levoscoliosis. Disc spaces  are preserved. No acute fracture or subluxation.          EKG:    Normal sinus rhythm at 83 bpm.  Mildly peaked T waves.  No signs of acute ischemia.   ms, QTc 498 ms.    I have independently reviewed and interpreted this EKG          I, Lorene Cervantes, am serving as a scribe to document services personally performed by Dr. Hilton based on my observation and the provider's statements to me. I, Brett Hilton, DO attest that Lorene Cervantes is acting in a scribe capacity, has observed my performance of the services and has documented them in accordance with my direction.    Brett Hilton DO  Emergency Medicine  [facility] EMERGENCY DEPARTMENT  [facility] [facility] [address] [phone]  Dept: [phone]       Brett Hilton DO  07/23/24 0103

## 2024-07-23 NOTE — PHARMACY-ADMISSION MEDICATION HISTORY
Pharmacist Admission Medication History    Admission medication history is complete. The information provided in this note is only as accurate as the sources available at the time of the update.    Information Source(s): Facility ([facility] medication list/MAR via N/A    Pertinent Information: Patient is on dialysis and has refused both medications and dialysis in the past.    Changes made to PTA medication list:  Added: None  Deleted: None  Changed: None    Allergies reviewed with patient and updates made in EHR: yes    Medication History Completed By: Emerita Santoro [facility] 7/23/2024 7:36 AM    PTA Med List   Medication Sig Last Dose    amLODIPine (NORVASC) 10 MG tablet Take 10 mg by mouth daily 7/22/2024 at AM    calcitRIOL (ROCALTROL) 0.25 MCG capsule Take 0.25 mcg by mouth daily 7/22/2024 at AM    calcium acetate (PHOSLO) 667 MG CAPS capsule Take 1,334 mg by mouth 3 times daily (with meals) 7/21/2024 at AM    metoprolol succinate ER (TOPROL XL) 25 MG 24 hr tablet Take 1 tablet (25 mg) by mouth 2 times daily 7/22/2024 at AM    multivitamin RENAL (TRIPHROCAPS) 1 capsule capsule Take 1 capsule by mouth daily 7/21/2024 at AM    ondansetron (ZOFRAN) 4 MG tablet Take 1 tablet (4 mg) by mouth every 8 hours as needed for nausea Past Month    pregabalin (LYRICA) 25 MG capsule Take 1 capsule (25 mg) by mouth daily 7/22/2024 at AM    risperiDONE (RISPERDAL) 1 MG tablet Take 1 mg by mouth at bedtime 7/21/2024 at PM

## 2024-07-23 NOTE — ED TRIAGE NOTES
"Pt arrives via [facility] with complaint of chest pain, back pain, and tailbone pain following a fall at nursing home. Pt reports that \"voices\" told him to exit the building. When he attempted to get up, the pt began to feel dizzy and fell back onto carpet. Nursing home reports chronic back and chest pain. Dialysis missed x3 this past week. Fistula L arm. . Hx bipolar and schizophrenia. Legs edematous and tender.      Triage Assessment (Adult)       Row Name 07/22/24 7708          Triage Assessment    Airway WDL WDL        Respiratory WDL    Respiratory WDL WDL        Cardiac WDL    Cardiac WDL X;chest pain        Chest Pain Assessment    Chest Pain Location anterior chest, left     Precipitating Factors activity;emotional stress;physical exertion     Alleviating Factors other (comment)  heat     Associated Signs/Symptoms anxiety     Chest Pain Intervention cardiac monitoring continued;activity minimized        Peripheral/Neurovascular WDL    Peripheral Neurovascular WDL X;neurovascular assessment lower;neurovascular assessment upper        LUE Neurovascular Assessment    Temperature LUE warm     Color LUE no discoloration     Sensation LUE no numbness;no tenderness;no tingling        RUE Neurovascular Assessment    Temperature RUE warm     Color RUE no discoloration     Sensation RUE no numbness;no tenderness;no tingling        LLE Neurovascular Assessment    Temperature LLE warm     Color LLE dusky     Sensation LLE tenderness present;tingling present  edematous        RLE Neurovascular Assessment    Temperature RLE warm     Color RLE dusky     Sensation RLE tenderness present;tingling present  edematous                     "

## 2024-07-23 NOTE — PROCEDURES
Potassium   Date Value Ref Range Status   07/23/2024 4.3 3.4 - 5.3 mmol/L Final   10/17/2014 4.5 3.4 - 5.3 mmol/L Final     Hemoglobin   Date Value Ref Range Status   07/23/2024 7.8 (L) 13.3 - 17.7 g/dL Final   03/27/2014 13.8 13.3 - 17.7 g/dL Final     Creatinine   Date Value Ref Range Status   07/23/2024 15.10 (H) 0.67 - 1.17 mg/dL Final   10/17/2014 1.6 (H) 0.8 - 1.5 mg/dL Final     Urea Nitrogen   Date Value Ref Range Status   07/23/2024 88.3 (H) 6.0 - 20.0 mg/dL Final   10/17/2014 20.0 5.0 - 24.0 mg/dL Final     Sodium   Date Value Ref Range Status   07/23/2024 141 135 - 145 mmol/L Final   10/17/2014 147.0 (H) 133.0 - 144.0 mmol/L Final     INR   Date Value Ref Range Status   03/27/2014 1.0  Final       DIALYSIS PROCEDURE NOTE  Hepatitis status of previous patient on machine log was checked and verified ok to use with this patients hepatitis status.  Patient dialyzed for 3 hrs. on a K3 bath with a net fluid removal of  3L.  A BFR of 350 ml/min was obtained using 15 gauge needles.      The treatment plan was discussed with Dr. Louise during the treatment.    Needle cannulation sites held x 10 min.     Meds  given: None   Complications: Pt. Tolerated treatment well      Person educated: Patient. Knowledge base moderate. Barriers to learning: none. Educated on procedure via explanation mode.      ICEBOAT? Timeout performed pre-treatment  I: Patient was identified using 2 identifiers  C:  Consent Signed Yes  E: Equipment preventative maintenance is current and dialysis delivery system OK to use  B: Hepatitis B Surface Antigen: Negative; Draw Date: 1/16/24      Hepatitis B Surface Antibody: Immune; Draw Date: 12/2/23  O: Dialysis orders present and complete prior to treatment  A: Vascular access verified and assessed prior to treatment  T: Treatment was performed at a clinically appropriate time  ?: Patient was allowed to ask questions and address concerns prior to treatment  See Adult Hemodialysis flowsheet in  EPIC for further details and post assessment.  Machine water alarm in place and functioning. Transducer pods intact and checked every 15min.   Pt returned via bed.  Chlorine/Chloramine water system checked every 4 hours.    Patient repositioned every 2 hours during the treatment.  Post treatment report given to GENE Saucedo RN regarding 3L of fluid removed, last BP of 154/98.

## 2024-07-23 NOTE — CONSULTS
Care Management Initial Consult    General Information  Assessment completed with: Natalie Macario  Type of CM/SW Visit: Initial Assessment    Primary Care Provider verified and updated as needed: No   Readmission within the last 30 days:           Advance Care Planning: Advance Care Planning Reviewed: other (see comments) (No ACP documents)          Communication Assessment  Patient's communication style: spoken language (English or Bilingual)             Cognitive  Cognitive/Neuro/Behavioral: WDL                      Living Environment:   People in home: facility resident (Pt was living with brother, went to TCU at [facility], then wanted to be there for LTC until able to return to brothers home)  [facility] TCU/LTC  Current living Arrangements: residential facility  Name of Facility: [facility] TCU/LTC   Able to return to prior arrangements: other (see comments) (Depending on therapy recs)       Family/Social Support:  Care provided by: self, other (see comments) (Facility staff)  Provides care for: no one, unable/limited ability to care for self  Marital Status: Single  Sibling(s)          Description of Support System: Supportive         Current Resources:   Patient receiving home care services: No     Community Resources: Transportation Services (Pt was using [employer] to get to dialysis, they did not work out and pt misssed dialysis runs)  Equipment currently used at home:    Supplies currently used at home: Other (Power Scooter from [employer])    Employment/Financial:  Employment Status: disabled        Financial Concerns:             Does the patient's insurance plan have a 3 day qualifying hospital stay waiver?  No    Lifestyle & Psychosocial Needs:  Social Determinants of Health     Food Insecurity: No Food Insecurity (4/23/2024)    Received from [employer] [facility]    Food Insecurity     Worried About Running Out of Food in the Last Year: 1    Depression: Not on file   Housing Stability: Low Risk  (4/23/2024)    [facility]    Housing Stability     Unable to Pay for Housing in the Last Year: 1   Recent Concern: Housing Stability - Medium Risk (3/27/2024)    [facility]    Housing Stability     Unable to Pay for Housing in the Last Year: 2   Tobacco Use: High Risk (1/16/2024)    Patient History     Smoking Tobacco Use: Some Days     Smokeless Tobacco Use: Never     Passive Exposure: Not on file   Financial Resource Strain: Low Risk  (4/23/2024)    [facility]    Financial Resource Strain     Difficulty of Paying Living Expenses: 3     Difficulty of Paying Living Expenses: Not on file   Alcohol Use: Not on file   Transportation Needs: Unmet Transportation Needs (5/6/2024)    [facility]    Transportation Needs     Lack of Transportation (Medical): 2   Physical Activity: Not on file   Interpersonal Safety: Not on file   Stress: Not on file   Social Connections: Socially Integrated (10/19/2023)    [facility], [facility]    Social Connections     Frequency of Communication with Friends and Family: 0   Health Literacy: Not on file       Functional Status:  Prior to admission patient needed assistance:   Dependent ADLs:: Bathing, Dressing, Eating, Positioning, Grooming, Transfers, Wheelchair-independent, Toileting  Dependent IADLs:: Cleaning, Laundry, Cooking, Shopping, Meal Preparation, Medication Management, Transportation       Mental Health Status:  Mental Health Status: No Current Concerns       Chemical Dependency Status:  Chemical Dependency Status: No Current Concerns             Values/Beliefs:  Spiritual, Cultural Beliefs, Muslim  "Practices, Values that affect care:                 Additional Information:  Assessed. RNCM introduced self and CM role. Pt was discharged from [facility] 7/18, and went to [facility] TCU. Pt attends OP HD at [facility] MWF [phone] chair time. Pt said he was supposed to take [employer] to dialysis from TCU, \"the TCU had major problems with getting transport and listening to pt.\" Prior to TCU, pt was living with brother, and his end goal would to live with brother again, but pt would need to be able to move around better. Pt has MA and pt is also understanding if he needed to stay in an LTC after TCU for awhile. Pt would prefer not to return to [facility]. Pt has a motorized scooter he was taking to dialysis from his brother's home but at the moment is is broken and at his brothers. Pt stated, he tried to bring it to [facility] to get fixed but the scooter is from [employer] so they could not assist. Pt would prefer a TCU closer to his dialysis center so transport may not be as difficult. He said his dialysis center also did a [facility] mobility application, he does not know the status of that. Pt does not have PCP listed, ok with [facility]. Pt states having a CM through [facility] is the first name, but he is unsure of #. [facility] transport needed at discharge.       Facesheet sent to [facility] to see if pt was in TCU or LTC and what happened? Awaiting response.       Cm will continue to follow plan of care,review recommendations, and assist with any discharge needs anticipated.       Brisa Soliman RN      "

## 2024-07-23 NOTE — CONSULTS
NEPHROLOGY CONSULTATION - [facility]        REASON FOR CONSULTATION: ESRD    HISTORY OF PRESENT ILLNESS: 43 yo male with ESRD on dialysis MWF at [facility] using left arm AVF, HTN, CAD, cognitive impairment admit with severe hyperkalemia, presented with complaints of chest pain, dizziness, his K was 8.1, he had emergent dialysis overnight.  He missed dialysis yesterday and says he has not dialyzed since his last admit here 7/17.  He says he has not been going to dialysis because they changed his time and that there is not a wheelchair ramp at his nursing home, also seems there is a lot of conflict with the nursing home and transportation.  We discussed again today the dangers of missing dialysis.    REVIEW OF SYSTEMS:Comprehensive review of systems obtained and otherwise negative.    Past Medical History:   Diagnosis Date    Chlamydia     history of    Flatfoot     feet pes plannus    Mental retardation     mild    Migraine headache     Pain disorder 11/6/11    upset about no pain meds  incedent report filerd by RN    Tobacco abuse     Undescended testicle        Social History     Socioeconomic History    Marital status: Single     Spouse name: Not on file    Number of children: Not on file    Years of education: Not on file    Highest education level: Not on file   Occupational History    Not on file   Tobacco Use    Smoking status: Some Days     Current packs/day: 0.10     Types: Cigarettes    Smokeless tobacco: Never    Tobacco comments:     smokes about 1 cigarette a day   Substance and Sexual Activity    Alcohol use: No     Comment: holidays and birthday - wine    Drug use: No     Comment: Drug use: Chart review shows + THC, pt denies    Sexual activity: Yes   Other Topics Concern    Not on file   Social History Narrative    5/21/2016 - Works at an [employer]     Social Determinants of Health     Financial Resource Strain: Low Risk  (4/23/2024)    Received from [employer] [facility]    Financial Resource Strain     Difficulty of Paying Living Expenses: 3     Difficulty of Paying Living Expenses: Not on file   Food Insecurity: No Food Insecurity (4/23/2024)    Received from [employer] [facility]    Food Insecurity     Worried About Running Out of Food in the Last Year: 1   Transportation Needs: Unmet Transportation Needs (5/6/2024)    Received from [facility] [employer] [facility]    Transportation Needs     Lack of Transportation (Medical): 2   Physical Activity: Not on file   Stress: Not on file   Social Connections: Socially Integrated (10/19/2023)    Received from [facility] [employer] [facility] [employer] [facility], [facility]    Social Connections     Frequency of Communication with Friends and Family: 0   Interpersonal Safety: Not on file   Housing Stability: Low Risk  (4/23/2024)    Received from [facility] [employer] [facility] [employer] [facility]    Housing Stability     Unable to Pay for Housing in the Last Year: 1   Recent Concern: Housing Stability - Medium Risk (3/27/2024)    Received from [facility] [employer] [facility], [facility]    Housing Stability     Unable to Pay for Housing in the Last Year: 2       Family History   Problem Relation Age of Onset    Diabetes No family hx of     Cancer No family hx of     Heart Disease No family hx of     Diabetes Mother     Heart Failure Father     Diabetes Type 2  Father     Kidney Disease Father        Allergies   Allergen Reactions    Bee Venom      Bee stings swelling and short of breath    Pcn [Penicillins]      Noted in 8/20/08 ER,hives,headaches,throat irritation    Trazodone      Abdominal pain    Ibuprofen GI Disturbance     Noted in 8/20/08 ER  Headaches and abdominal pain    Tylenol [Acetaminophen] GI Disturbance     Noted in 8/20/08 ER       MEDICATIONS:  Current Facility-Administered Medications   Medication  "Dose Route Frequency Provider Last Rate Last Admin    famotidine (PEPCID) injection 20 mg  20 mg Intravenous Q48H Lilliana Carmona MD        No heparin via hemodialysis machine   Does not apply Once Carol Louise MD        sodium chloride (PF) 0.9% PF flush 3 mL  3 mL Intracatheter Q8H Lilliana Carmona MD        sodium chloride 0.9% BOLUS 250 mL  250 mL Intravenous Once in dialysis/CRRT Carol Louise MD        sodium chloride 0.9% BOLUS 300 mL  300 mL Hemodialysis Machine Once Carol Louise MD             PHYSICAL EXAM    BP (!) 156/84   Pulse 88   Temp 98.2  F (36.8  C) (Temporal)   Resp 16   Ht 1.981 m (6' 6\")   Wt 104.3 kg (230 lb)   SpO2 100%   BMI 26.58 kg/m          Gen: NAD  HEENT: No icterus, NC/AT  CARDIOVASCULAR: RR, no rub,  1+ edema  PULMONARY:no resp distress, No cyanosis  GASTROINTESTINAL: obese   NEURO: Alert, no gross focal findings  PSYCHIATRIC: Normal affect, answers questions appropriately  SKIN: dry skin legs    LABORATORIES  Recent Labs   Lab 07/23/24  0533 07/23/24  0216 07/23/24  0004 07/22/24  2255 07/19/24  0922 07/18/24  1306 07/17/24  0856    145  --  140 142  --  138   POTASSIUM 4.3 6.5* 8.1* 8.1* 5.5* 5.7* 6.7*   CHLORIDE 100 102  --  100 101  --  99   CO2 24 18*  --  19* 19*  --  20*   BUN 88.3* 147.3*  --  146.9* 98.6*  --  92.2*   CR 15.10* 23.82*  --  23.68* 17.60*  --  18.84*   GFRESTIMATED 4* 2*  --  2* 3*  --  3*   LOVELY 8.3* 8.2*  --  8.5* 9.1  --  9.0   PHOS 7.4*  --   --   --   --   --   --    MAG 2.2  --   --   --   --   --   --    ALBUMIN 3.6  --   --   --   --   --   --        ASSESSMENT:   4 yo male with ESRD on dialysis MWF at [facility] using left arm AVF, HTN, CAD, cognitive impairment admit with severe hyperkalemia, presented with complaints of chest pain, dizziness, his K was 8.1    PLAN:       PLAN:  ESRD - on dialysis MWF at [facility] using left arm AVF.   History of very poor compliance with dialysis. His last dialysis " was here on 7/17.  Has ongoing conflicts with his nursing home and transport, he has had extensive assistance with social workers at dialysis and at [facility] systems with ongoing poor dialyss compliance and seems unlikely that this will change.Had emergent dialysis overnight due to severe hyperkalemia, will dialyze again today due to signif uremia, resume MWF schedule tomorrow  -dangers of missed dialysis including death discussed with him REPEATEDLY and again today  -prognosis guarded due to poor compliance with dialysis and now life threatening hyperkalemia as a result    Hyperkalemia - due to missed dialysis  -improved after dialysis  -needs low K diet when eating and needs to attend dialysis 3x/week    3. HTN - uncontrolled, hypervolemia from missed dialysis contributing  -improved after  UF with dialysis   -resume home meds    4. Cognitive impairment - makes management of his multitude of medical problems very difficult, he seems to have frequent conflicts with transportation and tcu/nursing homes in past, has left ama. Now unhappy with current nursing home, expect finding an alternative will be very difficult with his medical complexity and all of the above issues.  Would ask social work to see.    5. Anemia - severe, due to ESRD and noncompliance with dialysis so not on consistent DEMAR  -will update iron stores, replace if low  -will dose DEMAR here if iron replete    Carol Louise MD  [facility]  [phone]

## 2024-07-23 NOTE — PROCEDURES
Potassium   Date Value Ref Range Status   07/23/2024 6.5 (HH) 3.4 - 5.3 mmol/L Final   10/17/2014 4.5 3.4 - 5.3 mmol/L Final     Hemoglobin   Date Value Ref Range Status   07/22/2024 9.0 (L) 13.3 - 17.7 g/dL Final   03/27/2014 13.8 13.3 - 17.7 g/dL Final     Creatinine   Date Value Ref Range Status   07/23/2024 23.82 (H) 0.67 - 1.17 mg/dL Final   10/17/2014 1.6 (H) 0.8 - 1.5 mg/dL Final     Urea Nitrogen   Date Value Ref Range Status   07/23/2024 147.3 (H) 6.0 - 20.0 mg/dL Final   10/17/2014 20.0 5.0 - 24.0 mg/dL Final     Sodium   Date Value Ref Range Status   07/23/2024 145 135 - 145 mmol/L Final   10/17/2014 147.0 (H) 133.0 - 144.0 mmol/L Final     INR   Date Value Ref Range Status   03/27/2014 1.0  Final       Hemodialysis Treatment Note     Target weight loss (kg): 2 kg     Approximate (Net) weight removed (mL): 2343 mL     Pre-Vascular Access Status: AVF with +thrill/bruit. Accessed with 15 ga needles. Both ports aspirated and flushed easily. 400 BFR.      Dialyzer rinse: Streaked     Dialyzer: Revaclear 300     Total Blood Volume Processed (BVP): 52.8     Total dialysis (treatment) time (hours): 2 hrs 17 min of 2.5 hours prescribed     Post-Vascular Access Status: Needles d/c'd, pressure held until hemostasis obtained; 10 minutes. Drsg applied and secured. AVF with +thrill/bruit.     Medications given: Mannitol 20% 25 g      Run Summary: Hepatitis status of previous patient on machine log was checked and verified ok to use with this patients hepatitis status. Consent verified and on file. Pt educated on procedure and agreeable to treatment.   K2; 2hr 17 min total treatment time. Pt requested to be taken off early d/t being cold and uncomfortable on cart. A/O x 3, denies chest pain or other discomfort. Denies SOB on room air. Slept through most of treatment. Pt. was hemodynamically stable during dialysis.See HD flowsheet for all data.Report given to primary RN.      Interventions: Monitor VS q 15 minutes and  PRN. Goal adjustment per hemodynamics. Machine water alarm in place and functioning. Transducer pods intact and checked every 15min.      Plan: Per renal    Palma Cox RN on 7/23/2024 at 5:36 AM

## 2024-07-23 NOTE — ED NOTES
Bed: JNED-02  Expected date: 7/22/24  Expected time: 9:35 PM  Means of arrival: Ambulance  Comments:  [facility]  43 yo M chest pain/psych/missed dialysis

## 2024-07-23 NOTE — PLAN OF CARE
"  Problem: Adult Inpatient Plan of Care  Goal: Plan of Care Review  Description: The Plan of Care Review/Shift note should be completed every shift.  The Outcome Evaluation is a brief statement about your assessment that the patient is improving, declining, or no change.  This information will be displayed automatically on your shift  note.  Outcome: Progressing  Flowsheets (Taken 7/23/2024 1811)  Plan of Care Reviewed With: patient  Overall Patient Progress: no change  Goal: Patient-Specific Goal (Individualized)  Description: You can add care plan individualizations to a care plan. Examples of Individualization might be:  \"Parent requests to be called daily at 9am for status\", \"I have a hard time hearing out of my right ear\", or \"Do not touch me to wake me up as it startles  me\".  Outcome: Progressing  Goal: Absence of Hospital-Acquired Illness or Injury  Outcome: Progressing  Goal: Optimal Comfort and Wellbeing  Outcome: Progressing  Goal: Readiness for Transition of Care  Outcome: Progressing  Intervention: Mutually Develop Transition Plan  Recent Flowsheet Documentation  Taken 7/23/2024 1700 by Lata Bergman, RN  Equipment Currently Used at Home:   wheelchair, manual   other (see comments)   walker, rolling   walker, standard     Goal Outcome Evaluation:      Plan of Care Reviewed With: patient    Overall Patient Progress: no change    Pt. A&O x4 and on room air. Has chronic pain in legs and back. Pt. Refused IV. RN provided education. Pt. Continues to refuse and stated \" I only want oral medications\". MD notified. Verbal order received.   "

## 2024-07-24 PROCEDURE — 99232 SBSQ HOSP IP/OBS MODERATE 35: CPT | Performed by: INTERNAL MEDICINE

## 2024-07-24 PROCEDURE — 250N000013 HC RX MED GY IP 250 OP 250 PS 637: Performed by: HOSPITALIST

## 2024-07-24 PROCEDURE — 120N000004 HC R&B MS OVERFLOW

## 2024-07-24 PROCEDURE — 99222 1ST HOSP IP/OBS MODERATE 55: CPT | Performed by: REGISTERED NURSE

## 2024-07-24 PROCEDURE — 90935 HEMODIALYSIS ONE EVALUATION: CPT

## 2024-07-24 RX ORDER — FAMOTIDINE 20 MG/1
20 TABLET, FILM COATED ORAL EVERY OTHER DAY
Status: DISCONTINUED | OUTPATIENT
Start: 2024-07-25 | End: 2024-07-24

## 2024-07-24 RX ORDER — HYDROMORPHONE HYDROCHLORIDE 1 MG/ML
0.5 INJECTION, SOLUTION INTRAMUSCULAR; INTRAVENOUS; SUBCUTANEOUS EVERY 4 HOURS PRN
Status: DISCONTINUED | OUTPATIENT
Start: 2024-07-24 | End: 2024-07-24

## 2024-07-24 RX ADMIN — HYDROMORPHONE HYDROCHLORIDE 2 MG: 2 TABLET ORAL at 09:36

## 2024-07-24 RX ADMIN — Medication 1 TABLET: at 08:19

## 2024-07-24 RX ADMIN — AMLODIPINE BESYLATE 10 MG: 5 TABLET ORAL at 08:17

## 2024-07-24 RX ADMIN — PREGABALIN 25 MG: 25 CAPSULE ORAL at 08:17

## 2024-07-24 RX ADMIN — PROCHLORPERAZINE MALEATE 5 MG: 5 TABLET ORAL at 22:43

## 2024-07-24 RX ADMIN — PROCHLORPERAZINE MALEATE 5 MG: 5 TABLET ORAL at 09:36

## 2024-07-24 RX ADMIN — CALCITRIOL CAPSULES 0.25 MCG 0.25 MCG: 0.25 CAPSULE ORAL at 08:19

## 2024-07-24 RX ADMIN — RISPERIDONE 1 MG: 1 TABLET, FILM COATED ORAL at 20:44

## 2024-07-24 RX ADMIN — METOPROLOL SUCCINATE 25 MG: 25 TABLET, EXTENDED RELEASE ORAL at 20:43

## 2024-07-24 RX ADMIN — METOPROLOL SUCCINATE 25 MG: 25 TABLET, EXTENDED RELEASE ORAL at 08:19

## 2024-07-24 RX ADMIN — HYDROMORPHONE HYDROCHLORIDE 1 MG: 2 TABLET ORAL at 22:40

## 2024-07-24 ASSESSMENT — ACTIVITIES OF DAILY LIVING (ADL)
ADLS_ACUITY_SCORE: 31

## 2024-07-24 NOTE — PROGRESS NOTES
RENAL PROGRESS NOTE - [facility]        CC: follow up ESRD    Subjective: c/o pain in back and feet today, less interactive than yesterday but calm on my exam, awaiting dialysis today.           ASSESSMENT:   43 yo male with ESRD on dialysis MWF at [facility] using left arm AVF, HTN, CAD, cognitive impairment admit with severe hyperkalemia, presented with complaints of chest pain, dizziness, severe hyperkalemia of 8.1            PLAN:  ESRD - on dialysis MWF at [facility] using left arm AVF.   History of very poor compliance with dialysis. His last dialysis PTA was here on 7/17.  Has ongoing conflicts with his nursing home and transport, he has had extensive assistance with social workers at dialysis and at [facility] systems with ongoing poor dialyss compliance, unfortunately has not improved despite many attempts at optimizing his living situation, transportation, etc.  Had emergent dialysis 7/23 due to severe hyperkalemia  - will dialyze again today to resume MWF schedule  -dangers of missed dialysis including death discussed with him REPEATEDLY by me here and others  -prognosis guarded due to poor compliance with dialysis and now life threatening hyperkalemia as a result     Hyperkalemia - due to missed dialysis  -improved after dialysis  -needs low K diet when eating and needs to attend dialysis 3x/week     3. HTN - remains high, hypervolemia from missed dialysis contributing  -will re-eval bp after  UF with dialysis   -resume home meds     4. Cognitive impairment - makes management of his multitude of medical problems very difficult, he seems to have frequent conflicts with transportation and tcu/nursing homes in past, has left ama. Now unhappy with current nursing home, expect finding an alternative will be very difficult with his medical complexity and all of the above issues.  Appreciate social work assistance     5. Anemia - severe, due to ESRD and noncompliance with dialysis  "so not on consistent DEMAR  - iron stores adequate  -will dose DEMAR here q week and resume with outpt dialysis as able    Carol Louise MD  [facility]  [phone]    Objective    PHYSICAL EXAM  BP (!) 146/81 (BP Location: Right arm, Patient Position: Semi-Gurrola's, Cuff Size: Adult Large)   Pulse 88   Temp 98.1  F (36.7  C) (Oral)   Resp 18   Ht 1.981 m (6' 6\")   Wt 108.4 kg (239 lb)   SpO2 98%   BMI 27.62 kg/m    I/O last 3 completed shifts:  In: 200 [P.O.:200]  Out: 3400 [Urine:400; Other:3000]  Wt Readings from Last 3 Encounters:   07/23/24 108.4 kg (239 lb)   07/17/24 106 kg (233 lb 9.6 oz)   01/16/24 127 kg (280 lb)       GENERAL: nad in bed  HEENT:normocephalic, atraumatic  CARDIOVASCULAR: rr, no rub,  1+ edema  PULMONARY:cta ant. No cyanosis  GASTROINTESTINAL: soft, nt/nd, obese  MSK: diffuse muscle atrophy, warm  NEURO: Alert, no gross focal findings  PSYCHIATRIC: flat affect  SKIN: dry skin legs    LABORATORIES  Recent Labs   Lab 07/23/24  0533 07/23/24  0216 07/23/24  0004 07/22/24  2255 07/19/24  0922 07/18/24  1306    145  --  140 142  --    POTASSIUM 4.3 6.5* 8.1* 8.1* 5.5* 5.7*   CHLORIDE 100 102  --  100 101  --    CO2 24 18*  --  19* 19*  --    BUN 88.3* 147.3*  --  146.9* 98.6*  --    CR 15.10* 23.82*  --  23.68* 17.60*  --    GFRESTIMATED 4* 2*  --  2* 3*  --    LOVELY 8.3* 8.2*  --  8.5* 9.1  --    PHOS 7.4*  --   --   --   --   --    MAG 2.2  --   --   --   --   --    ALBUMIN 3.6  --   --   --   --   --        Recent Labs   Lab 07/23/24  0533 07/22/24  2255   WBC 5.5 6.3   HGB 7.8* 9.0*   HCT 24.4* 28.0*   MCV 85 88    265          MEDICATIONS  Current Facility-Administered Medications   Medication Dose Route Frequency Provider Last Rate Last Admin    amLODIPine (NORVASC) tablet 10 mg  10 mg Oral Daily Omar Mitchell MD   10 mg at 07/24/24 0817    calcitRIOL (ROCALTROL) capsule 0.25 mcg  0.25 mcg Oral Daily Omar Mitchell MD   0.25 mcg at 07/24/24 0819    " calcium acetate (PHOSLO) capsule 1,334 mg  1,334 mg Oral TID w/meals Omar Mitchell MD        [START ON 7/25/2024] famotidine (PEPCID) tablet 20 mg  20 mg Oral Every Other Day Alber Newsome MD        metoprolol succinate ER (TOPROL XL) 24 hr tablet 25 mg  25 mg Oral BID Omar Mitchell MD   25 mg at 07/24/24 0819    multivitamin RENAL (RENAVITE RX/NEPHROVITE) tablet 1 tablet  1 tablet Oral Daily Omar Mitchell MD   1 tablet at 07/24/24 0819    No heparin via hemodialysis machine   Does not apply Once Carol Louise MD        pregabalin (LYRICA) capsule 25 mg  25 mg Oral Daily Omar Mitchell MD   25 mg at 07/24/24 0817    risperiDONE (risperDAL) tablet 1 mg  1 mg Oral At Bedtime Omar Mitchell MD   1 mg at 07/23/24 2045    sodium chloride (PF) 0.9% PF flush 3 mL  3 mL Intracatheter Q8H Lilliana Carmona MD        sodium chloride 0.9% BOLUS 250 mL  250 mL Intravenous Once in dialysis/CRRT Carol Louise MD        sodium chloride 0.9% BOLUS 300 mL  300 mL Hemodialysis Machine Once Carol Louise MD Alexandra Straight, MD  [facility]  [phone]

## 2024-07-24 NOTE — PROGRESS NOTES
[facility]    Medicine Progress Note - Hospitalist Service    Date of Admission:  7/22/2024    Assessment & Plan   Acute hyperkalemia  Chest pain in the setting of hyperkalemia  -Currently chest pain-free, high potassium resolved.  -Initial cardiac troponin 72.    EKG with mild T wave prominence in leads II and III.  -Nephrology consulted and patient was dialyzed while in the ED.  Potassium improved to 4.3  -Plan to continue monitoring.  Elevated trop likely due to cdk     End-stage renal disease.  On hemodialysis MWF.  -Apparently patient noncompliant with dialysis he has missed dialysis session.  According to him he had trouble with transportation.  - consulted to assist with transportation problems  -Nephrology on board.  Continue dialysis per nephrology    History of mental health problems with hallucinations.  Awaiting evaluation by psychiatry for further recommendations    Chronic anemia likely CKD related  Nephrology aware and following  Continue DEMAR q. Weekly    Chronic pain syndrome.  Fairly stable at this time.  Continue pain medication    Hypertension.  Chronic.  BP remains slightly elevated  Continue PTA amlodipine and metoprolol   -Adjust medications if need be.  -Hydralazine 10 mg as needed for SBP>160    Diet: Regular Diet Adult    DVT Prophylaxis: Low Risk/Ambulatory with no VTE prophylaxis indicated  Raphael Catheter: Not present  Lines: None     Cardiac Monitoring: ACTIVE order. Indication: Tachyarrhythmias, acute (48 hours)  Code Status: Full Code      Clinically Significant Risk Factors        # Hyperkalemia: Highest K = 8.1 mmol/L in last 2 days, will monitor as appropriate   # Hypocalcemia: Lowest Ca = 8.2 mg/dL in last 2 days, will monitor and replace as appropriate    # Anion Gap Metabolic Acidosis: Highest Anion Gap = 25 mmol/L in last 2 days, will monitor and treat as appropriate                       # Overweight: Estimated body mass index is 27.62 kg/m   "as calculated from the following:    Height as of this encounter: 1.981 m (6' 6\").    Weight as of this encounter: 108.4 kg (239 lb)., PRESENT ON ADMISSION       # Financial/Environmental Concerns:           Disposition Plan     Medically Ready for Discharge: Anticipated in 2-4 Days      Alber Newsome MD  Hospitalist Service  [facility]  Securely message with [email or web address] (more info)  Text page via [employer] Paging/Directory   ______________________________________________________________________    Interval History   Patient seen with RN at bedside  Comfortable at rest, reports chronic back pain  Describes inability to walk since 2015  Requesting pain meds at bedside    Physical Exam   Vital Signs: Temp: 98.1  F (36.7  C) Temp src: Oral BP: (!) 146/86 Pulse: 91   Resp: 18 SpO2: 99 % O2 Device: None (Room air)    Weight: 239 lbs 0 oz    Constitutional: AAOx3  Eyes: Eyes pupils are equal round and reactive to light  Respiratory: Clear anteriorly  Cardiovascular: S1-S2 normal  GI: Abdomen is soft nontender nondistended bowel sounds are noted  Skin: No obvious rashes on exposed areas warm to touch please refer to nursing/wound care note for complete skin exam  Musculoskeletal: Good muscle tone send mild muscular atrophy noted in upper extremities  Neuropsychiatric: Alert and oriented to person    Medical Decision Making       50 MINUTES SPENT BY ME on the date of service doing chart review, history, exam, documentation & further activities per the note.  ------------------ MEDICAL DECISION MAKING ------------------------------------------------------------------------------------------------------  MANAGEMENT DISCUSSED with the following over the past 24 hours: Staff RN, and patient      Data   Recent Labs   Lab 07/23/24  1230 07/23/24  0921 07/23/24  0745 07/23/24  0534 07/23/24  0533 07/23/24  0216 07/23/24  0130 07/23/24  0004 07/22/24  1159   WBC  --   --   --   --  5.5  --   --   --  6.3   HGB  " --   --   --   --  7.8*  --   --   --  9.0*   MCV  --   --   --   --  85  --   --   --  88   PLT  --   --   --   --  221  --   --   --  265   NA  --   --   --   --  141 145  --   --  140   POTASSIUM  --   --   --   --  4.3 6.5*  --  8.1* 8.1*   CHLORIDE  --   --   --   --  100 102  --   --  100   CO2  --   --   --   --  24 18*  --   --  19*   BUN  --   --   --   --  88.3* 147.3*  --   --  146.9*   CR  --   --   --   --  15.10* 23.82*  --   --  23.68*   ANIONGAP  --   --   --   --  17* 25*  --   --  21*   LOVELY  --   --   --   --  8.3* 8.2*  --   --  8.5*   GLC 80 101* 93   < > 167* 96   < >  --  99   ALBUMIN  --   --   --   --  3.6  --   --   --   --    PROTTOTAL  --   --   --   --  6.1*  --   --   --   --    BILITOTAL  --   --   --   --  0.3  --   --   --   --    ALKPHOS  --   --   --   --  48  --   --   --   --    ALT  --   --   --   --  12  --   --   --   --    AST  --   --   --   --  12  --   --   --   --     < > = values in this interval not displayed.       Most Recent 3 CBC's:  Recent Labs   Lab Test 07/23/24  0533 07/22/24 2255 07/17/24  0856   WBC 5.5 6.3 5.7   HGB 7.8* 9.0* 8.8*   MCV 85 88 88    265 192     Most Recent 3 BMP's:  Recent Labs   Lab Test 07/23/24  1230 07/23/24  0921 07/23/24  0745 07/23/24  0534 07/23/24  0533 07/23/24  0216 07/23/24  0130 07/23/24  0004 07/22/24  2255   NA  --   --   --   --  141 145  --   --  140   POTASSIUM  --   --   --   --  4.3 6.5*  --  8.1* 8.1*   CHLORIDE  --   --   --   --  100 102  --   --  100   CO2  --   --   --   --  24 18*  --   --  19*   BUN  --   --   --   --  88.3* 147.3*  --   --  146.9*   CR  --   --   --   --  15.10* 23.82*  --   --  23.68*   ANIONGAP  --   --   --   --  17* 25*  --   --  21*   LOVELY  --   --   --   --  8.3* 8.2*  --   --  8.5*   GLC 80 101* 93   < > 167* 96   < >  --  99    < > = values in this interval not displayed.     Most Recent 2 LFT's:  Recent Labs   Lab Test 07/23/24  0533 01/16/24  1837   AST 12 16   ALT 12 6   ALKPHOS 48  47   BILITOTAL 0.3 0.3     Most Recent 3 INR's:No lab results found.

## 2024-07-24 NOTE — PROGRESS NOTES
Currently, refusing VSS and weights.  However, allowing telemetry to be maintained.    House Resident (Jatin) text messaged aforementioned.  Continue to monitor.  Pending Psych eval today.     Addendum: 04:30: Pt refusing to allow Lab draw.  Cites that lab draws hurt.    Addendum: 06:30 hrs.  Notified Stress test for 06:45 hrs.  However, when approached about procedure, pt refused.  He then went back to sleep.  House Resident text messaged aforementioned.

## 2024-07-24 NOTE — PROGRESS NOTES
"Care Management Follow Up    Length of Stay (days): 1    Expected Discharge Date: 07/25/2024    Anticipated Discharge Plan:   TCU vs LTC     Transportation: Confirmed medical transport    PT Recommendations:    OT Recommendations:        Barriers to Discharge: medical stability, placement    Social History: Pt was discharged from [facility] 7/18, and went to [facility]CU. Pt attends OP HD at [facility] MWF [id number] chair time. Pt said he was supposed to take [employer] [facility] to dialysis from TCU, \"the TCU had major problems with getting transport and listening to pt.\" Prior to TCU, pt was living with brother, and his end goal would to live with brother again, but pt would need to be able to move around better. Pt has MA and pt is also understanding if he needed to stay in an LTC after TCU for awhile. Pt would prefer not to return to [facility]. Pt has a motorized scooter he was taking to dialysis from his brother's home but at the moment is is broken and at his brothers. Pt stated, he tried to bring it to [facility] to get fixed but the scooter is from [facility] so they could not assist. Pt would prefer a TCU closer to his dialysis center so transport may not be as difficult. He said his dialysis center also did a metro mobility application, he does not know the status of that.      Patient/Spokesperson Updated: Yes. Who? Patient     Additional Information:  Patient declined at [facility], facility will not allow pt to return.   SW met with patient to discuss discharge planning and disposition. Pt requests referrals to facilities in [facility]. Referrals sent to [facility]. Patient reports he is hoping to get his scooter (which is currently at his brother's home) to make transportation to / from dialysis easier.     ALEISHA Bae      "

## 2024-07-24 NOTE — DISCHARGE INSTRUCTIONS
MENTAL HEALTH RESOURCES & SERVICES:   Behavioral Healthcare Providers Scheduling  During your hospitalization, you were seen by a licensed mental health professional through [facility] ([facility]  for a brief mental health assessment and/or psychotherapy at [facility].  It is recommended that you follow up with your established providers (psychiatrist, mental health therapist, and/or primary care doctor - as relevant) as soon as possible. Coordinators from [facility] will be calling you in the next 24-48 hours to ensure that you have the resources you need.  You can also contact [facility] coordinators directly at [phone].    You have been scheduled for the following mental health appointments:     Date: Wednesday, 8/7/2024  Time: 12:00 pm - 1:00 pm  Provider: Leena Simms MA, CNP,RN  Location: [facility], [address], [place] [address]  Phone: [phone]  Type: Medication Mgmt - Initial (In-Person)    Patient instructions  Please fill New Patient Form by using following link. All forms need to be completed 24 hours prior to the appointment date/time by going to [email or web address] Please call us on [phone] 96 hours prior to your scheduled appointment to confirm that you are able to attend. We will provide you information about how to log into video call software when you call.    Date: Tuesday, 8/6/2024  Time: 5:00 pm - 6:00 pm  Provider: German Haider PsyD, LP  Location: [facility], [address], [place] [address]  Phone: [phone]  Type: Therapy - Initial (In-Person)    Patient instructions  For Telehealth we will need your paperwork before you are seen. Website will give directions to us for in person, [email or web address] Please arrive 20 minutes early for in person  appointments. Bring insurance card. Metered street or ramp parking. Please call the clinic directly at least 48 hours prior to the appointment time to provide intake information. Failure to do so may result in appointment cancellation.        [facility] maintains an extensive network of licensed behavioral health providers to connect patients with the services they need.  We do not charge providers a fee to participate in our referral network.  We match patients with providers based on a patient s specific needs, insurance coverage, and location.  Our first effort will be to refer you to a provider within your care system, and will utilize providers outside your care system as needed.

## 2024-07-24 NOTE — CONSULTS
"      Initial Psychiatric Consult   Consult date: July 24, 2024         Reason for Consult, requesting source:    Mental health assessment/issues    Requesting source: Alber Newsome    Labs and imaging reviewed. Provider contacted with recommendations.         HPI:   Psychiatry seeing patient today regarding mental health assessment.     Macario Delatorre is a 44 year old male with a pertinent history of ERSD, noncompliance with renal dialysis, paroxysmal SVT, hypertensive disorder, intellectual disability who presents to this ED via EMS for evaluation of chest pain. Patient reports he heard voices in his head that told him to get out of his chair tonight. He was sitting in the recliner when he got up and developed \"room-spinning\" dizziness and saw \"stars\" which caused him to fall backwards. Patient reports an immediate jolt of pain shooting up his spine. States it felt like his back spasmed and locked up. He then tried to pull himself up from the floor and experienced chest pain in the \"heart area\". He decided to call 911. He states that the voices knew that the \"building wasn't for me\". Patient reports EMS put a hot pack on his chest but it didn't work because it was over his shirt. Patient notes he missed dialysis today because he was in a meeting. Patient also states he hasn't been able to go to dialysis because the medical rides don't have wheel chairs to transfer him.       Prior to his hospitalization, patient has been non-compliant with his kidney dialysis. Today, he reports that he does not wish to take the current medical cab, as \"I don't like the people who are driving the van.\" He shares that he plans to use his scooter to attend his dialysis appointments. Patient shares that he has a previous diagnosis of bipolar disorder and schizoaffective disorder. He endorses AH/VH, and reports that he manages his bipolar disorder, \"Ok. I could be doing better.\" However, he denies symptoms that would be consistent " "with yong. He shares that he typically manages his bipolar disorder by \"Smoking cannabis.\"         Past Psychiatric History:   Previous diagnosis of bipolar disorder vs. possible Schizoaffective disorder.        Substance Use and History:     Tobacco Use    Smoking status: Some Days     Current packs/day: 0.10     Types: Cigarettes    Smokeless tobacco: Never    Tobacco comments:     smokes about 1 cigarette a day   Substance Use Topics    Alcohol use: No     Comment: holidays and birthday - wine           Past Medical History:   PAST MEDICAL HISTORY:   Past Medical History:   Diagnosis Date    Chlamydia     history of    Flatfoot     feet pes plannus    Mental retardation     mild    Migraine headache     Pain disorder 11/6/11    upset about no pain meds  incedent report filerd by RN    Tobacco abuse     Undescended testicle        PAST SURGICAL HISTORY: History reviewed. No pertinent surgical history.          Family History:   FAMILY HISTORY:   Family History   Problem Relation Age of Onset    Diabetes No family hx of     Cancer No family hx of     Heart Disease No family hx of     Diabetes Mother     Heart Failure Father     Diabetes Type 2  Father     Kidney Disease Father            Social History:   Previously at a nursing home, but cannot return due to intermittent compliance with dialysis.          Physical ROS:   The 10 point Review of Systems is negative other than noted in the HPI or here.           Medications:     Current Facility-Administered Medications   Medication Dose Route Frequency Provider Last Rate Last Admin    amLODIPine (NORVASC) tablet 10 mg  10 mg Oral Daily Omar Mitchell MD   10 mg at 07/23/24 1845    calcitRIOL (ROCALTROL) capsule 0.25 mcg  0.25 mcg Oral Daily Omar Mitchell MD   0.25 mcg at 07/23/24 1845    calcium acetate (PHOSLO) capsule 1,334 mg  1,334 mg Oral TID w/meals Omar Mitchell MD        famotidine (PEPCID) injection 20 mg  20 mg Intravenous Q48H Omar Mitchell " MD RAMON        metoprolol succinate ER (TOPROL XL) 24 hr tablet 25 mg  25 mg Oral BID Omar Mitchell MD   25 mg at 07/23/24 2045    multivitamin RENAL (RENAVITE RX/NEPHROVITE) tablet 1 tablet  1 tablet Oral Daily Omar Mitchell MD   1 tablet at 07/23/24 1845    No heparin via hemodialysis machine   Does not apply Once Carol Louise MD        pregabalin (LYRICA) capsule 25 mg  25 mg Oral Daily Omar Mitchell MD   25 mg at 07/23/24 1845    risperiDONE (risperDAL) tablet 1 mg  1 mg Oral At Bedtime Omar Mitchell MD   1 mg at 07/23/24 2045    sodium chloride (PF) 0.9% PF flush 3 mL  3 mL Intracatheter Q8H Lilliana Carmona MD        sodium chloride 0.9% BOLUS 250 mL  250 mL Intravenous Once in dialysis/CRRT Carol Louise MD        sodium chloride 0.9% BOLUS 300 mL  300 mL Hemodialysis Machine Once Carol Louise MD                  Allergies:     Allergies   Allergen Reactions    Bee Venom      Bee stings swelling and short of breath    Pcn [Penicillins]      Noted in 8/20/08 ER,hives,headaches,throat irritation    Trazodone      Abdominal pain    Ibuprofen GI Disturbance     Noted in 8/20/08 ER  Headaches and abdominal pain    Tylenol [Acetaminophen] GI Disturbance     Noted in 8/20/08 ER          Labs:     Recent Results (from the past 48 hour(s))   ECG 12-Lead with MUSE - [facility]    Collection Time: 07/22/24 10:34 PM   Result Value Ref Range    Systolic Blood Pressure 148 mmHg    Diastolic Blood Pressure 88 mmHg    Ventricular Rate 83 BPM    Atrial Rate 84 BPM    AK Interval  ms    QRS Duration 126 ms     ms    QTc 498 ms    P Axis  degrees    R AXIS -52 degrees    T Axis 51 degrees    Interpretation ECG       Wide QRS rhythm  Left axis deviation  Non-specific intra-ventricular conduction block  Cannot rule out Septal infarct , age undetermined  Abnormal ECG  When compared with ECG of 12-JUL-2024 20:50,  Wide QRS rhythm has replaced Sinus rhythm  Confirmed by [facility]  PROVIDER NOTE FOR, ECG INTERPRETATION (4000),  JOLIE MOCTEZUMA ([id number]) on 7/23/2024 8:27:32 PM     CBC with platelets    Collection Time: 07/22/24 10:55 PM   Result Value Ref Range    WBC Count 6.3 4.0 - 11.0 10e3/uL    RBC Count 3.20 (L) 4.40 - 5.90 10e6/uL    Hemoglobin 9.0 (L) 13.3 - 17.7 g/dL    Hematocrit 28.0 (L) 40.0 - 53.0 %    MCV 88 78 - 100 fL    MCH 28.1 26.5 - 33.0 pg    MCHC 32.1 31.5 - 36.5 g/dL    RDW 15.8 (H) 10.0 - 15.0 %    Platelet Count 265 150 - 450 10e3/uL   Basic metabolic panel    Collection Time: 07/22/24 10:55 PM   Result Value Ref Range    Sodium 140 135 - 145 mmol/L    Potassium 8.1 (HH) 3.4 - 5.3 mmol/L    Chloride 100 98 - 107 mmol/L    Carbon Dioxide (CO2) 19 (L) 22 - 29 mmol/L    Anion Gap 21 (H) 7 - 15 mmol/L    Urea Nitrogen 146.9 (H) 6.0 - 20.0 mg/dL    Creatinine 23.68 (H) 0.67 - 1.17 mg/dL    GFR Estimate 2 (L) >60 mL/min/1.73m2    Calcium 8.5 (L) 8.8 - 10.4 mg/dL    Glucose 99 70 - 99 mg/dL   Troponin T, High Sensitivity    Collection Time: 07/22/24 10:55 PM   Result Value Ref Range    Troponin T, High Sensitivity 72 (H) <=22 ng/L   Potassium    Collection Time: 07/23/24 12:04 AM   Result Value Ref Range    Potassium 8.1 (HH) 3.4 - 5.3 mmol/L   Extra Blue Top Tube    Collection Time: 07/23/24 12:04 AM   Result Value Ref Range    Hold Specimen JIC    Extra Red Top Tube    Collection Time: 07/23/24 12:04 AM   Result Value Ref Range    Hold Specimen JIC    Extra Green Top (Lithium Heparin) Tube    Collection Time: 07/23/24 12:04 AM   Result Value Ref Range    Hold Specimen JIC    Extra Purple Top Tube    Collection Time: 07/23/24 12:04 AM   Result Value Ref Range    Hold Specimen JIC    Hepatitis B Surface Antibody    Collection Time: 07/23/24 12:04 AM   Result Value Ref Range    Hepatitis B Surface Antibody Reactive     Hepatitis B Surface Antibody Instrument Value 503.00 <8.5 m[IU]/mL   Hepatitis B surface antigen    Collection Time: 07/23/24 12:04 AM   Result Value Ref  Range    Hepatitis B Surface Antigen Nonreactive Nonreactive   Glucose by meter    Collection Time: 07/23/24  1:30 AM   Result Value Ref Range    GLUCOSE BY METER POCT 108 (H) 70 - 99 mg/dL   Glucose by meter    Collection Time: 07/23/24  1:58 AM   Result Value Ref Range    GLUCOSE BY METER POCT 100 (H) 70 - 99 mg/dL   Basic metabolic panel    Collection Time: 07/23/24  2:16 AM   Result Value Ref Range    Sodium 145 135 - 145 mmol/L    Potassium 6.5 (HH) 3.4 - 5.3 mmol/L    Chloride 102 98 - 107 mmol/L    Carbon Dioxide (CO2) 18 (L) 22 - 29 mmol/L    Anion Gap 25 (H) 7 - 15 mmol/L    Urea Nitrogen 147.3 (H) 6.0 - 20.0 mg/dL    Creatinine 23.82 (H) 0.67 - 1.17 mg/dL    GFR Estimate 2 (L) >60 mL/min/1.73m2    Calcium 8.2 (L) 8.8 - 10.4 mg/dL    Glucose 96 70 - 99 mg/dL   Comprehensive metabolic panel    Collection Time: 07/23/24  5:33 AM   Result Value Ref Range    Sodium 141 135 - 145 mmol/L    Potassium 4.3 3.4 - 5.3 mmol/L    Carbon Dioxide (CO2) 24 22 - 29 mmol/L    Anion Gap 17 (H) 7 - 15 mmol/L    Urea Nitrogen 88.3 (H) 6.0 - 20.0 mg/dL    Creatinine 15.10 (H) 0.67 - 1.17 mg/dL    GFR Estimate 4 (L) >60 mL/min/1.73m2    Calcium 8.3 (L) 8.8 - 10.4 mg/dL    Chloride 100 98 - 107 mmol/L    Glucose 167 (H) 70 - 99 mg/dL    Alkaline Phosphatase 48 40 - 150 U/L    AST 12 0 - 45 U/L    ALT 12 0 - 70 U/L    Protein Total 6.1 (L) 6.4 - 8.3 g/dL    Albumin 3.6 3.5 - 5.2 g/dL    Bilirubin Total 0.3 <=1.2 mg/dL   CBC with platelets and differential    Collection Time: 07/23/24  5:33 AM   Result Value Ref Range    WBC Count 5.5 4.0 - 11.0 10e3/uL    RBC Count 2.87 (L) 4.40 - 5.90 10e6/uL    Hemoglobin 7.8 (L) 13.3 - 17.7 g/dL    Hematocrit 24.4 (L) 40.0 - 53.0 %    MCV 85 78 - 100 fL    MCH 27.2 26.5 - 33.0 pg    MCHC 32.0 31.5 - 36.5 g/dL    RDW 15.6 (H) 10.0 - 15.0 %    Platelet Count 221 150 - 450 10e3/uL    % Neutrophils 62 %    % Lymphocytes 28 %    % Monocytes 7 %    % Eosinophils 2 %    % Basophils 0 %    % Immature  "Granulocytes 0 %    NRBCs per 100 WBC 0 <1 /100    Absolute Neutrophils 3.4 1.6 - 8.3 10e3/uL    Absolute Lymphocytes 1.6 0.8 - 5.3 10e3/uL    Absolute Monocytes 0.4 0.0 - 1.3 10e3/uL    Absolute Eosinophils 0.1 0.0 - 0.7 10e3/uL    Absolute Basophils 0.0 0.0 - 0.2 10e3/uL    Absolute Immature Granulocytes 0.0 <=0.4 10e3/uL    Absolute NRBCs 0.0 10e3/uL   Magnesium    Collection Time: 07/23/24  5:33 AM   Result Value Ref Range    Magnesium 2.2 1.7 - 2.3 mg/dL   Phosphorus    Collection Time: 07/23/24  5:33 AM   Result Value Ref Range    Phosphorus 7.4 (H) 2.5 - 4.5 mg/dL   Iron & Iron Binding Capacity    Collection Time: 07/23/24  5:33 AM   Result Value Ref Range    Iron 86 61 - 157 ug/dL    Iron Binding Capacity 184 (L) 240 - 430 ug/dL    Iron Sat Index 47 (H) 15 - 46 %   Ferritin    Collection Time: 07/23/24  5:33 AM   Result Value Ref Range    Ferritin 689 (H) 31 - 409 ng/mL   Glucose by meter    Collection Time: 07/23/24  5:34 AM   Result Value Ref Range    GLUCOSE BY METER POCT 109 (H) 70 - 99 mg/dL   Glucose by meter    Collection Time: 07/23/24  6:43 AM   Result Value Ref Range    GLUCOSE BY METER POCT 108 (H) 70 - 99 mg/dL   Glucose by meter    Collection Time: 07/23/24  7:45 AM   Result Value Ref Range    GLUCOSE BY METER POCT 93 70 - 99 mg/dL   Glucose by meter    Collection Time: 07/23/24  9:21 AM   Result Value Ref Range    GLUCOSE BY METER POCT 101 (H) 70 - 99 mg/dL   Glucose by meter    Collection Time: 07/23/24 12:30 PM   Result Value Ref Range    GLUCOSE BY METER POCT 80 70 - 99 mg/dL          Physical and Psychiatric Examination:     BP (!) 158/87 (BP Location: Right arm)   Pulse 92   Temp 98.9  F (37.2  C) (Oral)   Resp 18   Ht 1.981 m (6' 6\")   Wt 108.4 kg (239 lb)   SpO2 98%   BMI 27.62 kg/m    Weight is 239 lbs 0 oz  Body mass index is 27.62 kg/m .    Physical Exam:  I have reviewed the physical exam as documented by by the medical team and agree with findings and assessment and have no " additional findings to add at this time.         MSE:   Calm and cooperative. Alert and oriented. Insight is limited, judgement is questionable.             DSM-5 Diagnosis:   Bipolar disorder vs. Schizoaffective disorder, by patient report          Assessment:   Psychiatry seeing patient today regarding mental health assessment. Patient has reported historical diagnosis of bipolar disorder and possible schizoaffective disorder. He is currently taking Risperdal 1 mg at HS to manage these symptoms. However, patient appears to be somewhat of a poor historian regarding medications and his compliance with taking these medications. Patient symptoms that would be consistent with bipolar disorder are impulsivity, ruminating thoughts, somewhat pressured speech, non-compliance with medical treatments. Symptoms consistent with schizoaffective disorder are patient report of VH, paranoia of others.               Summary of Recommendations:   1) Patient is agreeable to outpatient psychiatry. Appointments for outpatient psychiatry for medication management have been made and will appear in patient's AVS.    2) Continue Risperdal 1 mg at HS    3) Today Macario does not show any symptoms of acute yong, nor suicidal or homicidal ideations. Therefore, based on all available evidence including the factors cited above, he does not appear to be at imminent risk for self-harm, does not meet criteria for a 72-hr hold, and therefore remains appropriate for ongoing outpatient level of care.     Additional steps taken to minimize risk include: medication optimization, close psychiatric follow up and crisis resources. Voluntary referral for outpatient care was offered and patient accepted these offers.    4) Discussed patient's concerns regarding housing with care coordinator. Appreciate their assistance in patient disposition planning.     5) Resources:   Crisis Intervention: 645.207.5381 or 306-796-1317 (TTY: 846.175.9947).  Call anytime  "for help.  [employer] [place] [employer] (www.[place].khushbu.org): 720.459.4411 or 679-759-4775.  Suicide Awareness Voices of Education (SAVE) (www.save.org): 666-129-LLYS (8776)  National Suicide Prevention Line (www.mentalhealthmn.org): 448-179-DWHI (2072)  Mental Health Consumer/Survivor Network of [place] (www.mhcsn.net): 978.874.2983 or 186-501-0979  [facility] Crisis Response 039 779-7895  Text 4 Life: txt \"LIFE\" to 02150 for immediate support and crisis intervention  Crisis text line: Text \"[place]\" to 412334. Free, confidential, 24/7.        [facility] ([place] [employer]ness) improves the lives of children and adults with mental illnesses and their families by providing free classes on mental illnesses and support groups for adults with mental illnesses, parents and family members. For more information: Phone: 960.858.8402 Toll free: 5-602-LSZX-HELPS Website: www.namihelps.orghttp://www.namihelps.org/            Page me or re-consult psychiatry as needed.       EZRA Mirza, MILAN  Consult/Liaison Psychiatry  [facility]   Contact information available via [facility] Paging/Directory.  If I am not available, please call [facility] intake ([phone])              "

## 2024-07-24 NOTE — PLAN OF CARE
"  Problem: Adult Inpatient Plan of Care  Goal: Plan of Care Review  Description: The Plan of Care Review/Shift note should be completed every shift.  The Outcome Evaluation is a brief statement about your assessment that the patient is improving, declining, or no change.  This information will be displayed automatically on your shift  note.  Outcome: Progressing  Goal: Patient-Specific Goal (Individualized)  Description: You can add care plan individualizations to a care plan. Examples of Individualization might be:  \"Parent requests to be called daily at 9am for status\", \"I have a hard time hearing out of my right ear\", or \"Do not touch me to wake me up as it startles  me\".  Outcome: Progressing  Goal: Absence of Hospital-Acquired Illness or Injury  Outcome: Progressing  Intervention: Identify and Manage Fall Risk  Recent Flowsheet Documentation  Taken 7/24/2024 0400 by Lars Smith, RN  Safety Promotion/Fall Prevention:   activity supervised   safety round/check completed   room organization consistent   room near nurse's station   room door open   patient and family education   nonskid shoes/slippers when out of bed   mobility aid in reach   lighting adjusted   increase visualization of patient   increased rounding and observation   clutter free environment maintained  Taken 7/24/2024 0000 by Lars Smith, RN  Safety Promotion/Fall Prevention:   activity supervised   safety round/check completed   room organization consistent   room near nurse's station   room door open   patient and family education   nonskid shoes/slippers when out of bed   mobility aid in reach   lighting adjusted   increase visualization of patient   increased rounding and observation   clutter free environment maintained  Taken 7/23/2024 2000 by Lars Smith, RN  Safety Promotion/Fall Prevention:   activity supervised   safety round/check completed   room organization consistent   room near nurse's station   " room door open   patient and family education   nonskid shoes/slippers when out of bed   mobility aid in reach   lighting adjusted   increase visualization of patient   increased rounding and observation   clutter free environment maintained  Intervention: Prevent Skin Injury  Recent Flowsheet Documentation  Taken 7/24/2024 0400 by Lars Smith RN  Body Position: supine  Skin Protection: incontinence pads utilized  Device Skin Pressure Protection: adhesive use limited  Taken 7/24/2024 0000 by Lars Smith RN  Body Position: supine  Skin Protection: incontinence pads utilized  Device Skin Pressure Protection: adhesive use limited  Taken 7/23/2024 2000 by Lars Smith RN  Body Position: supine  Skin Protection: incontinence pads utilized  Device Skin Pressure Protection: adhesive use limited  Intervention: Prevent Infection  Recent Flowsheet Documentation  Taken 7/24/2024 0400 by Lars Smith RN  Infection Prevention:   equipment surfaces disinfected   hand hygiene promoted   personal protective equipment utilized   rest/sleep promoted   single patient room provided   visitors restricted/screened  Taken 7/24/2024 0000 by Lars Smith RN  Infection Prevention:   equipment surfaces disinfected   hand hygiene promoted   personal protective equipment utilized   rest/sleep promoted   single patient room provided   visitors restricted/screened  Taken 7/23/2024 2000 by Lars Smith RN  Infection Prevention:   equipment surfaces disinfected   hand hygiene promoted   personal protective equipment utilized   rest/sleep promoted   single patient room provided   visitors restricted/screened  Goal: Optimal Comfort and Wellbeing  Outcome: Not Progressing  Intervention: Monitor Pain and Promote Comfort  Recent Flowsheet Documentation  Taken 7/24/2024 0400 by Lars Smith RN  Pain Management Interventions:   care clustered   declines   distraction    diversional activity provided   emotional support   environmental changes   pain management plan reviewed with patient/caregiver   pillow support provided   quiet environment facilitated   relaxation techniques promoted   repositioned   rest  Taken 7/24/2024 0000 by Lars Smith RN  Pain Management Interventions:   care clustered   distraction   diversional activity provided   emotional support   environmental changes   pillow support provided   quiet environment facilitated   relaxation techniques promoted   repositioned   rest  Taken 7/23/2024 2000 by Lars Smith, RN  Pain Management Interventions:   care clustered   distraction   declines   diversional activity provided   emotional support   environmental changes   pain management plan reviewed with patient/caregiver   pillow support provided   quiet environment facilitated   relaxation techniques promoted   repositioned   rest  Goal: Readiness for Transition of Care  Outcome: Not Progressing     Problem: Fall Injury Risk  Goal: Absence of Fall and Fall-Related Injury  Outcome: Not Progressing  Intervention: Identify and Manage Contributors  Recent Flowsheet Documentation  Taken 7/24/2024 0400 by Lars Smith, RN  Medication Review/Management:   medications reviewed   high-risk medications identified  Taken 7/24/2024 0000 by Lars Smith RN  Medication Review/Management:   medications reviewed   high-risk medications identified  Taken 7/23/2024 2000 by Lars Smith RN  Medication Review/Management:   medications reviewed   high-risk medications identified  Intervention: Promote Injury-Free Environment  Recent Flowsheet Documentation  Taken 7/24/2024 0400 by Lars Smith, RN  Safety Promotion/Fall Prevention:   activity supervised   safety round/check completed   room organization consistent   room near nurse's station   room door open   patient and family education   nonskid shoes/slippers when out  of bed   mobility aid in reach   lighting adjusted   increase visualization of patient   increased rounding and observation   clutter free environment maintained  Taken 7/24/2024 0000 by Lars Smith RN  Safety Promotion/Fall Prevention:   activity supervised   safety round/check completed   room organization consistent   room near nurse's station   room door open   patient and family education   nonskid shoes/slippers when out of bed   mobility aid in reach   lighting adjusted   increase visualization of patient   increased rounding and observation   clutter free environment maintained  Taken 7/23/2024 2000 by Lars Smith, RN  Safety Promotion/Fall Prevention:   activity supervised   safety round/check completed   room organization consistent   room near nurse's station   room door open   patient and family education   nonskid shoes/slippers when out of bed   mobility aid in reach   lighting adjusted   increase visualization of patient   increased rounding and observation   clutter free environment maintained     Problem: Comorbidity Management  Goal: Blood Pressure in Desired Range  Outcome: Not Progressing  Intervention: Maintain Blood Pressure Management  Recent Flowsheet Documentation  Taken 7/24/2024 0400 by Lars Smith, RN  Medication Review/Management:   medications reviewed   high-risk medications identified  Taken 7/24/2024 0000 by Lars Smith RN  Medication Review/Management:   medications reviewed   high-risk medications identified  Taken 7/23/2024 2000 by Lars Smith RN  Medication Review/Management:   medications reviewed   high-risk medications identified     Problem: Heart Failure  Goal: Optimal Coping  Outcome: Not Progressing  Goal: Optimal Cardiac Output  Outcome: Not Progressing  Goal: Stable Heart Rate and Rhythm  Outcome: Progressing  Goal: Optimal Functional Ability  Outcome: Not Progressing  Intervention: Optimize Functional  Ability  Recent Flowsheet Documentation  Taken 7/24/2024 0400 by Lars Smith RN  Activity Management: activity adjusted per tolerance  Taken 7/24/2024 0000 by Lars Smith RN  Activity Management: activity adjusted per tolerance  Taken 7/23/2024 2000 by Lars mSith RN  Activity Management: activity adjusted per tolerance  Goal: Fluid and Electrolyte Balance  Outcome: Progressing  Goal: Improved Oral Intake  Outcome: Not Progressing  Goal: Effective Oxygenation and Ventilation  Outcome: Not Progressing  Intervention: Promote Airway Secretion Clearance  Recent Flowsheet Documentation  Taken 7/24/2024 0400 by Lars Smith RN  Activity Management: activity adjusted per tolerance  Taken 7/24/2024 0000 by Lars Smith RN  Activity Management: activity adjusted per tolerance  Taken 7/23/2024 2000 by Lars Smith RN  Activity Management: activity adjusted per tolerance  Intervention: Optimize Oxygenation and Ventilation  Recent Flowsheet Documentation  Taken 7/24/2024 0400 by Lars Smith RN  Head of Bed (HOB) Positioning: HOB at 60 degrees  Taken 7/24/2024 0000 by Lars Smith RN  Head of Bed (HOB) Positioning: HOB at 60 degrees  Taken 7/23/2024 2000 by Lars Smith RN  Head of Bed (HOB) Positioning: HOB at 60 degrees  Goal: Effective Breathing Pattern During Sleep  Outcome: Not Progressing  Intervention: Monitor and Manage Obstructive Sleep Apnea  Recent Flowsheet Documentation  Taken 7/24/2024 0400 by Lars Smith RN  Medication Review/Management:   medications reviewed   high-risk medications identified  Taken 7/24/2024 0000 by Lars Smith RN  Medication Review/Management:   medications reviewed   high-risk medications identified  Taken 7/23/2024 2000 by Lars Smith RN  Medication Review/Management:   medications reviewed   high-risk medications identified   Goal Outcome  Evaluation:  Heart Failure Care Map  GOALS TO BE MET BEFORE DISCHARGE:    1. Decrease congestion and/or edema with diuretic therapy to achieve near optimal volume status.     Dyspnea improved: No, further care required to meet this goal. Please explain Selective towards following care plan.  Refusing multiple cares.   Edema improved: No, further care required to meet this goal. Please explain Pitting edema present bilat LE.  Refusing to be weighed.        Last 24 hour I/O:   Intake/Output Summary (Last 24 hours) at 7/24/2024 0548  Last data filed at 7/24/2024 0300  Gross per 24 hour   Intake 0 ml   Output 3400 ml   Net -3400 ml           Net I/O and Weights since admission:   06/24 0700 - 07/24 0659  In: 0   Out: 5747 [Urine:400]  Net: -5747     Vitals:    07/22/24 2202 07/23/24 2241   Weight: 104.3 kg (230 lb) 108.4 kg (239 lb)       2.  O2 sats > 90% on room air, or at prior home O2 therapy level.      Able to wean O2 this shift to keep sats above 90%?: Yes, satisfactory for discharge.   Does patient use Home O2? No          Current oxygenation status:   SpO2:  (pt  was refused vs)     O2 Device: None (Room air),      3.  Tolerates ambulation and mobility near baseline.     Ambulation: No, further care required to meet this goal. Please explain Needing assist X1 to get out of bed.  Periodically gets out of bed  without staff assistance despite repeated requests to call.   Times patient ambulated with staff this shift: 2.  Ambulates in room.    Cardiac:  NSR with 1st AVB, rate 60-80's.  Respiratory:  Rate 16-20, non-labored.  Sat's: Maintaining mid 90's at RA.  LS: Diminished in the bases, bilat.  Neuro:  WNL.  GI:  Passing flatus.  No BM for HS/NOC.  Won't allow assessment.  :  Anuric.  HD: M-W-F.  Pain:  Denies.  Ambulation:  Up with assist X1.  Labs:  Refusing lab draw.  Plan:  HD.      Please review the Heart Failure Care Map for additional HF goal outcomes.    Lars Smith RN  7/24/2024

## 2024-07-24 NOTE — PROGRESS NOTES
Potassium   Date Value Ref Range Status   07/23/2024 4.3 3.4 - 5.3 mmol/L Final   10/17/2014 4.5 3.4 - 5.3 mmol/L Final     Hemoglobin   Date Value Ref Range Status   07/23/2024 7.8 (L) 13.3 - 17.7 g/dL Final   03/27/2014 13.8 13.3 - 17.7 g/dL Final     Creatinine   Date Value Ref Range Status   07/23/2024 15.10 (H) 0.67 - 1.17 mg/dL Final   10/17/2014 1.6 (H) 0.8 - 1.5 mg/dL Final     Urea Nitrogen   Date Value Ref Range Status   07/23/2024 88.3 (H) 6.0 - 20.0 mg/dL Final   10/17/2014 20.0 5.0 - 24.0 mg/dL Final     Sodium   Date Value Ref Range Status   07/23/2024 141 135 - 145 mmol/L Final   10/17/2014 147.0 (H) 133.0 - 144.0 mmol/L Final     INR   Date Value Ref Range Status   03/27/2014 1.0  Final       DIALYSIS PROCEDURE NOTE  Hepatitis status of previous patient on machine log was checked and verified ok to use with this patients hepatitis status.  Patient dialyzed for 2.75 hrs. on a K2 bath with a net fluid removal of  2.5L.  A BFR of 450 ml/min was obtained via a LUE AVF using 15 gauge needles.      The treatment plan was discussed with Dr. Louise during the treatment.    Total heparin received during the treatment: 0 units.   Needle cannulation sites held x 10 min.     Meds  given: None     Complications: PT began cramping with 25 min left, UF turned off.  PT began to get lightheaded and confused with 15 min  left, HD was terminated and rinse back was successful.  PT back to baseline ost rinse back.  VSS post HD.     ICEBOAT? Timeout performed pre-treatment  I: Patient was identified using 2 identifiers  C:  Consent Signed Yes  E: Equipment preventative maintenance is current and dialysis delivery system OK to use  B: Hepatitis B Surface Antigen: negative; Draw Date: 1/16/24      Hepatitis B Surface Antibody: Immune; Draw Date: 12/02/23  O: Dialysis orders present and complete prior to treatment  A: Vascular access verified and assessed prior to treatment  T: Treatment was performed at a clinically  appropriate time  ?: Patient was allowed to ask questions and address concerns prior to treatment  See Adult Hemodialysis flowsheet in EPIC for further details and post assessment.  Machine water alarm in place and functioning. Transducer pods intact and checked every 15min.   Chlorine/Chloramine water system checked every 4 hours.    Patient repositioned every 2 hours during the treatment.  Post treatment report given to LUIS Smith RN regarding 2.5L of fluid removed and last BP of 111/62.

## 2024-07-24 NOTE — PLAN OF CARE
"  Patient is A/O, reported 10/10 pain at his back and feet.  Scheduled lyrica given and did not help, PRN dilaudid given in 0900 hour with compazine to avoid nausea, MD in room and reported this to be ok.  Pain improved slightly to 8/10.  End of shift patient reported his pain remained at 8/10 but is tolerating it well.  Patient can be guarded due to feeling \"no one will listen to me\".  Therapeutic communication and empathetic listening has been helpful for patient to remain comfortable.  Received hemodialysis in room at 1250, anticipated completion is 1540.  Patient has a psych consult MD wishes to have follow up after their meeting.     Tele: Sinus with First degree AVB    Pharmacy reported patient's Epoetin medication in the 1700 hour is located in the med room fridge.     Problem: Adult Inpatient Plan of Care  Goal: Plan of Care Review  Description: The Plan of Care Review/Shift note should be completed every shift.  The Outcome Evaluation is a brief statement about your assessment that the patient is improving, declining, or no change.  This information will be displayed automatically on your shift  note.  Outcome: Progressing  Goal: Patient-Specific Goal (Individualized)  Description: You can add care plan individualizations to a care plan. Examples of Individualization might be:  \"Parent requests to be called daily at 9am for status\", \"I have a hard time hearing out of my right ear\", or \"Do not touch me to wake me up as it startles  me\".  Outcome: Progressing  Goal: Absence of Hospital-Acquired Illness or Injury  Outcome: Progressing  Intervention: Identify and Manage Fall Risk  Recent Flowsheet Documentation  Taken 7/24/2024 0800 by Linden Bone RN  Safety Promotion/Fall Prevention:   activity supervised   safety round/check completed   room organization consistent   room near nurse's station   room door open   patient and family education   nonskid shoes/slippers when out of bed   mobility aid in " reach   lighting adjusted   increase visualization of patient   increased rounding and observation   clutter free environment maintained  Intervention: Prevent Infection  Recent Flowsheet Documentation  Taken 7/24/2024 0800 by Linden Bone, RN  Infection Prevention:   equipment surfaces disinfected   hand hygiene promoted   personal protective equipment utilized   rest/sleep promoted   single patient room provided   visitors restricted/screened  Goal: Optimal Comfort and Wellbeing  Outcome: Progressing  Intervention: Monitor Pain and Promote Comfort  Recent Flowsheet Documentation  Taken 7/24/2024 0759 by Linden Bone, RN  Pain Management Interventions:   emotional support   medication (see MAR)  Goal: Readiness for Transition of Care  Outcome: Progressing     Problem: Fall Injury Risk  Goal: Absence of Fall and Fall-Related Injury  Outcome: Progressing  Intervention: Identify and Manage Contributors  Recent Flowsheet Documentation  Taken 7/24/2024 0800 by Linden Bone, RN  Medication Review/Management:   medications reviewed   high-risk medications identified  Intervention: Promote Injury-Free Environment  Recent Flowsheet Documentation  Taken 7/24/2024 0800 by Linden Bone, RN  Safety Promotion/Fall Prevention:   activity supervised   safety round/check completed   room organization consistent   room near nurse's station   room door open   patient and family education   nonskid shoes/slippers when out of bed   mobility aid in reach   lighting adjusted   increase visualization of patient   increased rounding and observation   clutter free environment maintained     Problem: Comorbidity Management  Goal: Blood Pressure in Desired Range  Outcome: Progressing  Intervention: Maintain Blood Pressure Management  Recent Flowsheet Documentation  Taken 7/24/2024 0800 by Linden Bone, RN  Medication Review/Management:   medications reviewed   high-risk medications identified     Problem: Pain Acute  Goal:  Optimal Pain Control and Function  Outcome: Progressing  Intervention: Develop Pain Management Plan  Recent Flowsheet Documentation  Taken 7/24/2024 0759 by Linden Bone RN  Pain Management Interventions:   emotional support   medication (see MAR)  Intervention: Prevent or Manage Pain  Recent Flowsheet Documentation  Taken 7/24/2024 0800 by Linden Bone RN  Medication Review/Management:   medications reviewed   high-risk medications identified  Intervention: Optimize Psychosocial Wellbeing  Recent Flowsheet Documentation  Taken 7/24/2024 0800 by Linden Bone RN  Supportive Measures: active listening utilized     Problem: Heart Failure  Goal: Optimal Coping  Outcome: Progressing  Intervention: Support Psychosocial Response  Recent Flowsheet Documentation  Taken 7/24/2024 0800 by Linden Bone RN  Supportive Measures: active listening utilized  Goal: Optimal Cardiac Output  Outcome: Progressing  Intervention: Optimize Cardiac Output  Recent Flowsheet Documentation  Taken 7/24/2024 0800 by Linden Bone RN  Environmental Support: calm environment promoted  Goal: Stable Heart Rate and Rhythm  Outcome: Progressing  Goal: Optimal Functional Ability  Outcome: Progressing  Intervention: Optimize Functional Ability  Recent Flowsheet Documentation  Taken 7/24/2024 0800 by Linden Bone RN  Activity Management: activity adjusted per tolerance  Taken 7/24/2024 0759 by Linden Bone RN  Activity Management: activity adjusted per tolerance  Goal: Fluid and Electrolyte Balance  Outcome: Progressing  Goal: Improved Oral Intake  Outcome: Progressing  Goal: Effective Oxygenation and Ventilation  Outcome: Progressing  Intervention: Promote Airway Secretion Clearance  Recent Flowsheet Documentation  Taken 7/24/2024 0800 by Linden Bone RN  Activity Management: activity adjusted per tolerance  Taken 7/24/2024 0759 by Linden Bone RN  Activity Management: activity adjusted per tolerance  Goal:  Effective Breathing Pattern During Sleep  Outcome: Progressing  Intervention: Monitor and Manage Obstructive Sleep Apnea  Recent Flowsheet Documentation  Taken 7/24/2024 0800 by Linden Bone RN  Medication Review/Management:   medications reviewed   high-risk medications identified    Kendall Bone RN

## 2024-07-25 LAB
ANION GAP SERPL CALCULATED.3IONS-SCNC: 14 MMOL/L (ref 7–15)
BASOPHILS # BLD AUTO: 0 10E3/UL (ref 0–0.2)
BASOPHILS NFR BLD AUTO: 0 %
BUN SERPL-MCNC: 61.7 MG/DL (ref 6–20)
CALCIUM SERPL-MCNC: 9.3 MG/DL (ref 8.8–10.4)
CHLORIDE SERPL-SCNC: 100 MMOL/L (ref 98–107)
CREAT SERPL-MCNC: 12.83 MG/DL (ref 0.67–1.17)
EGFRCR SERPLBLD CKD-EPI 2021: 4 ML/MIN/1.73M2
EOSINOPHIL # BLD AUTO: 0.1 10E3/UL (ref 0–0.7)
EOSINOPHIL NFR BLD AUTO: 3 %
ERYTHROCYTE [DISTWIDTH] IN BLOOD BY AUTOMATED COUNT: 15.4 % (ref 10–15)
GLUCOSE SERPL-MCNC: 98 MG/DL (ref 70–99)
HCO3 SERPL-SCNC: 26 MMOL/L (ref 22–29)
HCT VFR BLD AUTO: 27.6 % (ref 40–53)
HGB BLD-MCNC: 8.6 G/DL (ref 13.3–17.7)
IMM GRANULOCYTES # BLD: 0 10E3/UL
IMM GRANULOCYTES NFR BLD: 0 %
LYMPHOCYTES # BLD AUTO: 1.4 10E3/UL (ref 0.8–5.3)
LYMPHOCYTES NFR BLD AUTO: 29 %
MCH RBC QN AUTO: 27.1 PG (ref 26.5–33)
MCHC RBC AUTO-ENTMCNC: 31.2 G/DL (ref 31.5–36.5)
MCV RBC AUTO: 87 FL (ref 78–100)
MONOCYTES # BLD AUTO: 0.5 10E3/UL (ref 0–1.3)
MONOCYTES NFR BLD AUTO: 10 %
NEUTROPHILS # BLD AUTO: 2.8 10E3/UL (ref 1.6–8.3)
NEUTROPHILS NFR BLD AUTO: 58 %
NRBC # BLD AUTO: 0 10E3/UL
NRBC BLD AUTO-RTO: 0 /100
PLATELET # BLD AUTO: 237 10E3/UL (ref 150–450)
POTASSIUM SERPL-SCNC: 6 MMOL/L (ref 3.4–5.3)
RBC # BLD AUTO: 3.17 10E6/UL (ref 4.4–5.9)
SODIUM SERPL-SCNC: 140 MMOL/L (ref 135–145)
WBC # BLD AUTO: 4.9 10E3/UL (ref 4–11)

## 2024-07-25 PROCEDURE — 250N000013 HC RX MED GY IP 250 OP 250 PS 637: Performed by: HOSPITALIST

## 2024-07-25 PROCEDURE — 120N000004 HC R&B MS OVERFLOW

## 2024-07-25 PROCEDURE — 85025 COMPLETE CBC W/AUTO DIFF WBC: CPT | Performed by: INTERNAL MEDICINE

## 2024-07-25 PROCEDURE — 36415 COLL VENOUS BLD VENIPUNCTURE: CPT | Performed by: INTERNAL MEDICINE

## 2024-07-25 PROCEDURE — 80048 BASIC METABOLIC PNL TOTAL CA: CPT | Performed by: INTERNAL MEDICINE

## 2024-07-25 PROCEDURE — 99233 SBSQ HOSP IP/OBS HIGH 50: CPT | Performed by: INTERNAL MEDICINE

## 2024-07-25 RX ADMIN — METOPROLOL SUCCINATE 25 MG: 25 TABLET, EXTENDED RELEASE ORAL at 13:06

## 2024-07-25 RX ADMIN — Medication 1 TABLET: at 13:06

## 2024-07-25 RX ADMIN — RISPERIDONE 1 MG: 1 TABLET, FILM COATED ORAL at 21:27

## 2024-07-25 RX ADMIN — AMLODIPINE BESYLATE 10 MG: 5 TABLET ORAL at 13:05

## 2024-07-25 RX ADMIN — PROCHLORPERAZINE MALEATE 5 MG: 5 TABLET ORAL at 21:26

## 2024-07-25 RX ADMIN — HYDROMORPHONE HYDROCHLORIDE 1 MG: 2 TABLET ORAL at 21:26

## 2024-07-25 RX ADMIN — PREGABALIN 25 MG: 25 CAPSULE ORAL at 13:05

## 2024-07-25 RX ADMIN — CALCITRIOL CAPSULES 0.25 MCG 0.25 MCG: 0.25 CAPSULE ORAL at 13:06

## 2024-07-25 RX ADMIN — METOPROLOL SUCCINATE 25 MG: 25 TABLET, EXTENDED RELEASE ORAL at 21:26

## 2024-07-25 ASSESSMENT — ACTIVITIES OF DAILY LIVING (ADL)
ADLS_ACUITY_SCORE: 30
ADLS_ACUITY_SCORE: 31
ADLS_ACUITY_SCORE: 30
ADLS_ACUITY_SCORE: 31
ADLS_ACUITY_SCORE: 30
ADLS_ACUITY_SCORE: 31
ADLS_ACUITY_SCORE: 30
ADLS_ACUITY_SCORE: 31

## 2024-07-25 NOTE — PROGRESS NOTES
Care Management Follow Up    Length of Stay (days): 2    Expected Discharge Date:  pending    Anticipated Discharge Plan:   transitional care/long term care    Transportation: Anticipate medical transport    PT Recommendations:    OT Recommendations:        Barriers to Discharge: placement    Prior Living Situation: residential facility with facility resident (Pt was living with brother, went to TCU at [facility], then wanted to be there for LTC until able to return to brothers home)     Patient/Spokesperson Updated: Yes. Who? Patient at bedside.    Additional Information:  Seeking placement. Referrals pending.   [employer] system reviewing- per liaison, patient's dialysis time may be a barrier as many transport companies will not transport pt back when his chair time ends ([facility] MWF [id number]).   Facility reviewing and working on verifying benefits. SW met with patient at bedside to provide update.    Angelika Ambriz, SINAISW

## 2024-07-25 NOTE — PLAN OF CARE
Problem: Adult Inpatient Plan of Care  Goal: Optimal Comfort and Wellbeing  Outcome: Progressing     Problem: Fall Injury Risk  Goal: Absence of Fall and Fall-Related Injury  Intervention: Identify and Manage Contributors  Recent Flowsheet Documentation  Taken 7/24/2024 1649 by Asif Smith, RN  Medication Review/Management:   medications reviewed   high-risk medications identified   Goal Outcome Evaluation:       Pt had 2.5 L of fluid removed during dialysis. Pt had pain managed with PO dilaudid late in the PM shift. PRN PO compazine given with dilaudid to manage nausea. Pt is alert and oriented x4. Pt declined to take epoetin kiana and the calcium acetate. Pt reported that he was afraid of needles and the calcium acetate makes him poop himself. Vital signs stable. No other concerns noted.       Asif Smith RN  7/24/2024  10:48 PM

## 2024-07-25 NOTE — PROGRESS NOTES
[facility]    PROGRESS NOTE - Hospitalist Service    Assessment and Plan  Patient is new to me, Macario Delatorre is a 44 years old male with a past medical history of ERSD, noncompliance with renal dialysis, paroxysmal SVT, hypertensive disorder, intellectual disability who presents to this ED via EMS for evaluation of chest pain.  Admitted with hyperkalemia and acute back and chest pain      Chest pain   - in the setting of hyperkalemia  - Currently chest pain-free, high potassium resolved.  - Initial cardiac troponin 72.   - EKG with mild T wave prominence in leads II and III.  - Unremarkable telemetry  monitoring.    Hypokalemia with end-stage renal disease  -Patient is noncompliant and has been missing dialysis sessions.  -According to him he had trouble with transportation.  -Nephrology consulted and patient was dialyzed while in the ED.  Potassium improved to 4.3  -  On hemodialysis MWF.  - Continue dialysis per nephrology     History of mental health problems with hallucinations.    -Psychiatric consult, appreciate input  -No suicidal or homicidal ideation  -Continue Risperdal 1 mg at at bedtime     Chronic anemia   - likely CKD related  Nephrology aware and following  Continue DEMAR q. Weekly     Chronic pain syndrome.   -  Fairly stable at this time.    - Continue pain medication     Hypertension.    -  BP remains slightly elevated  - Continue PTA amlodipine and metoprolol   - Adjust medications if need be.  - Hydralazine 10 mg as needed for SBP>160           50 MINUTES SPENT BY ME on the date of service doing chart review, history, exam, documentation & further activities per the note    Active Problems:    Hyperkalemia    Acute low back pain without sciatica, unspecified back pain laterality      VTE prophylaxis:  Pneumatic Compression Devices  DIET: Orders Placed This Encounter      Regular Diet Adult      Disposition/Barriers to discharge: Long-term care placement  Code Status: Full  Code    Subjective:  Macario is feeling about the same today, denies any chest pain or shortness of breath.    PHYSICAL EXAM  Vitals:    07/22/24 2202 07/23/24 2241   Weight: 104.3 kg (230 lb) 108.4 kg (239 lb)     B/P:145/94 T:97.8 P:91 R:18     Intake/Output Summary (Last 24 hours) at 7/25/2024 1616  Last data filed at 7/25/2024 0300  Gross per 24 hour   Intake 240 ml   Output 0 ml   Net 240 ml      Body mass index is 27.62 kg/m .    Constitutional: awake, alert, cooperative, no apparent distress, and appears stated age  Respiratory: No increased work of breathing, good air exchange, clear to auscultation bilaterally, no crackles or wheezing  Cardiovascular: Normal apical impulse, regular rate and rhythm, normal S1 and S2, no S3 or S4, and no murmur noted  GI: No scars, normal bowel sounds, soft, non-distended, non-tender, no masses palpated, no hepatosplenomegally  Skin: no bruising or bleeding and normal skin color, texture, turgor  Musculoskeletal: There is no redness, warmth, or swelling of the joints.  Full range of motion noted.  no lower extremity pitting edema present  Neurologic: Awake, alert, oriented to name, place and time.  Cranial nerves II-XII are grossly intact.  Motor is 5 out of 5 bilaterally.   Sensory is intact.    Neuropsychiatric: Appropriate with examiner      PERTINENT LABS/IMAGING:    I have personally reviewed the following data over the past 24 hrs:    4.9  \   8.6 (L)   / 237     140 100 61.7 (H) /  98   6.0 (H) 26 12.83 (H) \       Imaging results reviewed over the past 24 hrs:   No results found for this or any previous visit (from the past 24 hour(s)).    Discussed with patient, family, nephrology, nursing staff and discharge planner    Jose Richards MD  [facility] Medicine Service  [phone]

## 2024-07-25 NOTE — PROGRESS NOTES
Nephrology  Well known to our service  Will see on dialysis days unless acute issues  Please call with questions.    Chao Lua MD  [facility]  [phone]

## 2024-07-25 NOTE — PLAN OF CARE
"  Problem: Adult Inpatient Plan of Care  Goal: Plan of Care Review  Description: The Plan of Care Review/Shift note should be completed every shift.  The Outcome Evaluation is a brief statement about your assessment that the patient is improving, declining, or no change.  This information will be displayed automatically on your shift  note.  Outcome: Progressing  Goal: Patient-Specific Goal (Individualized)  Description: You can add care plan individualizations to a care plan. Examples of Individualization might be:  \"Parent requests to be called daily at 9am for status\", \"I have a hard time hearing out of my right ear\", or \"Do not touch me to wake me up as it startles  me\".  Outcome: Progressing  Goal: Absence of Hospital-Acquired Illness or Injury  Outcome: Progressing  Intervention: Identify and Manage Fall Risk  Recent Flowsheet Documentation  Taken 7/25/2024 0400 by Lars Smith RN  Safety Promotion/Fall Prevention:   safety round/check completed   room near nurse's station   patient and family education   nonskid shoes/slippers when out of bed   mobility aid in reach   lighting adjusted  Taken 7/25/2024 0000 by Lars Smith RN  Safety Promotion/Fall Prevention:   safety round/check completed   room near nurse's station   patient and family education   nonskid shoes/slippers when out of bed   mobility aid in reach   lighting adjusted  Intervention: Prevent Skin Injury  Recent Flowsheet Documentation  Taken 7/25/2024 0400 by Lars Smith, RN  Body Position: position changed independently  Skin Protection: incontinence pads utilized  Device Skin Pressure Protection: adhesive use limited  Taken 7/25/2024 0000 by Lars Smith RN  Body Position: position changed independently  Skin Protection: incontinence pads utilized  Device Skin Pressure Protection: adhesive use limited  Intervention: Prevent Infection  Recent Flowsheet Documentation  Taken 7/25/2024 0400 by Luis" Lars Leal, RN  Infection Prevention:   equipment surfaces disinfected   personal protective equipment utilized   hand hygiene promoted   rest/sleep promoted   single patient room provided  Taken 7/25/2024 0000 by Lars Smith RN  Infection Prevention:   equipment surfaces disinfected   personal protective equipment utilized   hand hygiene promoted   rest/sleep promoted   single patient room provided  Goal: Optimal Comfort and Wellbeing  Outcome: Not Progressing  Intervention: Monitor Pain and Promote Comfort  Recent Flowsheet Documentation  Taken 7/25/2024 0400 by Lars Smith, RN  Pain Management Interventions:   care clustered   declines   distraction   diversional activity provided   emotional support   environmental changes   pillow support provided   quiet environment facilitated   relaxation techniques promoted   repositioned   rest  Taken 7/25/2024 0000 by Lars Smith, RN  Pain Management Interventions:   pain management plan reviewed with patient/caregiver   pillow support provided   quiet environment facilitated   relaxation techniques promoted   repositioned   rest  Goal: Readiness for Transition of Care  Outcome: Not Progressing     Problem: Heart Failure  Goal: Optimal Coping  Outcome: Not Progressing  Goal: Optimal Cardiac Output  Outcome: Progressing  Goal: Stable Heart Rate and Rhythm  Outcome: Progressing  Goal: Optimal Functional Ability  Outcome: Progressing  Intervention: Optimize Functional Ability  Recent Flowsheet Documentation  Taken 7/25/2024 0400 by Lars Smith, RN  Activity Management: (Refusing care) other (see comments)  Taken 7/25/2024 0000 by Lars Smith RN  Activity Management: (Refusing care) other (see comments)  Goal: Fluid and Electrolyte Balance  Outcome: Progressing  Goal: Improved Oral Intake  Outcome: Progressing  Goal: Effective Oxygenation and Ventilation  Outcome: Progressing  Intervention: Promote Airway Secretion  Clearance  Recent Flowsheet Documentation  Taken 7/25/2024 0400 by Lars Smith RN  Activity Management: (Refusing care) other (see comments)  Taken 7/25/2024 0000 by Lars Smith RN  Activity Management: (Refusing care) other (see comments)  Intervention: Optimize Oxygenation and Ventilation  Recent Flowsheet Documentation  Taken 7/25/2024 0400 by Lars Smith RN  Head of Bed (HOB) Positioning: HOB at 30-45 degrees  Taken 7/25/2024 0000 by Lars Smith RN  Head of Bed (HOB) Positioning: HOB at 30-45 degrees  Goal: Effective Breathing Pattern During Sleep  Outcome: Progressing  Intervention: Monitor and Manage Obstructive Sleep Apnea  Recent Flowsheet Documentation  Taken 7/25/2024 0400 by Lars Smith RN  Medication Review/Management:   medications reviewed   high-risk medications identified  Taken 7/25/2024 0000 by Lars Smith RN  Medication Review/Management:   medications reviewed   high-risk medications identified   Goal Outcome Evaluation:    Heart Failure Care Map  GOALS TO BE MET BEFORE DISCHARGE:    1. Decrease congestion and/or edema with diuretic therapy to achieve near optimal volume status.     Dyspnea improved: Yes, satisfactory for discharge.   Edema improved: No, further care required to meet this goal. Please explain Bilat LE pitting edema observed.        Last 24 hour I/O:   Intake/Output Summary (Last 24 hours) at 7/25/2024 0514  Last data filed at 7/25/2024 0300  Gross per 24 hour   Intake 640 ml   Output 2700 ml   Net -2060 ml           Net I/O and Weights since admission:   06/25 0700 - 07/25 0659  In: 840 [P.O.:840]  Out: 8447 [Urine:600]  Net: -7607     Vitals:    07/22/24 2202 07/23/24 2241   Weight: 104.3 kg (230 lb) 108.4 kg (239 lb)       2.  O2 sats > 90% on room air, or at prior home O2 therapy level.      Able to wean O2 this shift to keep sats above 90%?: Yes, satisfactory for discharge.   Does patient use Home O2? No           Current oxygenation status:   SpO2: 98 %     O2 Device: None (Room air),      3.  Tolerates ambulation and mobility near baseline.     Ambulation: Yes, satisfactory for discharge.   Times patient ambulated with staff this shift: 0.    Cardiac:  NSR with 1st AVB, rate 80-90's.  Respiratory:  Rate 16-20, non-labored.  Sat's: Maintaining mid 90's at RA.  LS: Diminished in the bases, bilat.  Neuro:  WNL.  GI:  Passing flatus.  NO BM for NOC.  Unable to assess abd as pt refusing care.  :  Unknown as pt refusing care.  Pain:  Denies.  Ambulation:  Up with SBA X1.  However, pt refusing care.   Labs:  No labs ordered.  Plan:  HD M-W-F.  Refusing care.  See note regarding care plan non-compliance.     Please review the Heart Failure Care Map for additional HF goal outcomes.    Lars Smith, WILMA  7/25/2024

## 2024-07-25 NOTE — PROGRESS NOTES
"Met with pt for shift assessment and VS.    However, pt refused opportunity to take VS. Per pt, he cites he feels \"fine\" and nothing needs to be done.  The pt stated that when he feels he needs assistance, he will let me know.  Inquired if the pt would contact nursing staff when he wants to get out of bed as he is a fall risk.  The pt initially agreed to call nursing staff.  However, the pt then said that he felt he was being \"influenced\" to do what nursing staff want.  The pt then said that he doesn't want to do anything nursing staff ask (refusal of care).    Informed pt that if/when he changes his mind, to contact nursing staff.  The pt agreed.   House Resident text messaged aforementioned.    "

## 2024-07-26 PROCEDURE — 120N000004 HC R&B MS OVERFLOW

## 2024-07-26 PROCEDURE — 90937 HEMODIALYSIS REPEATED EVAL: CPT

## 2024-07-26 PROCEDURE — 99223 1ST HOSP IP/OBS HIGH 75: CPT | Performed by: STUDENT IN AN ORGANIZED HEALTH CARE EDUCATION/TRAINING PROGRAM

## 2024-07-26 PROCEDURE — 99232 SBSQ HOSP IP/OBS MODERATE 35: CPT | Mod: GC

## 2024-07-26 PROCEDURE — 250N000013 HC RX MED GY IP 250 OP 250 PS 637: Performed by: HOSPITALIST

## 2024-07-26 RX ORDER — RISPERIDONE 0.5 MG/1
0.5 TABLET ORAL DAILY
Status: DISCONTINUED | OUTPATIENT
Start: 2024-07-27 | End: 2024-07-31

## 2024-07-26 RX ADMIN — AMLODIPINE BESYLATE 10 MG: 5 TABLET ORAL at 16:22

## 2024-07-26 RX ADMIN — METOPROLOL SUCCINATE 25 MG: 25 TABLET, EXTENDED RELEASE ORAL at 16:23

## 2024-07-26 ASSESSMENT — ACTIVITIES OF DAILY LIVING (ADL)
ADLS_ACUITY_SCORE: 30
ADLS_ACUITY_SCORE: 26
ADLS_ACUITY_SCORE: 30
ADLS_ACUITY_SCORE: 26
ADLS_ACUITY_SCORE: 30
ADLS_ACUITY_SCORE: 26
ADLS_ACUITY_SCORE: 30
ADLS_ACUITY_SCORE: 30
ADLS_ACUITY_SCORE: 26
ADLS_ACUITY_SCORE: 26

## 2024-07-26 NOTE — PROCEDURES
Potassium   Date Value Ref Range Status   07/25/2024 6.0 (H) 3.4 - 5.3 mmol/L Final   10/17/2014 4.5 3.4 - 5.3 mmol/L Final     Hemoglobin   Date Value Ref Range Status   07/25/2024 8.6 (L) 13.3 - 17.7 g/dL Final   03/27/2014 13.8 13.3 - 17.7 g/dL Final     Creatinine   Date Value Ref Range Status   07/25/2024 12.83 (H) 0.67 - 1.17 mg/dL Final   10/17/2014 1.6 (H) 0.8 - 1.5 mg/dL Final     Urea Nitrogen   Date Value Ref Range Status   07/25/2024 61.7 (H) 6.0 - 20.0 mg/dL Final   10/17/2014 20.0 5.0 - 24.0 mg/dL Final     Sodium   Date Value Ref Range Status   07/25/2024 140 135 - 145 mmol/L Final   10/17/2014 147.0 (H) 133.0 - 144.0 mmol/L Final     INR   Date Value Ref Range Status   03/27/2014 1.0  Final       DIALYSIS PROCEDURE NOTE  Hepatitis status of previous patient on machine log was checked and verified ok to use with this patients hepatitis status.  Patient dialyzed for 3 hrs. on a K2 bath with a net fluid removal of  1.5L.  A BFR of 400 ml/min was obtained via a AVF using 15 gauge needles.      The treatment plan was discussed with Dr. Lua during the treatment.    Total heparin received during the treatment: 0 units.   Needle cannulation sites held x 10 min.       Meds  given: none.  Patient refused blood pressure meds unless he eats and he did not want to eat until he ordered at 1500.  Meal came and pt look Amlodipine and metoprolol at 1623 just prior to goint o the floor   Complications: none tolerated treatment well      Person educated: patient. Knowledge base fair. Barriers to learning: none. Educated on procedure via verbal mode.      ICEBOAT? Timeout performed pre-treatment  I: Patient was identified using 2 identifiers  C:  Consent Signed Yes  E: Equipment preventative maintenance is current and dialysis delivery system OK to use  B: Hepatitis B Surface Antigen: negative; Draw Date: 7/23/24      Hepatitis B Surface Antibody: immune; Draw Date: 7/23/24  O: Dialysis orders present and complete  prior to treatment  A: Vascular access verified and assessed prior to treatment  T: Treatment was performed at a clinically appropriate time  ?: Patient was allowed to ask questions and address concerns prior to treatment  See Adult Hemodialysis flowsheet in [facility] for further details and post assessment.  Machine water alarm in place and functioning. Transducer pods intact and checked every 15min.   Pt returned via bed.  Chlorine/Chloramine water system checked every 4 hours.  [place] [facility]    Patient repositioned every 2 hours during the treatment.  Post treatment report given to Serafin ROLAND RN regarding 1.5L of fluid removed, last BP of 186/116, and patient pain rating of 0/10.

## 2024-07-26 NOTE — CONSULTS
Psychiatry Consultation; Follow up     Psychiatry consulted today regarding capacity assessment, as patient is accepted to outpatient rehabilitation. Patient was in dialysis when I attempted to speak to him, so I was unable to speak with patient. I did have the opportunity to meet with patient on 7/24. Based on that meeting, patient appears to have decision making capacity. Although he appears to make unhealthy choices at times, including intermittently missing dialysis, his cognition is intact where he is able to make independent decisions.     Patient has been compliant with dialysis during this hospitalization, but appears to prefer choosing when he has these procedures. Given intermittent irritability, added Risperdal 0.5 mg in the morning. Given estimated CrCl of 11.4 mL/min, would keep daily Risperdal dose at 50% of typical recommended daily dose.         Aid to Capacity  1) Do the history and physical examination confirm that the patient can communicate choice?  -Patient's choices must remain stable long enough to be implemented and not produce rapid alterations in this choice    2) Can the patient understand the essential elements of consent?  Ask the following questions:  What is your present medical condition?  What is the treatment being recommended for you?  What do you and your doctor think might happen to you if you decide to accept the recommended treatment?  What are the alternatives available (including no treatment) and what are the probable consequences of accepting each?  -Patients who do not understand what they have been told about a treatment cannot decide whether to reject or accept treatment.   -Patient must be able to compare and contrast the different treatment options available.   -Is the person's decision affected by depression?   -Is the person s decision affected by delusion/psychosis?     3) Can the patient assign personal values to the risks and benefits of intervention?    4)  Can the patient manipulate the information rationally and logically?    5) Is the patient's decision-making capacity stable over time?  -Can be examined by repeating a question several minutes later.       Addendum: no charge visit.    Page me or re-consult psychiatry as needed (psychiatry is signing off).       Stephanie Solares, EZRA, APRN  Consult/Liaison Psychiatry  [facility]   Contact information available via [facility] Paging/Directory.  If I am not available, please call [facility] intake ([phone])

## 2024-07-26 NOTE — PROGRESS NOTES
"Patient refuse to have his vitals and weight taken this AM. Patient informed that we need to get him ready to go for dialysis but patient refused saying. \"Wait until I get up.\"  "

## 2024-07-26 NOTE — PROGRESS NOTES
Patient return to room 307 from dialysis. Reports mild pain 2/10 on back and left foot-Patient reposition to comfort. Patient BP is elevated. He took his daily dose of Amlodipine and Metoprolol. Will monitor.

## 2024-07-26 NOTE — PROGRESS NOTES
RENAL PROGRESS NOTE - [facility]        CC: follow up ESRD    Subjective: He is seen/examined on HD today. HD care discussed with HD RN.            ASSESSMENT:   45 yo male with ESRD on dialysis MWF at [facility] using left arm AVF, HTN, CAD, cognitive impairment admit with severe hyperkalemia, presented with complaints of chest pain, dizziness, severe hyperkalemia of 8.1            PLAN:  ESRD - on dialysis MWF at [facility] using left arm AVF.   History of very poor compliance with dialysis. His last dialysis PTA was here on 7/17.  Has ongoing conflicts with his nursing home and transport, he has had extensive assistance with social workers at dialysis and at [facility] systems with ongoing poor dialyss compliance, unfortunately has not improved despite many attempts at optimizing his living situation, transportation, etc.  Had emergent dialysis 7/23 due to severe hyperkalemia  - will dialyze again today to resume MWF schedule  -dangers of missed dialysis including death discussed with him REPEATEDLY by me here and others  -prognosis guarded due to poor compliance with dialysis and now life threatening hyperkalemia as a result     Hyperkalemia - due to missed dialysis  -improved after dialysis  -needs low K diet when eating and needs to attend dialysis 3x/week     3. HTN - remains high, hypervolemia from missed dialysis contributing  -will re-eval bp after  UF with dialysis   -continue home meds     4. Cognitive impairment - makes management of his multitude of medical problems very difficult, he seems to have frequent conflicts with transportation and tcu/nursing homes in past, has left [facility]. Now unhappy with current nursing home, expect finding an alternative will be very difficult with his medical complexity and all of the above issues.  Appreciate social work assistance     5. Anemia - severe, due to ESRD and noncompliance with dialysis so not on consistent DEMAR  - iron stores  "adequate  -will dose DEMAR here q week and resume with outpt dialysis as able    Chao Lua MD  [facility]  [phone]    Objective    PHYSICAL EXAM  BP (!) 148/89 (BP Location: Right arm)   Pulse 86   Temp 97.8  F (36.6  C) (Oral)   Resp 18   Ht 1.981 m (6' 6\")   Wt 108.4 kg (239 lb)   SpO2 99%   BMI 27.62 kg/m    I/O last 3 completed shifts:  In: 0   Out: 200 [Urine:200]  Wt Readings from Last 3 Encounters:   07/23/24 108.4 kg (239 lb)   07/17/24 106 kg (233 lb 9.6 oz)   01/16/24 127 kg (280 lb)       GENERAL: nad on HD  HEENT:normocephalic, atraumatic  CARDIOVASCULAR: rr, no rub,  1+ edema  PULMONARY:cta ant. No cyanosis  GASTROINTESTINAL: soft, nt/nd, obese  MSK: diffuse muscle atrophy, warm  NEURO: Alert, no gross focal findings  PSYCHIATRIC: flat affect  SKIN: dry skin legs    LABORATORIES  Recent Labs   Lab 07/25/24  1218 07/23/24  0533 07/23/24  0216 07/23/24  0004 07/22/24  2255    141 145  --  140   POTASSIUM 6.0* 4.3 6.5* 8.1* 8.1*   CHLORIDE 100 100 102  --  100   CO2 26 24 18*  --  19*   BUN 61.7* 88.3* 147.3*  --  146.9*   CR 12.83* 15.10* 23.82*  --  23.68*   GFRESTIMATED 4* 4* 2*  --  2*   LOVELY 9.3 8.3* 8.2*  --  8.5*   PHOS  --  7.4*  --   --   --    MAG  --  2.2  --   --   --    ALBUMIN  --  3.6  --   --   --        Recent Labs   Lab 07/25/24  1218 07/23/24  0533 07/22/24  2255   WBC 4.9 5.5 6.3   HGB 8.6* 7.8* 9.0*   HCT 27.6* 24.4* 28.0*   MCV 87 85 88    221 265          MEDICATIONS  Current Facility-Administered Medications   Medication Dose Route Frequency Provider Last Rate Last Admin    amLODIPine (NORVASC) tablet 10 mg  10 mg Oral Daily Omar Mitchell MD   10 mg at 07/25/24 1305    calcitRIOL (ROCALTROL) capsule 0.25 mcg  0.25 mcg Oral Daily Omar Mitchell MD   0.25 mcg at 07/25/24 1306    calcium acetate (PHOSLO) capsule 1,334 mg  1,334 mg Oral TID w/meals Omar Mitchell MD        epoetin kiana-epbx (RETACRIT) injection 40,000 Units  40,000 " Units Subcutaneous Weekly Carol Louise MD        metoprolol succinate ER (TOPROL XL) 24 hr tablet 25 mg  25 mg Oral BID Omar Mitchell MD   25 mg at 07/25/24 2126    multivitamin RENAL (RENAVITE RX/NEPHROVITE) tablet 1 tablet  1 tablet Oral Daily Omar Mitchell MD   1 tablet at 07/25/24 1306    No heparin via hemodialysis machine   Does not apply Once Carol Louise MD        pregabalin (LYRICA) capsule 25 mg  25 mg Oral Daily Omar Mitchell MD   25 mg at 07/25/24 1305    risperiDONE (risperDAL) tablet 1 mg  1 mg Oral At Bedtime Omar Mitchell MD   1 mg at 07/25/24 2127    sodium chloride (PF) 0.9% PF flush 3 mL  3 mL Intracatheter Q8H Lilliana Carmona MD        sodium chloride 0.9% BOLUS 250 mL  250 mL Intravenous Once in dialysis/CRRT Carol Louise MD        sodium chloride 0.9% BOLUS 300 mL  300 mL Hemodialysis Machine Once Carol Louise MD Alexandra Straight, MD  [facility]  [phone]

## 2024-07-26 NOTE — CONSULTS
Palliative Care Consultation Note  [facility]      Patient: Macario Delatorre  Date of Admission:  2024    Requesting Clinician / Team: Jose Richards MD   Reason for consult: Goals of care , declining medical care and dialysis        Recommendations & Counseling     GOALS OF CARE:   Life-prolonging without limits   Patient says that he has not heard a clear prognosis with regards to life expectancy and his ESRD, and yet he is aware that hemodialysis is a form of life support and that he will require it for as long as he lives.  Patient perceives that he tolerates hemodialysis well and without undesirable side effects or complications.  He would like to continue to receive hemodialysis with life-prolonging intent, although admittedly struggles with transportation to and from his clinic sessions as well as the rigidness of timing and scheduling.  In particular, he feels that his sleep is important and he should not have to wake up early the morning for hemodialysis, and ideally hemodialysis scheduling could better accommodate his needs and schedule.  Patient relays that he has felt disrespected, poorly communicated with, and treated inhumanely throughout various hospital stays at different facilities throughout the [place] over the past couple of years.  Would benefit from further clarification on social network and potential surrogate decision makers given his incurable disease process and real potential for serious complications in the future.       ADVANCE CARE PLANNING:  No health care directive on file. Per  informed consent policy, next of kin should be involved if patient becomes unable.  Patient should be offered an Advance Health Care Directive before discharge.  There is no POLST form on file.  Code status: Full Code    MEDICAL MANAGEMENT:   We are not actively managing symptoms at this time.    PSYCHOSOCIAL/SPIRITUAL SUPPORT:  Family; says half of his family is   and the other half is in [place], but also mentions an unnamed brother living in [place] which he has stayed with in the past.  Friends; only 1 close friend by the name of Enma who recently moved to [place], [place].  [place] community: [employer]     Palliative Care will continue to follow. Thank you for the consult and allowing us to aid in the care of Macario Delatorre.    These recommendations have been discussed with team.    Yobani Rivera, DO  [facility], Palliative Care  Securely message with the [employer] Web [employer] (learn more here) or  Text page via [facility] Paging/Directory         Assessment      Maacrio Delatorre is a 44 year old male with documented PMH of chronic psychosis/schizophrenia, T2DM, HTN, CAD, medication/medical treatment non-adherence, and ESRD on HD MWF using left arm AVF who was admitted to the hospital 7/22/2024 after multiple missed hemodialysis appointments and requiring urgent hemodialysis.  Documentation with EMR suggest the patient has ongoing concerns with his current care facility and available transportation which have been contributing to his poor hemodialysis compliance.  This hospitalization he reportedly informed medical staff that he did not want hemodialysis per their schedule.    Today, the patient was seen for:  Facilitation of medical communication and support with goals of care    History of Present Illness   Met with patient today and introduced the role of palliative care as an interdisciplinary team that cares for patients with serious illness to help support symptom management, communication, coping for patients and their families as well as support with medical decision making.    Erwin describes having mental health debilitation's and is in a handicapped state due to his physical limitations, namely his foot pain and leg weakness.  Moreover, he feels as though hospitalizations have been difficult on him due to the perception that he is not treated with respect as a human  being should, and also that the hospital and hemodialysis schedule can be inflexible which is hard on him.  He says he is used to staying up late and sleeping in late, taking his time with getting dressed and preparing for transportation as an outpatient.  In the hospital he feels as though he is being talked down to and demands being made of him.  This has been stressful.    Regarding medical care and hemodialysis, patient feels as though he is tolerating well without discomfort or intolerable symptoms.  He appreciates the life-prolonging care that hemodialysis offers him and wants to continue hemodialysis until his death, hopefully far off.  He is not able to relay a clear prognosis with regards to his overall health, but again is aware that he needs hemodialysis to continue living.  Outside of the hospital he has struggled with shelter/home insecurities and has stayed with a brother and [place] as well as a friend by the name of Enma in [place], [place].  Last hospital discharge he tried a LTC but did not find that staff was receptive to his needs and would prefer not to return.  Before our in-depth conversation was completed today, transport staff arrived to take patient hemodialysis so the visit ended somewhat abruptly.  Palliative will continue to follow along during hospital stay.    Overall Erwin is able to verbalized an understanding of multiple medical comorbidities, he seems to have and appreciation for the gravity of the situation with respect to ESRD, he can clearly communicate the proposed/alternative treatment options available (including risks/benefits/consequences), and continues to express a preference for hemodialysis as his schedule allows.  Goals are clear, patient has no unanswered questions or unaddressed concerns at this time.      Prognosis, Goals, & Planning:   Functional Status just prior to this current hospitalization:  Outpatient Palliative Performance Score (PPS) 50%   Extensive disease. Normal or reduced intake; normal LOC or confusion; little ambulation (mainly sit/lie), ADLs w/much assistance, unable to do any work.      Prognosis, Goals, and/or Advance Care Planning:  We discussed general treatment options (full/restorative, selective/conservatives, and comfort only/hospice).    Code Status was addressed today:   No      Patient's decision making preferences: independently        Patient has decision-making capacity today for complex decisions: Intact          Coping, Meaning, & Spirituality:   Mood, coping, and/or meaning in the context of serious illness were addressed today: Yes    Social:   Living situation: Most recently staying at a long-term care facility, history of shelter insecurity  Important relationships/caregivers: Patient says half of his family is , the other half is in nursing home.  An unnamed brother lives in [place] and a friend named Enma is in [place], [place].  Occupation: Disability  Areas of fulfillment/marivel: Watching sports, watching television, camping and getting outdoors in nature  Contributing stressors (financial, substance abuse, relationship concerns, etc.): Financial, home    Medications:  Reviewed this patient's medication profile and medications from this hospitalization.     [place] Board of Pharmacy Data Base Reviewed: Yes:   reviewed - controlled substances reflected in medication list.    ROS:  Comprehensive ROS is reviewed and is negative except as here & per HPI:     Physical Exam   Vital Signs with Ranges  Temp:  [97.8  F (36.6  C)] 97.8  F (36.6  C)  Pulse:  [86-91] 86  Resp:  [18-19] 18  BP: (145-154)/(89-94) 148/89  SpO2:  [97 %-99 %] 99 %  Wt Readings from Last 10 Encounters:   24 108.4 kg (239 lb)   24 106 kg (233 lb 9.6 oz)   24 127 kg (280 lb)   10/23/16 99.8 kg (220 lb)   16 126.6 kg (279 lb)   16 104.3 kg (230 lb)   13 128.7 kg (283 lb 12.8 oz)   09 126.8 kg (279 lb 9.6  "oz)     239 lbs 0 oz    BP (!) 187/138   Pulse 81   Temp 97.8  F (36.6  C) (Oral)   Resp 18   Ht 1.981 m (6' 6\")   Wt 108.4 kg (239 lb)   SpO2 99%   BMI 27.62 kg/m    General:  Appears stated age.  NAD.   HENT:  Atraumatic.  Hearing intact.  Moist mucous membranes.    Eyes:  Conjunctivae are clear.  Sclera is nonicteric.    Cardiovascular:  Without edema, cyanosis or mottling.   Respiratory:  Breathing comfortably without increased work of breathing or signs of respiratory distress.  Able to speak in complete sentences.    Skin:  Warm, dry without diaphoresis.   Neuro:  Alert, attentive, speech clear and fluent.    Psych:  Appropriate mood and affect.  No sign of confusion or delusion.     Wt Readings from Last 8 Encounters:   07/23/24 108.4 kg (239 lb)   07/17/24 106 kg (233 lb 9.6 oz)   01/16/24 127 kg (280 lb)   10/23/16 99.8 kg (220 lb)   09/27/16 126.6 kg (279 lb)   04/09/16 104.3 kg (230 lb)   08/12/13 128.7 kg (283 lb 12.8 oz)   08/05/09 126.8 kg (279 lb 9.6 oz)         Data reviewed:  No results found for this or any previous visit (from the past 24 hour(s)).    Medical Decision Making       Please see A&P for additional details of medical decision making.  MANAGEMENT DISCUSSED with the following over the past 24 hours: Bedside RN, primary hospitalist   NOTE(S)/MEDICAL RECORDS REVIEWED over the past 24 hours: Hospitalist, nephrologist, hospital   Tests personally interpreted in the past 24 hours:  - EKG tracing showing QTc of 498 (7/22/2024)  Tests ORDERED & REVIEWED in the past 24 hours:  - See lab/imaging results included in the data section of the note         "

## 2024-07-26 NOTE — PLAN OF CARE
"Goal Outcome Evaluation:      Problem: Adult Inpatient Plan of Care  Goal: Patient-Specific Goal (Individualized)  Description: You can add care plan individualizations to a care plan. Examples of Individualization might be:  \"Parent requests to be called daily at 9am for status\", \"I have a hard time hearing out of my right ear\", or \"Do not touch me to wake me up as it startles  me\".  Outcome: Progressing     Problem: Adult Inpatient Plan of Care  Goal: Optimal Comfort and Wellbeing  Outcome: Progressing     Problem: Fall Injury Risk  Goal: Absence of Fall and Fall-Related Injury  Outcome: Progressing    A/O x4. VSS. Denied pain. Pt uncooperative with cares including vital signs.                            "

## 2024-07-26 NOTE — PLAN OF CARE
Problem: Heart Failure  Goal: Stable Heart Rate and Rhythm  Outcome: Progressing   Goal Outcome Evaluation:       VSS overnight,  no complaints. Of pain.  Refused to have temperature taken and flush. Up to bathroom with SBA.

## 2024-07-26 NOTE — SIGNIFICANT EVENT
Significant Event Note    Time of event: 1:18 PM July 26, 2024    Description of event:  Transfer care to resident service  Patient was followed by resident service with recent discharge last week on 7/18/2024  Patient requested new provider after code green called this morning      Plan:  Transfer care to resident service  Discussed with Dr. Woods who will take over patient care.    Discussed with: bedside nurse    Jose Richards MD

## 2024-07-26 NOTE — PROGRESS NOTES
[facility]    Progress Note - Hospitalist Service       Date of Admission:  7/22/2024    Assessment & Plan   Macario Delatorre is a 44 year old male with PMH of ESR on M/W/F HD, chronic pain syndrome, paroxysmal SVT, chronic gout, hypertension, hyperlipidemia, CAD, intellectual disability who was admitted on 7/22/24 with severe hyperkalemia with possible presyncope in setting of missed dialysis.     Hyperkalemia, severe  ESRD on MWF dialysis  K of 8.1 on presentation to ED with missed dialysis. Follows with Dr. Lua/Nephrology. Patient has history of poor compliance with dialysis due to conflicts with living facility and transportation. Last outpt dialysis was 7/17. Had emergent dialysis on 7/23 due to severe hyperkalemia.   - am CBC  -Nephrology following   - dialyzed today to return to MWF schedule  - discussed dangers of missed dialysis including death discussed  - guarded prognosis due to poor compliance with dialysis and life threatening hyperkalemia  - low K diet   - Palliative following   - wants to continue life prolonging care with hemodialysis   - appears to have appreciation of his condition and can clearly communicate his wishes  - SW/CM consulted   - accepted at [facility]    Chest pain, resolved  Thought to be secondary to high potassium and hypervolemia from missed dialysis. Initial troponin 72, stable from prior though in setting of ESRD. EKG with mild T wave prominence in leads II and III. Resolved with emergent dialysis and electrolyte correction.      Cognitive impairment  History of mental health problems with hallucinations  Bipolar disorder  Agitation   Patient refusing cares and can easily get agitation. Patient reports history of bipolar and schizophrenia. Does not endorse routine psychiatric follow up or regular medication use. In [facility] system was started on risperidone 1 mg at bedtime though has not taken consistently. Stated he can escalate quickly  "if upset. 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 Denies any SI or HI.   - Psychiatric consult, appreciate input especially regarding capacity  - Continue Risperdal 1 mg at at bedtime; additional 0.5 mg risperdal in am starting 7/27     Chronic anemia, normocytic   Baseline Hgb 9-10. Likely related to ESRD/chronic disease. Iron stores adequate.   - Continue DEMAR weekly; resume with outpatient dialysis  - am CBC     Chronic pain syndrome, stable    Neuropathy, reported to be diabetic  - Lyrica 25 mg daily     Hypertension, uncontrolled  Continues to be hypertensive during hospitalization likely in setting of missed dialysis session.   - Continue PTA amlodipine 10 every day and metoprolol 25 BID        Diet: Regular Diet Adult    DVT Prophylaxis: Pneumatic Compression Devices  Raphael Catheter: Not present  Fluids: PO  Lines: None     Cardiac Monitoring: None  Code Status: Full Code      Clinically Significant Risk Factors        # Hyperkalemia: Highest K = 6 mmol/L in last 2 days, will monitor as appropriate                      # Overweight: Estimated body mass index is 27.62 kg/m  as calculated from the following:    Height as of this encounter: 1.981 m (6' 6\").    Weight as of this encounter: 108.4 kg (239 lb)., PRESENT ON ADMISSION       # Financial/Environmental Concerns:           Disposition Plan      Expected Discharge Date: 07/26/2024      Destination: inpatient rehabilitation facility;long-term care facility          The patient's care was discussed with the Attending Physician, Dr. Lemus .    Gisela Cooper MD  Hospitalist Service  [facility]  Securely message with [employer] (more info)  Text page via [employer] Paging/Directory   ______________________________________________________________________    Interval History     Notes reviewed as patient was transferred to resident service pm of 7/26 due to patient requesting new " provider after code green called this am.     Saw patient in dialysis. Patient was sleeping and aroused to voice. He asked to be left alone while sleeping so was told provider would return at a later time.     Physical Exam   Vital Signs:     BP: (!) 169/124 Pulse: 74   Resp: 18 SpO2: 100 % O2 Device: None (Room air)    Weight: 239 lbs 0 oz    GENERAL: sleeping in dialysis, aroused easily to voice  EYES: Eyes grossly normal to inspection.  No discharge or erythema, or obvious scleral/conjunctival abnormalities.  HENT: Normal cephalic/atraumatic.  External ears, nose and mouth without ulcers or lesions.  No nasal drainage visible.  RESP: No audible wheeze, cough, or visible cyanosis.  No visible retractions or increased work of breathing.    MS: No gross musculoskeletal defects noted.   SKIN: Visible skin clear.   NEURO: Cranial nerves grossly intact. Speech appropriate.    Medical Decision Making     Please see A&P for additional details of medical decision making.      Data   ------------------------- PAST 24 HR DATA REVIEWED -----------------------------------------------        Imaging results reviewed over the past 24 hrs:   No results found for this or any previous visit (from the past 24 hour(s)).

## 2024-07-26 NOTE — SIGNIFICANT EVENT
Writer responded to code roddy and patient yelling profanity at Dr Richards calling the doctor demeaning names.Dr Richards said approached patient and ask to see patient and discuss plan of cares and patient instantly got upset  and loud such that he was threatened so he called jeremiah pereyra.   The situation was de-escalated by letting patient vent and using therapeutic listening, reorientation and redirection. After a lengthy conversation patient calm down and demanded to be left alone. Patient said that his dialysis is always at about 2 PM. Patient was left in bed and call light within reach. Monitor.

## 2024-07-26 NOTE — SIGNIFICANT EVENT
Significant Event Note    Time of event: 9:30 AM July 26, 2024    Description of event:  Responded to a behavioral code.  Upon arrival I heard the patient yelling profanities at the hospitalist and refusing to be examined.     Bedside RN is in the room trying to deescalate the patient. Currently he does not seems to be a threat to himself or others. Discussed with Dr. Richards.       Discussed with: bedside nurse and Hospitalist.    Nelida aDmico MD

## 2024-07-26 NOTE — SIGNIFICANT EVENT
"Significant Event Note    Time of event: 9:38 AM July 26, 2024    Description of event:  Went to patient room to exam him and after I called his name, he open his eyes and stated , \"can't you see I am sleeping you mother fucker\" if you get close yo me I will punch you in the face   I explained to patient that it is 9:30 am and I am here to check on him and discuss his care   Patient behavior continued to escalate and started yelling and using the F words multiple times with direct verbal threat to myself     Plan:  Code green was called     Discussed with: bedside nurse    Jose Richards MD    "

## 2024-07-26 NOTE — PROGRESS NOTES
Care Management Follow Up    Length of Stay (days): 3    Expected Discharge Date:       Concerns to be Addressed:  transport to and from dialysis while at a facility  Patient plan of care discussed at interdisciplinary rounds: Yes    Anticipated Discharge Disposition:  TCU vs LTC      Anticipated Discharge Services:  dialysis  Anticipated Discharge DME:  tbd    Patient/family educated on Medicare website which has current facility and service quality ratings:  yes  Education Provided on the Discharge Plan:    Patient/Family in Agreement with the Plan:      Referrals Placed by CM/SW:  1  Private pay costs discussed: Not applicable    Additional Information:  Code green today due to patient behaviors with attending physician. Placement continues to be an issue at this time due to his chair times with [facility] dialysis MWF- new referrals placed including one to Ricky Spaulding overseeing for difficult placement.    Kary still reviewing at this time.  12:00PM  Spoke with Roe MORALES from [facility].  [address], [place], [place] [id number] [phone]    FAX: [phone] & [phone]  Roe MORALES is going to fax us the [employer] mobility Application document that requires a doctors signature before it is submitted to [employer]-  please have provider sign this document and then fax a copy to [facility] (roe MORALES) as well as to [employer] for completion of the application process.      1:35 PM  [place] from [facility] who Is willing to accept and have arranged transportation from facility to dialysis in [facility] for his rearranged chair time. Paged Dr. Damico to determine patients medical readiness for discharge.    Palliative care is preferable to having a patient contact on record. After thorough chart review, a sister Yudy [phone] was found. Will confirm with patient if we are able to list Yudy as emergency contact.    3:23 PM  PAS [id number] [facility] agreeable to [facility] MONDAY MORNING ADMISSION. Verfiy with resident staff his medical readiness.     3:56 PM  [employer] mobility application on CM desk in P3 office to be signed by provider and faxed to both [facility] and [facility] misha.    Sandrine Grant RN

## 2024-07-27 PROCEDURE — 120N000004 HC R&B MS OVERFLOW

## 2024-07-27 PROCEDURE — 99232 SBSQ HOSP IP/OBS MODERATE 35: CPT | Mod: GC

## 2024-07-27 ASSESSMENT — ACTIVITIES OF DAILY LIVING (ADL)
ADLS_ACUITY_SCORE: 26

## 2024-07-27 NOTE — PROGRESS NOTES
Patient calm and being pleasant with this writer but is refusing vitals and majority of assessments; remains uncooperative. Patient refuses bed alarm.     Patient requested removal of dinner tray from his room. Asked for a sandwhich. Otherwise patient agrees with writer he will use the call light if he needs anything and this writer can answer him.

## 2024-07-27 NOTE — PLAN OF CARE
OT Deferral Note    Occupational Therapy: Orders received. Chart reviewed and discussed with care team.? Occupational Therapy not indicated due to: Per PT handoff, pt does not demonstrate need for skilled OT in this setting.? Defer discharge recommendations to medical team and PT.? Will complete orders.     Drea Verdugo OTR/L. 7/27/2024, 8:28 AM

## 2024-07-27 NOTE — PLAN OF CARE
Problem: Heart Failure  Goal: Optimal Coping  Outcome: Progressing   Problem: Psychotic Signs/Symptoms  Goal: Improved Behavioral Control (Psychotic Signs/Symptoms)  Outcome: Progressing   Pt is oriented. Vitally stable, BP elevated. Denied pain, denied headache. On room air, Lungs sound clear. Had dialysis today. Voiding-uses urinal. Patient refuse tele monitoring. Patient ate well at dinner. Patient is pacing in bed. Monitor.

## 2024-07-27 NOTE — PROGRESS NOTES
Pt refused all morning medications. Assessments and vitals. Pt educated on the importance of vitals, medications and assessments.

## 2024-07-27 NOTE — PLAN OF CARE
Problem: Adult Inpatient Plan of Care  Goal: Optimal Comfort and Wellbeing  Outcome: Progressing     Problem: Heart Failure  Goal: Effective Oxygenation and Ventilation  Outcome: Progressing     Problem: Psychotic Signs/Symptoms  Goal: Improved Behavioral Control (Psychotic Signs/Symptoms)  Outcome: Progressing  Goal: Improved Mood Symptoms (Psychotic Signs/Symptoms)  Outcome: Progressing   Goal Outcome Evaluation:         Patient refused assessments, vitals and any cares tonight; as well as labs. Patient asked for a heat pack for his left shoulder this morning which he was provided. Patient was mildly upset with the fact the doctors discontinued his dialudid and did not inform him; he remained calm with the nurse however. Otherwise patient reported he didn't sleep tonight and was hoping to get some sleep this morning.

## 2024-07-27 NOTE — PLAN OF CARE
Goal Outcome Evaluation:       A&O x 4. Pt denied all medications, assessments and vitals this shift. Education was given on the importance of vitals medication and assessments pt still strongly refused. Pt was pleasant with writer this shift. Spent majority of shift sleeping in bed.  Called storeroom for T-pump none were available so pt was put on the waiting list.     Problem: Adult Inpatient Plan of Care  Goal: Plan of Care Review  Description: The Plan of Care Review/Shift note should be completed every shift.  The Outcome Evaluation is a brief statement about your assessment that the patient is improving, declining, or no change.  This information will be displayed automatically on your shift  note.  Outcome: Not Progressing

## 2024-07-27 NOTE — PROGRESS NOTES
RENAL PROGRESS NOTE - [facility]        CC: follow up ESRD    Subjective:   Had HD for 3hr with 1.5L of fluid removed on 7/26/24.  Seen in bed resting, he told me he wanted to sleep.  ROS was short.  No chest pain, dizziness, abd pain, fever and chills.          ASSESSMENT:   45 yo male with ESRD on dialysis MWF at [facility] using left arm AVF, HTN, CAD, cognitive impairment admit with severe hyperkalemia, presented with complaints of chest pain, dizziness, severe hyperkalemia of 8.1        PLAN:  ESRD - on dialysis MWF at [facility] using left arm AVF.   History of very poor compliance with dialysis. His last dialysis PTA was here on 7/17.  Has ongoing conflicts with his nursing home and transport, he has had extensive assistance with social workers at dialysis and at [facility] systems with ongoing poor dialyss compliance, unfortunately has not improved despite many attempts at optimizing his living situation, transportation, etc.  Had emergent dialysis 7/23 due to severe hyperkalemia  - Dialyzed again on 7/26, resume MWF schedule  -dangers of missed dialysis including death discussed with him REPEATEDLY by our team.  -prognosis guarded due to poor compliance with dialysis and now life threatening hyperkalemia as a result     Hyperkalemia - due to missed dialysis  -improved after dialysis  -needs low K diet when eating and needs to attend dialysis 3x/week     3. HTN - remains high, hypervolemia from missed dialysis contributing  -Bp much better with dialysis, monitoring  -continue home meds     4. Cognitive impairment - makes management of his multitude of medical problems very difficult, he seems to have frequent conflicts with transportation and tcu/nursing homes in past, has left [facility]. Now unhappy with current nursing home, expect finding an alternative will be very difficult with his medical complexity and all of the above issues.  Appreciate social work assistance     5. Anemia -  "severe, due to ESRD and noncompliance with dialysis so not on consistent DEMAR  - iron stores adequate  -will dose DEMAR here q week and resume with outpt dialysis as able      Cecelia Chandler, DNP, CNP, TONE  [facility]  Pager: [phone]    Objective    PHYSICAL EXAM  BP (!) 153/93 (BP Location: Right arm)   Pulse 93   Temp 98.2  F (36.8  C) (Oral)   Resp 18   Ht 1.981 m (6' 6\")   Wt 108.4 kg (239 lb)   SpO2 98%   BMI 27.62 kg/m    I/O last 3 completed shifts:  In: 650 [P.O.:650]  Out: 1650 [Urine:150; Other:1500]  Wt Readings from Last 3 Encounters:   07/23/24 108.4 kg (239 lb)   07/17/24 106 kg (233 lb 9.6 oz)   01/16/24 127 kg (280 lb)       GENERAL: Alert and interactive.   HEENT:normocephalic, atraumatic  CARDIOVASCULAR: rr, no rub,  1+ edema  PULMONARY:cta ant. No cyanosis  GASTROINTESTINAL: soft, nt/nd, obese  MSK: diffuse muscle atrophy, warm  NEURO: Alert, no gross focal findings  PSYCHIATRIC: flat affect  SKIN: dry skin legs    LABORATORIES  Recent Labs   Lab 07/25/24 1218 07/23/24  0533 07/23/24  0216 07/23/24  0004 07/22/24  2255    141 145  --  140   POTASSIUM 6.0* 4.3 6.5* 8.1* 8.1*   CHLORIDE 100 100 102  --  100   CO2 26 24 18*  --  19*   BUN 61.7* 88.3* 147.3*  --  146.9*   CR 12.83* 15.10* 23.82*  --  23.68*   GFRESTIMATED 4* 4* 2*  --  2*   LOVELY 9.3 8.3* 8.2*  --  8.5*   PHOS  --  7.4*  --   --   --    MAG  --  2.2  --   --   --    ALBUMIN  --  3.6  --   --   --        Recent Labs   Lab 07/25/24  1218 07/23/24  0533 07/22/24  2255   WBC 4.9 5.5 6.3   HGB 8.6* 7.8* 9.0*   HCT 27.6* 24.4* 28.0*   MCV 87 85 88    221 265          MEDICATIONS  Current Facility-Administered Medications   Medication Dose Route Frequency Provider Last Rate Last Admin    amLODIPine (NORVASC) tablet 10 mg  10 mg Oral Daily Omar Mitchell MD   10 mg at 07/26/24 1622    calcitRIOL (ROCALTROL) capsule 0.25 mcg  0.25 mcg Oral Daily Omar Mitchell MD   0.25 mcg at 07/25/24 1306    " calcium acetate (PHOSLO) capsule 1,334 mg  1,334 mg Oral TID w/meals Omar Mitchell MD        epoetin kiana-epbx (RETACRIT) injection 40,000 Units  40,000 Units Subcutaneous Weekly Carol Louise MD        metoprolol succinate ER (TOPROL XL) 24 hr tablet 25 mg  25 mg Oral BID Omar Mitchell MD   25 mg at 07/26/24 1623    multivitamin RENAL (RENAVITE RX/NEPHROVITE) tablet 1 tablet  1 tablet Oral Daily Omar Mitchell MD   1 tablet at 07/25/24 1306    No heparin via hemodialysis machine   Does not apply Once Carol Louise MD        pregabalin (LYRICA) capsule 25 mg  25 mg Oral Daily Omar Mitchell MD   25 mg at 07/25/24 1305    risperiDONE (risperDAL) tablet 0.5 mg  0.5 mg Oral Daily Stephanie Solares, MILAGROS        risperiDONE (risperDAL) tablet 1 mg  1 mg Oral At Bedtime Omar Mitchell MD   1 mg at 07/25/24 2127    sodium chloride (PF) 0.9% PF flush 3 mL  3 mL Intracatheter Q8H Lilliana Carmona MD        sodium chloride 0.9% BOLUS 250 mL  250 mL Intravenous Once in dialysis/CRRT Carol Louise MD        sodium chloride 0.9% BOLUS 300 mL  300 mL Hemodialysis Machine Once Carol Louise MD

## 2024-07-27 NOTE — PROGRESS NOTES
[facility]    Progress Note - Hospitalist Service       Date of Admission:  7/22/2024    Assessment & Plan   Macario Delatorre is a 44 year old male with PMH of ESR on M/W/F HD, chronic pain syndrome, paroxysmal SVT, chronic gout, hypertension, hyperlipidemia, CAD, intellectual disability who was admitted on 7/22/24 with severe hyperkalemia with possible presyncope in setting of missed dialysis. Improving with resumed dialysis. Planning for discharge to TCU on Monday 7/29 morning.     Hyperkalemia, severe  ESRD on MWF dialysis  K of 8.1 on presentation to ED with missed dialysis. Follows with Dr. Lua/Nephrology. Patient has history of poor compliance with dialysis due to conflicts with living facility and transportation. Last outpt dialysis was 7/17. Had emergent dialysis on 7/23 due to severe hyperkalemia.   - am CBC  -Nephrology following   -return to MWF dialysis schedule  - discussed dangers of missed dialysis including death discussed  - guarded prognosis due to poor compliance with dialysis and life threatening hyperkalemia  - low K diet   - Palliative following   - wants to continue life prolonging care with hemodialysis   - appears to have appreciation of his condition and can clearly communicate his wishes  - SW/CM consulted   - accepted at [facility]   - plan for discharge on Monday 7/29/24     - working on [employer] application to assist in transportation to dialysis, paperwork completed today     Chest pain, resolved  Thought to be secondary to high potassium and hypervolemia from missed dialysis. Initial troponin 72, stable from prior though in setting of ESRD. EKG with mild T wave prominence in leads II and III. Resolved with emergent dialysis and electrolyte correction.      Cognitive impairment  History of mental health problems with hallucinations  Bipolar disorder  Agitation   Patient refusing cares and can easily get agitation. Patient reports history  "of bipolar and schizophrenia. Does not endorse routine psychiatric follow up or regular medication use. In [facility] system was started on risperidone 1 mg at bedtime though has not taken consistently. Stated he can escalate quickly if upset. 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 Denies any SI or HI.   - Psychiatric consult, appreciate input especially regarding capacity   - Psych evaluated, patient felt to have capacity   - Continue Risperdal 1 mg at at bedtime; additional 0.5 mg risperdal in am starting 7/27     Chronic anemia, normocytic   Baseline Hgb 9-10. Likely related to ESRD/chronic disease. Iron stores adequate.   - Continue DEMAR weekly; resume with outpatient dialysis  -  Daily CBC (patient frequently refusing labs)      Chronic pain syndrome, stable    Neuropathy, reported to be diabetic  - Lyrica 25 mg daily     Hypertension, uncontrolled  Continues to be hypertensive during hospitalization likely in setting of missed dialysis session.   - Continue PTA amlodipine 10 every day and metoprolol 25 BID        Diet: Regular Diet Adult    DVT Prophylaxis: Pneumatic Compression Devices  Raphael Catheter: Not present  Fluids: PO  Lines: None     Cardiac Monitoring: None  Code Status: Full Code      Clinically Significant Risk Factors                             # Overweight: Estimated body mass index is 27.62 kg/m  as calculated from the following:    Height as of this encounter: 1.981 m (6' 6\").    Weight as of this encounter: 108.4 kg (239 lb).        # Financial/Environmental Concerns:           Disposition Plan      Expected Discharge Date: 07/29/2024      Destination: inpatient rehabilitation facility;long-term care facility          The patient's care was discussed with the Attending Physician, Dr. Lemus .    Kimi Steiner MD  Hospitalist Service  [facility]  Securely message with [employer] (more info)  Text page via [employer] " Paging/y   ______________________________________________________________________    Interval History   Patient pleased with news he will be going to TCU on Monday. Main concern in right shoulder pain, interested in heating pad. Otherwise no concerns.     Physical Exam   Vital Signs:           Resp: 18   O2 Device: None (Room air)    Weight: 239 lbs 0 oz    GENERAL: sleeping upon arrival, awakes to voice. Alert, pleasant.   EYES: Eyes grossly normal to inspection.  No discharge or erythema, or obvious scleral/conjunctival abnormalities.  HENT: Normal cephalic/atraumatic.  External ears, nose and mouth without ulcers or lesions.    RESP: No visible retractions or increased work of breathing.    MS: No gross musculoskeletal defects noted.   SKIN: Visible skin clear.   NEURO: Cranial nerves grossly intact. Speech appropriate. Non-agitated     Medical Decision Making     Please see A&P for additional details of medical decision making.      Data   ------------------------- PAST 24 HR DATA REVIEWED -----------------------------------------------        Imaging results reviewed over the past 24 hrs:   No results found for this or any previous visit (from the past 24 hour(s)).

## 2024-07-28 LAB
ANION GAP SERPL CALCULATED.3IONS-SCNC: 16 MMOL/L (ref 7–15)
BUN SERPL-MCNC: 74.8 MG/DL (ref 6–20)
CALCIUM SERPL-MCNC: 9 MG/DL (ref 8.8–10.4)
CHLORIDE SERPL-SCNC: 100 MMOL/L (ref 98–107)
CREAT SERPL-MCNC: 15.44 MG/DL (ref 0.67–1.17)
EGFRCR SERPLBLD CKD-EPI 2021: 4 ML/MIN/1.73M2
GLUCOSE SERPL-MCNC: 107 MG/DL (ref 70–99)
HCO3 SERPL-SCNC: 23 MMOL/L (ref 22–29)
HOLD SPECIMEN: NORMAL
POTASSIUM SERPL-SCNC: 6.4 MMOL/L (ref 3.4–5.3)
SODIUM SERPL-SCNC: 139 MMOL/L (ref 135–145)

## 2024-07-28 PROCEDURE — 80048 BASIC METABOLIC PNL TOTAL CA: CPT | Performed by: NURSE PRACTITIONER

## 2024-07-28 PROCEDURE — 99232 SBSQ HOSP IP/OBS MODERATE 35: CPT | Mod: GC

## 2024-07-28 PROCEDURE — 36415 COLL VENOUS BLD VENIPUNCTURE: CPT | Performed by: NURSE PRACTITIONER

## 2024-07-28 PROCEDURE — 250N000013 HC RX MED GY IP 250 OP 250 PS 637: Performed by: HOSPITALIST

## 2024-07-28 PROCEDURE — 120N000004 HC R&B MS OVERFLOW

## 2024-07-28 RX ORDER — SODIUM POLYSTYRENE SULFONATE 15 G/60ML
30 SUSPENSION ORAL; RECTAL ONCE
Status: COMPLETED | OUTPATIENT
Start: 2024-07-28 | End: 2024-07-28

## 2024-07-28 RX ADMIN — PREGABALIN 25 MG: 25 CAPSULE ORAL at 20:00

## 2024-07-28 ASSESSMENT — ACTIVITIES OF DAILY LIVING (ADL)
ADLS_ACUITY_SCORE: 26

## 2024-07-28 NOTE — PROGRESS NOTES
"Offered Kayexylate to [employer]. Patient refused stating that he did not want to take it because it makes him \" poop his pants and would rather get dialysis and the needles in his arm \". Writer educated patient on the dangers of a high K+ level and the purpose for taking Kayexylate. Patient after education still profusely refused.  "

## 2024-07-28 NOTE — PROGRESS NOTES
Care Management Follow Up    Length of Stay (days): 5    Expected Discharge Date: 07/29/2024    Anticipated Discharge Plan:   TCU    Transportation: Confirmed Wheelchair. Transportation costs discussed? Yes. Discussed with pt    PT Recommendations:    OT Recommendations:        Barriers to Discharge: placement    Prior Living Situation: residential facility with facility resident (Pt was living with brother, went to TCU at [facility], then wanted to be there for LTC until able to return to brothers home)     Patient/Spokesperson Updated: Yes. Who? pt    Additional Information:  MD completed [employer] Application. CM faxed application to [employer] and [employer].    CM met with pt in room, he is agreeable to placement at [facility]. Pt is wondering if he will be in private or shared room and if there are any rules he is expected to follow at the facility. Pt states that due to his mental health he does not do well with roommates and requests a private room. CM left VM farheen Garcia at [facility] with these questions.     Pt also asked if CM is able to assist with getting his power scooter and clothes from his brothers house. Pt states that his brother does not drive so he is unable to bring these to pt. CM explained that the hospital is unable to get these items for him but that he could work with the facility  to problem solve this issue.     [facility] w/c transport scheduled for tomorrow at 11-11:45.    uYe Goodson, BSW

## 2024-07-28 NOTE — PROGRESS NOTES
Renal  K 6.4 today, ordered dialysis today to correct after pt had refused labs yest and until later in day today.  Discussed need for dialysis with dialysis nurse, pt wants specific dialysis nurse to dialyze him (who is not currently working.) He  is refusing dialysis with the available staff.  Will give kayexylate for hyperkalemia and reattempt dialysis tomorrow. Providing optimal medical care is complicated by pt refusal of recommended treatments.  I discussed with dialysis nurse and floor nurse    Carol Louise MD  [facility]  [phone]

## 2024-07-28 NOTE — PLAN OF CARE
Refusing all cares. Did not allow RN to do any assessments including VSS. Refused all medications. RN attempted to educate on importance of cares, patient was not receptive. Bed alarm off because patient refused. Call light in reach and encouraged to use it. Was pleasant but not willing to participate in any cares.

## 2024-07-28 NOTE — PROGRESS NOTES
RENAL PROGRESS NOTE - [facility]        CC: follow up ESRD    Subjective:   Since last seen, patient working with social workers to setup transportation to and from dialysis.  No chest pain, dizziness, weakness, abd pain, fever and chills.     Seen in bed resting, has been refusing lab, his last K was 6.  After many conversations, he accepted lab, order placed for BMP.           ASSESSMENT:   45 yo male with ESRD on dialysis MWF at [facility] using left arm AVF, HTN, CAD, cognitive impairment admit with severe hyperkalemia, presented with complaints of chest pain, dizziness, severe hyperkalemia of 8.1        PLAN:  ESRD - on dialysis MWF at [facility] using left arm AVF.   History of very poor compliance with dialysis. His last dialysis PTA was here on 7/17.  Has ongoing conflicts with his nursing home and transport, he has had extensive assistance with social workers at dialysis and at [facility] systems with ongoing poor dialyss compliance, unfortunately has not improved despite many attempts at optimizing his living situation, transportation, etc.  Had emergent dialysis 7/23 due to severe hyperkalemia  - Dialyzed again on 7/26.  Last K was 6 2 days ago, he has been refusing lab.  After many conversations he accepted to have his bmp checked, order placed.     -Discussed with RN, patient likely discharging tomorrow morning to TCU with plan to attend outpatient dialysis later in the evening, this is okay with renal.  Orders are in for dialysis tomorrow just in case he doesn't discharge.       Hyperkalemia - due to missed dialysis  -improved after dialysis  -Was on regular diet, changed to renal diet( he is not happy with renal diet).   -HD as noted above.      3. HTN - remains high, hypervolemia from missed dialysis contributing  -Bp much better with dialysis, monitoring  -continue home meds     4. Cognitive impairment - makes management of his multitude of medical problems very  "difficult, he seems to have frequent conflicts with transportation and tcu/nursing homes in past, has left [facility]. Now unhappy with current nursing home, expect finding an alternative will be very difficult with his medical complexity and all of the above issues.  Appreciate social work assistance     5. Anemia - severe, due to ESRD and noncompliance with dialysis so not on consistent DEMAR  - iron stores adequate  -will dose DEMAR here q week and resume with outpt dialysis as able      Cecelia Chandler, DNP, CNP, TONE  [facility]  Pager: [phone]    Objective    PHYSICAL EXAM  BP (!) 153/93 (BP Location: Right arm)   Pulse 93   Temp 98.2  F (36.8  C) (Oral)   Resp 18   Ht 1.981 m (6' 6\")   Wt 108.4 kg (239 lb)   SpO2 98%   BMI 27.62 kg/m    I/O last 3 completed shifts:  In: 400 [P.O.:400]  Out: -   Wt Readings from Last 3 Encounters:   07/23/24 108.4 kg (239 lb)   07/17/24 106 kg (233 lb 9.6 oz)   01/16/24 127 kg (280 lb)       GENERAL: Alert and interactive.   HEENT:normocephalic, atraumatic  CARDIOVASCULAR: rr, no rub,  1+ edema  PULMONARY:cta ant. No cyanosis  GASTROINTESTINAL: soft, nt/nd, obese  MSK: diffuse muscle atrophy, warm  NEURO: Alert, no gross focal findings  PSYCHIATRIC: flat affect  SKIN: dry skin legs    LABORATORIES  Recent Labs   Lab 07/25/24  1218 07/23/24  0533 07/23/24  0216 07/23/24  0004 07/22/24  2255    141 145  --  140   POTASSIUM 6.0* 4.3 6.5* 8.1* 8.1*   CHLORIDE 100 100 102  --  100   CO2 26 24 18*  --  19*   BUN 61.7* 88.3* 147.3*  --  146.9*   CR 12.83* 15.10* 23.82*  --  23.68*   GFRESTIMATED 4* 4* 2*  --  2*   LOVELY 9.3 8.3* 8.2*  --  8.5*   PHOS  --  7.4*  --   --   --    MAG  --  2.2  --   --   --    ALBUMIN  --  3.6  --   --   --        Recent Labs   Lab 07/25/24  1218 07/23/24  0533 07/22/24  2255   WBC 4.9 5.5 6.3   HGB 8.6* 7.8* 9.0*   HCT 27.6* 24.4* 28.0*   MCV 87 85 88    221 265          MEDICATIONS  Current Facility-Administered Medications "   Medication Dose Route Frequency Provider Last Rate Last Admin    amLODIPine (NORVASC) tablet 10 mg  10 mg Oral Daily Omar Mitchell MD   10 mg at 07/26/24 1622    calcitRIOL (ROCALTROL) capsule 0.25 mcg  0.25 mcg Oral Daily Omar Mitchell MD   0.25 mcg at 07/25/24 1306    calcium acetate (PHOSLO) capsule 1,334 mg  1,334 mg Oral TID w/meals Omar Mitchell MD        epoetin kiana-epbx (RETACRIT) injection 40,000 Units  40,000 Units Subcutaneous Weekly Carol Louise MD        metoprolol succinate ER (TOPROL XL) 24 hr tablet 25 mg  25 mg Oral BID Omar Mitchell MD   25 mg at 07/26/24 1623    multivitamin RENAL (RENAVITE RX/NEPHROVITE) tablet 1 tablet  1 tablet Oral Daily Omar Mitchell MD   1 tablet at 07/25/24 1306    No heparin via hemodialysis machine   Does not apply Once Carol Louise MD        pregabalin (LYRICA) capsule 25 mg  25 mg Oral Daily Omar Mitchell MD   25 mg at 07/25/24 1305    risperiDONE (risperDAL) tablet 0.5 mg  0.5 mg Oral Daily Stephanie Solares, MILAGROS        risperiDONE (risperDAL) tablet 1 mg  1 mg Oral At Bedtime Omar Mitchell MD   1 mg at 07/25/24 2127    sodium chloride (PF) 0.9% PF flush 3 mL  3 mL Intracatheter Q8H Lilliana Carmona MD        sodium chloride 0.9% BOLUS 250 mL  250 mL Intravenous Once in dialysis/CRRT Carol Louise MD        sodium chloride 0.9% BOLUS 300 mL  300 mL Hemodialysis Machine Once Carol Louise MD

## 2024-07-28 NOTE — PROGRESS NOTES
[facility]    Progress Note - Hospitalist Service       Date of Admission:  7/22/2024    Assessment & Plan   Macario Delatorre is a 44 year old male with PMH of ESR on M/W/F HD, chronic pain syndrome, paroxysmal SVT, chronic gout, hypertension, hyperlipidemia, CAD, intellectual disability who was admitted on 7/22/24 with severe hyperkalemia with possible presyncope in setting of missed dialysis. Improving with dialysis. Tentatively, planning for discharge to TCU on Monday 7/29 morning.     Hyperkalemia, severe  ESRD on MWF dialysis  K of 8.1 on presentation to ED with missed dialysis. Follows with Dr. Lua/Nephrology. Patient has history of poor compliance with dialysis due to conflicts with living facility and transportation. Last outpt dialysis was 7/17. Had emergent dialysis on 7/23 due to severe hyperkalemia.   -Nephrology following   -return to MWF dialysis schedule  - discussed dangers of missed dialysis including death discussed  - guarded prognosis due to poor compliance with dialysis and life threatening hyperkalemia  - low K diet   - had been refusing all lab draws and cares overnight  - Palliative following   - wants to continue life prolonging care with hemodialysis  - appears to have appreciation of his condition and can clearly communicate his wishes  - SW/CM consulted   - accepted at [facility]   - tentatively plan for discharge on Monday 7/29/24    - working on metro mobility application to assist in transportation to dialysis, paperwork completed and given to social work     Chest pain, resolved  Thought to be secondary to high potassium and hypervolemia from missed dialysis. Initial troponin 72, stable from prior though in setting of ESRD. EKG with mild T wave prominence in leads II and III. Resolved with emergent dialysis and electrolyte correction.      Cognitive impairment  History of mental health problems with hallucinations  Bipolar disorder  Agitation  "  Patient refusing cares and can easily get agitation. Patient reports history of bipolar and schizophrenia. Does not endorse routine psychiatric follow up or regular medication use. In [facility] system was started on risperidone 1 mg at bedtime though has not taken consistently. Stated he can escalate quickly if upset. 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 Denies any SI or HI.   - Psychiatric consult, appreciate input especially regarding capacity   - Psych evaluated, patient felt to have capacity   - Continue Risperdal 1 mg at at bedtime; additional 0.5 mg risperdal in am starting 7/27     Chronic anemia, normocytic   Baseline Hgb 9-10. Likely related to ESRD/chronic disease. Iron stores adequate.   - Continue DEMAR weekly; resume with outpatient dialysis  - Daily CBC (patient frequently refusing labs)      Chronic pain syndrome, stable    Neuropathy, reported to be diabetic  - Lyrica 25 mg daily     Hypertension, uncontrolled  Continues to be hypertensive during hospitalization likely in setting of missed dialysis session.   - Continue PTA amlodipine 10 every day and metoprolol 25 BID        Diet: Renal Diet (dialysis)    DVT Prophylaxis: Pneumatic Compression Devices  Raphael Catheter: Not present  Fluids: PO  Lines: None     Cardiac Monitoring: None  Code Status: Full Code      Clinically Significant Risk Factors        # Hyperkalemia: Highest K = 6.4 mmol/L in last 2 days, will monitor as appropriate                      # Overweight: Estimated body mass index is 27.62 kg/m  as calculated from the following:    Height as of this encounter: 1.981 m (6' 6\").    Weight as of this encounter: 108.4 kg (239 lb).        # Financial/Environmental Concerns:           Disposition Plan      Expected Discharge Date: 07/29/2024, 11:00 AM  Discharge Delays: *Early Discharge Anticipated  *Medically Ready for Discharge  Destination: inpatient rehabilitation " facility;long-term care facility  Discharge Comments: [facility] rehab TCU via w/c at 11-11:45.        The patient's care was discussed with the Attending Physician, Dr. Lemus .    Emily Freed DO  Hospitalist Service  [facility]  Securely message with [employer] (more info)  Text page via [employer] Paging/Directory   ______________________________________________________________________    Interval History   Sleeping this morning, not wanting to participate in conversation. Has been refusing all cares and refusing medications. Discussed with patient reasoning why we have schedules cares as well as importance of taking medications. Again, patient not wanting to participate in conversation     Physical Exam   Vital Signs:           Resp: 18   O2 Device: None (Room air)    Weight: 239 lbs 0 oz    GENERAL: sleeping upon arrival, awakes to voice. Alert. No wanting to converse  EYES: Eyes grossly normal to inspection.  No discharge or erythema, or obvious scleral/conjunctival abnormalities.  HENT: Normal cephalic/atraumatic.    RESP: No visible retractions or increased work of breathing.    MS: No gross musculoskeletal defects noted.   SKIN: Visible skin clear.   NEURO: Moving all extremities. Non-agitated.    Medical Decision Making     Please see A&P for additional details of medical decision making.      Data   ------------------------- PAST 24 HR DATA REVIEWED -----------------------------------------------    I have personally reviewed the following data over the past 24 hrs:    N/A  \   N/A   / N/A     139 100 74.8 (H) /  107 (H)   6.4 (HH) 23 15.44 (H) \       Imaging results reviewed over the past 24 hrs:   No results found for this or any previous visit (from the past 24 hour(s)).

## 2024-07-28 NOTE — PLAN OF CARE
"Goal Outcome Evaluation:       A&O x 4. Patient refused all medications, assessments and vitals this shift. Pt has been very agitated and angry this shift. Patient was willing to have a lab drawn done this afternoon after education by nephrology and writer. Critical K+ level came back of 6.4. Provider notified and order for kayexalate was placed. Writer offered pt kayexalate and it was refused. Patient is scheduled for dialysis in the morning patient is aware of this and states that they will refuse in the morning. Patient is saying that they will not leave the hospital until they get their scooter delivered and is threatening to leave to another hospital. Patient used numerous swear words including racial slurs this shift. Refuses to order from the kitchen from now on because \" the food is terrible\". Only thing the patient wants to eat is turkey sandwiches with mensah.         Problem: Psychotic Signs/Symptoms  Goal: Improved Behavioral Control (Psychotic Signs/Symptoms)  7/28/2024 1823 by Bon Tellez, RN  Outcome: Not Progressing  7/28/2024 1409 by Bon Tellez, RN  Outcome: Progressing     "

## 2024-07-28 NOTE — PROGRESS NOTES
"Spoke with Dr. Louise on the phone regarding patient refusing Kayexalate. Patient has dialysis scheduled in the morning of 7/29 patient is aware of this and stating that they will refuse because \"their body is not built for the morning'.   "

## 2024-07-28 NOTE — PROGRESS NOTES
Angel Chandler for critical lab of K+ 6.1 with no response. Contacted Dr. Louise received verbal order for Kayexalate 30 g once.

## 2024-07-29 LAB
ANION GAP SERPL CALCULATED.3IONS-SCNC: 16 MMOL/L (ref 7–15)
BUN SERPL-MCNC: 92.6 MG/DL (ref 6–20)
CALCIUM SERPL-MCNC: 8.8 MG/DL (ref 8.8–10.4)
CHLORIDE SERPL-SCNC: 99 MMOL/L (ref 98–107)
CREAT SERPL-MCNC: 19.09 MG/DL (ref 0.67–1.17)
EGFRCR SERPLBLD CKD-EPI 2021: 3 ML/MIN/1.73M2
GLUCOSE SERPL-MCNC: 97 MG/DL (ref 70–99)
HCO3 SERPL-SCNC: 23 MMOL/L (ref 22–29)
HOLD SPECIMEN: NORMAL
POTASSIUM SERPL-SCNC: 6.6 MMOL/L (ref 3.4–5.3)
SODIUM SERPL-SCNC: 138 MMOL/L (ref 135–145)

## 2024-07-29 PROCEDURE — 99232 SBSQ HOSP IP/OBS MODERATE 35: CPT | Mod: GC

## 2024-07-29 PROCEDURE — 120N000004 HC R&B MS OVERFLOW

## 2024-07-29 PROCEDURE — 36415 COLL VENOUS BLD VENIPUNCTURE: CPT | Performed by: INTERNAL MEDICINE

## 2024-07-29 PROCEDURE — 99232 SBSQ HOSP IP/OBS MODERATE 35: CPT | Performed by: STUDENT IN AN ORGANIZED HEALTH CARE EDUCATION/TRAINING PROGRAM

## 2024-07-29 PROCEDURE — 80048 BASIC METABOLIC PNL TOTAL CA: CPT | Performed by: INTERNAL MEDICINE

## 2024-07-29 PROCEDURE — 250N000013 HC RX MED GY IP 250 OP 250 PS 637: Performed by: HOSPITALIST

## 2024-07-29 PROCEDURE — 90935 HEMODIALYSIS ONE EVALUATION: CPT

## 2024-07-29 RX ADMIN — AMLODIPINE BESYLATE 5 MG: 5 TABLET ORAL at 17:48

## 2024-07-29 RX ADMIN — METOPROLOL SUCCINATE 25 MG: 25 TABLET, EXTENDED RELEASE ORAL at 17:49

## 2024-07-29 ASSESSMENT — ACTIVITIES OF DAILY LIVING (ADL)
ADLS_ACUITY_SCORE: 26

## 2024-07-29 NOTE — PLAN OF CARE
Problem: Comorbidity Management  Goal: Blood Pressure in Desired Range  Outcome: Progressing  Intervention: Maintain Blood Pressure Management  Recent Flowsheet Documentation  Taken 7/29/2024 1002 by Jolene Ventura RN  Medication Review/Management: medications reviewed     Problem: Pain Acute  Goal: Optimal Pain Control and Function  Outcome: Progressing  Intervention: Prevent or Manage Pain  Recent Flowsheet Documentation  Taken 7/29/2024 1002 by Jolene Ventura RN  Sensory Stimulation Regulation: quiet environment promoted  Medication Review/Management: medications reviewed  Intervention: Optimize Psychosocial Wellbeing  Recent Flowsheet Documentation  Taken 7/29/2024 1002 by Jolene Ventura RN  Supportive Measures: verbalization of feelings encouraged     Problem: Heart Failure  Goal: Optimal Coping  Outcome: Progressing  Intervention: Support Psychosocial Response  Recent Flowsheet Documentation  Taken 7/29/2024 1002 by Jolene Ventura RN  Supportive Measures: verbalization of feelings encouraged   Goal Outcome Evaluation:       Pt is AOX4 and denies pain. Pt c/o tenderness bilaterally in feet and ankles states this is his baseline. Pt allowed writer to take BP and temp, but refused other vitals. /135, MD notified. Pt refused all AM medications, bed alarm, and skin assessment, MD notified. Pt educated on plan of care and states he is not going to do dialysis while in the hospital, MD aware. No interventions. Pt offered to go down to dialysis around 1230, but pt refused. MD notified and plan to offer dialysis again this afternoon around 1500.

## 2024-07-29 NOTE — PROGRESS NOTES
PALLIATIVE CARE PROGRESS NOTE  [facility]     Patient Name: Macario Delatorre  Date of Admission: 7/22/2024   Today the patient was seen for: Facilitation of medical communication and support with goals of care     Recommendations & Counseling     GOALS OF CARE:   Life-prolonging without limits; no identifiable surrogate  Erwin says that he has not heard a clear prognosis with regards to life expectancy and his ESRD.  Yet he identifies as having advanced heart failure and complete kidney failure which will require life support in the form of hemodialysis for the remainder of his life.  Patient perceives that he tolerates hemodialysis well and without undesirable side effects or complications.  He would like to continue receiving regularly scheduled hemodialysis with life-prolonging intent, although admittedly struggles with consistent transportation to/from dialysis clinic as well as the rigidness of timing and scheduling.  In particular, he feels that his sleep is important and he should not have to wake up in the morning for hemodialysis, ideally hemodialysis scheduling could better accommodate his schedule and preferences.  Patient relays that throughout multiple hospitalizations at various facilities over the past couple of years, he has consistently felt disrespected, a lack of consistent communication, and at times treated inhumanely.  He has historically been a very independent individual, but in recent years he has had to make significant adjustments to living a life of immobility and dependent for frequent medical care, understandably appreciates being asked his opinion and feeling heard/validated.     ADVANCE CARE PLANNING:  No health care directive on file. Per  informed consent policy, next of kin should be involved if patient becomes unable.  Offered healthcare directive 7/29/2024; patient remains reluctant to provide medical staff with contact information for his brother  "Yohannes or friend Enma describing them as strong advocates but stating that he wants to \"protect them\" from the stress of poorly communicated information and medical complexity of medical care visits.  Today he is not interested in discussing theoreticals such as surrogacy for potential time or place in the future in which he may not be able to communicate his goals and values.  There is no POLST form on file.  Code status: Full Code     MEDICAL MANAGEMENT:   We are not actively managing symptoms at this time.     PSYCHOSOCIAL/SPIRITUAL SUPPORT:  Family; says half of his family is  and the other half is in care home, but also mentions an unnamed brother living in [place] which he has stayed with in the past.  Friends; only 1 close friend by the name of Enma who recently moved to [place], [place].  [place] community: [employer]     Palliative Care will continue to follow. Thank you for the consult and allowing us to aid in the care of Macario Delatorre.    These recommendations have been discussed with team.    Yobani Rivera, DO  [facility], Palliative Care  Securely message with the [employer] (learn more here) or  Text page via [facility] Paging/Directory        Assessment          Macario Delatorre is a 44 year old male with documented PMH of chronic psychosis/schizophrenia, T2DM, HTN, CAD, medication/medical treatment non-adherence, and ESRD on HD MWF using left arm AVF who was admitted to the hospital 2024 after multiple missing hemodialysis appointments and requiring urgent hemodialysis.  In reviewing EMR, appears patient has endured semiregular hospitalizations related to ESRD and inconsistency in receiving hemodialysis.  There appears to be some difficulties with reliable transportation, housing, and miscommunication.  This hospitalization he reportedly informed medical staff that he did not want hemodialysis according to their schedule, but would prefer to stay on his regularly scheduled outpatient " "plan which is offered late afternoons.       Interval History:     Multidisciplinary collaboration:  Independently reviewed notes within EMR including hospitalist, nephrologist, and .  Communicated with bedside RN regarding care management.  Notable medications:  Patient has received hemodialysis 7/23, 7/24, and 7/26 with another session scheduled this afternoon.  Patient/family narrative  Visited with patient today who remains frustrated with what he perceives to be poor communication around his medical provider recommendations and a lack of hearing what he believes are important regarding his care.  Again, he feels this is a longstanding trend in healthcare and has been persistent through most of his hospital stays at various facilities over the past couple of years, not unique to this particular hospital experience.  As we talked more, patient prefers to a time in his life when he did not have consistent housing but was very independent and able to do what he wanted to do when he wanted to do it.  He now feels that his independence is being stripped away as he has grown immobile and reliant on both hemodialysis and frequent hospitalizations for life-sustaining therapies.  He desires more input into his care, timing and convenience as he is used to, and understandably frustrated when he feels he is told when and how to do things.  He would appreciate more empathy and understanding, and even some flexibility with medical cares in the hospital.  Sometimes he believes he is being flexible to accommodate hospital schedule, but is not reciprocated (his words).    Erwin readily admits that he has both heart failure and kidney failure which are incurable, he would like to continue receiving life-prolonging therapy understanding that someday he could have \"heart attack\" referring to an event ending his life.  Yet he does not want medical or hospital staff contacting his close relationships, namely brother " "Yohannes and friend Enma.  He says they are both like him and that they would loudly advocate for his wants and needs, but wants to protect them from the headaches of his complex medical care and for communication via medical staff.  He is not interested in providing telephone numbers or contact information for either of these individuals at this time, also not interested in discussing potential theoretical scenarios in the future in which he may not be able to speak for himself.    Overall Erwin relays that he would like to be treated humanely, his voice heard, opinions and preferences respected.  No unanswered questions or unaddressed concerns today other than continuing to receive necessary medical care on a flexible schedule.    Review of Systems:     Besides above, ROS was reviewed and is unremarkable        Physical Exam:   BP (!) 194/135   Pulse 82   Temp 98.4  F (36.9  C) (Oral)   Resp 20   Ht 1.981 m (6' 6\")   Wt 108.4 kg (239 lb)   SpO2 98%   BMI 27.62 kg/m    General:  Appears stated age.  NAD.   HENT:  Atraumatic.  Hearing intact.  Moist mucous membranes.    Eyes:  Conjunctivae are clear.  Sclera is nonicteric.    Cardiovascular:  Without edema, cyanosis or mottling.   Respiratory:  Breathing comfortably without increased work of breathing or signs of respiratory distress.  Able to speak in complete sentences.    Skin:  Warm, dry without diaphoresis.   Neuro:  Alert, attentive, speech clear and fluent.    Psych:  Appropriate mood and affect.  No sign of confusion or delusion.     Wt Readings from Last 8 Encounters:   07/23/24 108.4 kg (239 lb)   07/17/24 106 kg (233 lb 9.6 oz)   01/16/24 127 kg (280 lb)   10/23/16 99.8 kg (220 lb)   09/27/16 126.6 kg (279 lb)   04/09/16 104.3 kg (230 lb)   08/12/13 128.7 kg (283 lb 12.8 oz)   08/05/09 126.8 kg (279 lb 9.6 oz)             Data Reviewed:     Recent Labs   Lab Test 07/25/24  1218 07/23/24  0533 07/22/24  2255 07/12/24  0542 07/10/24  1253   WBC 4.9 " 5.5 6.3   < > 6.5   HGB 8.6* 7.8* 9.0*   < > 9.6*   MCV 87 85 88   < > 89    221 265   < > 234   NEUTROPHIL 58 62  --   --  70    < > = values in this interval not displayed.     Recent Labs   Lab Test 07/28/24  1216 07/25/24  1218 07/23/24  0533    140 141   POTASSIUM 6.4* 6.0* 4.3   CHLORIDE 100 100 100   CO2 23 26 24   ANIONGAP 16* 14 17*   BUN 74.8* 61.7* 88.3*   CR 15.44* 12.83* 15.10*   LOVELY 9.0 9.3 8.3*     Recent Labs   Lab Test 07/23/24  0533 01/16/24  1837   AST 12 16   ALT 12 6   ALKPHOS 48 47   BILITOTAL 0.3 0.3   ALBUMIN 3.6 4.1   PROTTOTAL 6.1* 7.3           Medical Decision Making       40 MINUTES SPENT BY ME on the date of service doing chart review, history, exam, documentation & further activities per the note.

## 2024-07-29 NOTE — PROGRESS NOTES
Assumed care at 1900. Pt requested for his lyrica med, but refuses other medications. Writer went in and give pt his lyrica, but the cup lid was not properly closed. So, water spilled on pt and pt angrily threw the whole cup away, refused all other cares and requested for a different nurse.

## 2024-07-29 NOTE — PROGRESS NOTES
[facility]    Progress Note - Hospitalist Service       Date of Admission:  7/22/2024    Assessment & Plan   Macario Delatorre is a 44 year old male with PMH of ESR on M/W/F HD, chronic pain syndrome, paroxysmal SVT, chronic gout, hypertension, hyperlipidemia, CAD, intellectual disability who was admitted on 7/22/24 with severe hyperkalemia with possible presyncope in setting of missed dialysis. Improving with dialysis. Tentatively, planning for discharge to TCU pending [facility] assessment     Hyperkalemia, severe  ESRD on MWF dialysis  K of 8.1 on presentation to ED with missed dialysis. Follows with Dr. Lua/Nephrology. Patient has history of poor compliance with dialysis due to conflicts with living facility and transportation. Last outpt dialysis was 7/17. Had emergent dialysis on 7/23 due to severe hyperkalemia.   -Nephrology following   -return to MWF dialysis schedule  - discussed dangers of missed dialysis including death discussed  - guarded prognosis due to poor compliance with dialysis and life threatening hyperkalemia  - low K diet   - had been refusing all lab draws and cares overnight  - Palliative following              - wants to continue life prolonging care with hemodialysis  - appears to have appreciation of his condition and can clearly communicate his wishes  - SW/CM consulted              - accepted at [facility]              - tentatively plan for discharge on Tuesday 7/20 pending HD and SW  - working on metro mobility application to assist in transportation to dialysis, paperwork completed and given to social work      Chest pain, resolved  Thought to be secondary to high potassium and hypervolemia from missed dialysis. Initial troponin 72, stable from prior though in setting of ESRD. EKG with mild T wave prominence in leads II and III. Resolved with emergent dialysis and electrolyte correction.      Cognitive impairment  History of mental health  "problems with hallucinations  Bipolar disorder  Agitation   Patient refusing cares and can easily get agitation. Patient reports history of bipolar and schizophrenia. Does not endorse routine psychiatric follow up or regular medication use. In [facility] system was started on risperidone 1 mg at bedtime though has not taken consistently. Stated he can escalate quickly if upset. 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 Denies any SI or HI.   - Psychiatric consult, appreciate input especially regarding capacity              - Psych evaluated, patient felt to have capacity   - Continue Risperdal 1 mg at at bedtime; additional 0.5 mg risperdal in am starting 7/27     Chronic anemia, normocytic   Baseline Hgb 9-10. Likely related to ESRD/chronic disease. Iron stores adequate.   - Continue DEMAR weekly; resume with outpatient dialysis  - Daily CBC (patient frequently refusing labs)      Chronic pain syndrome, stable    Neuropathy, reported to be diabetic  - Lyrica 25 mg daily     Hypertension, uncontrolled  Continues to be hypertensive during hospitalization likely in setting of missed dialysis session.   - Continue PTA amlodipine 10 every day and metoprolol 25 BID        Diet: Renal Diet (dialysis)    DVT Prophylaxis: Pneumatic Compression Devices  Raphael Catheter: Not present  Fluids: PO  Lines: None     Cardiac Monitoring: None  Code Status: Full Code      Clinically Significant Risk Factors     # Hyperkalemia: Highest K = 6.4 mmol/L in last 2 days, will monitor as appropriate                      # Overweight: Estimated body mass index is 27.62 kg/m  as calculated from the following:    Height as of this encounter: 1.981 m (6' 6\").    Weight as of this encounter: 108.4 kg (239 lb).      # Financial/Environmental Concerns:           Disposition Plan     Expected Discharge Date: 07/29/2024, 11:00 AM  Discharge Delays: *Early Discharge Anticipated  *Medically Ready for " "Discharge  Destination: inpatient rehabilitation facility;long-term care facility  Discharge Comments: [facility] and rehab TCU via w/c at 11-11:45.        The patient's care was discussed with the Attending Physician, Dr. Cortes .    Kristyn Cuenca MD  Hospitalist Service  [facility]  Securely message with [employer] (more info)  Text page via [employer] Paging/Directory   ______________________________________________________________________    Interval History   NAEON. Seen at bedside with nephrology today. Patient adamant that he wants to get his HD at his regularly scheduled time as this allows for him to have structure. Discussed the risks and benefits of not going through HD, patient understanding, knowing that his life is in \"God's Hands\" at this point. Discharge today cancelled due to facility not aware that OBRA II was completed at last visit, is valid for 6 months. Anticipate he will be able to return tomorrow if he gets dialyzed tonight. Will continue to monitor    Physical Exam   Vital Signs:     BP: (!) 160/98 Pulse: 82     SpO2: 98 % O2 Device: None (Room air)    Weight: 239 lbs 0 oz    General: Alert, no acute distress. Irritated.  Skin: clean, dry, and intact  HEENT: normocephalic, atraumatic, spontaneous eye movement, no injection or icterus, ears and nose normal, no neck masses  Resp: normal work of breathing, no wheezing  Cardio: RRR, S1 and S2 present  Abdomen: nondistended, no masses  Extremities: LUE with palpable thrill, compressible.  Psych: very irritable, logical thought process      Medical Decision Making   Please see A&P for additional details of medical decision making.      Data   ------------------------- PAST 24 HR DATA REVIEWED -----------------------------------------------        Imaging results reviewed over the past 24 hrs:   No results found for this or any previous visit (from the past 24 hour(s)).  "

## 2024-07-29 NOTE — PROGRESS NOTES
[facility] called, Maria Del Rosario [phone], PAS triggered a Obra Level II and can not leave hospital until [facility] gets back to CM team.    CM went to update patient but pt doesn't want to be disturbed and primary RN updated and she will inform patient when he wakes up.    Ride has been cancelled.    9:24 AM  Jose from [facility] called and will not be able to take patient now due to the Obra Level II and he would have to review it before accepting. There is also a Covid outbreak at facility and pt will not get a private room due to needs to isolate other patients/clients at facility.     Discharge is TBD for time and location/facility at this time.    11:04 AM  Edna from [facility] called. [phone] and fax [phone]. Pt has case reopened w/Ashtyn Pineda of [employer] for DD CM (Developmental Delayed Case Management) since he [place] of [place] at some point, has opened case since 2010. Due to the Obra Level II  trigger, the county has to follow through with investigation since there was a change in physical/mental health status. And the PAS Obra Level II from July 12 can not be waived for this new admission to [facility].     11:13 AM  Edna from [facility] called and gave the ok that the Obra Level II screening does not have to be redone due to it should have been extended for 6 months (the last PAS OBRA Level II screening is good for 6 months). She will email the letter to CM team and to [facility]. Pt should be ok to start discharge planning. CM will reach out to [facility] for acceptance based on new information.    1:57 PM  CM updated pt on potentially discharging Tuesday morning and that there is no private room available due to COVID outbreak, pt rolled over in bed and stats that he is not going if it's not a private room  Provider updated and will discuss with patient again in the morning, as of now he is refusing  dialysis.    3:25 PM  At this time the OBRA Level II is awaiting DHS to approve the extension of the previous screening. [facility] will alert CM team when they have what need to approve pt for a bed at their facility.   Pt still wants a private room.

## 2024-07-29 NOTE — PROGRESS NOTES
RENAL PROGRESS NOTE - [facility]        CC: follow up ESRD    Subjective: Refused HD yesterday in setting of hyperkalemia. Patient refused to go the dialysis at 12:30 today. Reports want to only run at 3pm his outpatient time. Discussed that we are not able to run patient on exact requested times in the hospital at length. Discussed risk of hyperkalemia and adverse events with risk of death. Patient reports he will agree to run later tonight but will not have HD staff available until after 6 pm if refusing now. Patient aware of risks.  Seen today x3 to discussed HD plans.       ASSESSMENT:   43 yo male with ESRD on dialysis MWF at [facility] using left arm AVF, HTN, CAD, cognitive impairment admit with severe hyperkalemia, presented with complaints of chest pain, dizziness, severe hyperkalemia of 8.1        PLAN:  ESRD - on dialysis MWF at [facility] using left arm AVF.   History of very poor compliance with dialysis. His last dialysis PTA was here on 7/17.  Has ongoing conflicts with his nursing home and transport, he has had extensive assistance with social workers at dialysis and at [facility] systems with ongoing poor dialyss compliance, unfortunately has not improved despite many attempts at optimizing his living situation, transportation, etc.  Had emergent dialysis 7/23 due to severe hyperkalemia  HD today_  Refusing treatment earlier today- will only agree to run after 3 pm - dicsussed risks. Patient decline dialysis earlier than this Will not have staff to un until after 6 pm at this time.      Hyperkalemia - due to missed dialysis  -Was on regular diet, changed to renal diet 7/28   -HD as noted above.      3. HTN - remains high, hypervolemia from missed dialysis contributing  -Bp much better with dialysis, monitoring  -continue home meds     4. Cognitive impairment - makes management of his multitude of medical problems very difficult, he seems to have frequent conflicts with  "transportation and tcu/nursing homes in past, has left [facility]a. Now unhappy with current nursing home, expect finding an alternative will be very difficult with his medical complexity and all of the above issues.  Appreciate social work assistance     5. Anemia - severe, due to ESRD and noncompliance with dialysis so not on consistent DEMAR  - iron stores adequate  -will dose DEMAR here q week and resume with outpt dialysis as able      Ray Boucher, DO  [facility]    Objective    PHYSICAL EXAM  BP (!) 194/135   Pulse 82   Temp 98.4  F (36.9  C) (Oral)   Resp 20   Ht 1.981 m (6' 6\")   Wt 108.4 kg (239 lb)   SpO2 98%   BMI 27.62 kg/m    I/O last 3 completed shifts:  In: 400 [P.O.:400]  Out: 700 [Urine:700]  Wt Readings from Last 3 Encounters:   07/23/24 108.4 kg (239 lb)   07/17/24 106 kg (233 lb 9.6 oz)   01/16/24 127 kg (280 lb)       GENERAL: Alert and interactive.   HEENT:normocephalic, atraumatic  CARDIOVASCULAR: rr, no rub,  1+ edema  PULMONARY:cta ant. No cyanosis  GASTROINTESTINAL: soft, nt/nd, obese  MSK: diffuse muscle atrophy, warm  NEURO: Alert, no gross focal findings  PSYCHIATRIC: flat affect  SKIN: dry skin legs    LABORATORIES  Recent Labs   Lab 07/28/24  1216 07/25/24  1218 07/23/24  0533 07/23/24  0216 07/23/24  0004 07/22/24  2255    140 141 145  --  140   POTASSIUM 6.4* 6.0* 4.3 6.5* 8.1* 8.1*   CHLORIDE 100 100 100 102  --  100   CO2 23 26 24 18*  --  19*   BUN 74.8* 61.7* 88.3* 147.3*  --  146.9*   CR 15.44* 12.83* 15.10* 23.82*  --  23.68*   GFRESTIMATED 4* 4* 4* 2*  --  2*   LOVELY 9.0 9.3 8.3* 8.2*  --  8.5*   PHOS  --   --  7.4*  --   --   --    MAG  --   --  2.2  --   --   --    ALBUMIN  --   --  3.6  --   --   --        Recent Labs   Lab 07/25/24  1218 07/23/24  0533 07/22/24  2255   WBC 4.9 5.5 6.3   HGB 8.6* 7.8* 9.0*   HCT 27.6* 24.4* 28.0*   MCV 87 85 88    304 197          MEDICATIONS  Current Facility-Administered Medications   Medication Dose Route " Frequency Provider Last Rate Last Admin    amLODIPine (NORVASC) tablet 10 mg  10 mg Oral Daily Omar Mitchell MD   10 mg at 07/26/24 1622    calcitRIOL (ROCALTROL) capsule 0.25 mcg  0.25 mcg Oral Daily Omar Mitchell MD   0.25 mcg at 07/25/24 1306    calcium acetate (PHOSLO) capsule 1,334 mg  1,334 mg Oral TID w/meals Omar Mitchell MD        epoetin kiana-epbx (RETACRIT) injection 40,000 Units  40,000 Units Subcutaneous Weekly Carol Louise MD        metoprolol succinate ER (TOPROL XL) 24 hr tablet 25 mg  25 mg Oral BID Omar Mitchell MD   25 mg at 07/26/24 1623    multivitamin RENAL (RENAVITE RX/NEPHROVITE) tablet 1 tablet  1 tablet Oral Daily Omar Mitchell MD   1 tablet at 07/25/24 1306    No heparin via hemodialysis machine   Does not apply Once Cecelia Chandler, MILAN CNP        pregabalin (LYRICA) capsule 25 mg  25 mg Oral Daily Omar Mitchell MD   25 mg at 07/28/24 2000    risperiDONE (risperDAL) tablet 0.5 mg  0.5 mg Oral Daily Stephanie Solares, MILAGROS        risperiDONE (risperDAL) tablet 1 mg  1 mg Oral At Bedtime Omar Mitchell MD   1 mg at 07/25/24 2127    sodium chloride (PF) 0.9% PF flush 3 mL  3 mL Intracatheter Q8H Lilliana Carmona MD

## 2024-07-29 NOTE — PROCEDURES
"Potassium   Date Value Ref Range Status   07/29/2024 6.6 (HH) 3.4 - 5.3 mmol/L Final   10/17/2014 4.5 3.4 - 5.3 mmol/L Final     Hemoglobin   Date Value Ref Range Status   07/25/2024 8.6 (L) 13.3 - 17.7 g/dL Final   03/27/2014 13.8 13.3 - 17.7 g/dL Final     Creatinine   Date Value Ref Range Status   07/29/2024 19.09 (H) 0.67 - 1.17 mg/dL Final   10/17/2014 1.6 (H) 0.8 - 1.5 mg/dL Final     Urea Nitrogen   Date Value Ref Range Status   07/29/2024 92.6 (H) 6.0 - 20.0 mg/dL Final   10/17/2014 20.0 5.0 - 24.0 mg/dL Final     Sodium   Date Value Ref Range Status   07/29/2024 138 135 - 145 mmol/L Final   10/17/2014 147.0 (H) 133.0 - 144.0 mmol/L Final     INR   Date Value Ref Range Status   03/27/2014 1.0  Final       Hemodialysis Treatment Note     Target weight loss: Target Weight Loss (kg): 3kg     Approximate (Net) weight removed (mL): 1197 mL     Pre-Vascular Access Status: AVF with +thrill/bruit. Site clean and dry, no s/s of infection. Accessed with 15 ga needles.400 BFR.      Dialyzer rinse: Streaked     Dialyzer: Revaclear 300     Total Blood Volume Processed (BVP): 52.7     Total dialysis (treatment) time (hours): 2 hrs 15 min of 3.5 hours prescribed.      Post-Vascular Access Status: Needles d/c'd, pressure held until hemostasis obtained; 5 minutes. Drsg applied and secured.     Medications given: Metoprolol 25 mg     Run Summary: Hepatitis status of previous patient on machine log was checked and verified ok to use with this patients hepatitis status. Consent verified and on file. Pt educated on procedure and agreeable to treatment.   K2; 1197 mL UF removed. Pt demanded treatment be stopped after writer asked pt to stop yelling and cursing. Pt stated he was on the phone and writer assured him he can be on the phone but to refrain from yelling and cursing as there were other pts in the treatment area. Pt very upset and started asking \"how are my people going to know what is being done to me if I don't tell " "them.\" Was reassured again that he can be on the phone if he likes but needed to be respectful of other pts and staff. MD notified.   A/O x 3, denies chest pain or other discomfort. Denies SOB on room air. See HD flowsheet for all data.Report given to primary RN.      Interventions: Monitor VS q 15 minutes and PRN. Goal adjustment per hemodynamics. Machine water alarm in place and functioning. Transducer pods intact and checked every 15min.      Plan: Per renal    Palma Cox RN on 7/29/2024 at 6:46 PM      "

## 2024-07-29 NOTE — CONSULTS
"Macario was soon to be going to dialysis. I introduced myself and explained the role of spiritual care alongside medical care. Macario was not interested in a visit.  \"What I have that's got me in this hospital has nothing to do with God. I don't need prayer. God can't fix it.\" He believes in God, credits God for his life. Appears to me he prefers to keep that part of him private and certainly not to share these thoughts with a stranger.    Mindful of Palliative provider Cornelio's chart note concerning Macario feeling disrespected, I sought to be kind and respectful. Unfortunately, Macario did not experience this.  Spiritual Care is available as requested. In order to respect his person, I will not visit unless invited.    JEREMY Naylor.  Palliative Care Team  "

## 2024-07-29 NOTE — PLAN OF CARE
Refusing all cares this shift. Did not allow RN to complete assessments or VS, and is refusing medications. Refusing bed alarm. Call light within reach and bed in low position. Fairly pleasant this shift but unwilling to comply or listen to any education.

## 2024-07-30 ENCOUNTER — APPOINTMENT (OUTPATIENT)
Dept: PHYSICAL THERAPY | Facility: HOSPITAL | Age: 44
DRG: 640 | End: 2024-07-30
Attending: INTERNAL MEDICINE
Payer: MEDICARE

## 2024-07-30 LAB
ANION GAP SERPL CALCULATED.3IONS-SCNC: 17 MMOL/L (ref 7–15)
BUN SERPL-MCNC: 71.3 MG/DL (ref 6–20)
CALCIUM SERPL-MCNC: 8.6 MG/DL (ref 8.8–10.4)
CHLORIDE SERPL-SCNC: 97 MMOL/L (ref 98–107)
CREAT SERPL-MCNC: 15.29 MG/DL (ref 0.67–1.17)
EGFRCR SERPLBLD CKD-EPI 2021: 4 ML/MIN/1.73M2
GLUCOSE SERPL-MCNC: 100 MG/DL (ref 70–99)
HCO3 SERPL-SCNC: 25 MMOL/L (ref 22–29)
HOLD SPECIMEN: NORMAL
POTASSIUM SERPL-SCNC: 6.4 MMOL/L (ref 3.4–5.3)
SODIUM SERPL-SCNC: 139 MMOL/L (ref 135–145)

## 2024-07-30 PROCEDURE — 999N000157 HC STATISTIC RCP TIME EA 10 MIN

## 2024-07-30 PROCEDURE — 36415 COLL VENOUS BLD VENIPUNCTURE: CPT

## 2024-07-30 PROCEDURE — 97542 WHEELCHAIR MNGMENT TRAINING: CPT | Mod: GP

## 2024-07-30 PROCEDURE — 80048 BASIC METABOLIC PNL TOTAL CA: CPT

## 2024-07-30 PROCEDURE — 97162 PT EVAL MOD COMPLEX 30 MIN: CPT | Mod: GP

## 2024-07-30 PROCEDURE — 250N000013 HC RX MED GY IP 250 OP 250 PS 637: Performed by: HOSPITALIST

## 2024-07-30 PROCEDURE — 99232 SBSQ HOSP IP/OBS MODERATE 35: CPT | Mod: GC

## 2024-07-30 PROCEDURE — 250N000013 HC RX MED GY IP 250 OP 250 PS 637

## 2024-07-30 PROCEDURE — 120N000004 HC R&B MS OVERFLOW

## 2024-07-30 PROCEDURE — 250N000009 HC RX 250: Performed by: INTERNAL MEDICINE

## 2024-07-30 PROCEDURE — 250N000013 HC RX MED GY IP 250 OP 250 PS 637: Performed by: REGISTERED NURSE

## 2024-07-30 PROCEDURE — 97530 THERAPEUTIC ACTIVITIES: CPT | Mod: GP

## 2024-07-30 PROCEDURE — 94640 AIRWAY INHALATION TREATMENT: CPT

## 2024-07-30 RX ORDER — PREGABALIN 25 MG/1
25 CAPSULE ORAL ONCE
Status: COMPLETED | OUTPATIENT
Start: 2024-07-30 | End: 2024-07-30

## 2024-07-30 RX ORDER — DEXTROSE MONOHYDRATE 25 G/50ML
25 INJECTION, SOLUTION INTRAVENOUS ONCE
Status: COMPLETED | OUTPATIENT
Start: 2024-07-30 | End: 2024-07-30

## 2024-07-30 RX ORDER — ALBUTEROL SULFATE 5 MG/ML
10 SOLUTION RESPIRATORY (INHALATION) ONCE
Status: COMPLETED | OUTPATIENT
Start: 2024-07-30 | End: 2024-07-30

## 2024-07-30 RX ORDER — DEXTROSE MONOHYDRATE 25 G/50ML
25-50 INJECTION, SOLUTION INTRAVENOUS
Status: DISCONTINUED | OUTPATIENT
Start: 2024-07-30 | End: 2024-08-02 | Stop reason: HOSPADM

## 2024-07-30 RX ORDER — NICOTINE POLACRILEX 4 MG
15-30 LOZENGE BUCCAL
Status: DISCONTINUED | OUTPATIENT
Start: 2024-07-30 | End: 2024-08-02 | Stop reason: HOSPADM

## 2024-07-30 RX ADMIN — AMLODIPINE BESYLATE 10 MG: 5 TABLET ORAL at 11:23

## 2024-07-30 RX ADMIN — RISPERIDONE 0.5 MG: 0.5 TABLET ORAL at 11:22

## 2024-07-30 RX ADMIN — Medication 1 TABLET: at 11:22

## 2024-07-30 RX ADMIN — PREGABALIN 25 MG: 25 CAPSULE ORAL at 22:26

## 2024-07-30 RX ADMIN — METOPROLOL SUCCINATE 25 MG: 25 TABLET, EXTENDED RELEASE ORAL at 11:23

## 2024-07-30 RX ADMIN — CALCITRIOL CAPSULES 0.25 MCG 0.25 MCG: 0.25 CAPSULE ORAL at 11:22

## 2024-07-30 RX ADMIN — PREGABALIN 25 MG: 25 CAPSULE ORAL at 11:22

## 2024-07-30 RX ADMIN — ALBUTEROL SULFATE 10 MG: 2.5 SOLUTION RESPIRATORY (INHALATION) at 18:37

## 2024-07-30 ASSESSMENT — ACTIVITIES OF DAILY LIVING (ADL)
ADLS_ACUITY_SCORE: 26

## 2024-07-30 NOTE — PROGRESS NOTES
Care Management Follow Up    Length of Stay (days): 7    Expected Discharge Date: 07/31/2024    Anticipated Discharge Plan:  Transitional Care    Transportation: Anticipate Wheelchair. Transportation costs discussed? No    PT Recommendations: Transitional Care Facility  OT Recommendations:        Barriers to Discharge:  obra level 2 screening     Prior Living Situation: residential facility with facility resident (Pt was living with brother, went to TCU at [facility][1], then wanted to be there for LTC until able to return to brothers home)     Patient/Spokesperson Updated: Yes. Who? Patient     Additional Information:  See below     Kimi England RN          Spoke to Jose at [facility]. Says he still needs to see the obra level 2 assessment and has not received it yet. Also wondering if we are still awaiting DHS approval?    Jose says they will try to get patient a private room but cannot guarantee it, as private rooms are reserved for patients with medical necessity and they currently have covid in the building.     Message left for Veronica with [facility] requesting call back [phone]    10:00 AM  Spoke to Veronica. She will email Jose what she has and await his response.     10:48 AM  Veronica emailed Jose screen shots of the obra level 2. Jose responded and says he needs a hard copy, not a screen shot.     1:30 PM  Received a message from Jose that they have a private room for patient. Still need the level 2     Guillermina is out of the office until tomorrow.     Jose is going to call previous TCU to see if they would send a copy     2:17 PM  Met with patient and nurse Linden. Updated patient that TCU has a private room. Patient is very happy about that. Told him about the delay with the [facility]. Patient verbalized understanding. Patient states he needs his scooter before discharge. This was discussed with previous  who encouraged patient to work with the  at the  facility.

## 2024-07-30 NOTE — PLAN OF CARE
Problem: Adult Inpatient Plan of Care  Goal: Optimal Comfort and Wellbeing  Outcome: Progressing  Intervention: Monitor Pain and Promote Comfort  Recent Flowsheet Documentation  Taken 7/29/2024 1900 by Ninfa Peña RN  Pain Management Interventions: rest     Problem: Psychotic Signs/Symptoms  Goal: Improved Behavioral Control (Psychotic Signs/Symptoms)  Outcome: Not Progressing     Problem: Psychotic Signs/Symptoms  Goal: Increased Participation and Engagement (Psychotic Signs/Symptoms)  Outcome: Not Progressing     Problem: Chronic Kidney Disease  Goal: Electrolyte Balance  Outcome: Not Progressing     Problem: Chronic Kidney Disease  Goal: Optimal Coping with Chronic Illness  Outcome: Progressing    Pt down to dialysis at shift change. Returned to unit around 1830. Pt completed 2.25 of 3.5 hrs of prescribed dialysis. Pt ate 1/2 tray in dialysis but remaining was spilt. Pt given sandwich on return to unit. T-pump ordered for BLE pain, per stores none available at the moment. Pt decline shower d/t shared use of shower chairs. Warm wipes and hygiene products provided for pt use.     Plan: possible discharge to [facility] if private room available per pt request (refer to CM note)

## 2024-07-30 NOTE — PLAN OF CARE
"  Patient is A/O, denied pain this shift.  Patient has refused dialysis after repeated attempts by Renal provider.  Patient conveyed thoughts and plans of suicide because he feels he is not being listened to, has restrictions on his diet, and feels his mental health is not being taken seriously with Renal provider and RN in the room.  Currently staying with patient until 1:1 can be found.  Patient is rocking in his recliner.    Original plan was to discharge to TCU tomorrow.    Tele Med Surg    Problem: Adult Inpatient Plan of Care  Goal: Plan of Care Review  Description: The Plan of Care Review/Shift note should be completed every shift.  The Outcome Evaluation is a brief statement about your assessment that the patient is improving, declining, or no change.  This information will be displayed automatically on your shift  note.  Outcome: Not Progressing  Goal: Patient-Specific Goal (Individualized)  Description: You can add care plan individualizations to a care plan. Examples of Individualization might be:  \"Parent requests to be called daily at 9am for status\", \"I have a hard time hearing out of my right ear\", or \"Do not touch me to wake me up as it startles  me\".  Outcome: Not Progressing  Goal: Absence of Hospital-Acquired Illness or Injury  Outcome: Not Progressing  Intervention: Identify and Manage Fall Risk  Recent Flowsheet Documentation  Taken 7/30/2024 1100 by Linden Bone, RN  Safety Promotion/Fall Prevention:   clutter free environment maintained   increased rounding and observation   lighting adjusted   nonskid shoes/slippers when out of bed   patient and family education   room near nurse's station   safety round/check completed  Intervention: Prevent Skin Injury  Recent Flowsheet Documentation  Taken 7/30/2024 0956 by Linden Bone, RN  Body Position: (repositions self in bed) other (see comments)  Taken 7/30/2024 0756 by Linden Bone, RN  Body Position: refuses positioning  Intervention: " Prevent Infection  Recent Flowsheet Documentation  Taken 7/30/2024 1100 by Linden Bone RN  Infection Prevention: equipment surfaces disinfected  Goal: Optimal Comfort and Wellbeing  Outcome: Not Progressing  Intervention: Monitor Pain and Promote Comfort  Recent Flowsheet Documentation  Taken 7/30/2024 1047 by Linden Bone, RN  Pain Management Interventions:   medication (see MAR)   MD notified (comment)   care clustered  Goal: Readiness for Transition of Care  Outcome: Not Progressing     Problem: Fall Injury Risk  Goal: Absence of Fall and Fall-Related Injury  Outcome: Not Progressing  Intervention: Identify and Manage Contributors  Recent Flowsheet Documentation  Taken 7/30/2024 1100 by Linden Bone RN  Medication Review/Management: medications reviewed  Intervention: Promote Injury-Free Environment  Recent Flowsheet Documentation  Taken 7/30/2024 1100 by Linden Bone RN  Safety Promotion/Fall Prevention:   clutter free environment maintained   increased rounding and observation   lighting adjusted   nonskid shoes/slippers when out of bed   patient and family education   room near nurse's station   safety round/check completed     Problem: Comorbidity Management  Goal: Blood Pressure in Desired Range  Outcome: Not Progressing  Intervention: Maintain Blood Pressure Management  Recent Flowsheet Documentation  Taken 7/30/2024 1100 by Linden Bone, RN  Medication Review/Management: medications reviewed     Problem: Pain Acute  Goal: Optimal Pain Control and Function  Outcome: Not Progressing  Intervention: Develop Pain Management Plan  Recent Flowsheet Documentation  Taken 7/30/2024 1047 by Linden Bone RN  Pain Management Interventions:   medication (see MAR)   MD notified (comment)   care clustered  Intervention: Prevent or Manage Pain  Recent Flowsheet Documentation  Taken 7/30/2024 1100 by iLnden Bone RN  Medication Review/Management: medications reviewed  Intervention: Optimize  Psychosocial Wellbeing  Recent Flowsheet Documentation  Taken 7/30/2024 1100 by Linden Bone RN  Supportive Measures: verbalization of feelings encouraged     Problem: Heart Failure  Goal: Optimal Coping  Outcome: Not Progressing  Intervention: Support Psychosocial Response  Recent Flowsheet Documentation  Taken 7/30/2024 1100 by Linden Bone RN  Supportive Measures: verbalization of feelings encouraged  Life Transition/Adjustment: decision-making facilitated  Goal: Optimal Cardiac Output  Outcome: Not Progressing  Intervention: Optimize Cardiac Output  Recent Flowsheet Documentation  Taken 7/30/2024 1100 by Linden Bone RN  Environmental Support: calm environment promoted  Goal: Stable Heart Rate and Rhythm  Outcome: Not Progressing  Goal: Optimal Functional Ability  Outcome: Not Progressing  Intervention: Optimize Functional Ability  Recent Flowsheet Documentation  Taken 7/30/2024 1100 by Linden Bone RN  Activity Management: activity adjusted per tolerance  Taken 7/30/2024 1047 by Linden Bone RN  Activity Management: activity adjusted per tolerance  Taken 7/30/2024 0945 by Linden Bone RN  Activity Management: activity adjusted per tolerance  Goal: Fluid and Electrolyte Balance  Outcome: Not Progressing  Goal: Improved Oral Intake  Outcome: Not Progressing  Goal: Effective Oxygenation and Ventilation  Outcome: Not Progressing  Intervention: Promote Airway Secretion Clearance  Recent Flowsheet Documentation  Taken 7/30/2024 1100 by Linden Bone RN  Cough And Deep Breathing: done independently per patient  Activity Management: activity adjusted per tolerance  Taken 7/30/2024 1047 by Linden Bone, RN  Activity Management: activity adjusted per tolerance  Taken 7/30/2024 0945 by Linden Bone, RN  Activity Management: activity adjusted per tolerance  Goal: Effective Breathing Pattern During Sleep  Outcome: Not Progressing  Intervention: Monitor and Manage Obstructive Sleep  Apnea  Recent Flowsheet Documentation  Taken 7/30/2024 1100 by Linden Bone RN  Medication Review/Management: medications reviewed     Problem: Psychotic Signs/Symptoms  Goal: Improved Behavioral Control (Psychotic Signs/Symptoms)  Outcome: Not Progressing  Goal: Optimal Cognitive Function (Psychotic Signs/Symptoms)  Outcome: Not Progressing  Goal: Increased Participation and Engagement (Psychotic Signs/Symptoms)  Outcome: Not Progressing  Intervention: Facilitate Participation and Engagement  Recent Flowsheet Documentation  Taken 7/30/2024 1100 by Linden Bone RN  Supportive Measures: verbalization of feelings encouraged  Goal: Improved Mood Symptoms (Psychotic Signs/Symptoms)  Outcome: Not Progressing  Intervention: Optimize Emotion and Mood  Recent Flowsheet Documentation  Taken 7/30/2024 1100 by Linden Bone RN  Supportive Measures: verbalization of feelings encouraged  Goal: Improved Psychomotor Symptoms (Psychotic Signs/Symptoms)  Outcome: Not Progressing  Goal: Decreased Sensory Symptoms (Psychotic Signs/Symptoms)  Outcome: Not Progressing  Goal: Improved Sleep (Psychotic Signs/Symptoms)  Outcome: Not Progressing  Goal: Enhanced Social, Occupational or Functional Skills (Psychotic Signs/Symptoms)  Outcome: Not Progressing  Intervention: Promote Social, Occupational and Functional Ability  Recent Flowsheet Documentation  Taken 7/30/2024 1100 by Linden Bone RN  Social Functional Ability Promotion: self-expression encouraged     Problem: Chronic Kidney Disease  Goal: Optimal Coping with Chronic Illness  Outcome: Not Progressing  Intervention: Support Psychosocial Response  Recent Flowsheet Documentation  Taken 7/30/2024 1100 by Linden Bone RN  Supportive Measures: verbalization of feelings encouraged  Goal: Electrolyte Balance  Outcome: Not Progressing  Goal: Fluid Balance  Outcome: Not Progressing  Goal: Optimal Functional Ability  Outcome: Not Progressing  Intervention: Optimize  Functional Ability  Recent Flowsheet Documentation  Taken 7/30/2024 1100 by Linden Bone, RN  Activity Management: activity adjusted per tolerance  Environment Familiarity/Consistency: daily routine followed  Taken 7/30/2024 1047 by Linden Bone RN  Activity Management: activity adjusted per tolerance  Taken 7/30/2024 0945 by Linden Bone, RN  Activity Management: activity adjusted per tolerance  Goal: Absence of Anemia Signs and Symptoms  Outcome: Not Progressing  Intervention: Manage Signs of Anemia and Bleeding  Recent Flowsheet Documentation  Taken 7/30/2024 1100 by Linden Bone, RN  Environmental Support: calm environment promoted  Goal: Optimal Oral Intake  Outcome: Not Progressing  Goal: Acceptable Pain Control  Outcome: Not Progressing  Intervention: Prevent or Manage Pain  Recent Flowsheet Documentation  Taken 7/30/2024 1047 by Linden Bone RN  Pain Management Interventions:   medication (see MAR)   MD notified (comment)   care clustered  Goal: Minimize Renal Failure Effects  Outcome: Not Progressing  Intervention: Monitor and Support Renal Function  Recent Flowsheet Documentation  Taken 7/30/2024 1100 by Linden Bone RN  Medication Review/Management: medications reviewed    Kendall Bone RN

## 2024-07-30 NOTE — PROGRESS NOTES
RENAL PROGRESS NOTE - [facility]        CC: follow up ESRD    Subjective: HD yesterday but did not complete full run. See HD nurse note. Patient decline to run today in the dialysis unit and informed patietn do not have staff to run in room at this time. Patient denies sob.     On second visit to see patient with potassium being elevated. Patient refusing dialysis at this time. Seen with nurse. He will not go down for HD or get in room by night nurse. Patient also reports suicidal thoughts at this time and reports he has consider trying to open the window to jump out or smash his face into the window.     Discussed risk of hyperkalemia and patient is aware.     Discussed with Dr Cuenca regarding suicidal ideation.       ASSESSMENT:   45 yo male with ESRD on dialysis MWF at [facility] using left arm AVF, HTN, CAD, cognitive impairment admit with severe hyperkalemia, presented with complaints of chest pain, dizziness, severe hyperkalemia of 8.1        PLAN:  ESRD - on dialysis MWF at [facility] using left arm AVF.   History of very poor compliance with dialysis. His last dialysis PTA was here on 7/17.  Has ongoing conflicts with his nursing home and transport, he has had extensive assistance with social workers at dialysis and at [facility] systems with ongoing poor dialyss compliance, unfortunately has not improved despite many attempts at optimizing his living situation, transportation, etc.  Had emergent dialysis 7/23 due to severe hyperkalemia  Patient refusing to run dialysis today at this time.   7/29resumed to run earlier in the day. Agree later in the afternoon. Run shortened. See nursing note.       Hyperkalemia - due to missed dialysis  -Was on regular diet, changed to renal diet 7/28   -HD as noted above.      3. HTN - remains high, hypervolemia from missed dialysis contributing  -Bp much better with dialysis, monitoring  -continue home meds     4. Cognitive impairment - makes  "management of his multitude of medical problems very difficult, he seems to have frequent conflicts with transportation and tcu/nursing homes in past, has left [facility]. Now unhappy with current nursing home, expect finding an alternative will be very difficult with his medical complexity and all of the above issues.  Appreciate social work assistance   -Need ongoing psych input. Now reports suicidal ideation.      5. Anemia - severe, due to ESRD and noncompliance with dialysis so not on consistent DEMAR  - iron stores adequate  -will dose DEMAR here q week and resume with outpt dialysis as able      Ray Boucher,   [facility]    Objective    PHYSICAL EXAM  BP (!) 161/87 (BP Location: Right arm, Patient Position: Prone)   Pulse 92   Temp 97.6  F (36.4  C) (Temporal)   Resp 20   Ht 1.981 m (6' 6\")   Wt 108.4 kg (239 lb)   SpO2 98%   BMI 27.62 kg/m    I/O last 3 completed shifts:  In: 0   Out: 1372 [Urine:175; Other:1197]  Wt Readings from Last 3 Encounters:   07/23/24 108.4 kg (239 lb)   07/17/24 106 kg (233 lb 9.6 oz)   01/16/24 127 kg (280 lb)       GENERAL: Alert and interactive.   HEENT:normocephalic, atraumatic  CARDIOVASCULAR: rr, no rub,  1+ edema  PULMONARY:cta ant. No cyanosis  GASTROINTESTINAL: soft, nt/nd, obese  MSK: diffuse muscle atrophy, warm  NEURO: Alert, no gross focal findings  PSYCHIATRIC: flat affect  SKIN: dry skin legs    LABORATORIES  Recent Labs   Lab 07/30/24  1242 07/29/24  1249 07/28/24  1216 07/25/24  1218    138 139 140   POTASSIUM 6.4* 6.6* 6.4* 6.0*   CHLORIDE 97* 99 100 100   CO2 25 23 23 26   BUN 71.3* 92.6* 74.8* 61.7*   CR 15.29* 19.09* 15.44* 12.83*   GFRESTIMATED 4* 3* 4* 4*   LOVELY 8.6* 8.8 9.0 9.3       Recent Labs   Lab 07/25/24  1218   WBC 4.9   HGB 8.6*   HCT 27.6*   MCV 87             MEDICATIONS  Current Facility-Administered Medications   Medication Dose Route Frequency Provider Last Rate Last Admin    amLODIPine (NORVASC) tablet 10 mg  10 mg " Oral Daily Omar Mitchell MD   10 mg at 07/30/24 1123    calcitRIOL (ROCALTROL) capsule 0.25 mcg  0.25 mcg Oral Daily Omar Mitchell MD   0.25 mcg at 07/30/24 1122    calcium acetate (PHOSLO) capsule 1,334 mg  1,334 mg Oral TID w/meals Omar Mitchell MD        epoetin kiana-epbx (RETACRIT) injection 40,000 Units  40,000 Units Subcutaneous Weekly Carol Louise MD        metoprolol succinate ER (TOPROL XL) 24 hr tablet 25 mg  25 mg Oral BID Omar Mitchell MD   25 mg at 07/30/24 1123    multivitamin RENAL (RENAVITE RX/NEPHROVITE) tablet 1 tablet  1 tablet Oral Daily Omar Mitchell MD   1 tablet at 07/30/24 1122    pregabalin (LYRICA) capsule 25 mg  25 mg Oral Daily Omar Mitchell MD   25 mg at 07/30/24 1122    risperiDONE (risperDAL) tablet 0.5 mg  0.5 mg Oral Daily Stephanie Solares NP   0.5 mg at 07/30/24 1122    risperiDONE (risperDAL) tablet 1 mg  1 mg Oral At Bedtime Omar Mitchell MD   1 mg at 07/25/24 2127    sodium chloride (PF) 0.9% PF flush 3 mL  3 mL Intracatheter Q8H Lilliana Carmona MD

## 2024-07-30 NOTE — PLAN OF CARE
Problem: Psychotic Signs/Symptoms  Goal: Improved Behavioral Control (Psychotic Signs/Symptoms)  Outcome: Not Progressing  Goal: Increased Participation and Engagement (Psychotic Signs/Symptoms)  Outcome: Not Progressing  Goal: Improved Mood Symptoms (Psychotic Signs/Symptoms)  Outcome: Not Progressing     Problem: Chronic Kidney Disease  Goal: Electrolyte Balance  Outcome: Not Progressing   Goal Outcome Evaluation:  Patient AOX4. Agreed to assessment and vitals except temp at 2300, refused all cares following. Originally requested lyrica, which pt previously refused earlier in the day. New order placed by pharmacy to allow administration of med. Patient then refused medication again and any further cares after.

## 2024-07-30 NOTE — PROGRESS NOTES
CLINICAL NUTRITION SERVICES - ASSESSMENT NOTE     Nutrition Prescription    RECOMMENDATIONS FOR MDs/PROVIDERS TO ORDER:      Malnutrition Status:    Not noted    Recommendations already ordered by Registered Dietitian (RD):      Future/Additional Recommendations:       REASON FOR ASSESSMENT  Macario Delatorre is a/an 44 year old male assessed by the dietitian for LOS    Admitted with hyperkalemia, missed HD runs.  History of ESRD on HD, chronic gout, chronic pain, CAD, intellectual disability.    NUTRITION HISTORY  Met patient.  Patient reports that his body does not like the renal diet and he will eat the turkey sandwiches that are on the unit.  Later in the visit patient stated that he has stopped eating.  RN reports that patient ate lunch today.    CURRENT NUTRITION ORDERS  Diet: Renal  Per cart eating % at meals.    LABS  CMP  Recent Labs   Lab 07/30/24  1242 07/29/24  1249 07/28/24  1216 07/25/24  1218    138 139 140   POTASSIUM 6.4* 6.6* 6.4* 6.0*   * 97 107* 98   BUN 71.3* 92.6* 74.8* 61.7*   CR 15.29* 19.09* 15.44* 12.83*   LOVELY 8.6* 8.8 9.0 9.3     Labs reviewed    MEDICATIONS  Current Facility-Administered Medications   Medication Dose Route Frequency Provider Last Rate Last Admin    amLODIPine (NORVASC) tablet 10 mg  10 mg Oral Daily Omar Mitchell MD   10 mg at 07/30/24 1123    calcitRIOL (ROCALTROL) capsule 0.25 mcg  0.25 mcg Oral Daily Omar Mitchell MD   0.25 mcg at 07/30/24 1122    calcium acetate (PHOSLO) capsule 1,334 mg  1,334 mg Oral TID w/meals Omar Mitchell MD        epoetin kiana-epbx (RETACRIT) injection 40,000 Units  40,000 Units Subcutaneous Weekly Carol Louise MD        metoprolol succinate ER (TOPROL XL) 24 hr tablet 25 mg  25 mg Oral BID Omar Mitchell MD   25 mg at 07/30/24 1123    multivitamin RENAL (RENAVITE RX/NEPHROVITE) tablet 1 tablet  1 tablet Oral Daily Omar Mitchell MD   1 tablet at 07/30/24 1122    pregabalin (LYRICA) capsule 25 mg  25  mg Oral Daily Omar Mitchell MD   25 mg at 07/30/24 1122    risperiDONE (risperDAL) tablet 0.5 mg  0.5 mg Oral Daily Stephanie Solares NP   0.5 mg at 07/30/24 1122    risperiDONE (risperDAL) tablet 1 mg  1 mg Oral At Bedtime Omar Mitchell MD   1 mg at 07/25/24 2127    sodium chloride (PF) 0.9% PF flush 3 mL  3 mL Intracatheter Q8H Lilliana Carmona MD        sodium zirconium cyclosilicate (LOKELMA) packet 10 g  10 g Oral TID Ray Boucher DO          Current Facility-Administered Medications   Medication Dose Route Frequency Provider Last Rate Last Admin      Current Facility-Administered Medications   Medication Dose Route Frequency Provider Last Rate Last Admin    calcium carbonate (TUMS) chewable tablet 1,000 mg  1,000 mg Oral 4x Daily PRN Omar Mitchell MD        glucose gel 15-30 g  15-30 g Oral Q15 Min PRN Omar Mitchell MD        Or    dextrose 50 % injection 25-50 mL  25-50 mL Intravenous Q15 Min PRN Omar Mitchell MD        Or    glucagon injection 1 mg  1 mg Subcutaneous Q15 Min PRN Omar Mitchell MD        lidocaine (LMX4) cream   Topical Q1H PRN Omar Mitchell MD        lidocaine 1 % 0.1-1 mL  0.1-1 mL Other Q1H PRN Omar Mitchell MD        naloxone (NARCAN) injection 0.2 mg  0.2 mg Intravenous Q2 Min PRN Omar Mitchell MD        Or    naloxone (NARCAN) injection 0.4 mg  0.4 mg Intravenous Q2 Min PRN Omar Mitchell MD        Or    naloxone (NARCAN) injection 0.2 mg  0.2 mg Intramuscular Q2 Min PRN Omar Mitchell MD        Or    naloxone (NARCAN) injection 0.4 mg  0.4 mg Intramuscular Q2 Min PRN Omar Mitchell MD        ondansetron (ZOFRAN ODT) ODT tab 4 mg  4 mg Oral Q6H PRN Omar Mitchell MD        Or    ondansetron (ZOFRAN) injection 4 mg  4 mg Intravenous Q6H PRN Omar Mitchell MD        prochlorperazine (COMPAZINE) injection 5 mg  5 mg Intravenous Q6H PRN Omar Mitchell MD        Or    prochlorperazine (COMPAZINE) tablet 5 mg  5 mg Oral Q6H PRN Stephen  "Omar NAVARRO MD   5 mg at 07/25/24 2126    Or    prochlorperazine (COMPAZINE) suppository 25 mg  25 mg Rectal Q12H PRN Omar Mitchell MD        senna-docusate (SENOKOT-S/PERICOLACE) 8.6-50 MG per tablet 1 tablet  1 tablet Oral BID PRN Omar Mitchell MD        Or    senna-docusate (SENOKOT-S/PERICOLACE) 8.6-50 MG per tablet 2 tablet  2 tablet Oral BID PRN Omar Mitchell MD        sodium chloride (PF) 0.9% PF flush 3 mL  3 mL Intracatheter q1 min prn Lilliana Carmona MD        sodium chloride 0.9% BOLUS 100-150 mL  100-150 mL Intravenous Q15 Min PRN Cecelia Chandler APRN CNP          Medications reviewed    ANTHROPOMETRICS  Height: 198.1 cm (6' 6\")  Most Recent Weight:  (pt refused)    BMI: Overweight BMI 25-29.9  Weight History:   07/17/24 : 106 kg (233 lb 9.6 oz)   04/01/24 : 110.5 kg (243 lb 9.7 oz)   02/11/24 : 90.7 kg (200 lb)   01/23/24 : 105.7 kg (233 lb)   01/16/24 : 127 kg (280 lb)-outlier, suspect stated wt.  11/28/23 : 104.4 kg (230 lb 2.6 oz)   10/29/23 : 103.2 kg (227 lb 8 oz)     Dosing Weight: 108.4 kg    ASSESSED NUTRITION NEEDS  Estimated Energy Needs: 2170+ kcals/day (20+ kcals/kg)  Justification: Overweight  Estimated Protein Needs: 87 grams protein/day (0.8  grams of pro/kg)  Justification: CKD  Estimated Fluid Needs: 1800 mL/day (or per MD pending fluid status)   Justification: CKD, oliguric    PHYSICAL FINDINGS  See malnutrition section below.  oliguric   Last BM 7/25/24.    MALNUTRITION:  % Weight Loss:  None noted  % Intake:  No decreased intake noted though patient states that he has now stopped eating  Subcutaneous Fat Loss:  None observed  Muscle Loss:  None observed  Fluid Retention:  None noted    Malnutrition Diagnosis: Patient does not meet two of the above criteria necessary for diagnosing malnutrition    NUTRITION DIAGNOSIS  Altered nutrition-related laboratory values related to renal failure as evidenced by elevated potassium and renal labs.  "     INTERVENTIONS  Implementation  Nutrition Education: Provided renal diet menu item list, these foods are on the regular menu.   Patient declines diet education.     Continue diet as ordered per MD     Goals  Potassium to WNL  Patient to consume % of nutritionally adequate meals three times per day, or the equivalent with supplements/snacks.     Monitoring/Evaluation  Progress toward goals will be monitored and evaluated per protocol.

## 2024-07-30 NOTE — PROGRESS NOTES
[facility]    Progress Note - Hospitalist Service       Date of Admission:  7/22/2024    Assessment & Plan   Macario Delatorre is a 44 year old male with PMH of ESR on M/W/F HD, chronic pain syndrome, paroxysmal SVT, chronic gout, hypertension, hyperlipidemia, CAD, intellectual disability who was admitted on 7/22/24 with severe hyperkalemia with possible presyncope in setting of missed dialysis. Improving with dialysis. Tentatively, planning for discharge to TCU pending [facility] assessment     Hyperkalemia, severe  ESRD on MWF dialysis  K of 8.1 on presentation to ED with missed dialysis. Follows with Dr. Lua/Nephrology. Patient has history of poor compliance with dialysis due to conflicts with living facility and transportation. Last outpt dialysis was 7/17. Had emergent dialysis on 7/23 due to severe hyperkalemia.   -Nephrology following   -return to MWF dialysis schedule  - discussed dangers of missed dialysis including death discussed  - guarded prognosis due to poor compliance with dialysis and life threatening hyperkalemia  - low K diet   - had been refusing all lab draws and cares overnight  - Palliative following              - wants to continue life prolonging care with hemodialysis  - appears to have appreciation of his condition and can clearly communicate his wishes  - SW/CM consulted              - accepted at [facility]              - tentatively plan for discharge on Tuesday 7/20 pending HD and SW  - working on [employer] mobility application to assist in transportation to dialysis, paperwork completed and given to social work      Chest pain, resolved  Thought to be secondary to high potassium and hypervolemia from missed dialysis. Initial troponin 72, stable from prior though in setting of ESRD. EKG with mild T wave prominence in leads II and III. Resolved with emergent dialysis and electrolyte correction.      Cognitive impairment  History of mental health  "problems with hallucinations  Bipolar disorder  Agitation   Patient refusing cares and can easily get agitation. Patient reports history of bipolar and schizophrenia. Does not endorse routine psychiatric follow up or regular medication use. In [facility] system was started on risperidone 1 mg at bedtime though has not taken consistently. Stated he can escalate quickly if upset. 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 Denies any SI or HI.   - Psychiatric consult, appreciate input especially regarding capacity              - Psych evaluated, patient felt to have capacity   - Continue Risperdal 1 mg at at bedtime; additional 0.5 mg risperdal in am starting 7/27     Chronic anemia, normocytic   Baseline Hgb 9-10. Likely related to ESRD/chronic disease. Iron stores adequate.   - Continue DEMAR weekly; resume with outpatient dialysis  - Daily CBC (patient frequently refusing labs)      Chronic pain syndrome, stable    Neuropathy, reported to be diabetic  - Lyrica 25 mg daily     Hypertension, uncontrolled  Continues to be hypertensive during hospitalization likely in setting of missed dialysis session.   - Continue PTA amlodipine 10 every day and metoprolol 25 BID        Diet: Renal Diet (dialysis)    DVT Prophylaxis: Pneumatic Compression Devices  Raphael Catheter: Not present  Fluids: PO  Lines: None     Cardiac Monitoring: None  Code Status: Full Code      Clinically Significant Risk Factors     # Hyperkalemia: Highest K = 6.6 mmol/L in last 2 days, will monitor as appropriate                      # Overweight: Estimated body mass index is 27.62 kg/m  as calculated from the following:    Height as of this encounter: 1.981 m (6' 6\").    Weight as of this encounter: 108.4 kg (239 lb).      # Financial/Environmental Concerns:           Disposition Plan      Expected Discharge Date: 07/31/2024    Discharge Delays: *Medically Ready for Discharge  Destination: inpatient " rehabilitation facility;long-term care facility  Discharge Comments: 7/29Going to [facility] and rehab.Wants private room. Discharge CXL'd for today.        The patient's care was discussed with the Attending Physician, Dr. Lemus .    Kristyn Cuenca MD  Hospitalist Service  [facility]  Securely message with [employer] (more info)  Text page via [employer] Paging/Directory   ______________________________________________________________________    Interval History   Underwent HD last night with limited run due to aggravation. 1.2L off. Refused labs overnight, overall though is feeling ok. Working on discharge planning.    Physical Exam   Vital Signs: Temp: 97.6  F (36.4  C) Temp src: Temporal BP: (!) 172/102 Pulse: 84   Resp: 22 SpO2: 98 % O2 Device: None (Room air)    Weight: 239 lbs 0 oz    General: Alert, no acute distress  Skin: clean, dry, and intact  HEENT: normocephalic, atraumatic, spontaneous eye movement, no injection or icterus, ears and nose normal, no neck masses  Resp: normal work of breathing, no wheezing  Cardio: RRR, S1 and S2 present  Abdomen: nondistended, no masses  Extremities: no erythema, no edema  Psych: calm today, rational conversation      Medical Decision Making   Please see A&P for additional details of medical decision making.      Data   ------------------------- PAST 24 HR DATA REVIEWED -----------------------------------------------    I have personally reviewed the following data over the past 24 hrs:    N/A  \   N/A   / N/A     138 99 92.6 (H) /  97   6.6 (HH) 23 19.09 (H) \       Imaging results reviewed over the past 24 hrs:   No results found for this or any previous visit (from the past 24 hour(s)).

## 2024-07-30 NOTE — PROVIDER NOTIFICATION
Spoke with nephrology about patient refusing IV and therefore declining hyperkalemia management protocol using IV insulin.  Also updated about patient declining Lokelma.  Two 10 mg albuterol nebulizers ordered.  No extra labs added to current plan due to patient declining.

## 2024-07-30 NOTE — PROGRESS NOTES
Physical Therapy        07/30/24 1330   Appointment Info   Signing Clinician's Name / Credentials (PT) Shannan Muñoz DPT   Living Environment   People in Home sibling(s)   Current Living Arrangements house   Home Accessibility stairs to enter home   Number of Stairs, Main Entrance 1   Living Environment Comments currently unhoused, plan is brother's home when able   Self-Care   Activity/Exercise/Self-Care Comment Manual and electric w/c, electric scooter. He reports he hasn't walked in years, but transfers independently.   General Information   Onset of Illness/Injury or Date of Surgery 07/22/24   Referring Physician Jose Richards MD   Patient/Family Therapy Goals Statement (PT) d/c to TCU   Pertinent History of Current Problem (include personal factors and/or comorbidities that impact the POC) 44 year old male with PMH of ESR on M/W/F HD, chronic pain syndrome, paroxysmal SVT, chronic gout, hypertension, hyperlipidemia, CAD, intellectual disability who was admitted on 7/22/24 with severe hyperkalemia with possible presyncope in setting of missed dialysis.   Existing Precautions/Restrictions fall   Cognition   Cognitive Status Comments currently oriented, pleasant, cooperative   Pain Assessment   Patient Currently in Pain No   Range of Motion (ROM)   Range of Motion ROM is WFL   Strength (Manual Muscle Testing)   Strength (Manual Muscle Testing) Deficits observed during functional mobility   Strength Comments visible muscle atrophy of BLE   Bed Mobility   Comment, (Bed Mobility) SBA supine to sit   Transfers   Comment, (Transfers) CGA direct pivot transfer bed to chair to right, downhill   Gait/Stairs (Locomotion)   Comment, (Gait/Stairs) 2 steps from w/c to chair, no AD (placing hands on furniture for balance)   Balance   Balance other (describe)   Balance Comments recommend further assessment   Sensory Examination   Sensory Perception patient reports no sensory changes   Coordination   Coordination no deficits  were identified   Clinical Impression   Criteria for Skilled Therapeutic Intervention Yes, treatment indicated   PT Diagnosis (PT) difficult transfers   Influenced by the following impairments weakness   Functional limitations due to impairments decreased household mobility   Clinical Presentation (PT Evaluation Complexity) stable   Clinical Presentation Rationale presents as medically diagnosed   Clinical Decision Making (Complexity) moderate complexity   Planned Therapy Interventions (PT) bed mobility training;balance training;gait training;home exercise program;motor coordination training;manual therapy techniques;neuromuscular re-education;patient/family education;orthotic fitting/training;postural re-education;stair training;strengthening;transfer training;progressive activity/exercise;wheelchair management/propulsion training   PT Total Evaluation Time   PT Eval, Moderate Complexity Minutes (88868) 10   Physical Therapy Goals   PT Frequency 4x/week   PT Predicted Duration/Target Date for Goal Attainment 08/06/24   PT Goals Transfers;Gait   PT: Transfers Supervision/stand-by assist;Sit to/from stand;Bed to/from chair   PT: Gait Minimal assist;Assistive device;10 feet   Therapeutic Activity   Therapeutic Activities: dynamic activities to improve functional performance Minutes (90266) 8   Symptoms Noted During/After Treatment None   Treatment Detail/Skilled Intervention eval completed. additional direct pivot transfer from w/c to bed, and back to w/c with armrest removed. sit<>stand with HHA, CGA; stand-pivot transfer to left with armrest, CGA. pt states it feels good to stand all the way upright   Wheelchair Managment/Training   Wheelchair Mgmt/Training Minutes (87030) 10   Treatment Detail/Skilled Intervention 350' with manual w/c. pt primarily pushes with arms, occasionally pulls with feet as well. Able to maneuver within room and perform U-turns with SBA and minimal cues.   Symptoms Noted During/After  Treatment Fatigue   PT Discharge Planning   PT Plan to clinic: transfers, standing misael, w/c mobility   PT Discharge Recommendation (DC Rec) Transitional Care Facility   PT Rationale for DC Rec ultimately pt would be best served by assisted living facility or group home. Currently participating well with therapy, and would benefit from TCU   PT Brief overview of current status SBA-CGA for transfers and w/c mobility   Total Session Time   Timed Code Treatment Minutes 18         Shannan Muñoz, DPT 7/30/2024

## 2024-07-30 NOTE — SIGNIFICANT EVENT
"Significant Event Note    Time of event: 4:09 PM July 30, 2024    Description of event:  Paged regarding patient expressing suicidal ideation  Patient has known bipolar and schizoaffective disorder with cognitive impairment, has ESRD on HD MWF but refusing dialysis due to conflicts with dialysis staff    Patient earlier today declining his HD due to conflicts with staff, see note from yesterday's HD run  In this context, patient has been saying he understands that he will die without HD  However, given that we are still unable to place him via SW's note and the need for [facility] to fax over his OBRA II assessment from his previous admission, he has remained hospitalized  Seems incredibly upset with this  Earlier today, stated to nephrologist that he would plan to \"jump out the window and kill himself\"  Patient does seem to have capacity at this time to make decisions per psych's recent evaluation    Went to talk to patient  Patient with headphones and resting calmly in the recliner  Discussed his SI, appears that he is simply very upset with his cares and feels disrespected by staff  Specifically, he does not know why he cannot get specific staff to dialyze him in his room, as he does not get along with some others and he does not want to encounter conflict with staff he does not get along with  Understands that his mental health is not stable, but expresses that his SI comments were said out of frustration and wanting to express his feelings  Says he would rather die than go through dialysis with people who do not respect him  Continues to refuse HD at this time, only wants it in his room with certain techs    When resting calmly, is agreeable to a 1:1  Discussed having psychiatry come by to evaluate him given his SI  Patient agrees, actually thinks his psych meds are underdosed and would want an increase in his psych meds  At this time, has no intent to harm himself or others  Otherwise is " stable      Plan:  Given patient's tenuous psych history, will place psych consult to have them evaluate his med regimen and his capacity to make decisions once more. With the understanding that he has the capacity to say he doesn't want life-saving treatment, we will hold off on HD for now. Will place 1:1 sitter, suicide precautions. Plan to follow-up tomorrow  - 1:1  - suicide precautions  - psych consult       Discussed with: bedside nurse and supervising physician, Dr. Azael Cuenca MD

## 2024-07-30 NOTE — PROVIDER NOTIFICATION
Patient upset about needing 1:1 during nighttime and stating that he is going to leave between [id number].  Provider aware.  Patient is holdable.

## 2024-07-30 NOTE — PROGRESS NOTES
Writer was advised by MD that patient needed a dialysis run today because he only received 2 hrs of treatment yesterday.  See previous hemodialysis note.  Patient refuses to run in treatment room but was agreeable to run bedside.  No staff available at this time for patient to run bedside.  Patient also refuses blood draws to check metabolic status.  MD advised by [facility] Nancy Spaulding that if patient wants to be run bedside it would be with the RN from last night.  MD will speak to patient and attempt blood draw.  K+ still elevated and patient continues to refuse treatment.  MD ashley Wright RN

## 2024-07-31 PROCEDURE — 250N000013 HC RX MED GY IP 250 OP 250 PS 637: Performed by: HOSPITALIST

## 2024-07-31 PROCEDURE — 250N000013 HC RX MED GY IP 250 OP 250 PS 637: Performed by: REGISTERED NURSE

## 2024-07-31 PROCEDURE — 120N000004 HC R&B MS OVERFLOW

## 2024-07-31 PROCEDURE — 99232 SBSQ HOSP IP/OBS MODERATE 35: CPT | Performed by: REGISTERED NURSE

## 2024-07-31 PROCEDURE — 99231 SBSQ HOSP IP/OBS SF/LOW 25: CPT | Mod: GC

## 2024-07-31 PROCEDURE — 99232 SBSQ HOSP IP/OBS MODERATE 35: CPT | Performed by: CLINICAL NURSE SPECIALIST

## 2024-07-31 RX ORDER — RISPERIDONE 1 MG/1
1 TABLET ORAL DAILY
Status: DISCONTINUED | OUTPATIENT
Start: 2024-08-01 | End: 2024-08-02

## 2024-07-31 RX ADMIN — METOPROLOL SUCCINATE 25 MG: 25 TABLET, EXTENDED RELEASE ORAL at 11:33

## 2024-07-31 RX ADMIN — Medication 1 TABLET: at 11:33

## 2024-07-31 RX ADMIN — AMLODIPINE BESYLATE 10 MG: 5 TABLET ORAL at 11:32

## 2024-07-31 RX ADMIN — RISPERIDONE 0.5 MG: 0.5 TABLET ORAL at 11:33

## 2024-07-31 RX ADMIN — PREGABALIN 25 MG: 25 CAPSULE ORAL at 11:33

## 2024-07-31 ASSESSMENT — ACTIVITIES OF DAILY LIVING (ADL)
ADLS_ACUITY_SCORE: 26
ADLS_ACUITY_SCORE: 30
ADLS_ACUITY_SCORE: 30
ADLS_ACUITY_SCORE: 26
ADLS_ACUITY_SCORE: 27
ADLS_ACUITY_SCORE: 30
ADLS_ACUITY_SCORE: 27
ADLS_ACUITY_SCORE: 30
ADLS_ACUITY_SCORE: 27
ADLS_ACUITY_SCORE: 32
ADLS_ACUITY_SCORE: 27
ADLS_ACUITY_SCORE: 30
ADLS_ACUITY_SCORE: 27
ADLS_ACUITY_SCORE: 30
ADLS_ACUITY_SCORE: 27

## 2024-07-31 NOTE — PROGRESS NOTES
"Care Management Follow Up    Length of Stay (days): 9    Expected Discharge Date:      Anticipated Discharge Plan:  Transitional Care    Transportation: TBD    PT Recommendations: Transitional Care Facility  OT Recommendations:        Barriers to Discharge: medical stability, placement, on 1:1    Prior Living Situation: residential facility with facility resident (Pt was living with brother, went to TCU at [facility], then wanted to be there for LTC until able to return to brothers home)     Patient/Spokesperson Updated: Yes. Who? Patient     Additional Information:  See below     Kimi England RN          Received a call from Jose with [facility]. Says he received the obra level 2 from [facility] but they are now declining patient because obra level 2 says \"less than 30 days\" and they believe patient will need more than 30 days    Email sent to Sascha and Veronica to ask if dates can be extended. Awaiting response    Manager updated .  Dr Cuenca updated     11:14 AM  Message left for Veronica at [facility] requesting call back     12:27 PM  Veronica/sascha from the [facility] is sending Jose an updated obra level 2 with extended dates. He will need to review once received     1:48 PM  Met with patient and updated him that county and facility are still sorting out paperwork. Patient says he plans to leave after dialysis . He is angry that he was told dialysis was scheduled for 1300 and they are not there yet. Updated Dr. Cuenca     Email sent to Jose to see if he received the paperwork. Awaiting response   "

## 2024-07-31 NOTE — PROGRESS NOTES
RENAL PROGRESS NOTE - [facility]        CC: follow up ESRD    Subjective: Patient up in chair in room today. He refusing to talk with me and does not want me to take care of him any further on and inpatient or outpatient basis. Patient refused to talk any further or listen to recommendation for dialysis today in bed. Patient in chair in room and unable to run in the chair. Patient placed headphones on and refused to look at or engage with patient     Discussed with Dr Cuenca regarding dialysis plan.       ASSESSMENT:   45 yo male with ESRD on dialysis MWF at [facility] using left arm AVF, HTN, CAD, cognitive impairment admit with severe hyperkalemia, presented with complaints of chest pain, dizziness, severe hyperkalemia of 8.1        PLAN:  ESRD - on dialysis MWF at [facility] using left arm AVF.   History of very poor compliance with dialysis. His last dialysis PTA was here on 7/17.  Has ongoing conflicts with his nursing home and transport, he has had extensive assistance with social workers at dialysis and at [facility] systems with ongoing poor dialyss compliance, unfortunately has not improved despite many attempts at optimizing his living situation, transportation, etc.  Had emergent dialysis 7/23 due to severe hyperkalemia  7/31 -patient does not want me to care for him any further - plan was for HD in room but patient refusing to get up into bed for HD. Patient refused to speak to me about transition of care plan but does not want me to take care of him inpatient or outpatient. Will consult [facility] tomorrow if patient still admitted. Nursing trying to work with him for dialysis. Page updated [facility] unit regarding patient request to change provider outpatient if possible.     7/30 - refused dialysis.   7/29- refused to run earlier in the day. Agree later in the afternoon. Run shortened. See nursing note.       Hyperkalemia - due to missed dialysis  -Was on  "regular diet, changed to renal diet 7/28   -HD as noted above.    -Patient has been educated at length about the life threatening effect of ongoing hyperkalemia and recommendation for dialysis to prevent this.      3. HTN - remains high, hypervolemia from missed dialysis contributing  -Bp much better with dialysis, monitoring  -continue home meds     4. Cognitive impairment - makes management of his multitude of medical problems very difficult, he seems to have frequent conflicts with transportation and tcu/nursing homes in past, has left [facility]. Now unhappy with current nursing home, expect finding an alternative will be very difficult with his medical complexity and all of the above issues.  Appreciate social work assistance   -Need ongoing psych input. Now reports suicidal ideation- 7/30    -per psych patient is decisional.      5. Anemia - severe, due to ESRD and noncompliance with dialysis so not on consistent DEMAR  - iron stores adequate  -will dose DEMAR here q week and resume with outpt dialysis as able      Ray Boucher, DO  [facility]    Objective    PHYSICAL EXAM  BP (!) 161/87 (BP Location: Right arm, Patient Position: Prone)   Pulse 92   Temp 97.6  F (36.4  C) (Temporal)   Resp 18   Ht 1.981 m (6' 6\")   Wt 108.4 kg (239 lb)   SpO2 98%   BMI 27.62 kg/m    I/O last 3 completed shifts:  In: 650 [P.O.:650]  Out: 600 [Urine:600]  Wt Readings from Last 3 Encounters:   07/23/24 108.4 kg (239 lb)   07/17/24 106 kg (233 lb 9.6 oz)   01/16/24 127 kg (280 lb)       GENERAL: Alert and interactive.   HEENT:normocephalic, atraumatic  CARDIOVASCULAR: rr, no rub,  1+ edema  PULMONARY:cta ant. No cyanosis  GASTROINTESTINAL: soft, nt/nd, obese  MSK: diffuse muscle atrophy, warm  NEURO: Alert, no gross focal findings  PSYCHIATRIC: flat affect  SKIN: dry skin legs    LABORATORIES  Recent Labs   Lab 07/30/24  1242 07/29/24  1249 07/28/24  1216 07/25/24  1218    138 139 140   POTASSIUM 6.4* 6.6* " 6.4* 6.0*   CHLORIDE 97* 99 100 100   CO2 25 23 23 26   BUN 71.3* 92.6* 74.8* 61.7*   CR 15.29* 19.09* 15.44* 12.83*   GFRESTIMATED 4* 3* 4* 4*   LOVELY 8.6* 8.8 9.0 9.3       Recent Labs   Lab 07/25/24  1218   WBC 4.9   HGB 8.6*   HCT 27.6*   MCV 87             MEDICATIONS  Current Facility-Administered Medications   Medication Dose Route Frequency Provider Last Rate Last Admin    amLODIPine (NORVASC) tablet 10 mg  10 mg Oral Daily Omar Mitchell MD   10 mg at 07/31/24 1132    calcitRIOL (ROCALTROL) capsule 0.25 mcg  0.25 mcg Oral Daily Omar Mitchell MD   0.25 mcg at 07/30/24 1122    calcium acetate (PHOSLO) capsule 1,334 mg  1,334 mg Oral TID w/meals Omar Mitchell MD        epoetin kiana-epbx (RETACRIT) injection 40,000 Units  40,000 Units Subcutaneous Weekly Carol Louise MD        metoprolol succinate ER (TOPROL XL) 24 hr tablet 25 mg  25 mg Oral BID Omar Mitchell MD   25 mg at 07/31/24 1133    multivitamin RENAL (RENAVITE RX/NEPHROVITE) tablet 1 tablet  1 tablet Oral Daily Omar Mitchell MD   1 tablet at 07/31/24 1133    pregabalin (LYRICA) capsule 25 mg  25 mg Oral Daily Omar Mitchell MD   25 mg at 07/31/24 1133    risperiDONE (risperDAL) tablet 0.5 mg  0.5 mg Oral Daily Stephanie Solares NP   0.5 mg at 07/31/24 1133    risperiDONE (risperDAL) tablet 1 mg  1 mg Oral At Bedtime Omar Mitchell MD   1 mg at 07/25/24 2127    sodium chloride (PF) 0.9% PF flush 3 mL  3 mL Intracatheter Q8H Lilliana Carmona MD   3 mL at 07/31/24 1139

## 2024-07-31 NOTE — PLAN OF CARE
Problem: Pain Acute  Goal: Optimal Pain Control and Function  Outcome: Not Progressing  Intervention: Develop Pain Management Plan  Recent Flowsheet Documentation  Taken 7/31/2024 1100 by Emerita Eckert RN  Pain Management Interventions: MD notified (comment)  Intervention: Prevent or Manage Pain  Recent Flowsheet Documentation  Taken 7/31/2024 1200 by Emerita Eckert RN  Medication Review/Management: medications reviewed     Problem: Heart Failure  Goal: Optimal Functional Ability  Intervention: Optimize Functional Ability  Recent Flowsheet Documentation  Taken 7/31/2024 1200 by Emerita Eckert RN  Activity Management:   activity encouraged   ambulated outside room  Goal: Effective Oxygenation and Ventilation  Outcome: Progressing  Intervention: Promote Airway Secretion Clearance  Recent Flowsheet Documentation  Taken 7/31/2024 1200 by Emerita Eckert RN  Activity Management:   activity encouraged   ambulated outside room  Goal: Effective Breathing Pattern During Sleep  Outcome: Progressing  Intervention: Monitor and Manage Obstructive Sleep Apnea  Recent Flowsheet Documentation  Taken 7/31/2024 1200 by Emerita Eckert RN  Medication Review/Management: medications reviewed     Problem: Psychotic Signs/Symptoms  Goal: Improved Behavioral Control (Psychotic Signs/Symptoms)  Outcome: Progressing   Goal Outcome Evaluation:    Pt slept until 1100.  Refused vitals.  Gave some morning meds. He refused some.  Gave water and turkey sandwich.  Refused some assessments.  Pt agreed to have dialysis at 1300 in the room. Pain 15/10 on right hand.  Scheduled lyrica was given per doctor.  Pt walked in hallway with walker alone.  Suicide precautions dc'd at 1330. Pt refused dialysis due to not being able to do it in the recliner.

## 2024-07-31 NOTE — PROGRESS NOTES
"Patient refused ALL vitals, assessments and cares. States he is not going to be able to sleep with someone watching him and that he doesn't like the CNA assigned to him because they had a \"run in\" the last time he was here in room 327.     Patient states when he is strong enough to walk on out of here that he is and he \"don't wanna catch no assault charge on the way out but force is force and if anyone forces him he will catch a charge.\"     He currently is calm and not verbally harassing anyone however he is still threatening to be verbally abusive and become physically violent/ assault someone if they force him to do anything or force him to stay here.    Patient states he \"plans to leave as soon as the sun is up and I can physically walk out of here. I doesn't care about Dialysis. I plan to fire the Nephrologist tomorrow and I don't care to stick around for Psych to see me either.\"  "

## 2024-07-31 NOTE — PROGRESS NOTES
Attempted to dialyze pt in his room but pt decline to receive dialysis in bed. He requested to be dialyzed in his chair. Writer and another dialysis RN  educated pt that we cannot dialyze him in his chair due to his safety. Dr. Boucher was notified of situation. Dr. Boucher came to talk to pt, but pt refused to communicate with Dr. Boucher. Primary RN made aware of pt refusal.

## 2024-07-31 NOTE — PROGRESS NOTES
PALLIATIVE CARE PROGRESS NOTE  [facility]     Patient Name: Macario Delatorre  Date of Admission: 2024   Today the patient was seen for: goals of care and check in     Recommendations & Counseling     GOALS OF CARE:   Life-prolonging without limits   Patient with no identifiable surrogate.  Previously identified having advanced heart failure and complete kidney failure which will require life support in the form of hemodialysis for the remainder of his life.   Plans for discharge to TCU.    ADVANCE CARE PLANNING:  No health care directive on file.   Multiple attempts in the past to ID emergency which patient declined.  There is no POLST form on file.   Code status: Full Code    MEDICAL MANAGEMENT:   We are not actively managing symptoms at this time.    #RUE pain and mild edema  Primary medical team notified (Dr. Cuenca) of patient's concern.   Could consider RUE US to r/o DVT? Defer management and work up to medical team.    PSYCHOSOCIAL/SPIRITUAL:  Family; says half of his family is  and the other half is in [place], but also mentions an unnamed brother living in [place] which he has stayed with in the past.  Friends; only 1 close friend by the name of Enma who recently moved to [place], [place].  [place] community: [employer]     Palliative Care will follow peripherally as goals are established. Thank you for the consult and allowing us to aid in the care of Macario Delatorre.    Discussed patient concern for RUE tenderness and edema with Dr. Cuenca.    MILAN Burt, CNS  [facility], Palliative Care  Securely message with the [employer] Web Console (learn more here) or  Text page via [employer] Paging/Directory       Interval History:     Multidisciplinary collaboration:  Previous 48 hours of chart reviewed.  Afebrile. VSS. Tolerating dialysis. No distress. In recliner finishing lunch during visit. Intermittently refusing cares, dialysis and strongly indicating his preferences  "regarding treatments.    Patient/family narrative  Complains only of right fore arm and hand tenderness and edema and requesting an Xray for evaluation. Very concerned \"something could be wrong\". Discussed that Primary Medical service will be notified.     Assessment          Macario Delatorre is a 45 y/o male admitted 7/22/2024 with severe hyperkalemia (8.1) and possible presyncope after missing dialysis. PMH ESRD on HD MWFF, chronic pain syndrome, paroxysmal SVT, chronic gout, HLD, HTN, CAD, bipolar disorder,  and intellectual disability. Attending dialysis dependent on transportation to center which is why he missed dialysis.       Review of Systems:     Besides above, ROS was reviewed and is unremarkable     Physical Exam:   Resp:  [18] 18  239 lbs 0 oz    Physical Exam  Constitutional:       General: He is not in acute distress.     Appearance: Normal appearance.   HENT:      Head: Normocephalic and atraumatic.      Nose: Nose normal.      Mouth/Throat:      Mouth: Mucous membranes are moist.   Eyes:      Conjunctiva/sclera: Conjunctivae normal.   Musculoskeletal:         General: Tenderness present.      Comments: RUE tenderness and edema in forearm extending into hand - mild right forearm and hand edema compared to left.   Neurological:      Mental Status: He is alert and oriented to person, place, and time.           Data Reviewed:     No results found for this or any previous visit (from the past 24 hour(s)).    40 MINUTES SPENT BY ME on the date of service doing chart review, history, exam, documentation & further activities per the note.      "

## 2024-07-31 NOTE — PLAN OF CARE
Problem: Psychotic Signs/Symptoms  Goal: Improved Behavioral Control (Psychotic Signs/Symptoms)  Outcome: Not Progressing  Goal: Increased Participation and Engagement (Psychotic Signs/Symptoms)  Outcome: Not Progressing  Goal: Improved Mood Symptoms (Psychotic Signs/Symptoms)  Outcome: Not Progressing     Problem: Fall Injury Risk  Goal: Absence of Fall and Fall-Related Injury  Outcome: Progressing  Intervention: Identify and Manage Contributors  Recent Flowsheet Documentation  Taken 7/31/2024 0020 by Ruby Echols RN  Medication Review/Management: medications reviewed  Intervention: Promote Injury-Free Environment  Recent Flowsheet Documentation  Taken 7/31/2024 0020 by Ruby Echols RN  Safety Promotion/Fall Prevention:   bedside attendant   lighting adjusted   safety round/check completed     Problem: Suicide Risk  Goal: Absence of Self-Harm  Outcome: Progressing  Intervention: Assess Risk to Self and Maintain Safety  Recent Flowsheet Documentation  Taken 7/31/2024 0020 by Ruby Echols RN  Enhanced Safety Measures: review medications for side effects with activity   Goal Outcome Evaluation:         Patient refuses ALL cares and assessments consistently; continues to threaten verbal aggression and potential physical aggression toward staff but is not acting upon it. Patient was initially in recliner; then moved to the bed and had eyes closed. Patient minimally cooperative with 1:1 supervision nearby. Patient stated as soon as he is able to he is leaving this morning.

## 2024-07-31 NOTE — CONSULTS
"      Psychiatry Consultation; Follow up              Reason for Consult, requesting source:    Expressing suicidal thoughts and behaviors, known hx schizoaffective disorder, STRONGLY consider increasing risperidone vs other medications    Requesting source: George Cortes    Labs and imaging reviewed. Discussed  patient status with provider.           Interim history:    Psychiatry seeing patient today regarding suicidal ideation and behaviors.     Macario Delatorre is a 44 year old male with a pertinent history of ERSD, noncompliance with renal dialysis, paroxysmal SVT, hypertensive disorder, intellectual disability who presents to this ED via EMS for evaluation of chest pain. Patient reports he heard voices in his head that told him to get out of his chair tonight. He was sitting in the recliner when he got up and developed \"room-spinning\" dizziness and saw \"stars\" which caused him to fall backwards. Patient reports an immediate jolt of pain shooting up his spine. States it felt like his back spasmed and locked up. He then tried to pull himself up from the floor and experienced chest pain in the \"heart area\". He decided to call 911. He states that the voices knew that the \"building wasn't for me\". Patient reports EMS put a hot pack on his chest but it didn't work because it was over his shirt. Patient notes he missed dialysis today because he was in a meeting. Patient also states he hasn't been able to go to dialysis because the medical rides don't have wheel chairs to transfer him.         Per my previous assessment from 7/24: Prior to his hospitalization, patient has been non-compliant with his kidney dialysis. Today, he reports that he does not wish to take the current medical cab, as \"I don't like the people who are driving the van.\" He shares that he plans to use his scooter to attend his dialysis appointments. Patient shares that he has a previous diagnosis of bipolar disorder and schizoaffective disorder. He " "endorses AH/, and reports that he manages his bipolar disorder, \"Ok. I could be doing better.\" However, he denies symptoms that would be consistent with yong. He shares that he typically manages his bipolar disorder by \"Smoking cannabis.\"     Today, patient reports, \"I don't feel suicidal. I was just venting to my provider earlier.\" Patient shares that he plans to follow up with outpatient dialysis and is willing to continue dialysis while in the hospital. \"There are some technicians that I don't like, but Rhett and Stacey are two that I don't have a problem with.\" He shares that he is agreeable to remaining in the hospital if these two technicians are able to provide dialysis. He reports, \"I have been in the hospital twice, and have been trying to find an appropriate place to accommodate my dialysis.\"         Current Medications:     Current Facility-Administered Medications   Medication Dose Route Frequency Provider Last Rate Last Admin    amLODIPine (NORVASC) tablet 10 mg  10 mg Oral Daily Omar Mitchell MD   10 mg at 07/30/24 1123    calcitRIOL (ROCALTROL) capsule 0.25 mcg  0.25 mcg Oral Daily Omar Mitchell MD   0.25 mcg at 07/30/24 1122    calcium acetate (PHOSLO) capsule 1,334 mg  1,334 mg Oral TID w/meals Omar Mitchell MD        epoetin kiana-epbx (RETACRIT) injection 40,000 Units  40,000 Units Subcutaneous Weekly Carol Louise MD        metoprolol succinate ER (TOPROL XL) 24 hr tablet 25 mg  25 mg Oral BID Omar Mitchell MD   25 mg at 07/30/24 1123    multivitamin RENAL (RENAVITE RX/NEPHROVITE) tablet 1 tablet  1 tablet Oral Daily Omar Mitchell MD   1 tablet at 07/30/24 1122    pregabalin (LYRICA) capsule 25 mg  25 mg Oral Daily Omar Mitchell MD   25 mg at 07/30/24 1122    risperiDONE (risperDAL) tablet 0.5 mg  0.5 mg Oral Daily Stephanie Solares NP   0.5 mg at 07/30/24 1122    risperiDONE (risperDAL) tablet 1 mg  1 mg Oral At Bedtime Omar Mitchell MD   1 mg at 07/25/24 " "2127    sodium chloride (PF) 0.9% PF flush 3 mL  3 mL Intracatheter Q8H Lilliana Carmona MD        sodium zirconium cyclosilicate (LOKELMA) packet 10 g  10 g Oral TID Ray Boucher DO                  MSE:   Calm and cooperative, but somewhat irritable. Denies SI/SIB, as well as AH/VH. Insight and judgement are fair.       Vital signs:      BP: (!) 161/87 Pulse: 92   Resp: 18 SpO2: 98 % O2 Device: None (Room air)   Height: 198.1 cm (6' 6\") Weight:  (pt refused)  Estimated body mass index is 27.62 kg/m  as calculated from the following:    Height as of this encounter: 1.981 m (6' 6\").    Weight as of this encounter: 108.4 kg (239 lb).    Qtc: 498 on 7/22/2024         DSM-5 Diagnosis:   Bipolar disorder vs. Schizoaffective disorder, by patient report           Assessment:   Psychiatry seeing patient today regarding suicidal statements made by patient, as well as history of schizoaffective disorder and medication recommendations.  Patient has reported historical diagnosis of bipolar disorder and possible schizoaffective disorder. He is currently taking Risperdal 1 mg at HS to manage these symptoms. However, patient appears to be somewhat of a poor historian regarding medications and his compliance with taking these medications.     Today, patient denies suicidality or other safety concerns. He answered questions appropriately and was engaged in discussion, and appears future-oriented with his continued dialysis. However, he does appear to appreciate having a say in when he receives his dialysis and other treatments, as well. In addition, patient at times has been verbally agitated and combative with staff and providers. Given this, he may presents as though he lacks capacity to make decisions, but may benefit from approaching at a later time, as he often appears agreeable when offered choices.     I spoke with medical provider, including their recommendation of switching patient from Risperdal to Geodon. However, it " "would be preferred to remain with a medication that patient has been taking PTA and know patient tolerates well. Given that patient is currently receiving dialysis, and estimated CrCl is down from 11 mL/min to 9.6 mL/min, Risperdal dosing should be 25% of daily dose typically recommended.     There are concerns regarding patient's ability to decide whether he can discharge from the hospital AMA. Please see below for capacity assessment.     Aid to Capacity  1) Do the history and physical examination confirm that the patient can communicate choice? Yes. Patient wishes to continue dialysis, but do so on an outpatient basis. This has remained consistent throughout his hospitalization.     2) Can the patient understand the essential elements of consent? Yes.   As the following questions:  What is your present medical condition? He understands and can verbalize that he has ESRD.   What is the treatment being recommended for you? Patient is able to explain that he needs to continue with dialysis.  What do you and your doctor think might happen to you if you decide to accept the recommended treatment? He understands, and can verbalize, that his dialysis is necessary in sustaining his life and keeping him from becoming ill.   What are the alternatives available (including no treatment) and what are the probable consequences of accepting each? Patient verbalizes, \"My feet would swell, I would not be able walk or eat, and then I would die.\"   -Is the person's decision affected by depression? No.   -Is the person s decision affected by delusion/psychosis? No.    3) Can the patient assign personal values to the risks and benefits of intervention? Yes.     4) Can the patient manipulate the information rationally and logically? Yes.     5) Is the patient's decision-making capacity stable over time? Yes. Patient often appreciates choosing when he has dialysis while in the hospital, but he will eventually agree to treatments.    " "              Summary of Recommendations:   1) Increased Risperdal by 0.5 mg in the morning.  Given estimated CrCl of 9.6 mL/min, would not increase further until patient is able to meet with his outpatient psychiatry provider.     2) Patient appears to have decision-making capacity at this time and does not meet criteria for a 72-hour hold. From a psychiatric standpoint, patient could discharge from the hospital when medically cleared.     3) Discussed patient case with social work and appreciate their assistance in outpatient disposition planning.     4) Today Macario does not show any symptoms of acute yong, nor suicidal or homicidal ideations. Therefore, based on all available evidence including the factors cited above, he does not appear to be at imminent risk for self-harm, does not meet criteria for a 72-hr hold, and therefore remains appropriate for ongoing outpatient level of care.     Additional steps taken to minimize risk include: medication optimization, close psychiatric follow up and crisis resources. Voluntary referral for outpatient care was offered and patient accepted these offers.      Resources:   Crisis Intervention: 584.359.7761 or 629-292-8274 (TTY: 926.522.7799).  Call anytime for help.  [place] on Mental Illness (www.[place].khushbu.org): 514.739.7909 or 253-019-9279.  Suicide Awareness Voices of Education (SAVE) (www.save.org): 244-424-CIXR (4684)  National Suicide Prevention Line (www.mentalhealthmn.org): 349-188-AKFX (4875)  Mental Health Consumer/Survivor Network of [place] (www.mhcsn.net): 440.941.7942 or 697-238-7490  [facility], [place] Crisis Response 698 784-7018  Text 4 Life: txt \"LIFE\" to 37232 for immediate support and crisis intervention  Crisis text line: Text \"[place]\" to 107664. Free, confidential, 24/7.        [facility]USH[facility] [place] ([place] on Mental Illness) improves the lives of children and adults with mental illnesses and their families by providing free classes on mental " illnesses and support groups for adults with mental illnesses, parents and family members. For more information: Phone: 450.133.9419 Toll free: 7-792-VCWR-HELPS Website: www.namihelps.orghttp://www.namihelps.org/          Page me or re-consult psychiatry as needed (psychiatry is signing off).       Stephanie Solares, EZRA, MILAN  Consult/Liaison Psychiatry  [facility]   Contact information available via [facility] Paging/Directory.  If I am not available, please call [facility] intake ([phone])

## 2024-07-31 NOTE — PROGRESS NOTES
[facility]    Progress Note - Hospitalist Service       Date of Admission:  7/22/2024    Assessment & Plan   Macario Delatorre is a 44 year old male with PMH of ESR on M/W/F HD, chronic pain syndrome, paroxysmal SVT, chronic gout, hypertension, hyperlipidemia, CAD, intellectual disability who was admitted on 7/22/24 with severe hyperkalemia with possible presyncope in setting of missed dialysis. Improving with dialysis. Tentatively, planning for discharge to TCU pending [facility] assessment     Hyperkalemia, severe  ESRD on MWF dialysis  K of 8.1 on presentation to ED with missed dialysis. Follows with Dr. Lua/Nephrology. Patient has history of poor compliance with dialysis due to conflicts with living facility and transportation. Last outpt dialysis was 7/17. Had emergent dialysis on 7/23 due to severe hyperkalemia. Last dialysis 7/29 with shortened run, now refusing all dialysis  -Nephrology following   -return to MWF dialysis schedule  - discussed dangers of missed dialysis including death discussed  - guarded prognosis due to poor compliance with dialysis and life threatening hyperkalemia  - low K diet   - had been refusing all lab draws and cares overnight  - Palliative following              - wants to continue life prolonging care with hemodialysis  - appears to have appreciation of his condition and can clearly communicate his wishes  - SW/CM consulted              - accepted at [facility]              - tentatively plan for discharge on Tuesday 7/20 pending HD and SW  - working on [employer] mobility application to assist in transportation to dialysis, paperwork completed and given to social work      Chest pain, resolved  Thought to be secondary to high potassium and hypervolemia from missed dialysis. Initial troponin 72, stable from prior though in setting of ESRD. EKG with mild T wave prominence in leads II and III. Resolved with emergent dialysis and electrolyte  correction.      Cognitive impairment  History of mental health problems with hallucinations  Bipolar disorder  Agitation   Suicidal ideation  Patient refusing cares and can easily get agitation. Patient reports history of bipolar and schizophrenia. Does not endorse routine psychiatric follow up or regular medication use. In [facility] system was started on risperidone 1 mg at bedtime though has not taken consistently. Stated he can escalate quickly if upset. 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 7/30 started expressing SI with a plan to jump out of the window in setting of extreme frustration and refusal to undergo HD without his exact circumstances, patient himself requesting that his psych meds be increased.  - Psychiatric consult, appreciate input especially regarding capacity              - Psych evaluated, patient felt to have capacity   - reconsulted 7/30 for SI with plan    - per psych NP, patient is not holdable and has capacity to make decisions for himself    - per SW, [facility] worker has stated that so long as psychiatry has deemed him decisional, he is not holdable  - Continue Risperdal 1 mg at at bedtime; additional 0.5 mg risperdal in am     Chronic anemia, normocytic   Baseline Hgb 9-10. Likely related to ESRD/chronic disease. Iron stores adequate.   - Continue DEMAR weekly; resume with outpatient dialysis  - Daily CBC (patient frequently refusing labs)      Chronic pain syndrome, stable    Neuropathy, reported to be diabetic  - Lyrica 25 mg daily     Hypertension, uncontrolled  Continues to be hypertensive during hospitalization likely in setting of missed dialysis session.   - Continue PTA amlodipine 10 every day and metoprolol 25 BID           Diet: Combination Diet Regular Diet    DVT Prophylaxis: Pneumatic Compression Devices  Raphael Catheter: Not present  Fluids: PO  Lines: None     Cardiac Monitoring: None  Code Status: Full Code   "    Clinically Significant Risk Factors     # Hyperkalemia: Highest K = 6.6 mmol/L in last 2 days, will monitor as appropriate                      # Overweight: Estimated body mass index is 27.62 kg/m  as calculated from the following:    Height as of this encounter: 1.981 m (6' 6\").    Weight as of this encounter: 108.4 kg (239 lb).      # Financial/Environmental Concerns:           Disposition Plan      The patient's care was discussed with the Attending Physician, Dr. Syed .    Kristyn Cuenca MD  Hospitalist Service  [facility]  Securely message with [employer] (more info)  Text page via [facility] Paging/Directory   ______________________________________________________________________    Interval History   Overall still very agitated. Expressing lots of frustration regarding suicide precautions and 1:1 sitter. Psych evaluated today, deemed that patient is decisional at this time. Discussed with them at length today, no room to increase meds at this time. Otherwise patient claiming he would like to leave AMA, would like dialysis before he goes.    Physical Exam   Vital Signs:     BP: (!) 161/87 Pulse: 92   Resp: 18 SpO2: 98 % O2 Device: None (Room air)    Weight: 239 lbs 0 oz    General: Alert, irritated  Skin: Visibly clean and dry  Resp: normal work of breathing, no wheezing  Psych: very angry, able to discuss health at length but still claiming that he wants to leave  Refused rest of exam      Medical Decision Making Please see A&P for additional details of medical decision making.      Data   ------------------------- PAST 24 HR DATA REVIEWED -----------------------------------------------    Imaging results reviewed over the past 24 hrs:   No results found for this or any previous visit (from the past 24 hour(s)).  "

## 2024-07-31 NOTE — PLAN OF CARE
Problem: Comorbidity Management  Goal: Blood Pressure in Desired Range  Outcome: Progressing     Problem: Psychotic Signs/Symptoms  Goal: Improved Behavioral Control (Psychotic Signs/Symptoms)  Outcome: Progressing  Goal: Optimal Cognitive Function (Psychotic Signs/Symptoms)  Outcome: Progressing  Goal: Increased Participation and Engagement (Psychotic Signs/Symptoms)  Outcome: Progressing  Goal: Improved Mood Symptoms (Psychotic Signs/Symptoms)  Outcome: Progressing  Goal: Improved Psychomotor Symptoms (Psychotic Signs/Symptoms)  Outcome: Progressing  Goal: Decreased Sensory Symptoms (Psychotic Signs/Symptoms)  Outcome: Progressing  Intervention: Minimize and Manage Sensory Impairment  Recent Flowsheet Documentation  Taken 7/31/2024 1600 by Isabel Davis RN  Sensory Stimulation Regulation: quiet environment promoted  Goal: Improved Sleep (Psychotic Signs/Symptoms)  Outcome: Progressing  Goal: Enhanced Social, Occupational or Functional Skills (Psychotic Signs/Symptoms)  Outcome: Progressing     Problem: Chronic Kidney Disease  Goal: Optimal Coping with Chronic Illness  Outcome: Progressing  Goal: Electrolyte Balance  Outcome: Progressing  Intervention: Monitor and Manage Electrolyte Imbalance  Recent Flowsheet Documentation  Taken 7/31/2024 1600 by Isabel Davis, RN  Fluid/Electrolyte Management: fluids provided  Goal: Fluid Balance  Outcome: Progressing  Intervention: Monitor and Manage Hypervolemia  Recent Flowsheet Documentation  Taken 7/31/2024 1600 by Isabel Davis, RN  Skin Protection: protective footwear used  Fluid/Electrolyte Management: fluids provided  Goal: Optimal Functional Ability  Outcome: Progressing  Intervention: Optimize Functional Ability  Recent Flowsheet Documentation  Taken 7/31/2024 1600 by Isabel Davis, RN  Activity Management: activity encouraged  Goal: Absence of Anemia Signs and Symptoms  Outcome: Progressing  Goal: Optimal Oral Intake  Outcome: Progressing  Goal:  Acceptable Pain Control  Outcome: Progressing  Intervention: Prevent or Manage Pain  Recent Flowsheet Documentation  Taken 7/31/2024 1600 by Isabel Davis, RN  Sleep/Rest Enhancement:   awakenings minimized   music provided   room darkened  Goal: Minimize Renal Failure Effects  Outcome: Progressing   Goal Outcome Evaluation:    He is a/o x 4. Pt refused to have vitals signs taken at 1600. He is also refusing to take medications. Pt was lethargic this afternoon sleeping in chair from [id number].    Med surg patient. He needs rehab at discharge, county is helping with paperwork.    Pt refused to take any dialysis/BP meds. He requested to have Lyrica 25 mg at bedtime but had already had his daily dose this afternoon.

## 2024-07-31 NOTE — PLAN OF CARE
"Goal Outcome Evaluation:      Plan of Care Reviewed With: patient    Overall Patient Progress: no changeOverall Patient Progress: no change    Outcome Evaluation: A&Ox4.  Tolerating room air.  Declined VS and all scheduled medications (whenever talked about it patient would say \"no I'm good,\" \"I don't need it,\" \"I feel fine,\"  and \"I don't feel like I need it.\"  Education attempted but adjusted to patient's receptiveness.  Mostly calm throughout evening, see previous notes.  Also began rocking in chair after first albuterol nebulzier.  Refused 2nd nebulizer because 1st one gave him dizziness.  Good appetite.      Problem: Adult Inpatient Plan of Care  Goal: Plan of Care Review  Description: The Plan of Care Review/Shift note should be completed every shift.  The Outcome Evaluation is a brief statement about your assessment that the patient is improving, declining, or no change.  This information will be displayed automatically on your shift  note.  Outcome: Not Progressing  Flowsheets (Taken 7/30/2024 2352)  Outcome Evaluation: A&Ox4.  Tolerating room air.  Declined VS and all scheduled medications (whenever talked about it patient would say \"no I'm good,\" \"I don't need it,\" \"I feel fine,\"  and \"I don't feel like I need it.\"  Education attempted but adjusted to patient's receptiveness.  Mostly calm throughout evening, see previous notes.  Also began rocking in chair after first albuterol nebulzier.  Refused 2nd nebulizer because 1st one gave him dizziness.  Good appetite.  Plan of Care Reviewed With: patient  Overall Patient Progress: no change  Goal: Readiness for Transition of Care  Outcome: Not Progressing  Flowsheets (Taken 7/30/2024 2359)  Anticipated Changes Related to Illness: inability to care for self  Transportation Anticipated: health plan transportation  Concerns to be Addressed:   compliance issue   decision making   mental health  Barriers to Discharge: Newly placed on suicide precautions and requiring " 1:1  Intervention: Mutually Develop Transition Plan  Recent Flowsheet Documentation  Taken 7/30/2024 2358 by Helio Trinh RN  Anticipated Changes Related to Illness: inability to care for self  Transportation Anticipated: health plan transportation  Concerns to be Addressed:   compliance issue   decision making   mental health     Problem: Chronic Kidney Disease  Goal: Optimal Coping with Chronic Illness  Outcome: Not Progressing  Goal: Electrolyte Balance  Outcome: Not Progressing  Goal: Fluid Balance  Outcome: Not Progressing  Goal: Acceptable Pain Control  Outcome: Not Progressing  Intervention: Prevent or Manage Pain  Recent Flowsheet Documentation  Taken 7/30/2024 2015 by Helio Trinh RN  Pain Management Interventions: (Declines notifiying the provider and meds)   distraction   emotional support   essential oils   other (see comments)   heat applied  Taken 7/30/2024 1640 by Helio Trinh RN  Pain Management Interventions: (Declines notifiying the provider and meds)   distraction   emotional support   essential oils   other (see comments)  Goal: Minimize Renal Failure Effects  Outcome: Not Progressing  Intervention: Monitor and Support Renal Function  Recent Flowsheet Documentation  Taken 7/30/2024 1640 by Helio Trinh RN  Medication Review/Management: medications reviewed

## 2024-07-31 NOTE — PROGRESS NOTES
1040-writer called floor nurse and asked her to talk to the patient when she can and offer a 1-130 time down in treatment room for dialysis.  Advised to tell him it was Stacey and I down here and if he came at that time I would be here the entire treatment.    Patient agreed to treatment at 1-130.  Writer went to patients room and told him a nurse will be here to run in him in his room, not specifying time.  Patient was in agreeable. When Nurse arrived pt was angry and wanted treatment in his chair.  See next RN note.    Tiffanie Wright RN

## 2024-08-01 ENCOUNTER — APPOINTMENT (OUTPATIENT)
Dept: ULTRASOUND IMAGING | Facility: HOSPITAL | Age: 44
DRG: 640 | End: 2024-08-01
Payer: MEDICARE

## 2024-08-01 ENCOUNTER — APPOINTMENT (OUTPATIENT)
Dept: RADIOLOGY | Facility: HOSPITAL | Age: 44
DRG: 640 | End: 2024-08-01
Payer: MEDICARE

## 2024-08-01 PROCEDURE — 250N000013 HC RX MED GY IP 250 OP 250 PS 637: Performed by: HOSPITALIST

## 2024-08-01 PROCEDURE — 99207 PR NO BILLABLE SERVICE THIS VISIT: CPT | Performed by: CLINICAL NURSE SPECIALIST

## 2024-08-01 PROCEDURE — 73110 X-RAY EXAM OF WRIST: CPT | Mod: RT

## 2024-08-01 PROCEDURE — 99222 1ST HOSP IP/OBS MODERATE 55: CPT | Performed by: INTERNAL MEDICINE

## 2024-08-01 PROCEDURE — 93971 EXTREMITY STUDY: CPT | Mod: RT

## 2024-08-01 PROCEDURE — 99231 SBSQ HOSP IP/OBS SF/LOW 25: CPT | Mod: GC

## 2024-08-01 PROCEDURE — 250N000013 HC RX MED GY IP 250 OP 250 PS 637: Performed by: REGISTERED NURSE

## 2024-08-01 PROCEDURE — 120N000001 HC R&B MED SURG/OB

## 2024-08-01 RX ORDER — LIDOCAINE 40 MG/G
CREAM TOPICAL 3 TIMES DAILY PRN
Status: DISCONTINUED | OUTPATIENT
Start: 2024-08-01 | End: 2024-08-02 | Stop reason: HOSPADM

## 2024-08-01 RX ORDER — RISPERIDONE 1 MG/1
1 TABLET ORAL EVERY MORNING
Status: DISCONTINUED | OUTPATIENT
Start: 2024-08-02 | End: 2024-08-01

## 2024-08-01 RX ORDER — LIDOCAINE 40 MG/G
CREAM TOPICAL 4 TIMES DAILY PRN
Status: DISCONTINUED | OUTPATIENT
Start: 2024-08-01 | End: 2024-08-01

## 2024-08-01 RX ADMIN — PREGABALIN 25 MG: 25 CAPSULE ORAL at 12:41

## 2024-08-01 RX ADMIN — Medication 1 TABLET: at 12:40

## 2024-08-01 RX ADMIN — CALCITRIOL CAPSULES 0.25 MCG 0.25 MCG: 0.25 CAPSULE ORAL at 12:40

## 2024-08-01 RX ADMIN — METOPROLOL SUCCINATE 25 MG: 25 TABLET, EXTENDED RELEASE ORAL at 12:41

## 2024-08-01 RX ADMIN — AMLODIPINE BESYLATE 10 MG: 5 TABLET ORAL at 12:40

## 2024-08-01 RX ADMIN — PROCHLORPERAZINE MALEATE 5 MG: 5 TABLET ORAL at 22:32

## 2024-08-01 RX ADMIN — RISPERIDONE 1 MG: 1 TABLET, FILM COATED ORAL at 12:40

## 2024-08-01 ASSESSMENT — ACTIVITIES OF DAILY LIVING (ADL)
ADLS_ACUITY_SCORE: 32

## 2024-08-01 NOTE — PLAN OF CARE
"  Problem: Heart Failure  Goal: Effective Breathing Pattern During Sleep  Outcome: Progressing  Intervention: Monitor and Manage Obstructive Sleep Apnea  Recent Flowsheet Documentation  Taken 8/1/2024 0040 by Ruby Echols RN  Medication Review/Management: medications reviewed   Goal Outcome Evaluation:         Patient slept in the bed all night; refused ALL vitals, Assessments, cares, and medications. Stated \"I don't want ANYONE in MY room and don't wake me up again!\"                "

## 2024-08-01 NOTE — CONSULTS
Ethics Consultation      Note: The purpose of this note, including any accompanying recommendation, is advisory only concerning ethical principles and considerations related to this case. Determination of appropriate patient care decisions is the responsibility of the clinical team in consultation with patients and their decision makers and relevant institutional policy. Legal and policy questions should be addressed with Risk Management.    Date: 8/1/2024     Time:  1430     Attending physician: Ruby Syed     Consult requested by: Kristyn Cuenca     Reason for consult: hx of ESRD on HD now refusing HD due to not getting needs met but wants to be full code, hx cognitive impairment, mood disorder, and bipolar disorder requiring county assessment though now deemed decisional      Participants in Consult:      Kristyn Cuenca, resident  jeannie Nunez     Decisional capacity: Decisional (see psych note from 7/31)     Advance directive:  none     Health Care Agent: none     Code status: Full Code     Background: (Medical)     ESRD  History of schizoaffective disorder, mood disorder, concerns about cognitive impairment     Social:     Patient refuses to participate in dialysis unless his preferred staff are available  Patient is not always a reliable historian  Can be agitated and combative  psychiatry recommends that he benefits from choosing between medically appropriate choices  Continues to express that he does not want others to make medical decisions for him  Continually expresses a wish for CPR and intubation     Ethical Issue:      Allowing patient refusals and code status     Ethics Analysis:      Generally, patients have a right to informed consent to treatment. This means that patients have a right to be given information that is necessary to make a decision about whether to accept or refuse treatment that is unconstrained by a lack of information or knowledge. The purpose of informed consent is to  prevent patients from being treated in ways that are inconsistent with their goals and values. In ethical shared decision making process, both patients and care providers have important roles and responsibilities. Patients have a responsibility to make their goals and values known to the care team, and to cooperate in a plan of care that advances their goals and values. Clinicians have an obligation to offer treatment options that are both medically indicated and potentially align with the patient's goals and values. Patients may not directly determine the treatments they recieve independent of the clinical judgment and decision of a licensed care provider. In addition, patients may not engage in a pattern of selective refusals that constrain a clinicians professional practice in ways that erode the scope of their professional responsibiltiies and cause them to be unable to provide effective and appropriate treatment and are inconsistent with a patient's own interests and goals.     In this case, Mr Delatorre is refusing to participate in dialysis when his preferred staff are not available. It is my understanding that this leaves him at risk for losing consciousness and requiring increasing medical support to achieve his overall goal of restorative care. Additionally, Mr Delatorre continues to express a wish for resuscitation, however his refusals increase the likelihood that his health would deteriorate in a way that requires resuscitation. While it is not medically appropriate to resuscitate a patient whose refusal of dialysis directly causes a cardiovascular arrest, current policy limitations dictate that code status can only be changed with patient consent. Therefore allowing Mr Delatorre to selectively refuse dialysis conflicts with supporting Mr Delatorre's wish to receive resuscitation.    It would be ethically inappropriate to involuntarily treat Mr Delatorre after he expresses a refusal to participate in dialysis. It is likely that  involuntary treatment would require restraints. Restraints and sedation should only be short-term responses to involuntary treatment and support the patient transitioning to a more therapeutic environment. As Mr Delatorre's ESRD is chronic, using restraints or sedation to enforce compliance is not ethically appropriate as it is overly restrictive.    As Mr Delatorre benefits from choosing between medical appropriate choices, it would be ethically appropriate to engage in the informed nondissent process to discuss his refusals and code status. The framework of informed nondissent (As outlined by Efrain Peterson in  The Shared Decision Making Continuum) is as follows:     With informed nondissent decision making, the physician, guided by the patient s values, determines the best course of action and fully informs the patient. The patient may either remain silent, thereby allowing the physician s decision to stand, or veto the decision. In this approach the patient must understand all pertinent information (as he/she would in any method of decision making). Furthermore, the patient must appreciate that silence will be construed as tacit agreement. Patients must understand that they are welcome to veto the decision and if so, their wishes will be honored and they will receive excellent care.      The care team should explain to Mr Delatorre that refusing dialysis means that he will not receive resuscitation as resuscitation is not medically appropriate and therefore, if he refuses dialysis the team will understand that as Mr Delatorre's decision to forgo resuscitation. In response to this conversation, if Mr Delatorre vetos this decision, then the patient's current code status will not be changed.     Resuscitation is a medical judgment. Code status was constructed to allow patients a documented way to refuse intervention (resuscitation). However, a full code does not require that clinicians resuscitate patients if the medical judgement is that  resuscitation would be medically ineffective. It is ethically supportable to forgo resuscitation attempts if resuscitation would not lead to a meaningful recovery for life outside of an acute care setting. If resuscitation would cause more harm to the patient than any potential benefit, clinicians have an obligation not to resuscitate - as there is a professional obligation not to cause harm without meaningful benefit in medical care. Therefore, clinicians do not have an ethical obligation to resuscitate the patient if resuscitation would cause harm or would be ineffective.                                                   However, [facility] policy on Code Status states that until disagreement regarding code status is resolved, providers are responsible for making reasonable efforts to respect the patient s/authorized surrogate decision-makers wishes. Consult risk management regarding legal/policy aspect to code status.      If Mr Delatorre loses capacity due to his selective refusals before an informed nondissent conversation about code status can occur, it is ethically appropriate to dialyze the patient under emergency exception to informed consent (see informed consent policy). While the patient has a history of selective refusals, his refusals do not necessarily convey a blanket refusal of dialysis as his goals are still restorative and he does accept dialysis at times. Emergency exception to informed consent is not a long term solution, therefore an informed nondissent conversation should take place with this patient to address long term concerns.     Recommendations:      Involuntary dialysis is not ethically appropriate  Engage in an informed nondissent conversation with the patient about code status and dialysis refusal  If the patient vetos the informed nondissent conversation, code status should not be changed according to policy on Code Status - consult risk management to discuss further if this  "occurs.  It is ethically appropriate to provide dialysis without informed consent in an emergency (see above)       Please contact the service if we can be of any further assistance: Mike: \"Clinical Ethics Consultation Service\" under [employer].    Blaine Brar, PhD      "

## 2024-08-01 NOTE — PLAN OF CARE
Problem: Suicide Risk  Goal: Absence of Self-Harm  Intervention: Promote Psychosocial Wellbeing  Recent Flowsheet Documentation  Taken 8/1/2024 1145 by Remi Ba RN  Supportive Measures:   active listening utilized   self-care encouraged     Problem: Psychotic Signs/Symptoms  Goal: Improved Sleep (Psychotic Signs/Symptoms)  Outcome: Progressing     Patient slept in until 1045 and slept well overnight per NOC RN. Patient spoke with nephrologist and refused dialysis this morning. Refused labs all day and not receptive to education from RN. Patient did agree to take most medications after lunch and assessments. He is calm and oriented. Swelling/pain of R hand noted. US of right hand to be completed later today. Patient denies suicidal ideation. Ethics to consult.

## 2024-08-01 NOTE — PROGRESS NOTES
Palliative Care Progress note - Chart check only    Notes reviewed.  Continues to set limits and declined cares and efforts to gain control.  Refusing vitals and dialysis.    Palliative care will sign off at this time as goals are established.    HCICA Morgan, CNS  [facility], Palliative Care  Securely message with the [employer] Web Console (learn more here) or  Text page via [facility] Paging/Directory

## 2024-08-01 NOTE — CONSULTS
[facility]  [facility]   Nephrology Consultation/Initial Inpatient Care    Macario Delatorre   MRN: [id number]  : 1980   DOA: 2024     REASON FOR CONSULTATION: We are asked to see pt by Dr Boucher    HISTORY OF PRESENT ILLNESS:44 year old male with ESRD  Case discussed with Dr Boucher and chart reviewed  Pt with long history of noncompliance with dialysis; has changed OP centers and nephrologist several times  Currently his OP arrangement is at [facility] under Dr. Boucher  He is admitted now since 24 after presenting to ER with chest and back and tailbone pain  Found to have hyperkalemia and he had urgent HD overnoc; had HD again on  and   Had shortened run on  and has been refusing to run since then unless he can had specific staff and he wants to sit in a chair/recliner  Pt had verbalized some SI to Dr. Boucher and he involved psychiatry; pt is very offended by this because he claims he was just venting and not serious and he has now refused to have Dr. Boucher attend to his case so we are asked to see him and assume his care; pt is very tangential; spent 45 minutes talking to him and listening to his various complaints and demands; says he understands it is dangerous to go without dialysis and he understands he may have a cardiac arrest or death as a consequence; he is requesting to be transferred to another hospital; he will not dialyze in a bed due to back pain  He is also complaining of R hand pain which he believes is due to using his wheelchair (he prefers the scooter)      REVIEW OF SYSTEMS:  ROS was completely reviewed and otherwise negative and non-contributory    Past Medical History:   Diagnosis Date    Chlamydia     history of    Flatfoot     feet pes plannus    Mental retardation     mild    Migraine headache     Pain disorder 11    upset about no pain meds  incedent report filerd by RN    Tobacco abuse     Undescended  testicle        Social History     Socioeconomic History    Marital status: Single     Spouse name: Not on file    Number of children: Not on file    Years of education: Not on file    Highest education level: Not on file   Occupational History    Not on file   Tobacco Use    Smoking status: Some Days     Current packs/day: 0.10     Types: Cigarettes    Smokeless tobacco: Never    Tobacco comments:     smokes about 1 cigarette a day   Substance and Sexual Activity    Alcohol use: No     Comment: holidays and birthday - wine    Drug use: No     Comment: Drug use: Chart review shows + THC, pt denies    Sexual activity: Yes   Other Topics Concern    Not on file   Social History Narrative    5/21/2016 - Works at an [employer]     Social Determinants of Health     Financial Resource Strain: Low Risk  (4/23/2024)    [facility]    Financial Resource Strain     Difficulty of Paying Living Expenses: 3     Difficulty of Paying Living Expenses: Not on file   Food Insecurity: No Food Insecurity (4/23/2024)    [facility]    Food Insecurity     Worried About Running Out of Food in the Last Year: 1   Transportation Needs: Unmet Transportation Needs (5/6/2024)    [facility]    Transportation Needs     Lack of Transportation (Medical): 2   Physical Activity: Not on file   Stress: Not on file   Social Connections: Socially Integrated (10/19/2023)    [facility], [facility] [employer] [facility]    Social Connections     Frequency of Communication with Friends and Family: 0   Interpersonal Safety: Not on file   Housing Stability: Low Risk  (4/23/2024)    [facility]    Housing Stability     Unable to Pay for Housing in the Last Year: 1   Recent Concern: Housing Stability - Medium Risk  (3/27/2024)    Received from [facility], [facility] [employer] [facility]    Housing Stability     Unable to Pay for Housing in the Last Year: 2       Family History   Problem Relation Age of Onset    Diabetes No family hx of     Cancer No family hx of     Heart Disease No family hx of     Diabetes Mother     Heart Failure Father     Diabetes Type 2  Father     Kidney Disease Father        Allergies   Allergen Reactions    Bee Venom      Bee stings swelling and short of breath    Pcn [Penicillins]      Noted in 8/20/08 ER,hives,headaches,throat irritation    Trazodone      Abdominal pain    Ibuprofen GI Disturbance     Noted in 8/20/08 ER  Headaches and abdominal pain    Tylenol [Acetaminophen] GI Disturbance     Noted in 8/20/08 ER       MEDICATIONS:  Current Facility-Administered Medications   Medication Dose Route Frequency Provider Last Rate Last Admin    amLODIPine (NORVASC) tablet 10 mg  10 mg Oral Daily Omar Mitchell MD   10 mg at 07/31/24 1132    calcitRIOL (ROCALTROL) capsule 0.25 mcg  0.25 mcg Oral Daily Omar Mitchell MD   0.25 mcg at 07/30/24 1122    calcium acetate (PHOSLO) capsule 1,334 mg  1,334 mg Oral TID w/meals Omar Mitchell MD        epoetin kiana-epbx (RETACRIT) injection 40,000 Units  40,000 Units Subcutaneous Weekly Carol Louise MD        metoprolol succinate ER (TOPROL XL) 24 hr tablet 25 mg  25 mg Oral BID Omar Mitchell MD   25 mg at 07/31/24 1133    multivitamin RENAL (RENAVITE RX/NEPHROVITE) tablet 1 tablet  1 tablet Oral Daily Omar Mitchell MD   1 tablet at 07/31/24 1133    pregabalin (LYRICA) capsule 25 mg  25 mg Oral Daily Omar Mitchell MD   25 mg at 07/31/24 1133    [START ON 8/2/2024] risperiDONE (risperDAL) tablet 1 mg  1 mg Oral Kristyn Flores MD        risperiDONE (risperDAL) tablet 1 mg  1 mg Oral Daily Stephanie Solares, NP        sodium chloride (PF) 0.9% PF flush 3 mL  3 mL Intracatheter Q8H  "Lilliana Carmona MD   3 mL at 07/31/24 1139         PHYSICAL EXAM    BP (!) 161/87 (BP Location: Right arm, Patient Position: Prone)   Pulse 92   Temp 97.6  F (36.4  C) (Temporal)   Resp 18   Ht 1.981 m (6' 6\")   Wt 108.4 kg (239 lb)   SpO2 98%   BMI 27.62 kg/m        Intake/Output Summary (Last 24 hours) at 8/1/2024 1020  Last data filed at 8/1/2024 0600  Gross per 24 hour   Intake 790 ml   Output 300 ml   Net 490 ml       Disheveled; tangential; at times talking in loud voice  HEENT NC/AT; perrla; OP clear without lesions; mmm  CV; RRR without rub or murmur  Lung: clear and equal; no extra sounds  Ext: some edema and well perfused  Skin; no rash  Neuro; grossly intact    LABORATORIES    Last Renal Panel:  Sodium   Date Value Ref Range Status   07/30/2024 139 135 - 145 mmol/L Final   10/17/2014 147.0 (H) 133.0 - 144.0 mmol/L Final     Potassium   Date Value Ref Range Status   07/30/2024 6.4 (HH) 3.4 - 5.3 mmol/L Final   10/17/2014 4.5 3.4 - 5.3 mmol/L Final     Chloride   Date Value Ref Range Status   07/30/2024 97 (L) 98 - 107 mmol/L Final   10/17/2014 105.0 94.0 - 109.0 mmol/L Final     Carbon Dioxide   Date Value Ref Range Status   10/17/2014 25.0 20.0 - 32.0 mmol/L Final     Carbon Dioxide (CO2)   Date Value Ref Range Status   07/30/2024 25 22 - 29 mmol/L Final     Anion Gap   Date Value Ref Range Status   07/30/2024 17 (H) 7 - 15 mmol/L Final     Glucose   Date Value Ref Range Status   07/30/2024 100 (H) 70 - 99 mg/dL Final   10/17/2014 100.0 60.0 - 109.0 mg/dL Final     GLUCOSE BY METER POCT   Date Value Ref Range Status   07/23/2024 80 70 - 99 mg/dL Final     Urea Nitrogen   Date Value Ref Range Status   07/30/2024 71.3 (H) 6.0 - 20.0 mg/dL Final   10/17/2014 20.0 5.0 - 24.0 mg/dL Final     Creatinine   Date Value Ref Range Status   07/30/2024 15.29 (H) 0.67 - 1.17 mg/dL Final   10/17/2014 1.6 (H) 0.8 - 1.5 mg/dL Final     GFR Estimate   Date Value Ref Range Status   07/30/2024 4 (L) >60 mL/min/1.73m2 " "Final     Comment:     eGFR calculated using 2021 CKD-EPI equation.   03/27/2014 60 (L) >60 ml/min/1.73m2 Final     Calcium   Date Value Ref Range Status   07/30/2024 8.6 (L) 8.8 - 10.4 mg/dL Final     Comment:     Reference intervals for this test were updated on 7/16/2024 to reflect our healthy population more accurately. There may be differences in the flagging of prior results with similar values performed with this method. Those prior results can be interpreted in the context of the updated reference intervals.   10/17/2014 9.1 8.5 - 10.4 mg/dL Final     Phosphorus   Date Value Ref Range Status   07/23/2024 7.4 (H) 2.5 - 4.5 mg/dL Final     Albumin   Date Value Ref Range Status   07/23/2024 3.6 3.5 - 5.2 g/dL Final   03/27/2014 3.4 (L) 3.5 - 5.0 g/dL Final     No components found for: \"URINE\"   Lab Results   Component Value Date    WBC 4.9 07/25/2024     Lab Results   Component Value Date    RBC 3.17 07/25/2024     Lab Results   Component Value Date    HGB 8.6 07/25/2024    HGB 13.8 03/27/2014     Lab Results   Component Value Date    HCT 27.6 07/25/2024    HCT 43.8 03/27/2014     No components found for: \"MCT\"  Lab Results   Component Value Date    MCV 87 07/25/2024    MCV 87.5 03/27/2014     Lab Results   Component Value Date    MCH 27.1 07/25/2024    MCH 27.5 03/27/2014     Lab Results   Component Value Date    MCHC 31.2 07/25/2024    MCHC 31.5 03/27/2014     Lab Results   Component Value Date    RDW 15.4 07/25/2024     Lab Results   Component Value Date     07/25/2024         I reviewed all labs    ASSESSMENT/PLAN:  44 year old male    ESRD; should be on Q MWF; last HD was a shortened run on 7/29/24; I recommended that he have dialysis today due to his ongoing need and risk for hyperkalemia (has been refusing labs); he says he will not run in a bed in his room or in the dialysis suite; we do not have a proper dialysis chair/recliner available at this facility which could be used for a safe run; I " explained to pt why the recliners in his room are not considered safe and that the dialysis nurses would not be able to provide dialysis while he was in one of those recliners; he is resolute in his request and says he understands he is putting his life at risk; d/w pt that we can try to get a recliner for use in the future but cannot guarantee this and not available presently  Hyperkalemia; refusing labs; could try K binder if he is agreeable (lokelma or kayexalate)  HTN; on chronic meds;  has been refusing bp readings  Cognitive impairment  Anemia; has been on DEMAR  Psych; some SI in the context of known schizoaffective disorder; on risperidone; seen by psych formally and deemed to be decisional; pt continues to make bad/irrational decisions (no dialysis unless we provide an environment that is to his liking but not safe and against our policy) that could have life threatening consequences: ?ethic consult  Secondary hyperpara; on calcitriol and binders  Hand pain; workup per primary team; on lyrica chronically      Nadege Guillen MD  Nephrology

## 2024-08-01 NOTE — PROGRESS NOTES
[facility]    Progress Note - Hospitalist Service       Date of Admission:  7/22/2024    Assessment & Plan   Macario Delatorre is a 44 year old male with PMH of ESR on M/W/F HD, chronic pain syndrome, paroxysmal SVT, chronic gout, hypertension, hyperlipidemia, CAD, intellectual disability who was admitted on 7/22/24 with severe hyperkalemia with possible presyncope in setting of missed dialysis. Improving with dialysis. Tentatively, planning for discharge to TCU pending [facility] assessment     Hyperkalemia, severe  ESRD on MWF dialysis  K of 8.1 on presentation to ED with missed dialysis. Follows with Rom[facility] Lua and [facility], though has since decided he does not want to have Dr. Boucher or Dr. Lua see him, now having [facility] see him. Patient has history of poor compliance with dialysis due to conflicts with living facility and transportation. Last outpt dialysis was 7/17. Had emergent dialysis on 7/23 due to severe hyperkalemia. Last dialysis 7/29 with shortened run, now refusing all dialysis  -Nephrology following   - return to MWF dialysis schedule  - discussed dangers of missed dialysis including death discussed  - guarded prognosis due to poor compliance with dialysis and life threatening hyperkalemia  - low K diet   - had been refusing all lab draws and cares overnight  - Palliative following              - wants to continue life prolonging care with hemodialysis  - appears to have appreciation of his condition and can clearly communicate his wishes  - SW/CM consulted              - accepted at [facility]              - tentatively plan for discharge on Tuesday 7/20 pending HD and SW  - working on [employer] mobility application to assist in transportation to dialysis, paperwork completed and given to social work      Chest pain, resolved  Thought to be secondary to high potassium and hypervolemia from missed dialysis. Initial troponin 72, stable from prior  "though in setting of ESRD. EKG with mild T wave prominence in leads II and III. Resolved with emergent dialysis and electrolyte correction.      Cognitive impairment  History of mental health problems with hallucinations  Bipolar disorder  Agitation   Suicidal ideation  Patient refusing cares and can easily get agitated. Patient reports history of bipolar and schizophrenia. Does not endorse routine psychiatric follow up or regular medication use. In [facility] system was started on risperidone 1 mg at bedtime though has not taken consistently. Stated he can escalate quickly if upset. 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 7/30 started expressing SI with a plan to jump out of the window in setting of extreme frustration and refusal to undergo HD without his exact circumstances, patient himself requesting that his psych meds be increased.  - Psychiatric consult, appreciate input especially regarding capacity              - Psych evaluated, patient felt to have capacity              - reconsulted 7/30 for SI with plan                          - per psych NP, patient is not holdable and has capacity to make decisions for himself                          - per [facility] worker has stated that so long as psychiatry has deemed him decisional, he is not holdable  - Continue Risperdal 1 mg and change to am dosing this morning; additional 1mg daily per psych    Goals of care  Patient has reiterated that he will not undergo HD for his ESRD unless he gets his specific environment situated (preferred Rns only, demanding HD only in recliner and refusing HD in a bed, only wants HD in his room and away from others). Has continued to decline HD, seems to understand that cardiac arrest is a risk, though continues to express that he \"will not die\" due to this anytime soon. However, continues to reiterate that he would like full CPR and resuscitative efforts at this time, " "including intubation. Unclear if he would want HD if in this situation. Given known cognitive impairment as above as well as mood disorders, question if he is able to be decisional and/or what his needs would be if he were to become unable to express his needs. Palliative has been following, however patient continuing to decline discussion regarding end of life cares. Will place ethics consult.  - ethics consult, appreciate     R wrist edema  R wrist pain  Started 7/31 after using walker to ambulate through the halls. Appears swollen, likely in setting of lack of dialysis. Tender to palpation over scaphoid. Will get US and XR to rule out DVT and fracture.  - RUE ultrasound  - XR R wrist  - consider lidocaine patch    Chronic anemia, normocytic   Baseline Hgb 9-10. Likely related to ESRD/chronic disease. Iron stores adequate.   - Continue DEMAR weekly; resume with outpatient dialysis  - Daily CBC (patient frequently refusing labs)      Chronic pain syndrome, stable    Neuropathy, reported to be diabetic  - Lyrica 25 mg daily     Hypertension, uncontrolled  Continues to be hypertensive during hospitalization likely in setting of missed dialysis session.   - Continue PTA amlodipine 10 every day and metoprolol 25 BID        Diet: Combination Diet Regular Diet    DVT Prophylaxis: Pneumatic Compression Devices  Raphael Catheter: Not present  Fluids: PO  Lines: None     Cardiac Monitoring: None  Code Status: Full Code      Clinically Significant Risk Factors     # Hyperkalemia: Highest K = 6.4 mmol/L in last 2 days, will monitor as appropriate                      # Overweight: Estimated body mass index is 27.62 kg/m  as calculated from the following:    Height as of this encounter: 1.981 m (6' 6\").    Weight as of this encounter: 108.4 kg (239 lb).      # Financial/Environmental Concerns:           Disposition Plan  - pending placement to living facility     The patient's care was discussed with the Attending Physician, . " Kunal .    Kristyn Cuenca MD  Hospitalist Service  [facility]  Securely message with Mike (more info)  Text page via [employer] Paging/Directory   ______________________________________________________________________    Interval History   Refusing cares overnight. Also refusing cares this morning, saw in conjunction with nephrology this morning. Very irritable. Is having some pain of R wrist, tender and swollen. Will image, treat as indicated. Will also change risperidone to 1mg in AM in hopes he will take this more consistently.    Physical Exam   Vital Signs:           Resp: 18        Weight: 239 lbs 0 oz    General: Alert, irritated  Skin: clean, dry, and intact  Resp: normal work of breathing, no wheezing  Cardio: RRR, S1 and S2 present  Extremities: R wrist significantly more swollen than L, TTP along scaphoid and along forearm, palpable pulses, strength 4/5 upper extremities L>R, sensation intact  Psych: angry, irrational thought process      Medical Decision Making   Please see A&P for additional details of medical decision making.      Data   ------------------------- PAST 24 HR DATA REVIEWED -----------------------------------------------        Imaging results reviewed over the past 24 hrs:   No results found for this or any previous visit (from the past 24 hour(s)).

## 2024-08-01 NOTE — PROGRESS NOTES
Care Management Follow Up    Length of Stay (days): 9    Expected Discharge Date:      Anticipated Discharge Plan:  Transitional Care    Transportation: TBD    PT Recommendations: Transitional Care Facility  OT Recommendations:        Barriers to Discharge: medical stability, placement    Prior Living Situation: residential facility with facility resident (Pt was living with brother, went to TCU at [facility], then wanted to be there for LTC until able to return to brothers home)     Patient/Spokesperson Updated: No    Additional Information:  Chart reviewed. Email chain ongoing between [facility] (Rosa) and Jose at [facility] regarding the obra level 2 assessment. Cm following. Case has been escalated      Kimi England RN

## 2024-08-02 VITALS
HEIGHT: 78 IN | BODY MASS INDEX: 27.65 KG/M2 | HEART RATE: 80 BPM | OXYGEN SATURATION: 96 % | TEMPERATURE: 98 F | RESPIRATION RATE: 18 BRPM | WEIGHT: 239 LBS | DIASTOLIC BLOOD PRESSURE: 105 MMHG | SYSTOLIC BLOOD PRESSURE: 173 MMHG

## 2024-08-02 LAB
ANION GAP SERPL CALCULATED.3IONS-SCNC: 21 MMOL/L (ref 7–15)
BUN SERPL-MCNC: 114.5 MG/DL (ref 6–20)
CALCIUM SERPL-MCNC: 8.7 MG/DL (ref 8.8–10.4)
CHLORIDE SERPL-SCNC: 100 MMOL/L (ref 98–107)
CREAT SERPL-MCNC: 22.5 MG/DL (ref 0.67–1.17)
EGFRCR SERPLBLD CKD-EPI 2021: 2 ML/MIN/1.73M2
GLUCOSE SERPL-MCNC: 85 MG/DL (ref 70–99)
HCO3 SERPL-SCNC: 19 MMOL/L (ref 22–29)
POTASSIUM SERPL-SCNC: 7.1 MMOL/L (ref 3.4–5.3)
SODIUM SERPL-SCNC: 140 MMOL/L (ref 135–145)

## 2024-08-02 PROCEDURE — 90935 HEMODIALYSIS ONE EVALUATION: CPT

## 2024-08-02 PROCEDURE — 80048 BASIC METABOLIC PNL TOTAL CA: CPT | Performed by: INTERNAL MEDICINE

## 2024-08-02 PROCEDURE — 90935 HEMODIALYSIS ONE EVALUATION: CPT | Performed by: INTERNAL MEDICINE

## 2024-08-02 PROCEDURE — 250N000013 HC RX MED GY IP 250 OP 250 PS 637

## 2024-08-02 PROCEDURE — 250N000013 HC RX MED GY IP 250 OP 250 PS 637: Performed by: HOSPITALIST

## 2024-08-02 RX ADMIN — AMLODIPINE BESYLATE 10 MG: 5 TABLET ORAL at 11:32

## 2024-08-02 RX ADMIN — RISPERIDONE 1.5 MG: 1 TABLET, FILM COATED ORAL at 11:33

## 2024-08-02 RX ADMIN — METOPROLOL SUCCINATE 25 MG: 25 TABLET, EXTENDED RELEASE ORAL at 11:32

## 2024-08-02 RX ADMIN — PREGABALIN 25 MG: 25 CAPSULE ORAL at 11:32

## 2024-08-02 RX ADMIN — CALCITRIOL CAPSULES 0.25 MCG 0.25 MCG: 0.25 CAPSULE ORAL at 11:33

## 2024-08-02 RX ADMIN — Medication 1 TABLET: at 11:33

## 2024-08-02 ASSESSMENT — ACTIVITIES OF DAILY LIVING (ADL)
ADLS_ACUITY_SCORE: 32

## 2024-08-02 NOTE — PROGRESS NOTES
"Discharged AMA- completed run of HD, took off 3L. BP elevated, but willing to take meds. A&Ox4. Chronic back & BLE pain managed w/ rest & repo. Up indep. Belongings sent w/ pt, PRATEEKA paperworke reviewed w/ MD, escorted to front entrance benches, pt verbalized \"I'm calling for my ride\".   "

## 2024-08-02 NOTE — PROGRESS NOTES
"Pt alert and oriented, calm and appropriate, writer went to pt's room around 0015, pt was  on the phone and request Writer to comeback. Writer also brought turkey sandwich, ate 100%,     Checked with pt, he was calm but irritable, refused all assessment, states \"he has been here for almost a week and has a week worth of assessment and that should be enough, educated pt that we can't document something we haven't observed. Informed pt that writer will document refusal, educated pt on call light and its within reach, slept between cares     Pt refused labs, educated on importance of getting lab drawn, pt verbalized the importance of not getting lab drawn  "

## 2024-08-02 NOTE — PLAN OF CARE
Physical Therapy Discharge Summary    Reason for therapy discharge:    Patient left AMA.    Progress towards therapy goal(s). See goals on Care Plan in [facility] electronic health record for goal details.  Goals not met.  Barriers to achieving goals:   Patient left AMA.    Therapy recommendation(s):    Continued therapy is recommended.  Rationale/Recommendations:  TCU had been recommended.

## 2024-08-02 NOTE — PLAN OF CARE
Patient refused all cares, all scheduled medications, and full assessment. Ate 100% dinner. Calm and appropriate otherwise.    Kimi Castañeda RN

## 2024-08-02 NOTE — SIGNIFICANT EVENT
"Significant Event Note    Time of event: 12:00 PM August 2, 2024    Description of event:  Paged regarding patient wanting to leave AMA  Patient is a 44M with ESRD on HD who frequently misses HD and has had resultant hyperkalemia and chest pain, also with significant bipolar disorder and likely schizoaffective disorder, currently working on placement to nursing home or long term care facility  K this morning at 7.1, 3.5L removed per HD    Patient currently wanting to leave AMA  Has been deemed decisional per psych this hospitalization, cleared by ethics committee per their note  Patient adamant about leaving AMA this morning given he underwent HD this morning  We are currently working on placement for him to a new facility that is to his liking  Patient does not want to wait, states he has \"bills to pay\" and feels that he has not had adequate care here  Offered to send patient home with risperidone to help with mood and mental health, patient states that \"his mental health will be fine, nothing will happen\"  Cannot tell me that he is at risk of death, however he states he will be fine since he got dialysis this morning, plans to continue with dialysis at [facility] in outpatient setting  Per nephrology, confirmed to have a bed setting there  Reviewed with patient that he is at risk of death or serious injury if he were to leave without safe disposition  Patient still adamant about leaving AMA  Reviewed AMA paperwork, patient signed form after review with me  Walked out with nursing assistance      Discussed with: bedside nurse    Kristyn Cuenca MD    "

## 2024-08-02 NOTE — PROGRESS NOTES
"Care Management Follow Up    Length of Stay (days): 10    Expected Discharge Date:       Concerns to be Addressed: Care progression - discharge planning  compliance issue, decision making, mental health     Patient plan of care discussed at interdisciplinary rounds: Yes    Anticipated Discharge Disposition: Transitional Care - [facility]     Anticipated Discharge Services:  Transitional Care - [facility]  Anticipated Discharge DME:  NA    Patient/family educated on Medicare website which has current facility and service quality ratings:  Yes  Education Provided on the Discharge Plan:  Yes per team  Patient/Family in Agreement with the Plan:  yes    Referrals Placed by CM/SW:  Yes  Private pay costs discussed: Not applicable    Additional Information:  0820 called [facility] and left a message to return call.    0824 called Veronica at [facility] pre-screening [phone]. Per Veronica the OBRA Level 2 assessment was completed 7/17/24 and it's good for 30 days, \"This was extended for 6 months for patient to be in TCU.\"  Veronica gave Jose at [facility] screen shots of the eval, but their supervisor wants a hard copy of the updated eval.  Veronica emailed [facility] yesterday and Veronica has not rec'd any response from [facility].   Veronica reported [facility] usually sends the hard copy to the facility, but [facility] want a hard copy now.  Veronica does not have the hard copy.    Social Hx: \"Case escalated. Was accepted to [facility], but wants a hard copy of OBRA level 2 assessment prior to admission. Very difficult patient, refuses many things. [facility] MWF 3-7pm through [id number] transport. PAS done. Ethics consult pending. [facility] transport.\"     RNCM to follow for medical progression, recommendations, and final discharge plan.     Moon Hernández, RN     8946 Jose called from [facility] and stated he did rec'd the old OBRA level 2 assessment from [facility] dated " "7/17/24, but on it, it only states 30 days, \"I can't take a patient for that short amount of time. We can't rehab him in 30 days.\" Jose needs a new OBRA Level 2 with extended time. Jose states, Veronica did send him screen shots of the updated dates, but \"I need a new hard copy of the OBRA Level 2 assessment. I can't accept screen shots. I need a new hard copy with the new dates.\"    0854 called Veronica at [facility] to update and she states, \"I sent him screen shots of the NF short term approval for the extended dates, but it's not good enough.\"  Veroinca states DEANN will send the new OBRA Level 2 to Jose with the new dates or Jose can also access the MMIS to see the dates.  Veronica will email her supervisor how to proceed.     "

## 2024-08-02 NOTE — PROGRESS NOTES
"[facility]  [facility]   Follow up    Macario Delatorre   MRN: [id number]  : 1980   DOA: 2024   CC: ESRD      Assessment and Plan:  44 year old male    ESRD; should be on Q MWF; last HD was a shortened run on 24; pt has been declining HD for various reasons; today he agreed to run in his room in his bed; next HD should be Monday; called [facility] and they still have his MWF spot and are willing to continue to treat him with MD change to our group (Dr. Gonzales)  Hyperkalemia; recorded as high today at 7.1; he had been refusing labs for several days; could try K binder if he is agreeable (lokelma or kayexalate) but so far he has refused to take; running on k 1 and K2 due to extreme hyperkalemia  HTN; on chronic meds;  has been refusing bp readings; bp recorded on HD today 150-160  Anemia; has been on DEMAR; getting epo here weekly; no recent hgb checks due to refusing labs  Psych; known schizoaffective disorder; also cognitive impairment; on risperidone and escalating doses; seen by psych formally and deemed to be decisional; pt continues to make bad/irrational decisions (no dialysis unless we provide an environment that is to his liking but not safe and against our policy) that could have life threatening consequence; noted ethics consult; multiple issues considered but no suggestions/ideas that will help with this complicated pt and his refusal of dialysis and other cares while inpt and then noncompliance with OP dialysis and problematic placement issues  Secondary hyperpara; on calcitriol and binders  Hand pain; workup per primary team; on lyrica chronically; improved per pt today  Dispo; prior placement is not willing to take pt back; working on more permanent placement; pt prefers in [place] near the dialysis unit  Subjective  Less hand pain; says mostly at R wrist; no swelling or warmth  Seen on dialysis; he says that he is \"done with " "[facility]\" and his plan is to leave here today--he currently does not have placement or a place to stay temporarily; he says he needs to pay bills and then he will figure it out; d/w pt ongoing HD at [facility] with next HD Monday at 3:15  Objective    Vital signs in last 24 hours  Temp:  [98  F (36.7  C)-98.2  F (36.8  C)] 98  F (36.7  C)  Pulse:  [75-89] (P) 80  Resp:  [18] 18  BP: (153-188)/() (P) 157/104  SpO2:  [96 %-100 %] 96 %      Intake/Output last 3 shifts  I/O last 3 completed shifts:  In: 240 [P.O.:240]  Out: 200 [Urine:200]  Intake/Output this shift:  I/O this shift:  In: 0   Out: 3900 [Urine:400; Other:3500]    Physical Exam  Alert/oriented x 3, awake, NAD  CV: RRR without murmur or rub  Lung: clear and equal; no extra sounds  Ab: soft and NT; not distended; normal bs  Ext: no edema and well perfused  Skin; no rash    Pertinent Labs     Last Renal Panel:  Sodium   Date Value Ref Range Status   08/02/2024 140 135 - 145 mmol/L Final   10/17/2014 147.0 (H) 133.0 - 144.0 mmol/L Final     Potassium   Date Value Ref Range Status   08/02/2024 7.1 (HH) 3.4 - 5.3 mmol/L Final   10/17/2014 4.5 3.4 - 5.3 mmol/L Final     Chloride   Date Value Ref Range Status   08/02/2024 100 98 - 107 mmol/L Final   10/17/2014 105.0 94.0 - 109.0 mmol/L Final     Carbon Dioxide   Date Value Ref Range Status   10/17/2014 25.0 20.0 - 32.0 mmol/L Final     Carbon Dioxide (CO2)   Date Value Ref Range Status   08/02/2024 19 (L) 22 - 29 mmol/L Final     Anion Gap   Date Value Ref Range Status   08/02/2024 21 (H) 7 - 15 mmol/L Final     Glucose   Date Value Ref Range Status   08/02/2024 85 70 - 99 mg/dL Final   10/17/2014 100.0 60.0 - 109.0 mg/dL Final     GLUCOSE BY METER POCT   Date Value Ref Range Status   07/23/2024 80 70 - 99 mg/dL Final     Urea Nitrogen   Date Value Ref Range Status   08/02/2024 114.5 (H) 6.0 - 20.0 mg/dL Final   10/17/2014 20.0 5.0 - 24.0 mg/dL Final     Creatinine   Date Value Ref Range Status "   08/02/2024 22.50 (H) 0.67 - 1.17 mg/dL Final   10/17/2014 1.6 (H) 0.8 - 1.5 mg/dL Final     GFR Estimate   Date Value Ref Range Status   08/02/2024 2 (L) >60 mL/min/1.73m2 Final     Comment:     eGFR calculated using 2021 CKD-EPI equation.   03/27/2014 60 (L) >60 ml/min/1.73m2 Final     Calcium   Date Value Ref Range Status   08/02/2024 8.7 (L) 8.8 - 10.4 mg/dL Final     Comment:     Reference intervals for this test were updated on 7/16/2024 to reflect our healthy population more accurately. There may be differences in the flagging of prior results with similar values performed with this method. Those prior results can be interpreted in the context of the updated reference intervals.   10/17/2014 9.1 8.5 - 10.4 mg/dL Final     Phosphorus   Date Value Ref Range Status   07/23/2024 7.4 (H) 2.5 - 4.5 mg/dL Final     Albumin   Date Value Ref Range Status   07/23/2024 3.6 3.5 - 5.2 g/dL Final   03/27/2014 3.4 (L) 3.5 - 5.0 g/dL Final         I reviewed all lab results  Nadege Guillen MD

## 2024-08-08 ENCOUNTER — APPOINTMENT (OUTPATIENT)
Dept: RADIOLOGY | Facility: HOSPITAL | Age: 44
DRG: 628 | End: 2024-08-08
Attending: EMERGENCY MEDICINE
Payer: MEDICARE

## 2024-08-08 ENCOUNTER — HOSPITAL ENCOUNTER (INPATIENT)
Facility: HOSPITAL | Age: 44
LOS: 10 days | Discharge: LEFT BEFORE DISCHARGE | DRG: 628 | End: 2024-08-19
Attending: EMERGENCY MEDICINE | Admitting: FAMILY MEDICINE
Payer: MEDICARE

## 2024-08-08 DIAGNOSIS — K04.7 TOOTH INFECTION: ICD-10-CM

## 2024-08-08 DIAGNOSIS — M54.6 BILATERAL THORACIC BACK PAIN, UNSPECIFIED CHRONICITY: Primary | ICD-10-CM

## 2024-08-08 DIAGNOSIS — N18.6 ESRD (END STAGE RENAL DISEASE) ON DIALYSIS (H): ICD-10-CM

## 2024-08-08 DIAGNOSIS — E87.5 HYPERKALEMIA: ICD-10-CM

## 2024-08-08 DIAGNOSIS — R07.9 CHEST PAIN, UNSPECIFIED TYPE: ICD-10-CM

## 2024-08-08 DIAGNOSIS — Z99.2 ESRD (END STAGE RENAL DISEASE) ON DIALYSIS (H): ICD-10-CM

## 2024-08-08 DIAGNOSIS — N25.81 SECONDARY HYPERPARATHYROIDISM OF RENAL ORIGIN (H): ICD-10-CM

## 2024-08-08 DIAGNOSIS — F31.81 BIPOLAR 2 DISORDER (H): ICD-10-CM

## 2024-08-08 PROBLEM — F39 MOOD DISORDER (H): Status: ACTIVE | Noted: 2024-08-08

## 2024-08-08 PROBLEM — R79.89 ELEVATED TROPONIN: Status: ACTIVE | Noted: 2024-08-08

## 2024-08-08 PROBLEM — M79.605 BILATERAL LEG PAIN: Status: ACTIVE | Noted: 2023-10-19

## 2024-08-08 PROBLEM — M15.9 OSTEOARTHRITIS OF MULTIPLE JOINTS: Status: ACTIVE | Noted: 2024-08-08

## 2024-08-08 PROBLEM — F81.9 LEARNING DISABILITY: Status: ACTIVE | Noted: 2024-08-08

## 2024-08-08 PROBLEM — M79.604 BILATERAL LEG PAIN: Status: ACTIVE | Noted: 2023-10-19

## 2024-08-08 PROBLEM — I51.7 LVH (LEFT VENTRICULAR HYPERTROPHY): Status: ACTIVE | Noted: 2024-04-15

## 2024-08-08 LAB
ANION GAP SERPL CALCULATED.3IONS-SCNC: 17 MMOL/L (ref 7–15)
ATRIAL RATE - MUSE: 91 BPM
BASOPHILS # BLD AUTO: 0 10E3/UL (ref 0–0.2)
BASOPHILS NFR BLD AUTO: 0 %
BUN SERPL-MCNC: 72.1 MG/DL (ref 6–20)
CALCIUM SERPL-MCNC: 8.6 MG/DL (ref 8.8–10.4)
CHLORIDE SERPL-SCNC: 99 MMOL/L (ref 98–107)
CREAT SERPL-MCNC: 17.22 MG/DL (ref 0.67–1.17)
DIASTOLIC BLOOD PRESSURE - MUSE: NORMAL MMHG
EGFRCR SERPLBLD CKD-EPI 2021: 3 ML/MIN/1.73M2
EOSINOPHIL # BLD AUTO: 0.1 10E3/UL (ref 0–0.7)
EOSINOPHIL NFR BLD AUTO: 1 %
ERYTHROCYTE [DISTWIDTH] IN BLOOD BY AUTOMATED COUNT: 15.9 % (ref 10–15)
GLUCOSE SERPL-MCNC: 92 MG/DL (ref 70–99)
HCO3 SERPL-SCNC: 23 MMOL/L (ref 22–29)
HCT VFR BLD AUTO: 29.1 % (ref 40–53)
HGB BLD-MCNC: 9.5 G/DL (ref 13.3–17.7)
IMM GRANULOCYTES # BLD: 0 10E3/UL
IMM GRANULOCYTES NFR BLD: 0 %
INTERPRETATION ECG - MUSE: NORMAL
LYMPHOCYTES # BLD AUTO: 1.6 10E3/UL (ref 0.8–5.3)
LYMPHOCYTES NFR BLD AUTO: 25 %
MCH RBC QN AUTO: 27.5 PG (ref 26.5–33)
MCHC RBC AUTO-ENTMCNC: 32.6 G/DL (ref 31.5–36.5)
MCV RBC AUTO: 84 FL (ref 78–100)
MONOCYTES # BLD AUTO: 0.5 10E3/UL (ref 0–1.3)
MONOCYTES NFR BLD AUTO: 8 %
NEUTROPHILS # BLD AUTO: 4.3 10E3/UL (ref 1.6–8.3)
NEUTROPHILS NFR BLD AUTO: 66 %
NRBC # BLD AUTO: 0 10E3/UL
NRBC BLD AUTO-RTO: 0 /100
P AXIS - MUSE: NORMAL DEGREES
PLATELET # BLD AUTO: 256 10E3/UL (ref 150–450)
POTASSIUM SERPL-SCNC: 6.7 MMOL/L (ref 3.4–5.3)
PR INTERVAL - MUSE: 304 MS
QRS DURATION - MUSE: 110 MS
QT - MUSE: 392 MS
QTC - MUSE: 482 MS
R AXIS - MUSE: -39 DEGREES
RBC # BLD AUTO: 3.46 10E6/UL (ref 4.4–5.9)
SODIUM SERPL-SCNC: 139 MMOL/L (ref 135–145)
SYSTOLIC BLOOD PRESSURE - MUSE: NORMAL MMHG
T AXIS - MUSE: 38 DEGREES
TROPONIN T SERPL HS-MCNC: 71 NG/L
TROPONIN T SERPL HS-MCNC: 72 NG/L
VENTRICULAR RATE- MUSE: 91 BPM
WBC # BLD AUTO: 6.5 10E3/UL (ref 4–11)

## 2024-08-08 PROCEDURE — 36415 COLL VENOUS BLD VENIPUNCTURE: CPT | Performed by: EMERGENCY MEDICINE

## 2024-08-08 PROCEDURE — G0378 HOSPITAL OBSERVATION PER HR: HCPCS

## 2024-08-08 PROCEDURE — 90935 HEMODIALYSIS ONE EVALUATION: CPT | Performed by: INTERNAL MEDICINE

## 2024-08-08 PROCEDURE — 99285 EMERGENCY DEPT VISIT HI MDM: CPT | Mod: 25

## 2024-08-08 PROCEDURE — 85049 AUTOMATED PLATELET COUNT: CPT | Performed by: EMERGENCY MEDICINE

## 2024-08-08 PROCEDURE — 5A1D70Z PERFORMANCE OF URINARY FILTRATION, INTERMITTENT, LESS THAN 6 HOURS PER DAY: ICD-10-PCS | Performed by: INTERNAL MEDICINE

## 2024-08-08 PROCEDURE — 93005 ELECTROCARDIOGRAM TRACING: CPT | Performed by: EMERGENCY MEDICINE

## 2024-08-08 PROCEDURE — 84484 ASSAY OF TROPONIN QUANT: CPT | Performed by: EMERGENCY MEDICINE

## 2024-08-08 PROCEDURE — 99205 OFFICE O/P NEW HI 60 MIN: CPT | Mod: FS | Performed by: INTERNAL MEDICINE

## 2024-08-08 PROCEDURE — 80048 BASIC METABOLIC PNL TOTAL CA: CPT | Performed by: EMERGENCY MEDICINE

## 2024-08-08 PROCEDURE — 99207 PR APP CREDIT; MD BILLING SHARED VISIT: CPT | Mod: FS | Performed by: NURSE PRACTITIONER

## 2024-08-08 PROCEDURE — 250N000013 HC RX MED GY IP 250 OP 250 PS 637

## 2024-08-08 PROCEDURE — 71046 X-RAY EXAM CHEST 2 VIEWS: CPT

## 2024-08-08 PROCEDURE — 90935 HEMODIALYSIS ONE EVALUATION: CPT

## 2024-08-08 PROCEDURE — 99222 1ST HOSP IP/OBS MODERATE 55: CPT | Mod: GC

## 2024-08-08 RX ORDER — DEXTROSE MONOHYDRATE 25 G/50ML
25 INJECTION, SOLUTION INTRAVENOUS ONCE
Status: DISCONTINUED | OUTPATIENT
Start: 2024-08-08 | End: 2024-08-08

## 2024-08-08 RX ORDER — PROCHLORPERAZINE MALEATE 10 MG
10 TABLET ORAL EVERY 6 HOURS PRN
Status: DISCONTINUED | OUTPATIENT
Start: 2024-08-08 | End: 2024-08-19 | Stop reason: HOSPADM

## 2024-08-08 RX ORDER — ACETAMINOPHEN 325 MG/1
975 TABLET ORAL 3 TIMES DAILY
Status: DISCONTINUED | OUTPATIENT
Start: 2024-08-08 | End: 2024-08-19 | Stop reason: HOSPADM

## 2024-08-08 RX ORDER — CALCIUM CARBONATE 500 MG/1
1000 TABLET, CHEWABLE ORAL 4 TIMES DAILY PRN
Status: DISCONTINUED | OUTPATIENT
Start: 2024-08-08 | End: 2024-08-19 | Stop reason: HOSPADM

## 2024-08-08 RX ORDER — DEXTROSE MONOHYDRATE 25 G/50ML
25-50 INJECTION, SOLUTION INTRAVENOUS
Status: DISCONTINUED | OUTPATIENT
Start: 2024-08-08 | End: 2024-08-19 | Stop reason: HOSPADM

## 2024-08-08 RX ORDER — ONDANSETRON 2 MG/ML
4 INJECTION INTRAMUSCULAR; INTRAVENOUS EVERY 6 HOURS PRN
Status: DISCONTINUED | OUTPATIENT
Start: 2024-08-08 | End: 2024-08-19 | Stop reason: HOSPADM

## 2024-08-08 RX ORDER — PROCHLORPERAZINE 25 MG
25 SUPPOSITORY, RECTAL RECTAL EVERY 12 HOURS PRN
Status: DISCONTINUED | OUTPATIENT
Start: 2024-08-08 | End: 2024-08-19 | Stop reason: HOSPADM

## 2024-08-08 RX ORDER — ONDANSETRON 4 MG/1
4 TABLET, ORALLY DISINTEGRATING ORAL EVERY 6 HOURS PRN
Status: DISCONTINUED | OUTPATIENT
Start: 2024-08-08 | End: 2024-08-19 | Stop reason: HOSPADM

## 2024-08-08 RX ORDER — NICOTINE POLACRILEX 4 MG
15-30 LOZENGE BUCCAL
Status: DISCONTINUED | OUTPATIENT
Start: 2024-08-08 | End: 2024-08-19 | Stop reason: HOSPADM

## 2024-08-08 RX ORDER — LIDOCAINE 40 MG/G
CREAM TOPICAL
Status: DISCONTINUED | OUTPATIENT
Start: 2024-08-08 | End: 2024-08-19 | Stop reason: HOSPADM

## 2024-08-08 RX ORDER — PROCHLORPERAZINE MALEATE 10 MG
10 TABLET ORAL EVERY 6 HOURS PRN
COMMUNITY

## 2024-08-08 RX ORDER — AMOXICILLIN 250 MG
1 CAPSULE ORAL 2 TIMES DAILY PRN
Status: DISCONTINUED | OUTPATIENT
Start: 2024-08-08 | End: 2024-08-19 | Stop reason: HOSPADM

## 2024-08-08 RX ORDER — LIDOCAINE 4 G/G
2 PATCH TOPICAL
Status: DISCONTINUED | OUTPATIENT
Start: 2024-08-08 | End: 2024-08-19 | Stop reason: HOSPADM

## 2024-08-08 RX ORDER — AMOXICILLIN 250 MG
2 CAPSULE ORAL 2 TIMES DAILY PRN
Status: DISCONTINUED | OUTPATIENT
Start: 2024-08-08 | End: 2024-08-19 | Stop reason: HOSPADM

## 2024-08-08 RX ADMIN — LIDOCAINE 2 PATCH: 4 PATCH TOPICAL at 20:27

## 2024-08-08 ASSESSMENT — ACTIVITIES OF DAILY LIVING (ADL)
ADLS_ACUITY_SCORE: 38
ADLS_ACUITY_SCORE: 36
ADLS_ACUITY_SCORE: 38
ADLS_ACUITY_SCORE: 40
ADLS_ACUITY_SCORE: 40
DEPENDENT_IADLS:: TRANSPORTATION;SHOPPING
ADLS_ACUITY_SCORE: 40
ADLS_ACUITY_SCORE: 36
ADLS_ACUITY_SCORE: 36
ADLS_ACUITY_SCORE: 40

## 2024-08-08 ASSESSMENT — COLUMBIA-SUICIDE SEVERITY RATING SCALE - C-SSRS
2. HAVE YOU ACTUALLY HAD ANY THOUGHTS OF KILLING YOURSELF IN THE PAST MONTH?: NO
1. IN THE PAST MONTH, HAVE YOU WISHED YOU WERE DEAD OR WISHED YOU COULD GO TO SLEEP AND NOT WAKE UP?: NO
6. HAVE YOU EVER DONE ANYTHING, STARTED TO DO ANYTHING, OR PREPARED TO DO ANYTHING TO END YOUR LIFE?: NO

## 2024-08-08 NOTE — H&P
[facility]    History and Physical - Hospitalist Service       Date of Admission:  8/8/2024    Assessment & Plan      Macario Delatorre is a 44 year old male admitted on 8/8/2024. He has a history of ESRD on HD MWF w/associated nephrology, cognitive disability, mental health complex hx (PTSD, bipolar? Agitation) HTN, gout, HLD and is admitted for recurrent chest pains, upper back pain found to have hyperkalemia (K 6.7), slightly elevated troponin at 72 w/no EKG changes, delta trop pending. Very complex social history including active homelessness and poor medication adherence in the past, has doctored inconsistently    #ESRD on HD MWF  #Hyperkalemia, severe  Patient has had recent previous similar admissions, notably 7/22-8/2/24 for hyperkalemia and dialysis in which patient left AMA. Also was admitted 7/10-7/18 for syncopal episode and chest pains, hyperkalemia and ESRD with HD. Per patient had dialysis run 8/7/24 but was stopped early due to him c/o extremities being cold. Have had issues in the past with continuing dialysis, however patient is understanding this admission of the importance of receiving his regularly scheduled treatments. K 6.7 this admission, s/p insulin and dextrose in ED, recheck pending. Cr. 17.2, BUN 72, GFR 3. Unclear etiology of ESRD, suspect 2/2 chronic poorly controlled HTN, last A1C 7/10/24 4.4, is ot on any medications for diabetes currently and BG wnl. Appears has not had prior workup.  -Nephrology consulted, recs appreciated  -Emergent dialysis initiated 8/8/24, will resume regular HD schedule MWF 8/9 per nephrology  -Renal diet  -Consider re-consultation of palliative care gven prior refusal of HD, however patient has been adherent and understanding this admission  -Resume home renal multivitamins, calcitriol, calcium acetate      #Chest Pain  #ACS r/out   Patient presented with chest pain, diffuse tenderness midsternal radiating to both L and R side of his  "chest, reproducible, has had chest pain recurrence on previous admissions. Denies SOB currently, initial EKG on admission not demonstrating ischemic changes, initial troponin 72, delta trop pending. CXR demonstrating Lung volumes are low. No focal airspace opacities, pleural effusions, or pneumothorax. Normal pulmonary vascularity. Stable cardiomediastinal silhouette.  -Cardiac telemetry overnight  -Can consider NTG if chest pain however I do not think at this time it is cardiac in etiology  -Scheduled tylenol      #Back Pain  #Fall, previous encounter  Pt endorsing upper back pain most notably tender over cervical paraspinal muscles, appears that he had workup done 8/6 at [facility] when he \"collapsed\" and hurt his back, denied LOC. Imaging performed at this time included CT lumbar, thoracic, and cervical spine, all demonstrating no evidence for fracture or dislocation, no significant spinal canal or neuroforaminal stenosis. Mild multilevel cervical spondylosis. CT head demonstrating no intracranial abnormality.    -Will trial lidocaine patch for upper back pain that I suspect is MSK in nature given his recent imaging studies.  -Scheduled tylenol    Cognitive impairment  History of mental health problems with hallucinations  Bipolar disorder  Agitation   Suicidal ideation  Patient has a history of refusing cares and can easily get agitated. Patient reports history of bipolar and schizophrenia. Does not endorse routine psychiatric follow up or regular medication use. In [facility] system was started on risperidone 1 mg at bedtime though has not taken consistently. Stated he can escalate quickly if upset. 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 7/30 started expressing SI with a plan to jump out of the window in setting of extreme frustration and refusal to undergo HD without his exact circumstances, patient himself requesting that his psych meds be increased.  - " "Psychiatric consulted last admission, could consider reconsultation if elevating risk of agitation              - Psych evaluated, patient felt to have capacity                          - per psych NP, patient is not holdable and has capacity to make decisions for himself                          - per ANDREW, [facility] worker has stated that so long as psychiatry has deemed him decisional, he is not holdable  - Continue Risperdal 1 mg daily PTA     Goals of care  Note from Dr. Cuenca from prior admission:  \"Patient has previously reiterated that he will not undergo HD for his ESRD unless he gets his specific environment situated (preferred Rns only, demanding HD only in recliner and refusing HD in a bed, only wants HD in his room and away from others). Has continued to decline HD, seems to understand that cardiac arrest is a risk, though continues to express that he \"will not die\" due to this anytime soon. However, continues to reiterate that he would like full CPR and resuscitative efforts at this time, including intubation. Unclear if he would want HD if in this situation. Given known cognitive impairment as above as well as mood disorders, question if he is able to be decisional and/or what his needs would be if he were to become unable to express his needs. Palliative has been following, however patient continuing to decline discussion regarding end of life cares\"    Chronic anemia, normocytic   Baseline Hgb 9-10. Likely related to ESRD/chronic disease. Iron stores adequate. CBC on admission pending  - Continue DEMAR weekly; resume with outpatient dialysis  -Transfuse for HgB <7 if indicated  - Daily CBC (patient has frequently refused labs on prior admissions)     Chronic pain syndrome, stable    Neuropathy, reported to be diabetic  - Lyrica 25 mg daily     Hypertension, uncontrolled  Continues to be hypertensive during hospitalization likely in setting of missed dialysis session.   - Continue PTA amlodipine 10 " "every day and metoprolol 25 BID       Diet: Renal Diet (dialysis)    DVT Prophylaxis: Pneumatic Compression Devices  Raphael Catheter: Not present  Fluids: Fluid restriction, renal diet, HD  Lines: None     Cardiac Monitoring: None  Code Status:  Full code    Clinically Significant Risk Factors Present on Admission        # Hyperkalemia: Highest K = 6.7 mmol/L in last 2 days, will monitor as appropriate               # Acute Hypoxic Respiratory Failure: Documented O2 saturation < 90%. Continue supplemental oxygen as needed        # Overweight: Estimated body mass index is 27.62 kg/m  as calculated from the following:    Height as of 7/22/24: 1.981 m (6' 6\").    Weight as of 7/23/24: 108.4 kg (239 lb).       # Financial/Environmental Concerns:                 Disposition Plan      Expected Discharge Date: 08/09/2024                The patient's care was discussed with the Attending Physician, Dr. Segovia .      Abdifatah King MD  Hospitalist Service  [facility]  Securely message with [employer] (more info)  Text page via [facility] Paging/Directory   ______________________________________________________________________    Chief Complaint   Chest pain, back pain    History is obtained from the patient    History of Present Illness   Macario Delatorre is a 44 year old male who presents with chest pain that has been recurrent, and upper back pain. Patient states he had a fall on 8/6, was evaluated at [facility] with negative spine and head CT imaging. States he was at his brother's house today after planting flowers for his mother, stretched his back and felt pain in his chest, back, and some radiation into his legs. Patient attempted to walk to his scooter and reports that he fell, felt SOB and had chest pain, called 9/11. Attempted to take 4 advil without relief. Reports he was in dialysis yesterday and had to stop his run early due to his lower extremities being cold.    Patient denies URI symptoms (cough, " rhinorrhea, congestion, etc), bowel or bladder symptoms, abdominal pain, flank pain, N/V.    Patient endorses that he smokes marijuana occasionally, occasional cigarette use, and quit drinking alcohol 10 years ago.    Patient endorses he is currently homeless, has used an electric scooter to ambulate but has had difficulties in the past getting to dialysis and appointments.       Past Medical History    Past Medical History:   Diagnosis Date    Chlamydia     history of    Flatfoot     feet pes plannus    Mental retardation     mild    Migraine headache     Pain disorder 11/6/11    upset about no pain meds  incedent report filerd by RN    Tobacco abuse     Undescended testicle        Past Surgical History   No past surgical history on file.    Prior to Admission Medications   Prior to Admission Medications   Prescriptions Last Dose Informant Patient Reported? Taking?   amLODIPine (NORVASC) 10 MG tablet   Yes No   Sig: Take 10 mg by mouth daily   calcitRIOL (ROCALTROL) 0.25 MCG capsule   Yes No   Sig: Take 0.25 mcg by mouth daily   calcium acetate (PHOSLO) 667 MG CAPS capsule   Yes No   Sig: Take 1,334 mg by mouth 3 times daily (with meals)   metoprolol succinate ER (TOPROL XL) 25 MG 24 hr tablet   No No   Sig: Take 1 tablet (25 mg) by mouth 2 times daily   multivitamin RENAL (TRIPHROCAPS) 1 capsule capsule   Yes No   Sig: Take 1 capsule by mouth daily   ondansetron (ZOFRAN) 4 MG tablet   No No   Sig: Take 1 tablet (4 mg) by mouth every 8 hours as needed for nausea   pregabalin (LYRICA) 25 MG capsule   No No   Sig: Take 1 capsule (25 mg) by mouth daily   risperiDONE (RISPERDAL) 1 MG tablet   Yes No   Sig: Take 1 mg by mouth at bedtime      Facility-Administered Medications: None         Physical Exam   Vital Signs: Temp: 99.2  F (37.3  C)   BP: (!) 140/84 Pulse: 84   Resp: 16 SpO2: 100 %      Weight: 0 lbs 0 oz    Physical Exam  Constitutional:       General: He is not in acute distress.     Appearance: Normal  appearance. He is not ill-appearing or diaphoretic.   HENT:      Mouth/Throat:      Mouth: Mucous membranes are dry.      Pharynx: Oropharynx is clear.   Eyes:      Extraocular Movements: Extraocular movements intact.      Conjunctiva/sclera: Conjunctivae normal.      Pupils: Pupils are equal, round, and reactive to light.   Cardiovascular:      Rate and Rhythm: Normal rate and regular rhythm.      Pulses: Normal pulses.      Heart sounds: Normal heart sounds.   Pulmonary:      Effort: Pulmonary effort is normal.      Breath sounds: Normal breath sounds.   Abdominal:      General: Bowel sounds are normal.      Palpations: Abdomen is soft.   Musculoskeletal:      Right lower leg: No edema.      Left lower leg: No edema.   Skin:     Capillary Refill: Capillary refill takes less than 2 seconds.   Neurological:      General: No focal deficit present.      Mental Status: He is alert and oriented to person, place, and time. Mental status is at baseline.   Psychiatric:         Mood and Affect: Mood normal.         Behavior: Behavior normal.         Thought Content: Thought content normal.         Judgment: Judgment normal.          Data     I have personally reviewed the following data over the past 24 hrs:    N/A  \   N/A   / N/A     139 99 72.1 (H) /  92   6.7 (HH) 23 17.22 (H) \     Trop: 72 (H) BNP: N/A       Imaging results reviewed over the past 24 hrs:   Recent Results (from the past 24 hour(s))   XR Chest 2 Views    Narrative    EXAM: XR CHEST 2 VIEWS  LOCATION: [facility]  DATE: 8/8/2024    INDICATION: Chest pain.   COMPARISON: Chest radiograph 7/10/2024.       Impression    IMPRESSION:     Lung volumes are low. No focal airspace opacities, pleural effusions, or pneumothorax. Normal pulmonary vascularity.    Stable cardiomediastinal silhouette.     Abdifatah King MD  PGY-2  [facility]  [facility]   August 8, 2024

## 2024-08-08 NOTE — PLAN OF CARE
Goal Outcome Evaluation:      Plan of Care Reviewed With: patient          Outcome Evaluation: Unknown discharge needs at this time.

## 2024-08-08 NOTE — PROCEDURES
Potassium   Date Value Ref Range Status   08/08/2024 6.7 (HH) 3.4 - 5.3 mmol/L Final   10/17/2014 4.5 3.4 - 5.3 mmol/L Final     Hemoglobin   Date Value Ref Range Status   07/25/2024 8.6 (L) 13.3 - 17.7 g/dL Final   03/27/2014 13.8 13.3 - 17.7 g/dL Final     Creatinine   Date Value Ref Range Status   08/08/2024 17.22 (H) 0.67 - 1.17 mg/dL Final   10/17/2014 1.6 (H) 0.8 - 1.5 mg/dL Final     Urea Nitrogen   Date Value Ref Range Status   08/08/2024 72.1 (H) 6.0 - 20.0 mg/dL Final   10/17/2014 20.0 5.0 - 24.0 mg/dL Final     Sodium   Date Value Ref Range Status   08/08/2024 139 135 - 145 mmol/L Final   10/17/2014 147.0 (H) 133.0 - 144.0 mmol/L Final     INR   Date Value Ref Range Status   03/27/2014 1.0  Final       DIALYSIS PROCEDURE NOTE  Hepatitis status of previous patient on machine log was checked and verified ok to use with this patients hepatitis status.  Patient dialyzed for 3 hrs. on a K2 bath with a net fluid removal of  3.3L.  A BFR of 350 ml/min was obtained using 15 gauge needles.      The treatment plan was discussed with Dr. Gonzales during the treatment.    Needle cannulation sites held x 5 min.     Meds  given: 100ml NaCL bolus.  Complications: Treatment terminated 2 minutes early. Pt. Reported dizziness and lightheadedness at end of treatment. 100ml saline bolus given in addition to rinseback. Pt. Reported symptoms resolving after termination of treatment.     Person educated: Patient. Knowledge base moderate. Barriers to learning: none. Educated on procedure via explanation.     Patient educated on inability to run patient bedside on observation unit due to inadequate water supply, pt. Verbalized understanding. Educated on need to have dialysis tomorrow to stay on MWF schedule, pt. Verbalized understanding and reported that he would refuse dialysis prior to 1200.     ICEBOAT? Timeout performed pre-treatment  I: Patient was identified using 2 identifiers  C:  Consent Signed Yes  E: Equipment  preventative maintenance is current and dialysis delivery system OK to use  B: Hepatitis B Surface Antigen: Negative; Draw Date: 7/23/24      Hepatitis B Surface Antibody: Immune; Draw Date: 7/23/24  O: Dialysis orders present and complete prior to treatment  A: Vascular access verified and assessed prior to treatment  T: Treatment was performed at a clinically appropriate time  ?: Patient was allowed to ask questions and address concerns prior to treatment  See Adult Hemodialysis flowsheet in [facility] for further details and post assessment.  Machine water alarm in place and functioning. Transducer pods intact and checked every 15min.   Pt returned via cart.  Chlorine/Chloramine water system checked every 4 hours.    Patient repositioned every 2 hours during the treatment.  Post treatment report given to NATASHA De Jesus RN regarding 3.3L of fluid removed, last BP of 120/70, and patient pain resting.

## 2024-08-08 NOTE — ED TRIAGE NOTES
"Pt was doing yard work today and started having CP. No specific injury. Pain \"is up the whole spine.\" Pt also c/o CP starting today. Pain is right side of chest. Any touch/palpitation to chest patient states is painful.     Triage Assessment (Adult)       Row Name 08/08/24 1313          Triage Assessment    Airway WDL WDL        Respiratory WDL    Respiratory WDL WDL        Skin Circulation/Temperature WDL    Skin Circulation/Temperature WDL WDL        Cardiac WDL    Cardiac WDL chest pain                     "

## 2024-08-08 NOTE — CONSULTS
"  RENAL CONSULT NOTE    REQUESTING PHYSICIAN: ED MD, transfer of care from DR Boucher at pt request     REASON FOR CONSULT: hyperkalemia, ESRD     PLAN:  Urgent dialysis today for hyperK, then resume schedule MWF tx tomorrow  Renal diet  Ck post HD K to r/o access recirc (low suspicion of this)  Continue to education on the importance of dialysis attendance and TT compliance, and fluid restriction     ASSESSMENT:  ESRD:  IHD MWF at the [facility]  RX:  , Typically high volume UF 3-5kg per pt report.  (he frequently cuts tx short) /, K 2   ACCESS: L AVF  H/o poor compliance, though attended laset 2 tx, did shorten duration at his request  Recurrent issues with HyperK, clearance looks reasonable, so less convinced this is an access related issue    HyperKalemia:  As above, urgent dialysis today,   Renal diet if pt will comply (historically would not)  Ck post K, but pre/post BUN outpt suggestive of good clearance; doubt this is an access issue  Educated again today on the life-threatening potential of hyperK     HTN/volume:  Currently controlled,  Typically quite volume overloaded , high volume UF   PTA Amlod, Metop, continue    CKD/MBD:  On calcitriol, Phoslo as binder, continue    Anemia:  ACD, on DEMAR and venofer as needed outpt,  Last hgb in the 8's    Bi-polar:  On Risperdal, h/o oppositional behavior tendencies, poor medical compliance , some paranoid behaviour noted in the ED during my intake   Reported cognitive impairment, with recurrent conflicts with prior staff, transport team (\"I wont let those [place]istanian's drive me around')    Homelessness:  Pt reports that he doesn't not have stable living  situation, and gets to/from dialysis via scooter     Chest pain:  Recurrent, prior ACS w/up reportedly unremarkable.      HPI: Macario Delatorre is a 44 year old male for whom we have been asked to see for hyperkalemia, ESRD, in need of dialysis.  She is new to our group, after recent transfer of " "care from [place] per pt request.  He is ESRD and dialyzes at the [facility] unit.  Last tx on schedule yesterday 8/7, but was shortened at the pts request d/t the unit being \"mortuary cold\".   PMHX sig for chronic intermittent hyperkalemia, bi-polar, HTN, secondary hyperPTHism, medical non-compliance.  He is reportedly homeless and gets himself to/from dialysis with a scooter.    Admitted to c/o leg pain when attempting to pull weeds at his brothers house.  Has limited mobility and was on his knees when he developed acute back, leg and chest pain and called 911.    REVIEW OF SYSTEMS:  COMPLETE REVIEW OF SYSTEMS: General: see HPI, denies active chest pain  Eyes: denies acute changes  Ears/Nose/Throat: no acute changes  Cardiovascular: CP PTA, resolved, chronic edema  Respiratory: denies SOB  Gastrointestinal: no n/v/d/c complaints  Musculoskeletal: see HPI  Skin: denies acute issues   Neurologic: back pain  Psychiatric: see HPI       Past Medical History:   Diagnosis Date    Chlamydia     history of    Flatfoot     feet pes plannus    Mental retardation     mild    Migraine headache     Pain disorder 11/6/11    upset about no pain meds  incedent report filerd by RN    Tobacco abuse     Undescended testicle        I have reviewed this patient's family history and updated it with pertinent information if needed.  Family History   Problem Relation Age of Onset    Diabetes No family hx of     Cancer No family hx of     Heart Disease No family hx of     Diabetes Mother     Heart Failure Father     Diabetes Type 2  Father     Kidney Disease Father          Social History     Tobacco Use    Smoking status: Some Days     Current packs/day: 0.10     Types: Cigarettes    Smokeless tobacco: Never    Tobacco comments:     smokes about 1 cigarette a day   Substance Use Topics    Alcohol use: No     Comment: holidays and birthday - wine    Drug use: No     Comment: Drug use: Chart review shows + THC, pt denies          Current " Facility-Administered Medications   Medication Dose Route Frequency Provider Last Rate Last Admin    dextrose 10% BOLUS 300 mL  300 mL Intravenous Once Anita Ferguson MD        dextrose 50 % injection 25 g  25 g Intravenous Once Anita Ferguson MD        glucose gel 15-30 g  15-30 g Oral Q15 Min PRN Anita Ferguson MD        Or    dextrose 50 % injection 25-50 mL  25-50 mL Intravenous Q15 Min PRN Anita Ferguson MD        Or    glucagon injection 1 mg  1 mg Subcutaneous Q15 Min PRN Anita Ferguson MD        insulin regular 1 unit/mL injection 10 Units  10 Units Intravenous Once Anita Ferguson MD        lidocaine 1 % 0.5 mL  0.5 mL Intradermal Once PRN Francisco Gonzales MD        lidocaine 1 % 0.5 mL  0.5 mL Intradermal Once PRN Francisco Gonzales MD        No heparin via hemodialysis machine   Does not apply Once Francisco Gonzales MD        sodium chloride 0.9% BOLUS 100-150 mL  100-150 mL Intravenous Q15 Min PRN Francisco Gonzales MD        Stop Heparin 60 minutes before end of treatment   Does not apply Continuous PRN Francisco Gonzales MD         Current Outpatient Medications   Medication Sig Dispense Refill    amLODIPine (NORVASC) 10 MG tablet Take 10 mg by mouth daily      calcitRIOL (ROCALTROL) 0.25 MCG capsule Take 0.25 mcg by mouth daily      calcium acetate (PHOSLO) 667 MG CAPS capsule Take 1,334 mg by mouth 3 times daily (with meals)      metoprolol succinate ER (TOPROL XL) 25 MG 24 hr tablet Take 1 tablet (25 mg) by mouth 2 times daily 30 tablet 0    multivitamin RENAL (TRIPHROCAPS) 1 capsule capsule Take 1 capsule by mouth daily      ondansetron (ZOFRAN) 4 MG tablet Take 1 tablet (4 mg) by mouth every 8 hours as needed for nausea 12 tablet 0    pregabalin (LYRICA) 25 MG capsule Take 1 capsule (25 mg) by mouth daily 30 capsule 0    risperiDONE (RISPERDAL) 1 MG tablet Take 1 mg by mouth at bedtime           ALLERGIES/SENSITIVITIES:  Allergies   Allergen Reactions    Bee Venom      Bee stings swelling and short  of breath    Pcn [Penicillins]      Noted in 8/20/08 ER,hives,headaches,throat irritation    Trazodone      Abdominal pain    Ibuprofen GI Disturbance     Noted in 8/20/08 ER  Headaches and abdominal pain    Tylenol [Acetaminophen] GI Disturbance     Noted in 8/20/08 ER          PHYSICAL EXAM:  BP (!) 140/84   Pulse 84   Temp 99.2  F (37.3  C)   Resp 16   SpO2 100%     No data found.  There is no height or weight on file to calculate BMI.  No intake or output data in the 24 hours ending 08/08/24 1535      General - A & O x 3, NAD, sitting up in chair in ED, RN present   HEENT - PERRLA, no scleral icterus  Respiratory -on RA  Cardiovascular - rate controlled, 's  Extremities - L AVF with bruit/thrill, BLE edema 3+  Integumentary - no rash/lesions by superficial exam while pt was fully clothed  Neurologic - grossly intact  Psych:  initially oppositional, somewhat paranoid, irritable, but then more compliant with intake/interaction       Laboratory:  Recent Labs   Lab Test 07/25/24  1218 07/23/24  0533   WBC 4.9 5.5   HGB 8.6* 7.8*   MCV 87 85    221      Recent Labs   Lab Test 08/08/24  1349 08/02/24  0830   POTASSIUM 6.7* 7.1*   CHLORIDE 99 100   BUN 72.1* 114.5*      Recent Labs   Lab Test 07/23/24  0533 07/10/24  1427 01/16/24  1837   ALBUMIN 3.6  --  4.1   BILITOTAL 0.3  --  0.3   ALT 12  --  6   AST 12  --  16   PROTEIN  --  200*  --        Personally reviewed today's laboratory studies      Thank you for involving us in the care of this patient. We will continue to follow along with you.      Ruby Cordoba, PRANAV-BC  [facility]  [phone]

## 2024-08-08 NOTE — ED PROVIDER NOTES
EMERGENCY DEPARTMENT ENCOUNTER      NAME: Macario Delatorre  AGE: 44 year old male  YOB: 1980  MRN: [id number]  EVALUATION DATE & TIME: No admission date for patient encounter.    PCP: No Ref-Primary, Physician    ED PROVIDER: Anita Ferguson M.D.      Chief Complaint   Patient presents with    Chest Pain    Back Pain         FINAL IMPRESSION:  1. Chest pain, unspecified type    2. Hyperkalemia    3. ESRD (end stage renal disease) on dialysis (H)          ED COURSE & MEDICAL DECISION MAKING:    ED Course as of 08/08/24 1515   Thu Aug 08, 2024   1430 Pt with ESRD s/p only half dialysis yesterday MWF as he noted he felt cold at dialysis, XR WNL and troponin 72 without EKG changes with creatinine 17.22, nephorlogy paged to discuss dialysis planning. Patient with chronic recurrent chest pains like this before without ACS history, VS WNL, PERC negative, repeat 2h delta troponin pendign for 1549 for ACS r/o with first troponin 72 in reassuring range, CBC pendign with insulin/dextrose ordered for hyperkalemia lab reported to me 6.7 without hemolysis and with anion gap 17 and CO2 23. Per chart review, he has seen nephrologist Dr Gaston Kruger thus [facility] paged to discuss case   1455 Dr Boucher from [facility] returned all and notes patient had fired them,  but not seeing another nephrologist yet at this time, they will arrange HD. Hospitalist paged for dialysis admission to [facility] tele obs, and dialysis orders placed, dialysis RN to bedside   1514 Pt endorsed to [facility] resident to cardiac tele obs   1514 Dr Boucher clarifies patient now followed by [facility]       Pertinent Labs & Imaging studies reviewed. (See chart for details)      At the conclusion of the encounter I discussed the results of all of the tests and the disposition. The questions were answered. The patient or family acknowledged understanding and was agreeable with the care plan.     Critical Care time was 45 minutes for  this patient excluding procedures.      MEDICATIONS GIVEN IN THE EMERGENCY:  Medications   glucose gel 15-30 g (has no administration in time range)     Or   dextrose 50 % injection 25-50 mL (has no administration in time range)     Or   glucagon injection 1 mg (has no administration in time range)   dextrose 10% BOLUS 300 mL (has no administration in time range)   dextrose 50 % injection 25 g (has no administration in time range)   insulin regular 1 unit/mL injection 10 Units (has no administration in time range)   sodium chloride 0.9% BOLUS 100-150 mL (has no administration in time range)   No heparin via hemodialysis machine (has no administration in time range)   lidocaine 1 % 0.5 mL (has no administration in time range)   lidocaine 1 % 0.5 mL (has no administration in time range)   Stop Heparin 60 minutes before end of treatment (has no administration in time range)       NEW PRESCRIPTIONS STARTED AT TODAY'S ER VISIT  New Prescriptions    No medications on file          =================================================================    HPI      Macario Delatorre is a 44 year old male with PMHx of ESRD with dialysis, cognitive disabilities, hypertension, SVT episodes, and chronic recurrent chest pain who presents to the ED today via ambulance with chest pain and back pain.    Patient mentions that today, at his brother's house, he went to stretch his back, and he felt substantial pain. The pain radiates from his back to his legs. Patient attempted to walk to his scooter, but then fell. Patient then began to breath heavily and have chest pain. He called 911.    Patient took 4 Advil before the ambulance arrived with no relief. Right now, he has chest pain which he rates as 9.5/10. He also presents with back pain which is palliated by rocking back and forth.      REVIEW OF SYSTEMS   All other systems reviewed and are negative except as noted above in HPI.    PAST MEDICAL HISTORY:  Past Medical History:   Diagnosis  Date    Chlamydia     history of    Flatfoot     feet pes plannus    Mental retardation     mild    Migraine headache     Pain disorder 11/6/11    upset about no pain meds  incedent report filerd by RN    Tobacco abuse     Undescended testicle        PAST SURGICAL HISTORY:  No past surgical history on file.    CURRENT MEDICATIONS:    amLODIPine (NORVASC) 10 MG tablet  calcitRIOL (ROCALTROL) 0.25 MCG capsule  calcium acetate (PHOSLO) 667 MG CAPS capsule  metoprolol succinate ER (TOPROL XL) 25 MG 24 hr tablet  multivitamin RENAL (TRIPHROCAPS) 1 capsule capsule  ondansetron (ZOFRAN) 4 MG tablet  pregabalin (LYRICA) 25 MG capsule  risperiDONE (RISPERDAL) 1 MG tablet        ALLERGIES:  Allergies   Allergen Reactions    Bee Venom      Bee stings swelling and short of breath    Pcn [Penicillins]      Noted in 8/20/08 ER,hives,headaches,throat irritation    Trazodone      Abdominal pain    Ibuprofen GI Disturbance     Noted in 8/20/08 ER  Headaches and abdominal pain    Tylenol [Acetaminophen] GI Disturbance     Noted in 8/20/08 ER       FAMILY HISTORY:  Family History   Problem Relation Age of Onset    Diabetes No family hx of     Cancer No family hx of     Heart Disease No family hx of     Diabetes Mother     Heart Failure Father     Diabetes Type 2  Father     Kidney Disease Father        SOCIAL HISTORY:   Social History     Socioeconomic History    Marital status: Single   Tobacco Use    Smoking status: Some Days     Current packs/day: 0.10     Types: Cigarettes    Smokeless tobacco: Never    Tobacco comments:     smokes about 1 cigarette a day   Substance and Sexual Activity    Alcohol use: No     Comment: holidays and birthday - wine    Drug use: No     Comment: Drug use: Chart review shows + THC, pt denies    Sexual activity: Yes   Social History Narrative    5/21/2016 - Works at an [employer]-.     Social Determinants of Health     Financial Resource Strain: Low Risk  (4/23/2024)    Received [facility]    Financial Resource Strain     Difficulty of Paying Living Expenses: 3   Food Insecurity: No Food Insecurity (4/23/2024)    Received from [facility]    Food Insecurity     Worried About Running Out of Food in the Last Year: 1   Transportation Needs: Unmet Transportation Needs (5/6/2024)    Received from [facility]    Transportation Needs     Lack of Transportation (Medical): 2   Social Connections: Socially Integrated (10/19/2023)    Received from [facility], [facility]    Social Connections     Frequency of Communication with Friends and Family: 0   Housing Stability: Low Risk  (4/23/2024)    Received from [facility]    Housing Stability     Unable to Pay for Housing in the Last Year: 1   Recent Concern: Housing Stability - Medium Risk (3/27/2024)    Received from [facility], [facility]    Housing Stability     Unable to Pay for Housing in the Last Year: 2       VITALS:  Patient Vitals for the past 24 hrs:   BP Temp Pulse Resp SpO2   08/08/24 1312 (!) 140/84 99.2  F (37.3  C) 84 16 100 %       PHYSICAL EXAM    GENERAL: Awake, alert.  In no acute distress.   HEENT: Normocephalic, atraumatic.  Pupils equal, round and reactive.  Conjunctiva normal.  EOMI.  NECK: No stridor or apparent deformity.  PULMONARY: Symmetrical breath sounds without distress.  Lungs clear to auscultation bilaterally without wheezes, rhonchi or rales.  CARDIO: Regular rate and rhythm.  No significant murmur, rub or gallop.  Radial pulses strong and symmetrical.  BACK: Diffuse discomfort to back when all locations palpated including thoracic and lumbar.  ABDOMINAL: Abdomen soft, non-distended and non-tender to palpation.  No CVAT, no palpable hepatosplenomegaly.  EXTREMITIES: No  lower extremity swelling or edema.    NEURO: Alert and oriented to person, place and time.  Cranial nerves grossly intact.  No focal motor deficit.  PSYCH: Normal mood and affect  SKIN: No rashes      LAB:  All pertinent labs reviewed and interpreted.  Results for orders placed or performed during the hospital encounter of 24   XR Chest 2 Views    Impression    IMPRESSION:     Lung volumes are low. No focal airspace opacities, pleural effusions, or pneumothorax. Normal pulmonary vascularity.    Stable cardiomediastinal silhouette.   Basic metabolic panel   Result Value Ref Range    Sodium 139 135 - 145 mmol/L    Potassium 6.7 (HH) 3.4 - 5.3 mmol/L    Chloride 99 98 - 107 mmol/L    Carbon Dioxide (CO2) 23 22 - 29 mmol/L    Anion Gap 17 (H) 7 - 15 mmol/L    Urea Nitrogen 72.1 (H) 6.0 - 20.0 mg/dL    Creatinine 17.22 (H) 0.67 - 1.17 mg/dL    GFR Estimate 3 (L) >60 mL/min/1.73m2    Calcium 8.6 (L) 8.8 - 10.4 mg/dL    Glucose 92 70 - 99 mg/dL   Result Value Ref Range    Troponin T, High Sensitivity 72 (H) <=22 ng/L       RADIOLOGY:  Reviewed all pertinent imaging. Please see official radiology report.  XR Chest 2 Views   Final Result   IMPRESSION:       Lung volumes are low. No focal airspace opacities, pleural effusions, or pneumothorax. Normal pulmonary vascularity.      Stable cardiomediastinal silhouette.            EK2024, 13:15:28, Reviewed and interpreted as: 91 bpm. Sinus rhythm with no ST abnormalities. Morphologically similar to prior EKG, 2024.      I have independently reviewed and interpreted the EKG(s) documented above.        I, Manuela Briceño, am serving as a scribe to document services personally performed by Dr. Anita Ferguson based on my observation and the provider's statements to me. I, Anita Ferguson MD attest that Manuela Briceño is acting in a scribe capacity, has observed my performance of the services and has documented them in accordance with my direction.       Anita Ferguson,  MD  08/08/24 1510

## 2024-08-08 NOTE — PROGRESS NOTES
RENAL  Seen on dialysis.  I know Mr. Delatorre peripherally from the dialysis unit but I introduced that I will now be seeing him there in lieu of Dr. Boucher who he recently fired.  Discussed dialysis today for hyperkalemia and moderate hypervolemia.  He is agreeable to this.  He admits he is not fully compliant with a renal diet.  He is not currently insisting on special circumstances for his dialysis as he was last admission. We discussed likely dialysis again tomorrow to stay on schedule although he feels uncertain about this.     Of note, shifting protocol for hyperkalemia ordered by ED.  This is not needed as he is getting immediate dialysis.  No need for serial K as well, would check in the am.    Francisco Gonzales  [facility]  [phone]

## 2024-08-08 NOTE — CONSULTS
"Care Management Initial Consult    General Information  Assessment completed with: Macario Estrada  Type of CM/SW Visit: Initial Assessment    Primary Care Provider verified and updated as needed:  (\"I don't have a PMD that I see or a clinic that I go to\".)   Readmission within the last 30 days: previous discharge plan unsuccessful (7/10/24 - 7/18/24 and 7/22/24 - 8/2/24)   Return Category: Exacerbation of disease  Reason for Consult: discharge planning  Advance Care Planning: Advance Care Planning Reviewed: no concerns identified (\"I don't have a written advanced directive\".)          Communication Assessment  Patient's communication style: spoken language (English or Bilingual)             Living Environment:   People in home: other (see comments) (\"I live on the streets or sometimes I can stay at my brother's house\".)     Current living Arrangements: homeless, other (see comments) (\"I live on the streets or sometimes I can stay at my brother's house. Today when I came to the ER I was on the streets and called 911\".)      Able to return to prior arrangements: other (see comments) (unknown at this time)       Family/Social Support:  Care provided by: self  Provides care for: no one, unable/limited ability to care for self     Sibling(s) (brother)          Description of Support System: Uninvolved    Support Assessment: Lacks necessary supervision and assistance, Lacks adequate physical care, Lacks adequate emotional support, Minimal outside structure and leisure time activities, Uses health system/providers for social contact    Current Resources:   Patient receiving home care services: No     Community Resources: OP Dialysis (\"I have had transportation in the past, but it doesn't work when I live on the streets. I don't like the people who drive the med cabs. I just take my scooter to and from dialysis. Goes to [facility] MWF.\")  Equipment currently used at home: wheelchair, power (\"I have an " "electric scooter. It is at my brother's house right now. I have a walker, but it is in my storage.\")  Supplies currently used at home: None    Employment/Financial:  Employment Status: unemployed, disabled     Employment/ Comments: \"no  history\"  Financial Concerns: none   Referral to Financial Worker: No       Does the patient's insurance plan have a 3 day qualifying hospital stay waiver?  No    Lifestyle & Psychosocial Needs:  Social Determinants of Health     Food Insecurity: No Food Insecurity (4/23/2024)    [facility]    Food Insecurity     Worried About Running Out of Food in the Last Year: 1   Depression: Not on file   Housing Stability: Low Risk  (4/23/2024)    Received from [facility]    Housing Stability     Unable to Pay for Housing in the Last Year: 1   Recent Concern: Housing Stability - Medium Risk (3/27/2024)    Received from [facility], [facility]    Housing Stability     Unable to Pay for Housing in the Last Year: 2   Tobacco Use: High Risk (1/16/2024)    Patient History     Smoking Tobacco Use: Some Days     Smokeless Tobacco Use: Never     Passive Exposure: Not on file   Financial Resource Strain: Low Risk  (4/23/2024)    [facility]    Financial Resource Strain     Difficulty of Paying Living Expenses: 3     Difficulty of Paying Living Expenses: Not on file   Alcohol Use: Not on file   Transportation Needs: Unmet Transportation Needs (5/6/2024)    [facility]    Transportation Needs     Lack of Transportation (Medical): 2   Physical Activity: Not on file   Interpersonal Safety: Not on file   Stress: Not on file   Social Connections: Socially Integrated (10/19/2023)    [facility], [employer] [facility] [employer] [facility]    Social Connections     Frequency of Communication with Friends and Family: 0   Health Literacy: Not on file       Functional Status:  Prior to admission patient needed assistance:   Dependent ADLs:: Wheelchair-independent (\"I'm supposed to have a PCA, but that doesn't work if I am living on the streets\".)  Dependent IADLs:: Transportation, Shopping (\"I [place] on the ste[place] right [place] and don't drive\".)  Assesssment of Functional Status: At functional baseline    Mental Health Status:  Mental Health Status: Past Concern  Mental Health Management: Medication, Psychiatrist    Chemical Dependency Status:  Chemical Dependency Status: Past Concern             Values/Beliefs:  Spiritual, Cultural Beliefs, Jain Practices, Values that affect care: no               Additional Information:  \"I am living on the streets right now. I can sometimes stay with my brother. Today when I came to the ER I was on the streets and called 911\".     \"I'm supposed to have a PCA, but that doesn't work if I am living on the streets\". \"I have an electric scooter. It is at my brother's house right now. I have a walker, but it is in my storage. I pivot transfer to my scooter\".    He goes to [facility] on Mon, Wed, and Fri. \"I have had transportation in the past, but it doesn't work when I live on the streets. I don't like the people who drive the med cabs. I just take my scooter to and from dialysis.\"    He has a history of multiple ER and hospital admissions. He had been going to [facility], or [facility], but now the past 2 admissions have been at [facility]. He was admitted 7/10/24 - 7/18/24. Prior to this he was homeless or living with his brother. On 7/18/24 we sent him to TCU at [facility]. Then he returned for admission 7/22/24 - 8/2/24. He didn't want to return to TCU at [facility] upon this discharge so staff worked to get him to [facility] and rehab TCU. But he states, " "\"when I got there you guys were to stupid here at this hospital to get the right paperwork sent to the TCU so I just decided to leave the TCU. I have bills to pay so I left there and am living on the streets now\". I can see from notes that they maybe didn't have the correct \"OBRA Level 2 assessment paperwork\". But otherwise they had the correct TCU orders.    Unknown discharge needs at this time. No PMD for follow up.    [facility] transport at discharge.    NICHOLAS was given and discussed. All questions were answered.    CM to follow for medical progression of care, discharge recommendations, and final discharge plan.    Anita Hughes RN    "

## 2024-08-09 LAB
ANION GAP SERPL CALCULATED.3IONS-SCNC: 14 MMOL/L (ref 7–15)
ANION GAP SERPL CALCULATED.3IONS-SCNC: 16 MMOL/L (ref 7–15)
BUN SERPL-MCNC: 26.6 MG/DL (ref 6–20)
BUN SERPL-MCNC: 49.5 MG/DL (ref 6–20)
CALCIUM SERPL-MCNC: 8.8 MG/DL (ref 8.8–10.4)
CALCIUM SERPL-MCNC: 8.9 MG/DL (ref 8.8–10.4)
CHLORIDE SERPL-SCNC: 96 MMOL/L (ref 98–107)
CHLORIDE SERPL-SCNC: 97 MMOL/L (ref 98–107)
CREAT SERPL-MCNC: 14.04 MG/DL (ref 0.67–1.17)
CREAT SERPL-MCNC: 8.84 MG/DL (ref 0.67–1.17)
EGFRCR SERPLBLD CKD-EPI 2021: 4 ML/MIN/1.73M2
EGFRCR SERPLBLD CKD-EPI 2021: 7 ML/MIN/1.73M2
ERYTHROCYTE [DISTWIDTH] IN BLOOD BY AUTOMATED COUNT: 15.4 % (ref 10–15)
ERYTHROCYTE [DISTWIDTH] IN BLOOD BY AUTOMATED COUNT: 15.8 % (ref 10–15)
GLUCOSE BLDC GLUCOMTR-MCNC: 88 MG/DL (ref 70–99)
GLUCOSE SERPL-MCNC: 104 MG/DL (ref 70–99)
GLUCOSE SERPL-MCNC: 124 MG/DL (ref 70–99)
HCO3 SERPL-SCNC: 26 MMOL/L (ref 22–29)
HCO3 SERPL-SCNC: 27 MMOL/L (ref 22–29)
HCT VFR BLD AUTO: 27 % (ref 40–53)
HCT VFR BLD AUTO: 28.4 % (ref 40–53)
HGB BLD-MCNC: 8.6 G/DL (ref 13.3–17.7)
HGB BLD-MCNC: 9 G/DL (ref 13.3–17.7)
MCH RBC QN AUTO: 27.4 PG (ref 26.5–33)
MCH RBC QN AUTO: 27.8 PG (ref 26.5–33)
MCHC RBC AUTO-ENTMCNC: 31.7 G/DL (ref 31.5–36.5)
MCHC RBC AUTO-ENTMCNC: 31.9 G/DL (ref 31.5–36.5)
MCV RBC AUTO: 86 FL (ref 78–100)
MCV RBC AUTO: 88 FL (ref 78–100)
PLATELET # BLD AUTO: 209 10E3/UL (ref 150–450)
PLATELET # BLD AUTO: 229 10E3/UL (ref 150–450)
POTASSIUM SERPL-SCNC: 4.5 MMOL/L (ref 3.4–5.3)
POTASSIUM SERPL-SCNC: 5.2 MMOL/L (ref 3.4–5.3)
RBC # BLD AUTO: 3.14 10E6/UL (ref 4.4–5.9)
RBC # BLD AUTO: 3.24 10E6/UL (ref 4.4–5.9)
SODIUM SERPL-SCNC: 138 MMOL/L (ref 135–145)
SODIUM SERPL-SCNC: 138 MMOL/L (ref 135–145)
WBC # BLD AUTO: 5.4 10E3/UL (ref 4–11)
WBC # BLD AUTO: 6.3 10E3/UL (ref 4–11)

## 2024-08-09 PROCEDURE — 85027 COMPLETE CBC AUTOMATED: CPT

## 2024-08-09 PROCEDURE — 120N000001 HC R&B MED SURG/OB

## 2024-08-09 PROCEDURE — 85027 COMPLETE CBC AUTOMATED: CPT | Performed by: FAMILY MEDICINE

## 2024-08-09 PROCEDURE — 80048 BASIC METABOLIC PNL TOTAL CA: CPT

## 2024-08-09 PROCEDURE — 90935 HEMODIALYSIS ONE EVALUATION: CPT

## 2024-08-09 PROCEDURE — 250N000013 HC RX MED GY IP 250 OP 250 PS 637

## 2024-08-09 PROCEDURE — 99232 SBSQ HOSP IP/OBS MODERATE 35: CPT | Mod: GC

## 2024-08-09 PROCEDURE — 36415 COLL VENOUS BLD VENIPUNCTURE: CPT

## 2024-08-09 PROCEDURE — 99232 SBSQ HOSP IP/OBS MODERATE 35: CPT | Performed by: NURSE PRACTITIONER

## 2024-08-09 PROCEDURE — G0378 HOSPITAL OBSERVATION PER HR: HCPCS

## 2024-08-09 RX ORDER — CALCITRIOL 0.25 UG/1
0.25 CAPSULE, LIQUID FILLED ORAL DAILY
Status: DISCONTINUED | OUTPATIENT
Start: 2024-08-09 | End: 2024-08-19 | Stop reason: HOSPADM

## 2024-08-09 RX ORDER — RISPERIDONE 1 MG/1
1 TABLET ORAL 2 TIMES DAILY
Status: DISCONTINUED | OUTPATIENT
Start: 2024-08-09 | End: 2024-08-09

## 2024-08-09 RX ORDER — NALOXONE HYDROCHLORIDE 0.4 MG/ML
0.4 INJECTION, SOLUTION INTRAMUSCULAR; INTRAVENOUS; SUBCUTANEOUS
Status: DISCONTINUED | OUTPATIENT
Start: 2024-08-09 | End: 2024-08-19 | Stop reason: HOSPADM

## 2024-08-09 RX ORDER — PREGABALIN 25 MG/1
25 CAPSULE ORAL EVERY EVENING
Status: DISCONTINUED | OUTPATIENT
Start: 2024-08-09 | End: 2024-08-19 | Stop reason: HOSPADM

## 2024-08-09 RX ORDER — CALCIUM ACETATE 667 MG/1
1334 CAPSULE ORAL
Status: DISCONTINUED | OUTPATIENT
Start: 2024-08-09 | End: 2024-08-09

## 2024-08-09 RX ORDER — HYDROMORPHONE HYDROCHLORIDE 1 MG/ML
0.5 INJECTION, SOLUTION INTRAMUSCULAR; INTRAVENOUS; SUBCUTANEOUS ONCE
Status: COMPLETED | OUTPATIENT
Start: 2024-08-09 | End: 2024-08-09

## 2024-08-09 RX ORDER — CALCITRIOL 0.25 UG/1
0.25 CAPSULE, LIQUID FILLED ORAL DAILY
Status: DISCONTINUED | OUTPATIENT
Start: 2024-08-09 | End: 2024-08-09

## 2024-08-09 RX ORDER — ONDANSETRON 4 MG/1
4 TABLET, FILM COATED ORAL EVERY 8 HOURS PRN
Status: DISCONTINUED | OUTPATIENT
Start: 2024-08-09 | End: 2024-08-09

## 2024-08-09 RX ORDER — NALOXONE HYDROCHLORIDE 0.4 MG/ML
0.2 INJECTION, SOLUTION INTRAMUSCULAR; INTRAVENOUS; SUBCUTANEOUS
Status: DISCONTINUED | OUTPATIENT
Start: 2024-08-09 | End: 2024-08-19 | Stop reason: HOSPADM

## 2024-08-09 RX ORDER — AMLODIPINE BESYLATE 5 MG/1
10 TABLET ORAL DAILY
Status: DISCONTINUED | OUTPATIENT
Start: 2024-08-09 | End: 2024-08-19 | Stop reason: HOSPADM

## 2024-08-09 RX ORDER — CAPSAICIN 0.025 %
CREAM (GRAM) TOPICAL 3 TIMES DAILY
Status: DISCONTINUED | OUTPATIENT
Start: 2024-08-09 | End: 2024-08-11

## 2024-08-09 RX ORDER — METOPROLOL SUCCINATE 25 MG/1
25 TABLET, EXTENDED RELEASE ORAL 2 TIMES DAILY
Status: DISCONTINUED | OUTPATIENT
Start: 2024-08-09 | End: 2024-08-19 | Stop reason: HOSPADM

## 2024-08-09 RX ORDER — VIT B COMP NO.3/FOLIC/C/BIOTIN 1 MG-60 MG
1 TABLET ORAL DAILY
Status: DISCONTINUED | OUTPATIENT
Start: 2024-08-09 | End: 2024-08-19 | Stop reason: HOSPADM

## 2024-08-09 RX ORDER — RISPERIDONE 0.5 MG/1
0.5 TABLET ORAL EVERY MORNING
Status: DISCONTINUED | OUTPATIENT
Start: 2024-08-09 | End: 2024-08-16

## 2024-08-09 RX ORDER — RISPERIDONE 1 MG/1
1 TABLET ORAL AT BEDTIME
Status: DISCONTINUED | OUTPATIENT
Start: 2024-08-09 | End: 2024-08-19 | Stop reason: HOSPADM

## 2024-08-09 RX ORDER — RISPERIDONE 1 MG/1
1 TABLET ORAL AT BEDTIME
Status: DISCONTINUED | OUTPATIENT
Start: 2024-08-09 | End: 2024-08-09

## 2024-08-09 RX ORDER — CALCIUM ACETATE 667 MG/1
1334 CAPSULE ORAL
Status: DISCONTINUED | OUTPATIENT
Start: 2024-08-09 | End: 2024-08-19 | Stop reason: HOSPADM

## 2024-08-09 RX ADMIN — METOPROLOL SUCCINATE 25 MG: 25 TABLET, EXTENDED RELEASE ORAL at 20:50

## 2024-08-09 RX ADMIN — CAPSAICIN: 0.25 CREAM TOPICAL at 10:28

## 2024-08-09 RX ADMIN — RISPERIDONE 1 MG: 1 TABLET, FILM COATED ORAL at 21:02

## 2024-08-09 RX ADMIN — ACETAMINOPHEN, ASPIRIN, CAFFEINE 2 TABLET: 250; 65; 250 TABLET, FILM COATED ORAL at 20:57

## 2024-08-09 RX ADMIN — Medication 1 TABLET: at 10:28

## 2024-08-09 RX ADMIN — METOPROLOL SUCCINATE 25 MG: 25 TABLET, EXTENDED RELEASE ORAL at 10:28

## 2024-08-09 RX ADMIN — CALCITRIOL CAPSULES 0.25 MCG 0.25 MCG: 0.25 CAPSULE ORAL at 10:28

## 2024-08-09 RX ADMIN — AMLODIPINE BESYLATE 10 MG: 5 TABLET ORAL at 10:28

## 2024-08-09 RX ADMIN — RISPERIDONE 0.5 MG: 0.5 TABLET, FILM COATED ORAL at 10:28

## 2024-08-09 RX ADMIN — PREGABALIN 25 MG: 25 CAPSULE ORAL at 20:50

## 2024-08-09 ASSESSMENT — ACTIVITIES OF DAILY LIVING (ADL)
ADLS_ACUITY_SCORE: 40
ADLS_ACUITY_SCORE: 30
ADLS_ACUITY_SCORE: 36
HEARING_DIFFICULTY_OR_DEAF: NO
ADLS_ACUITY_SCORE: 30
TOILETING_ISSUES: NO
EQUIPMENT_CURRENTLY_USED_AT_HOME: WHEELCHAIR, POWER
ADLS_ACUITY_SCORE: 30
WEAR_GLASSES_OR_BLIND: NO
CONCENTRATING,_REMEMBERING_OR_MAKING_DECISIONS_DIFFICULTY: YES
DRESSING/BATHING_DIFFICULTY: NO
WALKING_OR_CLIMBING_STAIRS: AMBULATION DIFFICULTY, ASSISTANCE 1 PERSON;AMBULATION DIFFICULTY, REQUIRES EQUIPMENT
ADLS_ACUITY_SCORE: 36
FALL_HISTORY_WITHIN_LAST_SIX_MONTHS: YES
DIFFICULTY_COMMUNICATING: NO
ADLS_ACUITY_SCORE: 30
ADLS_ACUITY_SCORE: 40
NUMBER_OF_TIMES_PATIENT_HAS_FALLEN_WITHIN_LAST_SIX_MONTHS: 2
ADLS_ACUITY_SCORE: 30
ADLS_ACUITY_SCORE: 30
ADLS_ACUITY_SCORE: 40
ADLS_ACUITY_SCORE: 36
ADLS_ACUITY_SCORE: 30
ADLS_ACUITY_SCORE: 40
WALKING_OR_CLIMBING_STAIRS_DIFFICULTY: YES
ADLS_ACUITY_SCORE: 30
ADLS_ACUITY_SCORE: 30
CHANGE_IN_FUNCTIONAL_STATUS_SINCE_ONSET_OF_CURRENT_ILLNESS/INJURY: NO
DIFFICULTY_EATING/SWALLOWING: NO
ADLS_ACUITY_SCORE: 40
ADLS_ACUITY_SCORE: 36
DOING_ERRANDS_INDEPENDENTLY_DIFFICULTY: NO
ADLS_ACUITY_SCORE: 30
ADLS_ACUITY_SCORE: 30
ADLS_ACUITY_SCORE: 40

## 2024-08-09 NOTE — PLAN OF CARE
Problem: Adult Inpatient Plan of Care  Goal: Optimal Comfort and Wellbeing  Outcome: Not Progressing   Goal Outcome Evaluation:         Pt is A/O x4. VSS on room air. Complaints of pain in his back 9/10. Scheduled lidocaine patch applied on the upper back. Refused scheduled tylenol, pt says he has an allergy.

## 2024-08-09 NOTE — PLAN OF CARE
Problem: Hemodialysis  Goal: Safe, Effective Therapy Delivery  Outcome: Progressing  Intervention: Optimize Device Care and Function  Recent Flowsheet Documentation  Taken 8/9/2024 0943 by Betty Vela RN  Medication Review/Management: medications reviewed  Goal: Absence of Infection Signs and Symptoms  Intervention: Prevent or Manage Infection  Recent Flowsheet Documentation  Taken 8/9/2024 0943 by Betty Vela RN  Infection Prevention: hand hygiene promoted   Goal Outcome Evaluation:         Patient still at dialysis. Patient to transfer to room 122 after dialysis. Report given to RN.

## 2024-08-09 NOTE — PROGRESS NOTES
Pt has been uncooperative and frustrated with writer when assessing pt. Pt states he refuses all cares that includes checking vitals, IV placement. Tele monitoring, oxygen therapy and scheduled and PRN all medications. Pt yelled at the writer that he wants to be left alone. And pt stated he has rights to do whatever he wants.

## 2024-08-09 NOTE — PROGRESS NOTES
"Care Management Follow Up    Length of Stay (days): 0    Expected Discharge Date: 08/10/2024     Concerns to be Addressed: discharge planning, compliance issue, decision making, mental health     Patient plan of care discussed at interdisciplinary rounds: Yes    Anticipated Discharge Disposition: Dialysis Services, Other (Comments) (unknown at this time)     Anticipated Discharge Services:  TBD  Anticipated Discharge DME:  n/a    Patient/family educated on Medicare website which has current facility and service quality ratings:  yes  Education Provided on the Discharge Plan: yes    Patient/Family in Agreement with the Plan:  yes    Referrals Placed by CM/SW:    Private pay costs discussed: Not applicable    Additional Information:  SW met with pt to introduce role of  and to discuss needs at time of d/c. Pt comes currently homeless and \"living on the streets\"; stated that he has a  though the [facility] who has been assisting with permanent housing but has been taking longer than he anticipated. Uses electric scooter at baseline. Pt shared that he is interested in moving into a group home or LTC in the future but would like TCU prior to addressing that to regain strength. Pt was at [facility] assisting with gardening and then pain began. Therapy to be ordered and work with pt; care management to follow for recommendations. Pt Pt will trigger OBRA level 2 (care management to complete PAS once therapy makes recommendations. Referral sent to [facility] to review.      NOTE: Pt attends OP HD at [facility] MWF [id number] chair time. Has been transporting self via scooter.    CM to continue to follow through hospitalization.  12:40 PM    Brenna Kjellberg, BSW LSW  8/9/2024        "

## 2024-08-09 NOTE — PROGRESS NOTES
[facility]    Progress Note - Hospitalist Service       Date of Admission:  8/8/2024    Assessment & Plan   Macario Delatorre is a 44 year old male admitted on 8/8/2024. He has a history of ESRD on HD MWF w/associated nephrology, cognitive disability, mental health complex hx (PTSD, bipolar? Agitation) HTN, gout, HLD and is admitted for recurrent chest pains, upper back pain found to have hyperkalemia (K 6.7), slightly elevated troponin at 72 w/no EKG changes, delta trop pending. Very complex social history including active homelessness and poor medication adherence in the past, has doctored inconsistently     #ESRD on HD MWF  #Hyperkalemia, severe  Patient has had recent previous similar admissions, notably 7/22-8/2/24 for hyperkalemia and dialysis in which patient left AMA. Also was admitted 7/10-7/18 for syncopal episode and chest pains, hyperkalemia and ESRD with HD. Per patient had dialysis run 8/7/24 but was stopped early due to him c/o extremities being cold. Have had issues in the past with continuing dialysis, however patient is understanding this admission of the importance of receiving his regularly scheduled treatments. K 6.7 this admission, s/p insulin and dextrose in ED, recheck pending. Cr. 17.2, BUN 72, GFR 3. Unclear etiology of ESRD, suspect 2/2 chronic poorly controlled HTN, last A1C 7/10/24 4.4, is ot on any medications for diabetes currently and BG wnl. Appears has not had prior workup.  -Nephrology consulted, recs appreciated  -Emergent dialysis initiated 8/8/24, will resume regular HD schedule MWF 8/9 per nephrology  -Patient refusing lab draws this AM - repeat K s/p dialysis has not been collected  -Cont. Dialysis 8/9/24   -Renal diet  -Consider re-consultation of palliative care gven prior refusal of HD, however patient has been adherent and understanding this admission  -Resume home renal multivitamins, calcitriol, calcium acetate    #Chest Pain  #ACS r/out  "  Patient presented with chest pain, diffuse tenderness midsternal radiating to both L and R side of his chest, reproducible, has had chest pain recurrence on previous admissions. Denies SOB currently, initial EKG on admission not demonstrating ischemic changes, initial troponin 72, delta trop downtrending slightly. CXR demonstrating Lung volumes are low. No focal airspace opacities, pleural effusions, or pneumothorax. Normal pulmonary vascularity. Stable cardiomediastinal silhouette. I do not suspect cardiac etiology at this time.  -Cardiac telemetry, no events detected  -Pt requested excedrin for pain, ok with CKD  -Hold scheduled tylenol due to above    #Back Pain  #Fall, previous encounter  Pt endorsing upper back pain most notably tender over cervical paraspinal muscles, appears that [place] [facility] [place]\" and hurt his back, denied LOC. Imaging performed at this time included CT lumbar, thoracic, and cervical spine, all demonstrating no evidence for fracture or dislocation, no significant spinal canal or neuroforaminal stenosis. Mild multilevel cervical spondylosis. CT head demonstrating no intracranial abnormality.     -Lidocaine patches PRN  -Scheduled excedrin (pt requested instead of tylenol)     Cognitive impairment  History of mental health problems with hallucinations  Bipolar disorder  Agitation   Suicidal ideation  Patient has a history of refusing cares and can easily get agitated. Patient reports history of bipolar and schizophrenia. Does not endorse routine psychiatric follow up or regular medication use. In [facility] system was started on risperidone 1 mg at bedtime though has not taken consistently. Stated he can escalate quickly if upset. 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 7/30 started expressing SI with a plan to jump out of the window in setting of extreme frustration and refusal to undergo HD without his " "exact circumstances, patient himself requesting that his psych meds be increased.  - Psychiatric consulted last admission, could consider reconsultation if elevating risk of agitation              - Psych evaluated, patient felt to have capacity                          - per psych NP, patient is not holdable and has capacity to make decisions for himself                          - per SW, [facility] worker has stated that so long as psychiatry has deemed him decisional, he is not holdable  -Risperidone 0.5 mg AM, 1 mg bedtime     Goals of care  Note from Dr. Cuenca from prior admission:  \"Patient has previously reiterated that he will not undergo HD for his ESRD unless he gets his specific environment situated (preferred Rns only, demanding HD only in recliner and refusing HD in a bed, only wants HD in his room and away from others). Has continued to decline HD, seems to understand that cardiac arrest is a risk, though continues to express that he \"will not die\" due to this anytime soon. However, continues to reiterate that he would like full CPR and resuscitative efforts at this time, including intubation. Unclear if he would want HD if in this situation. Given known cognitive impairment as above as well as mood disorders, question if he is able to be decisional and/or what his needs would be if he were to become unable to express his needs. Palliative has been following, however patient continuing to decline discussion regarding end of life cares\"     Chronic anemia, normocytic   Baseline Hgb 9-10. Likely related to ESRD/chronic disease. Iron stores adequate. CBC on admission pending  - Continue DEMAR weekly; resume with outpatient dialysis  -Transfuse for HgB <7 if indicated  - Daily CBC (patient has frequently refused labs on prior admissions)     Chronic pain syndrome, stable    Neuropathy, reported to be diabetic  - Lyrica 25 mg daily   -Cannot increase dose due to ESRD   -Added capsaicin cream for foot " "neuropathy     Hypertension, uncontrolled  Continues to be hypertensive during hospitalization likely in setting of missed dialysis session.   - Continue PTA amlodipine 10 every day and metoprolol 25 BID     Disposition Planning  Discussed with CM, pt currently homeless and \"living on the streets\", pt reports has  through [employer] working on permanent housing but has not been completed yet, pt is interested in group home or LTC in the future but for immediate disposition planning likely TCU or medical respite care. PT/OT consulted to evaluate patient.    L hand/thumb pain  Pt reports he has had pain in his L thumb which has limited his  strength and functional status. On exam, does not appear markedly swollen, he does note pain most significant in 1st dorsal compartment of wrist. He is tender to palpation over distal radial styloid, but will not let examiner do much in terms of palpation or ROM. I suspect most likely de quervain tenosynovitis in setting of overuse injury vs. Intersection syndrome. Could consider further evaluation with xray but without traumatic history I am less suspicious of fracture.  -Consider thumb spica cast or brace at discharge  -will require OP follow up, treatment options include corticosteroid injection or OP hand therapy w/OT   -Excedrin scheduled BID per pt request over tylenol  -Capsaican cream can also be applied to his thumb  -Limited medication options due to renal function           Diet: Renal Diet (dialysis)    DVT Prophylaxis: Pneumatic Compression Devices  Raphael Catheter: Not present  Fluids: None, fluid restriction, renal diet  Lines: None     Cardiac Monitoring: ACTIVE order. Indication: Chest pain/ ACS rule out (24 hours)  Code Status: Full Code      Clinically Significant Risk Factors Present on Admission        # Hyperkalemia: Highest K = 6.7 mmol/L in last 2 days, will monitor as appropriate              # Anemia: based on hgb <11       # Overweight: " "Estimated body mass index is 26.57 kg/m  as calculated from the following:    Height as of 7/22/24: 1.981 m (6' 6\").    Weight as of this encounter: 104.3 kg (229 lb 15 oz).       # Financial/Environmental Concerns: none  # Housing Instability: noted in nursing assessment           Disposition Plan      Expected Discharge Date: 08/10/2024      Destination: other (comment) (unknown at this time)  Discharge Comments: 8/9 Barriers:  - Discharge Plan d/t patient homeless  - Difficult placement if TCU is needed        The patient's care was discussed with the Attending Physician, Dr. Segovia .    Abdifatah King MD  Hospitalist Service  [facility]  Securely message with [employer] (more info)  Text page via [employer] Paging/Directory   ______________________________________________________________________    Interval History   Pt sleeping this AM but arousable, states chest pain has improved a little, back pain is still persistent. Pt requesting excedrin for pains instead of tylenol. Pt agreeable to resume dialysis today, states he felt dizzy during his HD run 8/8/24. C/o L thumb paain but would not let me fully examine ROM and palpation. We did discuss disposition planning given current living situation of homelessness, CM is assisting with finding placement. Pt refused AM labs, needs K recheck at some point after he gets dialysis today.    Physical Exam   Vital Signs: Temp: 98.8  F (37.1  C) Temp src: Oral BP: (!) 156/103 Pulse: 86   Resp: 18 SpO2: 98 % O2 Device: None (Room air)    Weight: 229 lbs 15.04 oz    Constitutional:       General: He is not in acute distress.     Appearance: Normal appearance. He is not ill-appearing or diaphoretic.   HENT:      Mouth/Throat:      Mouth: Mucous membranes are dry.      Pharynx: Oropharynx is clear.   Eyes:      Extraocular Movements: Extraocular movements intact.      Conjunctiva/sclera: Conjunctivae normal.      Pupils: Pupils are equal, round, and reactive " to light.   Cardiovascular:      Rate and Rhythm: Normal rate and regular rhythm.      Pulses: Normal pulses.      Heart sounds: Normal heart sounds.   Pulmonary:      Effort: Pulmonary effort is normal.      Breath sounds: Normal breath sounds.   Abdominal:      General: Bowel sounds are normal.      Palpations: Abdomen is soft.   Musculoskeletal:      Right lower leg: No edema.      Left lower leg: No edema.   Lower extremities very sensitive to touch on exam, pt reports pain with light touch  L thumb and wrist painful, pt would not let me perform ROM testing, has tenderness over L distal radial styloid and first dorsal compartment. Partially completed finkelstein's test which elicited pain, tinel's sign negative  Skin:     Capillary Refill: Capillary refill takes less than 2 seconds.   Neurological:      General: No focal deficit present.      Mental Status: He is alert and oriented to person, place, and time. Mental status is at baseline.   Psychiatric:         Mood and Affect: Mood normal.         Behavior: Behavior normal.         Thought Content: Thought content normal.         Judgment: Judgment normal.       Data     I have personally reviewed the following data over the past 24 hrs:    6.5  \   9.5 (L)   / 256     138 96 (L) 49.5 (H) /  88   5.2 26 14.04 (H) \     Trop: 71 (H) BNP: N/A       Imaging results reviewed over the past 24 hrs:   Recent Results (from the past 24 hour(s))   XR Chest 2 Views    Narrative    EXAM: XR CHEST 2 VIEWS  LOCATION: [facility]  DATE: 8/8/2024    INDICATION: Chest pain.   COMPARISON: Chest radiograph 7/10/2024.       Impression    IMPRESSION:     Lung volumes are low. No focal airspace opacities, pleural effusions, or pneumothorax. Normal pulmonary vascularity.    Stable cardiomediastinal silhouette.     Abdifatah King MD  PGY-2  [facility]  [facility]   August 9, 2024

## 2024-08-09 NOTE — PLAN OF CARE
Problem: Hemodialysis  Goal: Safe, Effective Therapy Delivery  Outcome: Not Progressing  Intervention: Optimize Device Care and Function  Recent Flowsheet Documentation  Taken 8/9/2024 0400 by Naomy Lowery RN  Medication Review/Management: medications reviewed  Taken 8/9/2024 0300 by Naomy Lowery RN  Medication Review/Management: medications reviewed  Taken 8/9/2024 0000 by Naomy Lowery RN  Medication Review/Management: medications reviewed     Problem: Adult Inpatient Plan of Care  Goal: Absence of Hospital-Acquired Illness or Injury  Outcome: Progressing  Intervention: Identify and Manage Fall Risk  Recent Flowsheet Documentation  Taken 8/9/2024 0400 by Naomy Lowery RN  Safety Promotion/Fall Prevention: safety round/check completed  Taken 8/9/2024 0300 by Naomy Lowery RN  Safety Promotion/Fall Prevention: safety round/check completed  Taken 8/9/2024 0000 by Naomy Lowery RN  Safety Promotion/Fall Prevention: safety round/check completed  Intervention: Prevent Skin Injury  Recent Flowsheet Documentation  Taken 8/9/2024 0400 by Naomy Lowery RN  Body Position: position changed independently  Skin Protection: adhesive use limited  Device Skin Pressure Protection: adhesive use limited  Taken 8/9/2024 0300 by Naomy Lowery RN  Body Position: position changed independently  Skin Protection: adhesive use limited  Device Skin Pressure Protection: adhesive use limited  Taken 8/9/2024 0000 by Naomy Lowery RN  Body Position: position changed independently  Skin Protection: adhesive use limited  Device Skin Pressure Protection: adhesive use limited  Intervention: Prevent and Manage VTE (Venous Thromboembolism) Risk  Recent Flowsheet Documentation  Taken 8/9/2024 0400 by Naomy Lowery RN  VTE Prevention/Management: (pt refused assessment) --  Taken 8/9/2024 0300 by Naomy Lowery RN  VTE Prevention/Management: (pt refused assessment)  --  Taken 8/9/2024 0000 by Naomy Lowery RN  VTE Prevention/Management: (pt refused assessment) --  Intervention: Prevent Infection  Recent Flowsheet Documentation  Taken 8/9/2024 0400 by Naomy Lowery RN  Infection Prevention: rest/sleep promoted  Taken 8/9/2024 0300 by Naomy Lowery RN  Infection Prevention: rest/sleep promoted  Taken 8/9/2024 0000 by Naomy Lowery RN  Infection Prevention: rest/sleep promoted  Goal: Optimal Comfort and Wellbeing  Outcome: Progressing     Problem: Skin Injury Risk Increased  Goal: Skin Health and Integrity  Outcome: Progressing  Intervention: Plan: Nurse Driven Intervention: Moisture Management  Recent Flowsheet Documentation  Taken 8/9/2024 0400 by Naomy Lowery RN  Bathing/Skin Care: patient refused  Taken 8/9/2024 0300 by Naomy Lowery RN  Bathing/Skin Care: patient refused  Taken 8/9/2024 0000 by Naomy Lowery RN  Bathing/Skin Care: patient refused  Intervention: Optimize Skin Protection  Recent Flowsheet Documentation  Taken 8/9/2024 0400 by Naomy Lowery RN  Skin Protection: adhesive use limited  Activity Management: activity adjusted per tolerance  Head of Bed (HOB) Positioning: HOB at 30-45 degrees  Taken 8/9/2024 0300 by Naomy Lowery RN  Skin Protection: adhesive use limited  Activity Management: activity adjusted per tolerance  Head of Bed (HOB) Positioning: HOB at 30-45 degrees  Taken 8/9/2024 0000 by Naomy Lowery RN  Skin Protection: adhesive use limited  Activity Management: activity adjusted per tolerance  Head of Bed (HOB) Positioning: HOB at 30-45 degrees     Problem: Hemodialysis  Goal: Absence of Infection Signs and Symptoms  Outcome: Progressing  Intervention: Prevent or Manage Infection  Recent Flowsheet Documentation  Taken 8/9/2024 0400 by Naomy Lowery RN  Infection Prevention: rest/sleep promoted  Taken 8/9/2024 0300 by Naomy Lowery RN  Infection Prevention:  rest/sleep promoted  Taken 8/9/2024 0000 by Naomy Lowery RN  Infection Prevention: rest/sleep promoted   Goal Outcome Evaluation:      Vitals: Pt refused assessment, unable to obtain   Neuro: A/O x 4. Call light appropriate.    Cardiac: SR, 1st degree AV block        Respiratory:  O2 saturation > 92% on RA.   GI/:  Receives dialysis on MWF, Pt was uncooperative, refused assessments  Diet/appetite:  Renal diet  Activity:  1-assist with walker  Pain:  Pt refused all medications  Skin:  Pt refused all assessment   LDA's:  L AV fistula  Plan:  Dialysis, Nephrology consult and needs psych consult

## 2024-08-09 NOTE — ED NOTES
[facility] ED Handoff Report    ED Chief Complaint: CP, hyperkalemia    ED Diagnosis:  (R07.9) Chest pain, unspecified type  Comment: trop stable. Pain with palpitation or respiration  Plan: monitor    (E87.5) Hyperkalemia  Comment: see results  Plan: dialysis    (N18.6,  Z99.2) ESRD (end stage renal disease) on dialysis (H)  Comment:   Plan: run done today       PMH:    Past Medical History:   Diagnosis Date    Chlamydia     history of    Flatfoot     feet pes plannus    Mental retardation     mild    Migraine headache     Pain disorder 11/6/11    upset about no pain meds  incedent report filerd by RN    Tobacco abuse     Undescended testicle         Code Status:  Full Code     Falls Risk: No Band: Not applicable    Current Living Situation/Residence: with family     Elimination Status: Continent: Yes     Activity Level: SBA w/ walker    Patients Preferred Language:  English     Needed: No    Vital Signs:  BP (!) 159/81   Pulse 94   Temp 97.7  F (36.5  C) (Temporal)   Resp (!) 43   SpO2 98%      Cardiac Rhythm:     Pain Score: 10/10    Is the Patient Confused:  No    Last Food or Drink: 08/08/24 at 1700    Focused Assessment:      Tests Performed: Done: Labs and Imaging    Treatments Provided:  dialysis    Family Dynamics/Concerns: No    Family Updated On Visitor Policy: No    Plan of Care Communicated to Family: No    Who Was Updated about Plan of Care: no family present    Belongings Checklist Done and Signed by Patient: No    Medications sent with patient: none    Covid: asymptomatic , na    Additional Information:     RN: Shama Lino RN   8/8/2024 7:29 PM

## 2024-08-09 NOTE — PLAN OF CARE
"PRIMARY DIAGNOSIS: \"GENERIC\" NURSING  OUTPATIENT/OBSERVATION GOALS TO BE MET BEFORE DISCHARGE:  ADLs back to baseline: Yes    Activity and level of assistance: Up with standby assistance.    Pain status: Pt refused IV placement and pain medication    Return to near baseline physical activity: Yes     Discharge Planner Nurse   Safe discharge environment identified: Yes  Barriers to discharge: Yes       Entered by: Naomy Lowery RN 08/09/2024 5:07 AM     Please review provider order for any additional goals.   Nurse to notify provider when observation goals have been met and patient is ready for discharge.Goal Outcome Evaluation:                        "

## 2024-08-09 NOTE — PROGRESS NOTES
RENAL PROGRESS NOTE     REASON FOR CONSULT: hyperkalemia, ESRD      PLAN:  Urgent dialysis yesterday for hyperK, then resume MWF schedule today  Renal diet  SW working on TCU placement, pt will be admitted over the weekend      ASSESSMENT:  ESRD:  IHD MWF at the [facility]  RX:  , Typically high volume UF 3-5kg per pt report.  (he frequently cuts tx short) /, K 2   ACCESS: L AVF  H/o poor compliance, short tx etc   Recurrent issues with HyperK, clearance looks reasonable, so less convinced this is an access related issue  S/p 3kg UF, no updated wts, UF as indicated      HyperKalemia:  As above, urgent dialysis 8/8  Renal diet if pt will comply (historically would not)  pre/post BUN outpt suggestive of good clearance; doubt this is an access issue  Educated again today on the life-threatening potential of hyperK      HTN/volume:  Mostly controlled   Typically quite volume overloaded , high volume UF   PTA Amlod, Metop, continue     CKD/MBD:  On calcitriol, Phoslo as binder, continue     Anemia:  ACD, on DEMAR and venofer as needed outpt,  Last hgb in the 8's     Bi-polar:  On Risperdal, h/o oppositional behavior tendencies, poor medical compliance , some paranoid behaviour noted in the ED during my intake   Reported cognitive impairment, with recurrent conflicts with prior staff, transport team, working on psych TCU placement and housing per SW      Homelessness:  Pt reports that he doesn't not have stable living  situation, and gets to/from dialysis via scooter      Chest pain:  Recurrent, prior ACS w/up reportedly unremarkable.    SUBJECTIVE:  Mark, his preferred name, was seen in his rom with Dr Segovia and SW.  He reports that he did NOT tolerate HD well yesterday, dizzy, needed fluid flush EOR, 3.3 kg UF noted, didn't sleep well, upset at RN staff for interrupting him.  Josué compliants, then pleasant and interactive with me, discussed with SW outside room, who notes that  pt will remain inpt this weekend for placement, does have a h/o leaving AMA but doesn't have his scooter here (at his brothers house)     OBJECTIVE:  Physical Exam   Temp: 98.8  F (37.1  C) Temp src: Oral BP: (!) 156/103 Pulse: 86   Resp: 18 SpO2: 98 % O2 Device: None (Room air)    Vitals:    24 1950   Weight: 104.3 kg (229 lb 15 oz)     Vital Signs with Ranges  Temp:  [97.7  F (36.5  C)-99.2  F (37.3  C)] 98.8  F (37.1  C)  Pulse:  [81-94] 86  Resp:  [10-47] 18  BP: (120-189)/() 156/103  SpO2:  [98 %-100 %] 98 %  I/O last 3 completed shifts:  In: 340 [P.O.:240; Other:100]  Out: 3330 [Urine:50; Other:3280]    Temp (24hrs), Av.4  F (36.9  C), Min:97.7  F (36.5  C), Max:99.2  F (37.3  C)      Patient Vitals for the past 72 hrs:   Weight   24 1950 104.3 kg (229 lb 15 oz)     Intake/Output Summary (Last 24 hours) at 2024 1032  Last data filed at 2024 2134  Gross per 24 hour   Intake 340 ml   Output 3330 ml   Net -2990 ml       PHYSICAL EXAM:  General - Alert and oriented x3, appears comfortable, NAD  Cardiovascular - rate controlled, 's  Respiratory - on RA  Abd: obese  Extremities - No sig lower extremity edema bilaterally  Skin: dry, intact, no rash, good turgor  Neuro:  Grossly intact, no focal deficits  MSK:  limited LE mobility, cannot stand without assist, has electric scooter for transport outpt  Psych:  initially agitated with staff in the room, then pleasant, agreeable to dialysis today and cont MWF, agrees to admission     LABORATORY STUDIES:     Recent Labs   Lab 24  1902   WBC 6.5   RBC 3.46*   HGB 9.5*   HCT 29.1*          Basic Metabolic Panel:  Recent Labs   Lab 24  1004 24  1349   NA  --  139   POTASSIUM  --  6.7*   CHLORIDE  --  99   CO2  --  23   BUN  --  72.1*   CR  --  17.22*   GLC 88 92   LOVELY  --  8.6*       INRNo lab results found in last 7 days.     Recent Labs   Lab Test 24  1902 24  1218   WBC 6.5 4.9   HGB 9.5* 8.6*   PLT  [phone]       Personally reviewed current labs      Ruby Cordoba, [facility]  [facility]  [phone]

## 2024-08-09 NOTE — PLAN OF CARE
Problem: Adult Inpatient Plan of Care  Goal: Optimal Comfort and Wellbeing  Outcome: Not Progressing     Problem: Skin Injury Risk Increased  Goal: Skin Health and Integrity  Outcome: Not Progressing   Goal Outcome Evaluation:         Patient refusing lab draws, vital signs checks, medications, telemetry and assessments. Patient later agreed to VS check except temperature. Took all medications except Tylenol, calcium acetate. Agreed to have dialysis RN to collect labs. Patient taken to dialysis at 12:15pm. Assist of 1 with walker, uses scooter at baseline.

## 2024-08-09 NOTE — PROGRESS NOTES
Potassium   Date Value Ref Range Status   08/09/2024 5.2 3.4 - 5.3 mmol/L Final   10/17/2014 4.5 3.4 - 5.3 mmol/L Final     Hemoglobin   Date Value Ref Range Status   08/09/2024 8.6 (L) 13.3 - 17.7 g/dL Final   03/27/2014 13.8 13.3 - 17.7 g/dL Final     Creatinine   Date Value Ref Range Status   08/09/2024 14.04 (H) 0.67 - 1.17 mg/dL Final   10/17/2014 1.6 (H) 0.8 - 1.5 mg/dL Final     Urea Nitrogen   Date Value Ref Range Status   08/09/2024 49.5 (H) 6.0 - 20.0 mg/dL Final   10/17/2014 20.0 5.0 - 24.0 mg/dL Final     Sodium   Date Value Ref Range Status   08/09/2024 138 135 - 145 mmol/L Final   10/17/2014 147.0 (H) 133.0 - 144.0 mmol/L Final     INR   Date Value Ref Range Status   03/27/2014 1.0  Final       DIALYSIS PROCEDURE NOTE  Hepatitis status of previous patient on machine log was checked and verified ok to use with this patients hepatitis status.  Patient dialyzed for 3 hrs. on a K2 bath with a net fluid removal of  2L.  A BFR of 350 ml/min was obtained via a LUE AVF using 15 gauge needles.      The treatment plan was discussed with Dr. Goznales during the treatment.    Total heparin received during the treatment: 0 units.   Needle cannulation sites held x 5 min.     Meds  given: None ordered   Complications: BFR reduced due to increased venous pressure, tx tolerated well otherwise.       ICEBOAT? Timeout performed pre-treatment  I: Patient was identified using 2 identifiers  C:  Consent Signed Yes  E: Equipment preventative maintenance is current and dialysis delivery system OK to use  B: Hepatitis B Surface Antigen: negative; Draw Date: 7/23/24      Hepatitis B Surface Antibody: immune; Draw Date: 7/23/24  O: Dialysis orders present and complete prior to treatment  A: Vascular access verified and assessed prior to treatment  T: Treatment was performed at a clinically appropriate time  ?: Patient was allowed to ask questions and address concerns prior to treatment  See Adult Hemodialysis flowsheet in EPIC  for further details and post assessment.  Machine water alarm in place and functioning. Transducer pods intact and checked every 15min.   Pt returned via Bed.  Chlorine/Chloramine water system checked every 4 hours.    Patient repositioned every 2 hours during the treatment.  Post treatment report given to JARRELL Chirinos RN regarding 2L of fluid removed, last BP of 144/94, and patient pain rating of 8/10.

## 2024-08-09 NOTE — PHARMACY-ADMISSION MEDICATION HISTORY
"Pharmacist Admission Medication History    Admission medication history is complete. The information provided in this note is only as accurate as the sources available at the time of the update.    Information Source(s): Patient via in-person    Pertinent Information: Patient states that he needs a refill of his multivitamin and is requesting and increase in his Pregabalin 25 mg  and Risperidone 1 mg doses.  His request is for Pregabalin 50mg and Risperidone 5 mg.  He stated that \"They aren't working for me\".    Changes made to PTA medication list:  Added: Prochlorperazine  Deleted: Calcitriol, Calcium Phosphate, Ondansetron  Changed: None    Allergies reviewed with patient and updates made in EHR: no    Medication History Completed By: Emerita Santoro [facility] 8/8/2024 8:28 PM    PTA Med List   Medication Sig Last Dose    amLODIPine (NORVASC) 10 MG tablet Take 10 mg by mouth daily Past Week    metoprolol succinate ER (TOPROL XL) 25 MG 24 hr tablet Take 1 tablet (25 mg) by mouth 2 times daily Past Week    multivitamin RENAL (TRIPHROCAPS) 1 capsule capsule Take 1 capsule by mouth daily Past Week    pregabalin (LYRICA) 25 MG capsule Take 1 capsule (25 mg) by mouth daily Past Week    prochlorperazine (COMPAZINE) 10 MG tablet Take 10 mg by mouth every 6 hours as needed for nausea or vomiting Past Month    risperiDONE (RISPERDAL) 1 MG tablet Take 1 mg by mouth at bedtime Past Week     "

## 2024-08-10 ENCOUNTER — APPOINTMENT (OUTPATIENT)
Dept: OCCUPATIONAL THERAPY | Facility: HOSPITAL | Age: 44
DRG: 628 | End: 2024-08-10
Payer: MEDICARE

## 2024-08-10 ENCOUNTER — APPOINTMENT (OUTPATIENT)
Dept: PHYSICAL THERAPY | Facility: HOSPITAL | Age: 44
DRG: 628 | End: 2024-08-10
Payer: MEDICARE

## 2024-08-10 LAB
ANION GAP SERPL CALCULATED.3IONS-SCNC: 15 MMOL/L (ref 7–15)
BUN SERPL-MCNC: 38.7 MG/DL (ref 6–20)
CALCIUM SERPL-MCNC: 9.6 MG/DL (ref 8.8–10.4)
CHLORIDE SERPL-SCNC: 98 MMOL/L (ref 98–107)
CREAT SERPL-MCNC: 11.76 MG/DL (ref 0.67–1.17)
EGFRCR SERPLBLD CKD-EPI 2021: 5 ML/MIN/1.73M2
ERYTHROCYTE [DISTWIDTH] IN BLOOD BY AUTOMATED COUNT: 15.6 % (ref 10–15)
GLUCOSE SERPL-MCNC: 125 MG/DL (ref 70–99)
HCO3 SERPL-SCNC: 28 MMOL/L (ref 22–29)
HCT VFR BLD AUTO: 30.2 % (ref 40–53)
HGB BLD-MCNC: 9.4 G/DL (ref 13.3–17.7)
HOLD SPECIMEN: NORMAL
MCH RBC QN AUTO: 27.2 PG (ref 26.5–33)
MCHC RBC AUTO-ENTMCNC: 31.1 G/DL (ref 31.5–36.5)
MCV RBC AUTO: 87 FL (ref 78–100)
PLATELET # BLD AUTO: 274 10E3/UL (ref 150–450)
POTASSIUM SERPL-SCNC: 5.4 MMOL/L (ref 3.4–5.3)
RBC # BLD AUTO: 3.46 10E6/UL (ref 4.4–5.9)
SODIUM SERPL-SCNC: 141 MMOL/L (ref 135–145)
WBC # BLD AUTO: 5.8 10E3/UL (ref 4–11)

## 2024-08-10 PROCEDURE — 99232 SBSQ HOSP IP/OBS MODERATE 35: CPT | Mod: GC

## 2024-08-10 PROCEDURE — 250N000013 HC RX MED GY IP 250 OP 250 PS 637: Performed by: INTERNAL MEDICINE

## 2024-08-10 PROCEDURE — 97162 PT EVAL MOD COMPLEX 30 MIN: CPT | Mod: GP

## 2024-08-10 PROCEDURE — 80048 BASIC METABOLIC PNL TOTAL CA: CPT

## 2024-08-10 PROCEDURE — 120N000001 HC R&B MED SURG/OB

## 2024-08-10 PROCEDURE — 97110 THERAPEUTIC EXERCISES: CPT | Mod: GP

## 2024-08-10 PROCEDURE — 97165 OT EVAL LOW COMPLEX 30 MIN: CPT | Mod: GO

## 2024-08-10 PROCEDURE — 36415 COLL VENOUS BLD VENIPUNCTURE: CPT

## 2024-08-10 PROCEDURE — 250N000013 HC RX MED GY IP 250 OP 250 PS 637

## 2024-08-10 PROCEDURE — 99232 SBSQ HOSP IP/OBS MODERATE 35: CPT | Performed by: INTERNAL MEDICINE

## 2024-08-10 PROCEDURE — 85027 COMPLETE CBC AUTOMATED: CPT

## 2024-08-10 RX ORDER — MENTHOL AND METHYL SALICYLATE 7.6; 29 G/100G; G/100G
OINTMENT TOPICAL EVERY 6 HOURS PRN
Status: DISCONTINUED | OUTPATIENT
Start: 2024-08-10 | End: 2024-08-19 | Stop reason: HOSPADM

## 2024-08-10 RX ADMIN — PREGABALIN 25 MG: 25 CAPSULE ORAL at 20:43

## 2024-08-10 RX ADMIN — RISPERIDONE 0.5 MG: 0.5 TABLET, FILM COATED ORAL at 13:02

## 2024-08-10 RX ADMIN — CAPSAICIN: 0.25 CREAM TOPICAL at 13:03

## 2024-08-10 RX ADMIN — ACETAMINOPHEN, ASPIRIN, CAFFEINE 2 TABLET: 250; 65; 250 TABLET, FILM COATED ORAL at 13:42

## 2024-08-10 RX ADMIN — METOPROLOL SUCCINATE 25 MG: 25 TABLET, EXTENDED RELEASE ORAL at 13:02

## 2024-08-10 RX ADMIN — Medication 1 TABLET: at 13:02

## 2024-08-10 RX ADMIN — RISPERIDONE 1 MG: 1 TABLET, FILM COATED ORAL at 21:31

## 2024-08-10 RX ADMIN — CALCITRIOL CAPSULES 0.25 MCG 0.25 MCG: 0.25 CAPSULE ORAL at 13:03

## 2024-08-10 RX ADMIN — ACETAMINOPHEN, ASPIRIN, CAFFEINE 2 TABLET: 250; 65; 250 TABLET, FILM COATED ORAL at 21:31

## 2024-08-10 RX ADMIN — CAPSAICIN: 0.25 CREAM TOPICAL at 20:37

## 2024-08-10 RX ADMIN — SODIUM ZIRCONIUM CYCLOSILICATE 5 G: 5 POWDER, FOR SUSPENSION ORAL at 15:57

## 2024-08-10 RX ADMIN — AMLODIPINE BESYLATE 10 MG: 5 TABLET ORAL at 13:02

## 2024-08-10 RX ADMIN — METOPROLOL SUCCINATE 25 MG: 25 TABLET, EXTENDED RELEASE ORAL at 20:43

## 2024-08-10 ASSESSMENT — ACTIVITIES OF DAILY LIVING (ADL)
ADLS_ACUITY_SCORE: 30

## 2024-08-10 NOTE — PROGRESS NOTES
Safety Refusal    Patient refusing basic safety measures. Will not allow vitals, assessment, non-slip socks, bed alarm, IV access, adequate lighting. Continuing to educate patient on the necessity of these measures, will re-approach intermittently.

## 2024-08-10 NOTE — PLAN OF CARE
"Goal Outcome Evaluation:      Problem: Adult Inpatient Plan of Care  Goal: Patient-Specific Goal (Individualized)  Description: You can add care plan individualizations to a care plan. Examples of Individualization might be:  \"Parent requests to be called daily at 9am for status\", \"I have a hard time hearing out of my right ear\", or \"Do not touch me to wake me up as it startles  me\".  Outcome: Progressing     Problem: Adult Inpatient Plan of Care  Goal: Optimal Comfort and Wellbeing  Outcome: Progressing     Problem: Skin Injury Risk Increased  Goal: Skin Health and Integrity  Outcome: Progressing    A/O x4. VSS. Denied pain. Pt refused phoslo caps at dinner time. Pt keeps asking for food items which is not allowed based on his diet order. He states that he wants to eat whatever he wants to eat. Writer explained the importance of following the diet order for better health outcome.                            "

## 2024-08-10 NOTE — PLAN OF CARE
Goal Outcome Evaluation:    Pt A/O x4. C/O headache, PRN excedrin given. Continues to ask for food, writer brought applesauce and a sandwich. Pt refused portions of assessment. Refused bed alarm, writer educated about using the call light if he needs help. Tele SR with 1DAVB.    Temp: 98.3  F (36.8  C) Temp src: Oral BP: 125/73 Pulse: 72   Resp: 20 SpO2: 96 % O2 Device: None (Room air)

## 2024-08-10 NOTE — PROGRESS NOTES
[facility]    Progress Note - Hospitalist Service       Date of Admission:  8/8/2024    Assessment & Plan   Macario Delatorre is a 44 year old male admitted on 8/8/2024. He has a history of ESRD on HD MWF w/associated nephrology, cognitive disability, mental health complex hx (PTSD, bipolar? Agitation) HTN, gout, HLD and is admitted for recurrent chest pains, upper back pain found to have hyperkalemia (K 6.7), slightly elevated troponin at 72 w/no EKG changes, delta trop pending. Very complex social history including active homelessness and poor medication adherence in the past, has doctored inconsistently      #ESRD on HD MWF  #Hyperkalemia, severe  Patient has had recent previous similar admissions, notably 7/22-8/2/24 for hyperkalemia and dialysis in which patient left AMA. Also was admitted 7/10-7/18 for syncopal episode and chest pains, hyperkalemia and ESRD with HD. Per patient had dialysis run 8/7/24 but was stopped early due to him c/o extremities being cold. Have had issues in the past with continuing dialysis, however patient is understanding this admission of the importance of receiving his regularly scheduled treatments. K 6.7 this admission, s/p insulin and dextrose in ED, recheck pending. Cr. 17.2, BUN 72, GFR 3. Unclear etiology of ESRD, suspect 2/2 chronic poorly controlled HTN, last A1C 7/10/24 4.4, is ot on any medications for diabetes currently and BG wnl. Appears has not had prior workup.  -Nephrology consulted, recs appreciated  -Emergent dialysis initiated 8/8/24, will resume regular HD schedule MWF 8/9 per nephrology  -K improved to 4.5 8/9 s/p dialysis  -Cont. Dialysis MWF  -Renal diet   -Pt has expressed frustration with renal diet, did discuss w/RD who agreed to go see patient to provide menu and options for what patient can eat to follow renal diet guidelines, help appreciated   -Per nephro, ok to resume regular diet if pt agrees to take K binders  -Consider  "re-consultation of palliative care gven prior refusal of HD, however patient has been adherent and understanding this admission  -Resume home renal multivitamins, calcitriol, calcium acetate     #Chest Pain  #ACS r/out   Patient presented with chest pain, diffuse tenderness midsternal radiating to both L and R side of his chest, reproducible, has had chest pain recurrence on previous admissions. Denies SOB currently, initial EKG on admission not demonstrating ischemic changes, initial troponin 72, delta trop downtrending slightly. CXR demonstrating Lung volumes are low. No focal airspace opacities, pleural effusions, or pneumothorax. Normal pulmonary vascularity. Stable cardiomediastinal silhouette. I do not suspect cardiac etiology at this time.  -Cardiac telemetry, no events detected, discontinued 8/10  -Pt requested excedrin for pain, ok with CKD  -Hold scheduled tylenol due to above     #Back Pain  #Fall, previous encounter  Pt endorsing upper back pain most notably tender over cervical paraspinal muscles, appears that he had workup done 8/6 at [facility] when he \"collapsed\" and hurt his back, denied LOC. Imaging performed at this time included CT lumbar, thoracic, and cervical spine, all demonstrating no evidence for fracture or dislocation, no significant spinal canal or neuroforaminal stenosis. Mild multilevel cervical spondylosis. CT head demonstrating no intracranial abnormality.     -Lidocaine patches PRN  -Excedrin BID PRN is available to patient as this is his preference (pt requested instead of tylenol)  -Would be valuable to have PT's input for upper MSK back pain, recs appreciated     Cognitive impairment  History of mental health problems with hallucinations  Bipolar disorder  Agitation   Suicidal ideation  Patient has a history of refusing cares and can easily get agitated. Patient reports history of bipolar and schizophrenia. Does not endorse routine psychiatric follow up or regular medication use. In " "[facility] system was started on risperidone 1 mg at bedtime though has not taken consistently. Stated he can escalate quickly if upset. 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 7/30 started expressing SI with a plan to jump out of the window in setting of extreme frustration and refusal to undergo HD without his exact circumstances, patient himself requesting that his psych meds be increased.  - Psychiatric consulted last admission, could consider reconsultation if elevating risk of agitation              - Psych evaluated, patient felt to have capacity                          - per psych NP, patient is not holdable and has capacity to make decisions for himself                          - per , [facility] worker has stated that so long as psychiatry has deemed him decisional, he is not holdable  -Risperidone 0.5 mg AM, 1 mg bedtime     Goals of care  Note from Dr. Cuenca from prior admission:  \"Patient has previously reiterated that he will not undergo HD for his ESRD unless he gets his specific environment situated (preferred Rns only, demanding HD only in recliner and refusing HD in a bed, only wants HD in his room and away from others). Has continued to decline HD, seems to understand that cardiac arrest is a risk, though continues to express that he \"will not die\" due to this anytime soon. However, continues to reiterate that he would like full CPR and resuscitative efforts at this time, including intubation. Unclear if he would want HD if in this situation. Given known cognitive impairment as above as well as mood disorders, question if he is able to be decisional and/or what his needs would be if he were to become unable to express his needs. Palliative has been following, however patient continuing to decline discussion regarding end of life cares\"     Chronic anemia, normocytic   Baseline Hgb 9-10. Likely related to ESRD/chronic disease. Iron stores " "adequate. CBC on admission pending  - Continue DEMAR weekly; resume with outpatient dialysis. Hgb stable this AM 9.4  -Transfuse for HgB <7 if indicated  - Daily CBC (patient has frequently refused labs on prior admissions)     Chronic pain syndrome, stable    Neuropathy, reported to be diabetic  - Lyrica 25 mg daily              -Cannot increase dose due to ESRD              -Added capsaicin cream for foot neuropathy   -Also added icyhot for symptom relief     Hypertension, uncontrolled  Continues to be hypertensive during hospitalization likely in setting of missed dialysis session.   - Continue PTA amlodipine 10 every day and metoprolol 25 BID     Disposition Planning  Discussed with CM, pt currently homeless and \"living on the streets\", pt reports has  through [facility] working on permanent housing but has not been completed yet, pt is interested in group home or LTC in the future but for immediate disposition planning likely TCU or medical respite care. PT/OT consulted to evaluate patient.  -Pt reports he worked with PT and OT yesterday afternoon, although I do not see any documentation to confirm this. Did discuss with patient that this is required for TCU placement after hospitalization     L hand/thumb pain  Pt reports he has had pain in his L thumb which has limited his  strength and functional status. On exam, does not appear markedly swollen, he does note pain most significant in 1st dorsal compartment of wrist. He is tender to palpation over distal radial styloid, but will not let examiner do much in terms of palpation or ROM. I suspect most likely de quervain tenosynovitis in setting of overuse injury vs. Intersection syndrome. Could consider further evaluation with xray but without traumatic history I am less suspicious of fracture.  -Consider thumb spica cast or brace at discharge  -will require OP follow up, treatment options include corticosteroid injection or OP hand therapy w/OT " "  -Excedrin scheduled BID per pt request over tylenol  -Capsaican cream can also be applied to his thumb  -Limited medication options due to renal function           Diet: Snacks/Supplements Adult: Other; [place] at HS; Between Meals  Regular Diet Adult    DVT Prophylaxis: Pneumatic Compression Devices  Raphael Catheter: Not present  Fluids: PO  Lines: None     Cardiac Monitoring: None  Code Status: Full Code      Clinically Significant Risk Factors        # Hyperkalemia: Highest K = 6.7 mmol/L in last 2 days, will monitor as appropriate                     # Overweight: Estimated body mass index is 28.71 kg/m  as calculated from the following:    Height as of 7/22/24: 1.981 m (6' 6\").    Weight as of this encounter: 112.7 kg (248 lb 7.3 oz)., PRESENT ON ADMISSION     # Financial/Environmental Concerns: none  # Housing Instability: noted in nursing assessment               Disposition Plan      Expected Discharge Date: 08/11/2024      Destination: other (comment) (unknown at this time)  Discharge Comments: 8/9 Barriers:  - Discharge Plan d/t patient homeless  - Difficult placement if TCU is needed        The patient's care was discussed with the Attending Physician, Dr. Segovia .    Abdifatah King MD  Hospitalist Service  [facility]  Securely message with [employer] (more info)  Text page via [employer] Paging/Directory   ______________________________________________________________________    Interval History   NAEO, patient states pain is improved with excedrin, will make sure he can get this twice daily as requested. Dialysis run went well yesterday, patient was frustrated with renal diet but I had RD meet with him and provide menu to discuss options. Patient understanding he needs to work with PT/OT for TCU acceptance upon discharge. Trialing topical medications for neuropathic pain given his CKD.     Physical Exam   Vital Signs: Temp: 98.3  F (36.8  C) Temp src: Oral BP: 125/73 Pulse: 72   " Resp: 20 SpO2: 96 % O2 Device: None (Room air)    Weight: 248 lbs 7.33 oz     Constitutional:       General: He is not in acute distress. Rocking back and forth in his chair, pleasant during out conversation     Appearance: Normal appearance. He is not ill-appearing or diaphoretic.   HENT:      Mouth/Throat:      Mouth: Mucous membranes are dry.      Pharynx: Oropharynx is clear.   Eyes:      Extraocular Movements: Extraocular movements intact.      Conjunctiva/sclera: Conjunctivae normal.      Pupils: Pupils are equal, round, and reactive to light.   Cardiovascular:      Rate and Rhythm: Normal rate and regular rhythm.      Pulses: Normal pulses.      Heart sounds: Normal heart sounds.   Pulmonary:      Effort: Pulmonary effort is normal.      Breath sounds: Normal breath sounds.   Abdominal:      General: Bowel sounds are normal.      Palpations: Abdomen is soft.   Musculoskeletal:      Right lower leg: No edema.      Left lower leg: No edema.   Lower extremities very sensitive to touch on exam, pt reports pain with light touch, did not examine further  Skin:     Capillary Refill: Capillary refill takes less than 2 seconds.   Neurological:      General: No focal deficit present.      Mental Status: He is alert and oriented to person, place, and time. Mental status is at baseline.   Psychiatric:         Mood and Affect: Mood normal.         Behavior: Behavior normal.         Thought Content: Thought content normal.         Judgment: Judgment normal.          Data     I have personally reviewed the following data over the past 24 hrs:    5.8  \   9.4 (L)   / 274     138 97 (L) 26.6 (H) /  124 (H)   4.5 27 8.84 (H) \       Imaging results reviewed over the past 24 hrs:   No results found for this or any previous visit (from the past 24 hour(s)).    Abdifatah King MD  PGY-2  [facility]  [facility]  August 10, 2024

## 2024-08-10 NOTE — PROGRESS NOTES
CLINICAL NUTRITION SERVICES    Met with patient per MD request.   Patient familiar to staff from previous admissions.    Diet RX:  Renal diet.    Patient does not want to be on the renal diet. Patient c/o of not getting a sandwich last evening when requested, he had to wait until 10pm before he was given a sandwich.  Patient ordered 3 meals yesterday and per chart documentation ate 3 meals.    Patient's lunch tray was delivered during our visit.  Patient upset that cheese was not on the scrambled eggs and he did not get 2 cream of wheat as ordered.    Plan:  RD requested scrambled eggs with cheese for patient to be sent now.    Daily HS sandwich ordered for patient.   Provided patient with renal diet menu list.

## 2024-08-10 NOTE — PLAN OF CARE
Problem: Adult Inpatient Plan of Care  Goal: Absence of Hospital-Acquired Illness or Injury  Intervention: Identify and Manage Fall Risk  Recent Flowsheet Documentation  Taken 8/10/2024 1300 by Abdifatah Farooq, RN  Safety Promotion/Fall Prevention:   assistive device/personal items within reach   clutter free environment maintained   increased rounding and observation   increase visualization of patient   lighting adjusted   mobility aid in reach   patient and family education   room door open   room near nurse's station   room organization consistent   safety round/check completed     Problem: Adult Inpatient Plan of Care  Goal: Optimal Comfort and Wellbeing  Intervention: Monitor Pain and Promote Comfort  Recent Flowsheet Documentation  Taken 8/10/2024 1333 by Abdifatah Farooq, RN  Pain Management Interventions:   medication (see MAR)   breathing exercises   care clustered   distraction   emotional support   food   pillow support provided   quiet environment facilitated   relaxation techniques promoted   repositioned   rest  Taken 8/10/2024 1000 by Abdifatah Farooq, RN  Pain Management Interventions:   breathing exercises   care clustered   distraction   emotional support   pillow support provided   quiet environment facilitated   relaxation techniques promoted   repositioned   rest     Problem: Chronic Kidney Disease  Goal: Optimal Coping with Chronic Illness  Outcome: Progressing   Goal Outcome Evaluation:       VSS on RA. A&Ox4. Intermittently cooperative, still refusing numerous safety measures & IV. Tele DC'd. Diet upgraded to regular, education continues on low K foods/drinks. Generalized pain managed w/ PRN excedrin, capsaicin ointment, rest & repo. Up independently- slightly unsteady gait but refusing assistance & safety alarms.

## 2024-08-10 NOTE — PROGRESS NOTES
08/10/24 0910   Appointment Info   Signing Clinician's Name / Credentials (PT) Lucy Mendez PT   Living Environment   Current Living Arrangements homeless   Self-Care   Equipment Currently Used at Home wheelchair, power;walker, rolling;wheelchair, manual   Activity/Exercise/Self-Care Comment has been using electric scooter recently due to B foot pain/neuropathy/gout   General Information   Onset of Illness/Injury or Date of Surgery 08/08/24   Referring Physician Dr. Segovia   Patient/Family Therapy Goals Statement (PT) go to TCU   Pertinent History of Current Problem (include personal factors and/or comorbidities that impact the POC) PMH of ESRD on HD MWF w/associated nephrology, cognitive disability, mental health complex hx (PTSD, bipolar? Agitation) HTN, gout, HLD and is admitted for recurrent chest pains, upper back pain   Cognition   Affect/Mental Status (Cognition) WFL   Orientation Status (Cognition) oriented x 4   Follows Commands (Cognition) WFL   Range of Motion (ROM)   Range of Motion ROM deficits secondary to pain;ROM deficits secondary to weakness   ROM Comment decreased B ankle DF/PF due to painful   Strength (Manual Muscle Testing)   Strength (Manual Muscle Testing) Deficits observed during functional mobility   Bed Mobility   Bed Mobility supine-sit   Supine-Sit [place] (Bed Mobility) independent   Transfers   Transfers bed-chair transfer;sit-stand transfer   Bed-Chair Transfer   Assistive Device (Bed-Chair Transfers)   (none)   Bed-Chair [place] (Transfers) contact guard   Sit-Stand Transfer   Sit-Stand [place] (Transfers) contact guard   Assistive Device (Sit-Stand Transfers) walker, front-wheeled   Gait/Stairs (Locomotion)   Comment, (Gait/Stairs) unable at this time due to foot pain   Balance   Balance Comments cga standing with BUE support   Sensory Examination   Sensory Perception Comments pt reports Neuropathy B feet   Clinical Impression   Criteria for Skilled Therapeutic  Intervention Yes, treatment indicated   PT Diagnosis (PT) impaired functional mobility   Influenced by the following impairments pain; decreased rom, strength, balance   Functional limitations due to impairments transfers, gait   Clinical Presentation (PT Evaluation Complexity) stable   Clinical Presentation Rationale pt presents as medically diagnosed   Clinical Decision Making (Complexity) moderate complexity   Planned Therapy Interventions (PT) balance training;gait training;home exercise program;neuromuscular re-education;patient/family education;stair training;strengthening;transfer training;stretching;wheelchair management/propulsion training   Risk & Benefits of therapy have been explained evaluation/treatment results reviewed;patient   PT Total Evaluation Time   PT Eval, Moderate Complexity Minutes (63199) 20   Physical Therapy Goals   PT Frequency 5x/week   PT Predicted Duration/Target Date for Goal Attainment 08/17/24   PT Goals Transfers;Gait;Wheelchair Mobility   PT: Transfers Supervision/stand-by assist;Sit to/from stand;Bed to/from chair;Assistive device   PT: Gait Minimal assist;Assistive device;10 feet   PT: Wheelchair Mobility 50 feet;Caregiver SBA;manual wheelchair   Interventions   Interventions Quick Adds Therapeutic Procedure   Therapeutic Procedure/Exercise   Ther. Procedure: strength, endurance, ROM, flexibillity Minutes (94624) 8   Symptoms Noted During/After Treatment increased pain  (B foot pain chronic)   Treatment Detail/Skilled Intervention instruction in seated BLE ex x10 reps with sba, cuing and demonstration for technique; use of sheet for AA ankle DF BLE   PT Discharge Planning   PT Plan transfers, standing misael, short distance amb with FWW; Le ex/ankle ROM   PT Discharge Recommendation (DC Rec) Transitional Care Facility   PT Rationale for DC Rec pt would benefit from further PT for strengthening and mobility training; ultimately pt would benefit from supported living environment  such as assisted living or group home   PT Brief overview of current status cga transfers and standing   Total Session Time   Timed Code Treatment Minutes 8   Total Session Time (sum of timed and untimed services) 28

## 2024-08-10 NOTE — PROGRESS NOTES
OT Vitor     08/10/24 1400   Appointment Info   Signing Clinician's Name / Credentials (OT) Drea Verdugo, OTR/L   Living Environment   Current Living Arrangements homeless   Living Environment Comments Pt currently unhoused, multiple recent admissions. Per recent admission, pt may be able to live w/ brother in house in near future.   Self-Care   Current Activity Tolerance fair   Equipment Currently Used at Home wheelchair, power;walker, rolling;wheelchair, manual   Activity/Exercise/Self-Care Comment Has been having increased difficulty caring for self, IND w dressing, toileting. Using electric scooter recently prior to recent admissions.   Instrumental Activities of Daily Living (IADL)   IADL Comments Pt has not able to complete IADLs, does not have consistent assist at this time as pt houseless   General Information   Onset of Illness/Injury or Date of Surgery 08/08/24   Referring Physician Abdifatah King MD   Additional Occupational Profile Info/Pertinent History of Current Problem Macario Delatorre is a 44 year old male admitted on 8/8/2024. He has a history of ESRD on HD MWF w/associated nephrology, cognitive disability, mental health complex hx (PTSD, bipolar? Agitation) HTN, gout, HLD and is admitted for recurrent chest pains, upper back pain found to have hyperkalemia (K 6.7), slightly elevated troponin at 72 w/no EKG changes, delta trop pending. Very complex social history including active homelessness and poor medication adherence in the past, has doctored inconsistently   Existing Precautions/Restrictions fall   Limitations/Impairments safety/cognitive   Cognitive Status Examination   Orientation Status orientation to person, place and time   Affect/Mental Status (Cognitive) WFL;unable/difficult to assess   Cognitive Status Comments Pt able to answer Qs, follow commands, flat affect, reduced safety awareness   Sensory   Sensory Quick Adds sensation intact   Range of Motion Comprehensive   General Range of  Motion other (see comments)   Comment, General Range of Motion Pt reporting mild swelling, pain and stiffness L thumb   Strength Comprehensive (MMT)   General Manual Muscle Testing (MMT) Assessment no strength deficits identified   Comment, General Manual Muscle Testing (MMT) Assessment Generalized weakness   Muscle Tone Assessment   Muscle Tone Quick Adds No deficits were identified   Coordination   Coordination Comments Mild decreased ROM/function L hand, able to coordinate and use B hands functionally   Transfers   Transfers sit-stand transfer   Sit-Stand Transfer   Sit-Stand [place] (Transfers) supervision   Assistive Device (Sit-Stand Transfers) walker, front-wheeled   Balance   Balance Assessment standing static balance;sit to stand dynamic balance;sitting dynamic balance   Sitting Balance: Static supervision   Sitting Balance: Dynamic verbal cues   Position, Sitting Balance sitting in chair   Sit-to-Stand Balance supervision   Standing Balance: Static supervision   Position/Device Used, Standing Balance walker, front-wheeled   Balance Comments Pt not willing to attempt further standing /transfers today.   Activities of Daily Living   BADL Assessment/Intervention toileting   Lower Body Dressing Assessment/Training   Comment, (Lower Body Dressing) Did not directly observe, per clinical reasoning pt likely SBA to don LB clothing from seated position   Toileting   Comment, (Toileting) Did not directly observe, per clinical reasoning pt likely CGA SPT to commode at this time.   Clinical Impression   Criteria for Skilled Therapeutic Interventions Met (OT) Yes, treatment indicated   OT Diagnosis Impaired ADLs   Influenced by the following impairments Medical complexity, deconditioning   OT Problem List-Impairments impacting ADL problems related to;activity tolerance impaired;balance;mobility;strength   Assessment of Occupational Performance 1-3 Performance Deficits   Identified Performance Deficits functional  mobility, toileting, transfers   Planned Therapy Interventions (OT) ADL retraining;transfer training;home program guidelines;progressive activity/exercise   Clinical Decision Making Complexity (OT) problem focused assessment/low complexity   Risk & Benefits of therapy have been explained evaluation/treatment results reviewed;care plan/treatment goals reviewed;patient   OT Total Evaluation Time   OT Eval, Low Complexity Minutes (72844) 15   OT Goals   Therapy Frequency (OT) 3 times/week   OT Predicted Duration/Target Date for Goal Attainment 08/17/24   OT Goals Toilet Transfer/Toileting;Lower Body Dressing   OT: Lower Body Dressing Independent   OT: Toilet Transfer/Toileting Modified independent   OT Discharge Planning   OT Plan Functional transfers <>w/c, commode, bed/chair. Toileting   OT Discharge Recommendation (DC Rec) Transitional Care Facility   OT Rationale for DC Rec Pt would benefit from cont therapy to improve safety and IND. Pt would benefit from supportive long-term living environment.   OT Brief overview of current status SBA STS   Total Session Time   Total Session Time (sum of timed and untimed services) 15

## 2024-08-10 NOTE — PLAN OF CARE
Goal Outcome Evaluation:       2341pm: Pt requested  PRN EXCEDRIN after having the previous dose at 2057 pm.Writer tried to make pt understand that it is too soon to have another dose and  pt got upset and frustrated said that he would never take any meds before discharge and feel like he have to beg for medication; house notified.   Pt A/O X 4 VSS, has been refusing care; on  tele Plan of care ongoing.

## 2024-08-10 NOTE — PROGRESS NOTES
RENAL PROGRESS NOTE     REASON FOR CONSULT: hyperkalemia, ESRD      PLAN:  MWF dialysis  Not that useful trying get daily labs as he's primarily refusing them  Long discussion regarding diet, willing to try a K binder again if he can have a more open diet  Discussed fistulogram, he has needed one for most of the year but has refused because of IV placement problems, will continue to discuss       ASSESSMENT:  ESRD:  IHD MWF at the [facility]  RX:  , Typically high volume UF 3-5kg per pt report.  (he frequently cuts tx short) /, K 2   ACCESS: L AVF  H/o poor compliance, short tx etc   Recurrent issues with HyperK, clearance looks reasonable but has had recurrent bleeding and high pressures in his AVF     HyperKalemia:  As above, urgent dialysis 8/8 and regular dialysis on 8/9  Moved to regular diet as he's willing to try K binders again  Will conitnue to discuss fistulogram     HTN/volume:  Mostly controlled when he allows it to be taken  Typically quite volume overloaded , high volume UF   PTA Amlod, Metop, continue     CKD/MBD:  On calcitriol, Phoslo as binder, continue     Anemia:  ACD, on DEMAR and venofer as needed outpt,  Last hgb in the 8's     Bi-polar:  On Risperdal, h/o oppositional behavior tendencies, poor medical compliance , some paranoid behaviour noted in the ED during my intake   Reported cognitive impairment, with recurrent conflicts with prior staff, transport team, working on psych TCU placement and housing per       Homelessness:  Pt reports that he doesn't not have stable living  situation, and gets to/from dialysis via scooter      Chest pain:  Recurrent, prior ACS w/up reportedly unremarkable.          SUBJECTIVE:  Mark, his preferred name, was seen in his room.  We discussed hyperkalemia at length. He understands that we're concerned.  He clearly orders quite a bit of food here, suspect he's under an enormous amount of food stress as an outpatient.  After  discussing prior issues with kayexalate he's will to try a small dose of Lokelma if he can have more flexible diet.  I discussed its up to him then to make good food choices and if he can't then we will be forced to continue with the renal diet.  Suspect phos will be an issue with this but would focus on the life-threatening recurrent hyperkalemia first.      Discussed need for fistulogram, his primary problem historically with this has been refusing to have an IV placed.  He acknowledges this and its unclear how we're going to get around this problem but will try to continue to engage with him.      OBJECTIVE:  Physical Exam   Temp: 98.3  F (36.8  C) Temp src: Oral BP: 125/73 Pulse: 72   Resp: 20 SpO2: 96 % O2 Device: None (Room air)    Vitals:    24   Weight: 104.3 kg (229 lb 15 oz) 112.7 kg (248 lb 7.3 oz)     Vital Signs with Ranges  Temp:  [97.7  F (36.5  C)-98.3  F (36.8  C)] 98.3  F (36.8  C)  Pulse:  [72-96] 72  Resp:  [15-27] 20  BP: (125-167)/() 125/73  SpO2:  [96 %-98 %] 96 %  I/O last 3 completed shifts:  In: 240 [P.O.:240]  Out: 2000 [Other:2000]    Temp (24hrs), Av.4  F (36.9  C), Min:97.7  F (36.5  C), Max:99.2  F (37.3  C)      Patient Vitals for the past 72 hrs:   Weight   24 112.7 kg (248 lb 7.3 oz)   24 104.3 kg (229 lb 15 oz)     Intake/Output Summary (Last 24 hours) at 2024 1032  Last data filed at 2024 2134  Gross per 24 hour   Intake 340 ml   Output 3330 ml   Net -2990 ml       PHYSICAL EXAM:  General - Alert and oriented x3, appears comfortable, NAD  Cardiovascular - rate controlled,  Respiratory - on RA  Abd: obese  Extremities - No sig lower extremity edema bilaterally  Skin: dry, intact, no rash, good turgor  Neuro:  Grossly intact, no focal deficits  MSK:  limited LE mobility, cannot stand without assist, has electric scooter for transport outpt  Psych:  pleasant and conversant with me    LABORATORY STUDIES:     Recent Labs    Lab 08/10/24  1138 08/09/24  1813 08/09/24  0910 08/08/24  1902   WBC 5.8 5.4 6.3 6.5   RBC 3.46* 3.24* 3.14* 3.46*   HGB 9.4* 9.0* 8.6* 9.5*   HCT 30.2* 28.4* 27.0* 29.1*    209 229 256       Basic Metabolic Panel:  Recent Labs   Lab 08/09/24  1813 08/09/24  1004 08/09/24  0910 08/08/24  1349     --  138 139   POTASSIUM 4.5  --  5.2 6.7*   CHLORIDE 97*  --  96* 99   CO2 27  --  26 23   BUN 26.6*  --  49.5* 72.1*   CR 8.84*  --  14.04* 17.22*   * 88 104* 92   LOVELY 8.9  --  8.8 8.6*       INRNo lab results found in last 7 days.     Recent Labs   Lab Test 08/10/24  1138 08/09/24  1813   WBC 5.8 5.4   HGB 9.4* 9.0*    209       Personally reviewed current labs    Francisco Gonzales  [facility]  [phone]

## 2024-08-11 PROCEDURE — 120N000001 HC R&B MED SURG/OB

## 2024-08-11 PROCEDURE — 99232 SBSQ HOSP IP/OBS MODERATE 35: CPT | Performed by: INTERNAL MEDICINE

## 2024-08-11 PROCEDURE — 250N000013 HC RX MED GY IP 250 OP 250 PS 637: Performed by: INTERNAL MEDICINE

## 2024-08-11 PROCEDURE — 250N000013 HC RX MED GY IP 250 OP 250 PS 637

## 2024-08-11 PROCEDURE — 99232 SBSQ HOSP IP/OBS MODERATE 35: CPT | Mod: GC

## 2024-08-11 RX ADMIN — ACETAMINOPHEN, ASPIRIN, CAFFEINE 2 TABLET: 250; 65; 250 TABLET, FILM COATED ORAL at 14:49

## 2024-08-11 RX ADMIN — METOPROLOL SUCCINATE 25 MG: 25 TABLET, EXTENDED RELEASE ORAL at 21:50

## 2024-08-11 RX ADMIN — CALCITRIOL CAPSULES 0.25 MCG 0.25 MCG: 0.25 CAPSULE ORAL at 13:46

## 2024-08-11 RX ADMIN — MENTHOL AND METHYL SALICYLATE: 7.6; 29 OINTMENT TOPICAL at 13:47

## 2024-08-11 RX ADMIN — RISPERIDONE 0.5 MG: 0.5 TABLET, FILM COATED ORAL at 13:46

## 2024-08-11 RX ADMIN — METOPROLOL SUCCINATE 25 MG: 25 TABLET, EXTENDED RELEASE ORAL at 13:46

## 2024-08-11 RX ADMIN — SODIUM ZIRCONIUM CYCLOSILICATE 10 G: 10 POWDER, FOR SUSPENSION ORAL at 17:46

## 2024-08-11 RX ADMIN — Medication 1 TABLET: at 13:46

## 2024-08-11 RX ADMIN — PREGABALIN 25 MG: 25 CAPSULE ORAL at 21:50

## 2024-08-11 RX ADMIN — AMLODIPINE BESYLATE 10 MG: 5 TABLET ORAL at 13:46

## 2024-08-11 RX ADMIN — ACETAMINOPHEN, ASPIRIN, CAFFEINE 2 TABLET: 250; 65; 250 TABLET, FILM COATED ORAL at 21:50

## 2024-08-11 RX ADMIN — RISPERIDONE 1 MG: 1 TABLET, FILM COATED ORAL at 21:50

## 2024-08-11 ASSESSMENT — ACTIVITIES OF DAILY LIVING (ADL)
ADLS_ACUITY_SCORE: 30

## 2024-08-11 NOTE — PLAN OF CARE
Goal Outcome Evaluation:       Patient relatively cooperative this shift. Ended up allowing an assessment to be completed as long as he didn't have to move at all, and allowed vitals to be taken. Gave PRN Excedrin at 2131 per patient's request.

## 2024-08-11 NOTE — PROGRESS NOTES
RENAL PROGRESS NOTE     REASON FOR CONSULT: hyperkalemia, ESRD      PLAN:  MWF dialysis  Tolerated the Lokelma well, he's open to taking 10g on his non-dialysis days  Okay to skip BMP today, will draw tomorrow on dialysis  He's open to fistulogram if we can tell him when its scheduled and when the PIV would need to be placed, favor Tuesday to avoid prolonged NPO status and dialysis to address expected hyperkalemia on Monday       ASSESSMENT:  ESRD:  IHD MWF at the [facility]  RX:  , Typically high volume UF 3-5kg per pt report.  (he frequently cuts tx short) /, K 2   ACCESS: L AVF  H/o poor compliance, short tx etc   Recurrent issues with HyperK, clearance looks reasonable but has had recurrent bleeding and high pressures in his AVF.  He is more open to fistulogram now, will see if we can schedule on Tuesday     HyperKalemia:  As above, urgent dialysis 8/8 and regular dialysis on 8/9  Lokelma 10g on non dialysis days, may increase to daily  I have some concerns about his ability to take this outside of the hospital     HTN/volume:  Mostly controlled when he allows it to be taken  Typically quite volume overloaded , high volume UF   PTA Amlod, Metop, continue     CKD/MBD:  On calcitriol, Phoslo as binder, continue     Anemia:  ACD, on DEMAR and venofer as needed outpt,  Last hgb in the 8's     Bi-polar:  On Risperdal, h/o oppositional behavior tendencies, poor medical compliance , some paranoid behaviour noted in the ED during my intake   Reported cognitive impairment, with recurrent conflicts with prior staff, transport team, working on psych TCU placement and housing per       Homelessness:  Pt reports that he doesn't not have stable living  situation, and gets to/from dialysis via scooter      Chest pain:  Recurrent, prior ACS w/up reportedly unremarkable.          SUBJECTIVE:  Doing okay today.  Tolerated Lokelma well.  We discussed balancing diet with his health on dialysis.   We mostly talked about potassium but I discussed we're going to have to deal with phos as well eventually.      Drawing labs isn't going to change my plan today, discussed drawing on dialysis tomorrow.     He is open to a fistulogram, he wants to know when the PIV will be placed and I told him that we'll have to get IR's input on when it can be done.  Favor doing this on Tuesday to see if we can limit his NPO time and avoid getting him into an opposition place trying to fight everything we do.      OBJECTIVE:  Physical Exam   Temp: 98.2  F (36.8  C) Temp src: Oral BP: (!) 150/93 Pulse: 79     SpO2: 99 % O2 Device: None (Room air)    Vitals:    24   Weight: 104.3 kg (229 lb 15 oz) 112.7 kg (248 lb 7.3 oz)     Vital Signs with Ranges  Temp:  [98.2  F (36.8  C)] 98.2  F (36.8  C)  Pulse:  [79-82] 79  BP: (137-150)/(80-93) 150/93  SpO2:  [99 %] 99 %  I/O last 3 completed shifts:  In: 480 [P.O.:480]  Out: -     Temp (24hrs), Av.4  F (36.9  C), Min:97.7  F (36.5  C), Max:99.2  F (37.3  C)      Patient Vitals for the past 72 hrs:   Weight   24 112.7 kg (248 lb 7.3 oz)   24 104.3 kg (229 lb 15 oz)     Intake/Output Summary (Last 24 hours) at 2024 1032  Last data filed at 2024 2134  Gross per 24 hour   Intake 340 ml   Output 3330 ml   Net -2990 ml       PHYSICAL EXAM:  General - Alert and oriented x3, appears comfortable, NAD  Cardiovascular - rate controlled,  Respiratory - on RA  Abd: obese  Extremities - No sig lower extremity edema bilaterally  Skin: dry, intact, no rash, good turgor  Neuro:  Grossly intact, no focal deficits  MSK:  limited LE mobility, cannot stand without assist  Psych:  pleasant and conversant with me    LABORATORY STUDIES:     Recent Labs   Lab 08/10/24  1138 24  1813 24  0910 24  1902   WBC 5.8 5.4 6.3 6.5   RBC 3.46* 3.24* 3.14* 3.46*   HGB 9.4* 9.0* 8.6* 9.5*   HCT 30.2* 28.4* 27.0* 29.1*    209 229 256       Basic  Metabolic Panel:  Recent Labs   Lab 08/10/24  1155 08/09/24  1813 08/09/24  1004 08/09/24  0910 08/08/24  1349    138  --  138 139   POTASSIUM 5.4* 4.5  --  5.2 6.7*   CHLORIDE 98 97*  --  96* 99   CO2 28 27  --  26 23   BUN 38.7* 26.6*  --  49.5* 72.1*   CR 11.76* 8.84*  --  14.04* 17.22*   * 124* 88 104* 92   LOVELY 9.6 8.9  --  8.8 8.6*       INRNo lab results found in last 7 days.     Recent Labs   Lab Test 08/10/24  1138 08/09/24  1813   WBC 5.8 5.4   HGB 9.4* 9.0*    209       Personally reviewed current labs    Francisco Gonzales  [facility]  [phone]

## 2024-08-11 NOTE — PROGRESS NOTES
[facility]    Progress Note - Hospitalist Service       Date of Admission:  8/8/2024    Assessment & Plan   Macario Delatorre is a 44 year old male admitted on 8/8/2024. He has a history of ESRD on HD MWF w/associated nephrology, cognitive disability, mental health complex hx (PTSD, bipolar? Agitation) HTN, gout, HLD and is admitted for recurrent chest pains, upper back pain found to have hyperkalemia (K 6.7), slightly elevated troponin at 72 w/no EKG changes, delta trop pending. Very complex social history including active homelessness and poor medication adherence in the past, has doctored inconsistently      #ESRD on HD MWF  #Hyperkalemia, severe  Patient has had recent previous similar admissions, notably 7/22-8/2/24 for hyperkalemia and dialysis in which patient left AMA. Also was admitted 7/10-7/18 for syncopal episode and chest pains, hyperkalemia and ESRD with HD. Per patient had dialysis run 8/7/24 but was stopped early due to him c/o extremities being cold. Have had issues in the past with continuing dialysis, however patient is understanding this admission of the importance of receiving his regularly scheduled treatments. K 6.7 this admission, s/p insulin and dextrose in ED, recheck pending. Cr. 17.2, BUN 72, GFR 3. Unclear etiology of ESRD, suspect 2/2 chronic poorly controlled HTN, last A1C 7/10/24 4.4, is ot on any medications for diabetes currently and BG wnl. Appears has not had prior workup.  -Nephrology consulted, recs appreciated  -Emergent dialysis initiated 8/8/24, will resume regular HD schedule MWF 8/9 per nephrology  -K improved to 4.5 8/9 s/p dialysis  -Cont. Dialysis MWF  -Renal diet              -Per nephro, ok to resume regular diet if pt agrees to take K binders   -s/p Lokelma given 8/10   -K recheck pending, pt declined lab draw this AM but will get labs done after he eats. Repeat BMP ordered   -If significantly elevated, would reach out to nephrology if urgent  "HD is required  -Consider re-consultation of palliative care gven prior refusal of HD, however patient has been adherent and understanding this admission  -Resume home renal multivitamins, calcitriol, calcium acetate     #Chest Pain  #ACS r/out   Patient presented with chest pain, diffuse tenderness midsternal radiating to both L and R side of his chest, reproducible, has had chest pain recurrence on previous admissions. Denies SOB currently, initial EKG on admission not demonstrating ischemic changes, initial troponin 72, delta trop downtrending slightly. CXR demonstrating Lung volumes are low. No focal airspace opacities, pleural effusions, or pneumothorax. Normal pulmonary vascularity. Stable cardiomediastinal silhouette. I do not suspect cardiac etiology at this time.  -Cardiac telemetry, no events detected, discontinued 8/10  -Pt requested excedrin for pain, ok with CKD  -Hold scheduled tylenol due to above     #Back Pain  #Fall, previous encounter  Pt endorsing upper back pain most notably tender over cervical paraspinal muscles, appears that he had workup done 8/6 at [facility] when he \"collapsed\" and hurt his back, denied LOC. Imaging performed at this time included CT lumbar, thoracic, and cervical spine, all demonstrating no evidence for fracture or dislocation, no significant spinal canal or neuroforaminal stenosis. Mild multilevel cervical spondylosis. CT head demonstrating no intracranial abnormality.     -Lidocaine patches PRN  -Excedrin BID PRN is available to patient as this is his preference (pt requested instead of tylenol)  -Would be valuable to have PT's input for upper MSK back pain, recs appreciated     Cognitive impairment  History of mental health problems with hallucinations  Bipolar disorder  Agitation   Suicidal ideation  Patient has a history of refusing cares and can easily get agitated. Patient reports history of bipolar and schizophrenia. Does not endorse routine psychiatric follow up or " "regular medication use. In [facility] system was started on risperidone 1 mg at bedtime though has not taken consistently. Stated he can escalate quickly if upset. 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 7/30 started expressing SI with a plan to jump out of the window in setting of extreme frustration and refusal to undergo HD without his exact circumstances, patient himself requesting that his psych meds be increased.  - Psychiatric consulted last admission, could consider reconsultation if elevating risk of agitation              - Psych evaluated, patient felt to have capacity                          - per psych NP, patient is not holdable and has capacity to make decisions for himself                          - per , [facility] worker has stated that so long as psychiatry has deemed him decisional, he is not holdable  -Risperidone 0.5 mg AM, 1 mg bedtime     Goals of care  Note from Dr. Cuenca from prior admission:  \"Patient has previously reiterated that he will not undergo HD for his ESRD unless he gets his specific environment situated (preferred Rns only, demanding HD only in recliner and refusing HD in a bed, only wants HD in his room and away from others). Has continued to decline HD, seems to understand that cardiac arrest is a risk, though continues to express that he \"will not die\" due to this anytime soon. However, continues to reiterate that he would like full CPR and resuscitative efforts at this time, including intubation. Unclear if he would want HD if in this situation. Given known cognitive impairment as above as well as mood disorders, question if he is able to be decisional and/or what his needs would be if he were to become unable to express his needs. Palliative has been following, however patient continuing to decline discussion regarding end of life cares\"     Chronic anemia, normocytic   Baseline Hgb 9-10. Likely related to " "ESRD/chronic disease. Iron stores adequate. CBC on admission pending  - Continue DEAMR weekly; resume with outpatient dialysis. Hgb stable this AM 9.4  -Transfuse for HgB <7 if indicated  - Daily CBC (patient has frequently refused labs on prior admissions)     Chronic pain syndrome, stable    Neuropathy, reported to be diabetic  - Lyrica 25 mg daily              -Cannot increase dose due to ESRD              -Pt felt capsaicin was not effective, will discontinue   -Trial icyhot today for BL LE   -Pt reports history of being on allopurinol and gabapentin for gout (diagnosed ~10 years ago by pt's estimation), however states does not want to resume medications at this time. Likely gabapentin not good option at this point due to ESRD     Hypertension, uncontrolled  Continues to be hypertensive during hospitalization likely in setting of missed dialysis session.   - Continue PTA amlodipine 10 every day and metoprolol 25 BID     Disposition Planning  Discussed with CM, pt currently homeless and \"living on the streets\", pt reports has  through [employer] working on permanent housing but has not been completed yet, pt is interested in group home or LTC in the future but for immediate disposition planning likely TCU or medical respite care. PT/OT consulted to evaluate patient.  -Pt was evaluated by PT/OT 8/10, both services recommending TCU placement upon discharge due to cognitive impairment and difficulty with ambulation  2/2 deconditioning and neuropathy  -D/W LISA, currently looking for TCU placement options, hoping to hear back 8/12    L hand/thumb pain  Pt reports he has had pain in his L thumb which has limited his  strength and functional status. On exam, does not appear markedly swollen, he does note pain most significant in 1st dorsal compartment of wrist. He is tender to palpation over distal radial styloid, but will not let examiner do much in terms of palpation or ROM. I suspect most likely de " "quervain tenosynovitis in setting of overuse injury vs. Intersection syndrome. Could consider further evaluation with xray but without traumatic history I am less suspicious of fracture.  -Consider thumb spica cast or brace at discharge  -will require OP follow up, treatment options include corticosteroid injection or OP hand therapy w/OT   -Excedrin scheduled BID per pt request over tylenol  -Capsaican cream can also be applied to his thumb  -Limited medication options due to renal function        Diet: Snacks/Supplements Adult: Other; Vilas at HS; Between Meals  Regular Diet Adult    DVT Prophylaxis: Pneumatic Compression Devices  Raphael Catheter: Not present  Fluids: PO  Lines: None     Cardiac Monitoring: None  Code Status: Full Code      Clinically Significant Risk Factors        # Hyperkalemia: Highest K = 5.4 mmol/L in last 2 days, will monitor as appropriate                     # Overweight: Estimated body mass index is 28.71 kg/m  as calculated from the following:    Height as of 7/22/24: 1.981 m (6' 6\").    Weight as of this encounter: 112.7 kg (248 lb 7.3 oz)., PRESENT ON ADMISSION     # Financial/Environmental Concerns: none  # Housing Instability: noted in nursing assessment               Disposition Plan      Expected Discharge Date: 08/13/2024      Destination: other (comment) (unknown at this time)  Discharge Comments: 8/9 Barriers:  - Discharge Plan d/t patient homeless  - Difficult placement if TCU is needed        The patient's care was discussed with the Attending Physician, Dr. Segovia .    Abdifatah King MD  Hospitalist Service  [facility]  Securely message with [employer] (more info)  Text page via [employer] Paging/Directory   ______________________________________________________________________    Interval History   NAEO, patient was able to get food ordered on regular diet and K binders. Plan for icyhot today to help relieve neuropathy given limited PO medication " options with ESRD. Plan to resume regular HD schedule tomorrow MWF and hoping to hear back from TCU's regarding placement.     Physical Exam   Vital Signs: Temp: 98.2  F (36.8  C) Temp src: Oral BP: (!) 150/93 Pulse: 79   Resp: 22 SpO2: 99 % O2 Device: None (Room air)    Weight: 248 lbs 7.33 oz    Constitutional:       General: He is not in acute distress. Rocking back and forth in his chair, pleasant during out conversation again today     Appearance: Normal appearance. He is not ill-appearing or diaphoretic.   HENT:      Mouth/Throat:      Mouth: Mucous membranes are moist.      Pharynx: Oropharynx is clear.   Eyes:      Extraocular Movements: Extraocular movements intact.      Conjunctiva/sclera: Conjunctivae normal.      Pupils: Pupils are equal, round, and reactive to light.   Cardiovascular:      Rate and Rhythm: Normal rate and regular rhythm.      Pulses: Normal pulses.      Heart sounds: Normal heart sounds.   Pulmonary:      Effort: Pulmonary effort is normal.      Breath sounds: Normal breath sounds.   Abdominal:      General: Bowel sounds are normal.      Palpations: Abdomen is soft.   Musculoskeletal:      Right lower leg: No edema.      Left lower leg: No edema.   Lower extremities very sensitive to touch on exam, pt reports pain with light touch, did not examine further  Skin:     Capillary Refill: Capillary refill takes less than 2 seconds.   Neurological:      General: No focal deficit present.      Mental Status: He is alert and oriented to person, place, and time. Mental status is at baseline.   Psychiatric:         Mood and Affect: Mood normal.         Behavior: Behavior normal.         Thought Content: Thought content normal.         Judgment: Judgment normal.             Data     I have personally reviewed the following data over the past 24 hrs:    N/A  \   N/A   / N/A     N/A N/A N/A /  N/A   N/A N/A N/A \       Imaging results reviewed over the past 24 hrs:   No results found for this or any  previous visit (from the past 24 hour(s)).    Abdifatah King MD  PGY-2  [facility]  [facility]   August 11, 2024

## 2024-08-11 NOTE — PLAN OF CARE
Problem: Adult Inpatient Plan of Care  Goal: Absence of Hospital-Acquired Illness or Injury  Intervention: Identify and Manage Fall Risk  Recent Flowsheet Documentation  Taken 8/11/2024 1330 by Abdifatah Farooq RN  Safety Promotion/Fall Prevention:   assistive device/personal items within reach   clutter free environment maintained   increased rounding and observation   increase visualization of patient   lighting adjusted   mobility aid in reach   patient and family education   room door open   room near nurse's station   room organization consistent   safety round/check completed     Problem: Chronic Kidney Disease  Goal: Optimal Coping with Chronic Illness  Outcome: Progressing  Intervention: Support Psychosocial Response  Recent Flowsheet Documentation  Taken 8/11/2024 1330 by Abdifatah Farooq RN  Supportive Measures:   active listening utilized   decision-making supported   goal-setting facilitated   positive reinforcement provided   relaxation techniques promoted   self-care encouraged   self-responsibility promoted   verbalization of feelings encouraged   Goal Outcome Evaluation:       VSS on RA. A&Ox4. Intermittently cooperative, still refusing numerous safety measures & IV access. Generalized pain managed w/ PRN excedrin, icy hot ointment, rest & repo. Had a BM today. Up independently- slightly unsteady gait but refusing assistance & safety alarms. Lab draws resched for tomorrow (at HD). IR consult for fistulagram, will be called tomorrow.

## 2024-08-11 NOTE — PLAN OF CARE
Problem: Adult Inpatient Plan of Care  Goal: Absence of Hospital-Acquired Illness or Injury  Intervention: Identify and Manage Fall Risk  Recent Flowsheet Documentation  Taken 8/11/2024 0111 by Lonnie Winn RN  Safety Promotion/Fall Prevention:   assistive device/personal items within reach   clutter free environment maintained   increased rounding and observation   increase visualization of patient   mobility aid in reach   room near nurse's station   room organization consistent   safety round/check completed   patient and family education  Intervention: Prevent Skin Injury  Recent Flowsheet Documentation  Taken 8/11/2024 0111 by Lonnie Winn RN  Body Position: position changed independently  Intervention: Prevent and Manage VTE (Venous Thromboembolism) Risk  Recent Flowsheet Documentation  Taken 8/11/2024 0111 by Lonnie Winn RN  VTE Prevention/Management:   SCDs off (sequential compression devices)   patient refused intervention  Intervention: Prevent Infection  Recent Flowsheet Documentation  Taken 8/11/2024 0111 by Lonnie Winn RN  Infection Prevention: hand hygiene promoted  Goal: Optimal Comfort and Wellbeing  Outcome: Progressing   Goal Outcome Evaluation:    Patient alert and oriented. Refused vital signs check. No complain  of pain. Pt slept in the chair throughout the shift.

## 2024-08-11 NOTE — PROGRESS NOTES
Safety Refusal    Patient refusing basic safety measures. Will not allow vitals, assessment, non-slip socks, bed/chair alarm, IV access, adequate lighting. Continuing to educate patient on the necessity of these measures, will re-approach intermittently.

## 2024-08-12 LAB
ANION GAP SERPL CALCULATED.3IONS-SCNC: 16 MMOL/L (ref 7–15)
CHLORIDE SERPL-SCNC: 98 MMOL/L (ref 98–107)
ERYTHROCYTE [DISTWIDTH] IN BLOOD BY AUTOMATED COUNT: 15.6 % (ref 10–15)
HCO3 SERPL-SCNC: 23 MMOL/L (ref 22–29)
HCT VFR BLD AUTO: 25.7 % (ref 40–53)
HGB BLD-MCNC: 8 G/DL (ref 13.3–17.7)
MCH RBC QN AUTO: 27.6 PG (ref 26.5–33)
MCHC RBC AUTO-ENTMCNC: 31.1 G/DL (ref 31.5–36.5)
MCV RBC AUTO: 89 FL (ref 78–100)
PHOSPHATE SERPL-MCNC: 9.4 MG/DL (ref 2.5–4.5)
PLATELET # BLD AUTO: 244 10E3/UL (ref 150–450)
POTASSIUM SERPL-SCNC: 4.4 MMOL/L (ref 3.4–5.3)
POTASSIUM SERPL-SCNC: 6 MMOL/L (ref 3.4–5.3)
RBC # BLD AUTO: 2.9 10E6/UL (ref 4.4–5.9)
SODIUM SERPL-SCNC: 137 MMOL/L (ref 135–145)
WBC # BLD AUTO: 7.9 10E3/UL (ref 4–11)

## 2024-08-12 PROCEDURE — 80051 ELECTROLYTE PANEL: CPT | Performed by: INTERNAL MEDICINE

## 2024-08-12 PROCEDURE — 120N000001 HC R&B MED SURG/OB

## 2024-08-12 PROCEDURE — 99232 SBSQ HOSP IP/OBS MODERATE 35: CPT | Mod: GC

## 2024-08-12 PROCEDURE — 85027 COMPLETE CBC AUTOMATED: CPT | Performed by: INTERNAL MEDICINE

## 2024-08-12 PROCEDURE — 84132 ASSAY OF SERUM POTASSIUM: CPT | Performed by: NURSE PRACTITIONER

## 2024-08-12 PROCEDURE — 90935 HEMODIALYSIS ONE EVALUATION: CPT

## 2024-08-12 PROCEDURE — 36415 COLL VENOUS BLD VENIPUNCTURE: CPT | Performed by: NURSE PRACTITIONER

## 2024-08-12 PROCEDURE — 99232 SBSQ HOSP IP/OBS MODERATE 35: CPT | Performed by: NURSE PRACTITIONER

## 2024-08-12 PROCEDURE — 36415 COLL VENOUS BLD VENIPUNCTURE: CPT | Performed by: INTERNAL MEDICINE

## 2024-08-12 PROCEDURE — 250N000013 HC RX MED GY IP 250 OP 250 PS 637

## 2024-08-12 PROCEDURE — 84100 ASSAY OF PHOSPHORUS: CPT | Performed by: INTERNAL MEDICINE

## 2024-08-12 RX ADMIN — CALCITRIOL CAPSULES 0.25 MCG 0.25 MCG: 0.25 CAPSULE ORAL at 09:40

## 2024-08-12 RX ADMIN — METOPROLOL SUCCINATE 25 MG: 25 TABLET, EXTENDED RELEASE ORAL at 19:45

## 2024-08-12 RX ADMIN — Medication 1 TABLET: at 09:40

## 2024-08-12 RX ADMIN — ACETAMINOPHEN, ASPIRIN, CAFFEINE 2 TABLET: 250; 65; 250 TABLET, FILM COATED ORAL at 19:46

## 2024-08-12 RX ADMIN — METOPROLOL SUCCINATE 25 MG: 25 TABLET, EXTENDED RELEASE ORAL at 09:40

## 2024-08-12 RX ADMIN — PREGABALIN 25 MG: 25 CAPSULE ORAL at 19:45

## 2024-08-12 RX ADMIN — RISPERIDONE 1 MG: 1 TABLET, FILM COATED ORAL at 22:44

## 2024-08-12 RX ADMIN — ACETAMINOPHEN, ASPIRIN, CAFFEINE 2 TABLET: 250; 65; 250 TABLET, FILM COATED ORAL at 09:39

## 2024-08-12 RX ADMIN — AMLODIPINE BESYLATE 10 MG: 5 TABLET ORAL at 09:39

## 2024-08-12 RX ADMIN — RISPERIDONE 0.5 MG: 0.5 TABLET, FILM COATED ORAL at 09:40

## 2024-08-12 ASSESSMENT — ACTIVITIES OF DAILY LIVING (ADL)
ADLS_ACUITY_SCORE: 30

## 2024-08-12 NOTE — PLAN OF CARE
Problem: Fall Injury Risk  Goal: Absence of Fall and Fall-Related Injury  Intervention: Identify and Manage Contributors  Recent Flowsheet Documentation  Taken 8/12/2024 0600 by Linden Magallanes RN  Medication Review/Management: medications reviewed  Taken 8/12/2024 0400 by Linden Magallanes RN  Medication Review/Management: medications reviewed  Intervention: Promote Injury-Free Environment  Recent Flowsheet Documentation  Taken 8/12/2024 0600 by Linden Magallanes, RN  Safety Promotion/Fall Prevention:   assistive device/personal items within reach   clutter free environment maintained   increase visualization of patient   mobility aid in reach   patient and family education   room door open   room near nurse's station   room organization consistent   safety round/check completed  Taken 8/12/2024 0400 by Linden Magallanes RN  Safety Promotion/Fall Prevention:   assistive device/personal items within reach   clutter free environment maintained   increase visualization of patient   mobility aid in reach   patient and family education   room door open   room near nurses station   room organization consistent   safety round/check completed   Goal Outcome Evaluation:       A&O x 4. Refuses to ambulate with standby assist. Admitted for hyperkalemia. Patient refuses IV access. No acute changes over night. Patient adamantly refused assessment and wished to sleep uninterrupted. Periodic check-ins were attempted. Bed in low position, patient sleeping in chair. Patient removed non-slip footwear and refuses bed and chair alarms.  Visit Vitals  BP (!) 167/99 (BP Location: Right arm, Patient Position: Chair, Cuff Size: Adult Regular)   Pulse 91   Temp 98  F (36.7  C) (Oral)   Resp 20

## 2024-08-12 NOTE — PROGRESS NOTES
"Care Management Follow Up    Length of Stay (days): 3    Expected Discharge Date: 08/13/2024    Anticipated Discharge Plan:  Dialysis Services, Other (Comments) (unknown at this time)    Transportation: Anticipate medical transport    PT Recommendations: Transitional Care Facility  OT Recommendations:  Transitional Care Facility     Barriers to Discharge: placement    Prior Living Situation: homeless, other (see comments) (\"I live on the streets or sometimes I can stay at my brother's house. Today when I came to the ER I was on the streets and called 911\".) with other (see comments) (\"I live on the streets or sometimes I can stay at my brother's house\".)    Advanced Directive on File: no concerns identified (\"I don't have a written advanced directive\".)     Patient/Spokesperson Updated: No    Additional Information:  PAS completed: [id number]. OBRA Level II assessment triggered. CM following care progression to assist with safe discharge planning.    SW received call from [facility] and [facility]. They have accepted Pt for TCU and are waiting to receive hard copy of OBRA Level II assessment from [facility]. Pt had an assessment completed in July for DD that is good for 30 days. [facility] and [facility] can take Pt after they receive ORBA Level II assessment either today or tomorrow after Pt has dialysis. CM following care progression to assist with safe discharge planning.    1:58 PM  ANDREW spoke with [facility] worker regarding OBRA Level II assessment for MI. Pt will need assessment for MI due to Pt's suicidal ideation before Pt can discharge. [place] worker indicated assessment will likely be done within 24 hours. ANDREW faxed H&P and psych notes from previous hospital visits regarding Pt's SI to [facility] ([phone]).    Jaja Lewis, BSW     "

## 2024-08-12 NOTE — CONSULTS
Interventional Radiology - Consultation Note:  [facility]  08/12/2024     Reason for Consult:  LUE fistulogram   Requesting Provider:  Dr. Gonzales    HPI:  Macario Delatorre is a 44 year old homeless male with ESRD on HD via LUE fistula; admitted with hyperkalemia. Nephrology now reports high K and prolonged bleeding, favor doing this on Tuesday, he does better when things are scheduled ahead of time and a prolonged NPO status on Monday will not go well.     Surgeon: Dr. Chao  Dialysis: MWF    IMAGING:    None prior     NPO Status:  ordered for 0130 Tuesday (procedure time ~9:30am Tuesday); ordered  Anticoagulation/Antiplatelets/Bleeding tendencies:  aspirin  Antibiotics:  none for IR    REVIEW OF SYSTEMS:  A comprehensive 10-point review of systems was performed. All systems were reviewed and negative with exception to those reported in the HPI.     PAST MEDICAL HISTORY:  Past Medical History:   Diagnosis Date    Chlamydia     history of    Flatfoot     feet pes plannus    Mental retardation     mild    Migraine headache     Pain disorder 11/6/11    upset about no pain meds  incedent report filerd by RN    Tobacco abuse     Undescended testicle        PAST SURGICAL HISTORY:  LUE fistula creation    FAMILY HISTORY:  Family History   Problem Relation Age of Onset    Diabetes No family hx of     Cancer No family hx of     Heart Disease No family hx of     Diabetes Mother     Heart Failure Father     Diabetes Type 2  Father     Kidney Disease Father         SOCIAL HISTORY:  Social History     Socioeconomic History    Marital status: Single   Tobacco Use    Smoking status: Some Days     Current packs/day: 0.10     Types: Cigarettes    Smokeless tobacco: Never    Tobacco comments:     smokes about 1 cigarette a day   Substance and Sexual Activity    Alcohol use: No     Comment: holidays and birthday - wine    Drug use: No     Comment: Drug use: Chart review shows + THC, pt denies     Sexual activity: Yes   Social History Narrative    5/21/2016 - Works at an [employer].     Social Determinants of Health     Financial Resource Strain: Low Risk  (4/23/2024)    [facility]    Financial Resource Strain     Difficulty of Paying Living Expenses: 3   Food Insecurity: No Food Insecurity (4/23/2024)    [facility] [address] [facility]    Food Insecurity     Worried About Running Out of Food in the Last Year: 1   Transportation Needs: Unmet Transportation Needs (5/6/2024)    Received from [facility] [address]    Transportation Needs     Lack of Transportation (Medical): 2   Social Connections: Socially Integrated (10/19/2023)    [facility], [facility] [address]    Social Connections     Frequency of Communication with Friends and Family: 0   Housing Stability: Low Risk  (4/23/2024)    [facility] [address] [facility]    Housing Stability     Unable to Pay for Housing in the Last Year: 1   Recent Concern: Housing Stability - Medium Risk (3/27/2024)    [facility], [facility] [address]    Housing Stability     Unable to Pay for Housing in the Last Year: 2        ALLERGIES:  Allergies   Allergen Reactions    Bee Venom      Bee stings swelling and short of breath    Pcn [Penicillins]      Noted in 8/20/08 ER,hives,headaches,throat irritation    Trazodone      Abdominal pain    Ibuprofen GI Disturbance     Noted in 8/20/08 ER  Headaches and abdominal pain    Tylenol [Acetaminophen] GI Disturbance     Noted in 8/20/08 ER        MEDICATIONS:  Current Facility-Administered Medications   Medication Dose Route Frequency Provider Last Rate Last Admin    [Held by provider] acetaminophen (TYLENOL) tablet 975 mg  975 mg Oral TID Abdifatah King MD         amLODIPine (NORVASC) tablet 10 mg  10 mg Oral Daily Abdifatah King MD   10 mg at 08/11/24 1346    aspirin-acetaminophen-caffeine (EXCEDRIN MIGRAINE) per tablet 2 tablet  2 tablet Oral BID PRN Abdifatah King MD   2 tablet at 08/11/24 2150    calcitRIOL (ROCALTROL) capsule 0.25 mcg  0.25 mcg Oral Daily Abdifatah King MD   0.25 mcg at 08/11/24 1346    calcium acetate (PHOSLO) capsule 1,334 mg  1,334 mg Oral TID w/meals Abdifatah King MD        calcium carbonate (TUMS) chewable tablet 1,000 mg  1,000 mg Oral 4x Daily PRN Abdifatah King MD        glucose gel 15-30 g  15-30 g Oral Q15 Min PRN Abdifatah King MD        Or    dextrose 50 % injection 25-50 mL  25-50 mL Intravenous Q15 Min PRN Abdifatah King MD        Or    glucagon injection 1 mg  1 mg Subcutaneous Q15 Min PRN Abdifatah King MD        Lidocaine (LIDOCARE) 4 % Patch 2 patch  2 patch Transdermal Q24h Abdifatah King MD   2 patch at 08/08/24 2027    lidocaine (LMX4) cream   Topical Q1H PRN Abdifatah King MD        lidocaine 1 % 0.1-1 mL  0.1-1 mL Other Q1H PRN Abdifatah King MD        lidocaine 1 % 0.5 mL  0.5 mL Intradermal Once PRN Abdifatah King MD        lidocaine 1 % 0.5 mL  0.5 mL Intradermal Once PRN Abdifatah King MD        melatonin tablet 5 mg  5 mg Oral At Bedtime PRN Abdifatah King MD        methyl salicylate-menthol (ICY HOT) ointment   Topical Q6H PRN Abdifatah King MD   Given at 08/11/24 1347    metoprolol succinate ER (TOPROL XL) 24 hr tablet 25 mg  25 mg Oral BID Abdifatah King MD   25 mg at 08/11/24 2150    multivitamin RENAL (RENAVITE RX/NEPHROVITE) tablet 1 tablet  1 tablet Oral Daily Abdifatah King MD   1 tablet at 08/11/24 1346    naloxone (NARCAN) injection 0.2 mg  0.2 mg Intravenous Q2 Min PRN Lilliana Carmona MD        Or    naloxone (NARCAN) injection 0.4 mg  0.4 mg Intravenous Q2 Min PRN Lilliana Carmona MD        Or    naloxone (NARCAN) injection 0.2 mg  0.2 mg Intramuscular Q2 Min PRN Lilliana Carmona MD        Or     naloxone (NARCAN) injection 0.4 mg  0.4 mg Intramuscular Q2 Min PRN Lilliana Carmona MD        No heparin via hemodialysis machine   Does not apply Once Abdifatah King MD        ondansetron (ZOFRAN ODT) ODT tab 4 mg  4 mg Oral Q6H PRN Abdifatah King MD        Or    ondansetron (ZOFRAN) injection 4 mg  4 mg Intravenous Q6H PRN Abdifatah King MD        pregabalin (LYRICA) capsule 25 mg  25 mg Oral QPM Abdifatah King MD   25 mg at 08/11/24 2150    prochlorperazine (COMPAZINE) injection 10 mg  10 mg Intravenous Q6H PRN Abdifatah King MD        Or    prochlorperazine (COMPAZINE) tablet 10 mg  10 mg Oral Q6H PRN Abdifatah King MD        Or    prochlorperazine (COMPAZINE) suppository 25 mg  25 mg Rectal Q12H PRN Abdifatah King MD        risperiDONE (risperDAL) tablet 0.5 mg  0.5 mg Oral QAM Abdifatah King MD   0.5 mg at 08/11/24 1346    risperiDONE (risperDAL) tablet 1 mg  1 mg Oral At Bedtime Abdifatah King MD   1 mg at 08/11/24 2150    senna-docusate (SENOKOT-S/PERICOLACE) 8.6-50 MG per tablet 1 tablet  1 tablet Oral BID PRN Abdifatah King MD        Or    senna-docusate (SENOKOT-S/PERICOLACE) 8.6-50 MG per tablet 2 tablet  2 tablet Oral BID PRN Abdifatah King MD        sodium chloride (PF) 0.9% PF flush 3 mL  3 mL Intracatheter Q8H Abdifatah King MD        sodium chloride (PF) 0.9% PF flush 3 mL  3 mL Intracatheter q1 min prn Abdifatah King MD        sodium chloride 0.9% BOLUS 100-150 mL  100-150 mL Intravenous Q15 Min PRN Abdifatah King MD        sodium zirconium cyclosilicate (LOKELMA) packet 10 g  10 g Oral Once per day on Sunday Tuesday Thursday Saturday Francisco Gonzales MD   10 g at 08/11/24 1746    Stop Heparin 60 minutes before end of treatment   Does not apply Continuous PRN Abdifatah King MD            LABS:  INR   Date Value Ref Range Status   03/27/2014 1.0  Final      Hemoglobin   Date Value Ref Range Status   08/12/2024 8.0 (L) 13.3 - 17.7 g/dL Final   03/27/2014 13.8 13.3 - 17.7 g/dL Final      Platelet Count   Date Value Ref Range Status   08/12/2024 244 150 - 450 10e3/uL Final     Creatinine   Date Value Ref Range Status   08/10/2024 11.76 (H) 0.67 - 1.17 mg/dL Final   10/17/2014 1.6 (H) 0.8 - 1.5 mg/dL Final     Potassium   Date Value Ref Range Status   08/12/2024 6.0 (H) 3.4 - 5.3 mmol/L Final   10/17/2014 4.5 3.4 - 5.3 mmol/L Final         EXAM:  BP (!) 167/99 (BP Location: Right arm, Patient Position: Chair, Cuff Size: Adult Regular)   Pulse 91   Temp 98  F (36.7  C) (Oral)   Resp 20   Wt 112.7 kg (248 lb 7.3 oz)   SpO2 100%   BMI 28.71 kg/m    General: Stable. In no acute distress.    Neurologic: Alert and oriented x 3. No focal deficits.  Psychiatric: Appropriate mood and affect. Cooperative. Answering questions appropriately.   Respiratory: Normal respirations on room air. Non-labored.   Cardiac: Well perfused.   Vascular: LUE fistula.         ASSESSMENT/PLAN:    Case discussed with IR Charge RN, IR MD Dr. Mccauley and message sent to ordering provider Dr. Gonzales. Patient in need of more time to contemplate undergoing IR procedure.     Tuesday: NPO at 0130am for estimated procedure time of 0930am on Tuesday.     Left upper extremity fistulogram with sedation and possible angioplasty, stent, lysis, and/or dialysis catheter placement - needs consent, but reviewed fully with patient.       Recommendations and procedural education reviewed with patient/family in detail including, but not limited to risks, benefits and alternatives with understanding verbalized by patient/family.    Thank you for kindly for this consultation.     Total time spent on the date of the encounter: 55 minutes.      MILAN Sauer CNP  Interventional Radiology  [phone]

## 2024-08-12 NOTE — PROGRESS NOTES
Safety Refusal    Patient refusing basic safety measures. Will not allow non-slip socks, bed/chair alarm, IV access, adequate lighting, non-cluttered environment. Continuing to educate patient on the necessity of these measures, will re-approach intermittently.

## 2024-08-12 NOTE — PROCEDURES
Potassium   Date Value Ref Range Status   08/12/2024 6.0 (H) 3.4 - 5.3 mmol/L Final   10/17/2014 4.5 3.4 - 5.3 mmol/L Final     Hemoglobin   Date Value Ref Range Status   08/12/2024 8.0 (L) 13.3 - 17.7 g/dL Final   03/27/2014 13.8 13.3 - 17.7 g/dL Final     Creatinine   Date Value Ref Range Status   08/10/2024 11.76 (H) 0.67 - 1.17 mg/dL Final   10/17/2014 1.6 (H) 0.8 - 1.5 mg/dL Final     Urea Nitrogen   Date Value Ref Range Status   08/10/2024 38.7 (H) 6.0 - 20.0 mg/dL Final   10/17/2014 20.0 5.0 - 24.0 mg/dL Final     Sodium   Date Value Ref Range Status   08/12/2024 137 135 - 145 mmol/L Final   10/17/2014 147.0 (H) 133.0 - 144.0 mmol/L Final     INR   Date Value Ref Range Status   03/27/2014 1.0  Final       DIALYSIS PROCEDURE NOTE  Hepatitis status of previous patient on machine log was checked and verified ok to use with this patients hepatitis status.  Patient dialyzed for 2 hrs. on a K2 bath with a net fluid removal of  2.5L.  A BFR of 400 ml/min was obtained via a LUE AVF using 15 gauge needles.        Needle cannulation sites held x 5 min.       Meds  given: none   Complications: Pt called early for dialysis, was told by bedside RN patient refusing to come down for treatment. Writer offered a float RN to come run bedside, pt refused, asking to be run later. Writer called multiple times throughout the morning offering different times to run between 12 and 1300. Pt refused to come down until after 1400. Writer advised if patient keeps pushing tx off, overnight RN will come run patient, she does cover two hospitals so there is no time guarantee.   Writer was advised pt agreed to dialysis, upon arrival of transport, pt refused treatment again. Nephrologist and dialysis HSA advised, Nephrologist recommended shorter 2 hour treatment for today. Patient brought down for treatment after 1400 for 2 hour treatment, patient verbalized he is good with this plan.     Person educated: patient. Knowledge base substantial.  Barriers to learning: pt refusal/. Educated on procedure via oral mode.      ICEBOAT? Timeout performed pre-treatment  I: Patient was identified using 2 identifiers  C:  Consent Signed Yes  E: Equipment preventative maintenance is current and dialysis delivery system OK to use  B: Hepatitis B Surface Antigen: Negative; Draw Date: 7/23/24      Hepatitis B Surface Antibody: Immune; Draw Date: 7/23/24  O: Dialysis orders present and complete prior to treatment  A: Vascular access verified and assessed prior to treatment  T: Treatment was performed at a clinically appropriate time  ?: Patient was allowed to ask questions and address concerns prior to treatment  See Adult Hemodialysis flowsheet in [facility] for further details and post assessment.  Machine water alarm in place and functioning. Transducer pods intact and checked every 15min.   Pt returned via bed.  Chlorine/Chloramine water system checked every 4 hours.      Patient repositioned every 2 hours during the treatment.  Post treatment report given to MICHA Elias RN regarding 2.5L of fluid removed, last BP of 128/74, and patient pain rating of 10/10.

## 2024-08-12 NOTE — PROGRESS NOTES
RENAL PROGRESS NOTE    PLAN:  MWF dialysis, initially refused today, now willing to go, willing to run for 3hr today, not wanting to run tomorrow off schedule   Tolerated the Lokelma well, he's open to taking 10g on his non-dialysis days  Get post tx K today to ck clearance   He's open to fistulogram (currently scheduled for 9:30 8/13, advised NPO and explained the rationale for sedation purposes (will need PIV placed  ? In IR, d/t difficult stick).         ASSESSMENT:  ESRD:  IHD MWF at the [facility]  RX:  , Typically high volume UF 3-5kg per pt report.  (he frequently cuts tx short) /, K 2   ACCESS: L AVF  H/o poor compliance, short tx etc   Recurrent issues with HyperK, clearance looks reasonable but has had recurrent bleeding and high pressures in his AVF.  He is more open to fistulogram now, will see if we can schedule on Tuesday     HyperKalemia:  As above, urgent dialysis 8/8 and regular dialysis on 8/9, high again today 6.0  Lokelma 10g on non dialysis days, may increase to daily if persistent and access functional   some concerns about his ability/willlingness  to take this outside of the hospital     HTN/volume:  Mostly controlled when he allows it to be taken and compliant with meds   Typically quite volume overloaded , high volume UF , educated on FR   PTA Amlod, Metop, continue     CKD/MBD:  On calcitriol, Phoslo as binder, continue     Anemia:  ACD, on DEMAR and venofer as needed outpt,  Last hgb in the 8's     Bi-polar:  On Risperdal, h/o oppositional behavior tendencies, poor medical compliance , some paranoid behaviour noted in the ED during my intake   Reported cognitive impairment, with recurrent conflicts with prior staff, transport team, working on psych TCU placement and housing per       Homelessness:  Pt reports that he doesn't not have stable living  situation, and gets to/from dialysis via scooter      Chest pain:  Recurrent, prior ACS w/up reportedly  unremarkable.    SUBJECTIVE:  pt refused HD this a.m. for unclear reasons, explained the rationale for tx today, lamont with K 6.0.  Also reviewed rationale for fistulogram to r/o access recirc 9though clearance seemed ok outpt.  Sounds as if he has had some prolonged bleeding??      OBJECTIVE:  Physical Exam   Temp: 98.4  F (36.9  C) Temp src: Oral BP: (!) 153/88 Pulse: 79   Resp: 20 SpO2: 99 % O2 Device: None (Room air)    Vitals:    24 1950 24   Weight: 104.3 kg (229 lb 15 oz) 112.7 kg (248 lb 7.3 oz)     Vital Signs with Ranges  Temp:  [98.4  F (36.9  C)] 98.4  F (36.9  C)  Pulse:  [79-91] 79  Resp:  [20] 20  BP: (147-167)/() 153/88  SpO2:  [99 %-100 %] 99 %  I/O last 3 completed shifts:  In: -   Out: 200 [Urine:200]    Temp (24hrs), Av.4  F (36.9  C), Min:98.4  F (36.9  C), Max:98.4  F (36.9  C)      Patient Vitals for the past 72 hrs:   Weight   24 112.7 kg (248 lb 7.3 oz)     Intake/Output Summary (Last 24 hours) at 2024 1408  Last data filed at 2024 2150  Gross per 24 hour   Intake --   Output 200 ml   Net -200 ml       PHYSICAL EXAM:  General - Alert and oriented x3, appears comfortable, NAD, pleasant with me   Cardiovascular - BP escalated, rate controlled   Respiratory -on RA   Abd: obese   Extremities -trace  lower extremity edema bilaterally  Skin: dry, intact, no rash, good turgor  Neuro:  Grossly intact, no focal deficits  MSK:  Grossly intact  Psych:  Normal affect, has been oppositional with staff earlier today  Reviewed labs with pt and  the need ffor HD and plans for fistulogram.    LABORATORY STUDIES:     Recent Labs   Lab 24  0705 08/10/24  1138 24  1813 24  0910 24  1902   WBC 7.9 5.8 5.4 6.3 6.5   RBC 2.90* 3.46* 3.24* 3.14* 3.46*   HGB 8.0* 9.4* 9.0* 8.6* 9.5*   HCT 25.7* 30.2* 28.4* 27.0* 29.1*    274 209 229 256       Basic Metabolic Panel:  Recent Labs   Lab 24  0705 08/10/24  1155 24  1817  08/09/24  1004 08/09/24  0910 08/08/24  1349    141 138  --  138 139   POTASSIUM 6.0* 5.4* 4.5  --  5.2 6.7*   CHLORIDE 98 98 97*  --  96* 99   CO2 23 28 27  --  26 23   BUN  --  38.7* 26.6*  --  49.5* 72.1*   CR  --  11.76* 8.84*  --  14.04* 17.22*   GLC  --  125* 124* 88 104* 92   LOVELY  --  9.6 8.9  --  8.8 8.6*       INRNo lab results found in last 7 days.     Recent Labs   Lab Test 08/12/24  0705 08/10/24  1138   WBC 7.9 5.8   HGB 8.0* 9.4*    274       Personally reviewed current labs      Ruby Cordoba, NICOLAS-BC  [facility]  [phone]

## 2024-08-12 NOTE — PROGRESS NOTES
[facility]    Progress Note - Hospitalist Service       Date of Admission:  8/8/2024    Assessment & Plan   Macario Delatorre is a 44 year old male admitted on 8/8/2024. He has a history of ESRD on HD MWF w/associated nephrology, cognitive disability, mental health complex hx (PTSD, bipolar? Agitation) HTN, gout, HLD and is admitted for recurrent chest pains, upper back pain found to have hyperkalemia (K 6.7), slightly elevated troponin at 72 w/no EKG changes, delta trop pending. Very complex social history including active homelessness and poor medication adherence in the past, has doctored inconsistently      #ESRD on HD MWF  #Hyperkalemia, severe  Patient has had recent previous similar admissions, notably 7/22-8/2/24 for hyperkalemia and dialysis in which patient left AMA. Also was admitted 7/10-7/18 for syncopal episode and chest pains, hyperkalemia and ESRD with HD. Per patient had dialysis run 8/7/24 but was stopped early due to him c/o extremities being cold. Have had issues in the past with continuing dialysis, however patient is understanding this admission of the importance of receiving his regularly scheduled treatments. K 6.7 this admission, s/p insulin and dextrose in ED, recheck pending. Cr. 17.2, BUN 72, GFR 3. Unclear etiology of ESRD, suspect 2/2 chronic poorly controlled HTN, last A1C 7/10/24 4.4, is ot on any medications for diabetes currently and BG wnl. Appears has not had prior workup.  -Nephrology consulted, recs appreciated  -Emergent dialysis initiated 8/8/24, will resume regular HD schedule MWF 8/9 per nephrology   -K 6.0 8/12 prior to dialysis  -Cont. Dialysis MWF  -Renal diet              -Per nephro, ok to resume regular diet if pt agrees to take K binders              -s/p Lokelma given 8/10  -Consider re-consultation of palliative care gven prior refusal of HD, however patient has been adherent and understanding this admission  -Resume home renal  "multivitamins, calcitriol, calcium acetate     #Chest Pain  #ACS r/out   Patient presented with chest pain, diffuse tenderness midsternal radiating to both L and R side of his chest, reproducible, has had chest pain recurrence on previous admissions. Denies SOB currently, initial EKG on admission not demonstrating ischemic changes, initial troponin 72, delta trop downtrending slightly. CXR demonstrating Lung volumes are low. No focal airspace opacities, pleural effusions, or pneumothorax. Normal pulmonary vascularity. Stable cardiomediastinal silhouette. I do not suspect cardiac etiology at this time.  -Cardiac telemetry, no events detected, discontinued 8/10  -Pt requested excedrin for pain, ok with CKD  -Hold scheduled tylenol due to above     #Back Pain  #Fall, previous encounter  Pt endorsing upper back pain most notably tender over cervical paraspinal muscles, appears that he had workup done 8/6 at [facility] when he \"collapsed\" and hurt his back, denied LOC. Imaging performed at this time included CT lumbar, thoracic, and cervical spine, all demonstrating no evidence for fracture or dislocation, no significant spinal canal or neuroforaminal stenosis. Mild multilevel cervical spondylosis. CT head demonstrating no intracranial abnormality.     -Lidocaine patches PRN  -Excedrin BID PRN is available to patient as this is his preference (pt requested instead of tylenol)  -Would be valuable to have PT's input for upper MSK back pain, recs appreciated     Cognitive impairment  History of mental health problems with hallucinations  Bipolar disorder  Agitation   Suicidal ideation  Patient has a history of refusing cares and can easily get agitated. Patient reports history of bipolar and schizophrenia. Does not endorse routine psychiatric follow up or regular medication use. In [facility] system was started on risperidone 1 mg at bedtime though has not taken consistently. Stated he can escalate quickly if upset. 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 7/30 started expressing SI with a plan to jump out of the window in setting of extreme frustration and refusal to undergo HD without his exact circumstances, patient himself requesting that his psych meds be increased.  - Psychiatric consulted last admission, could consider reconsultation if elevating risk of agitation              - Psych evaluated, patient felt to have capacity                          - per psych NP, patient is not holdable and has capacity to make decisions for himself                          - per , [facility] worker has stated that so long as psychiatry has deemed him decisional, he is not holdable  -Risperidone 0.5 mg AM, 1 mg bedtime     Goals of care  Note from Dr. Cuenca from prior admission:  \"Patient has previously reiterated that he will not undergo HD for his ESRD unless he gets his specific environment situated (preferred Rns only, demanding HD only in recliner and refusing HD in a bed, only wants HD in his room and away from others). Has continued to decline HD, seems to understand that cardiac arrest is a risk, though continues to express that he \"will not die\" due to this anytime soon. However, continues to reiterate that he would like full CPR and resuscitative efforts at this time, including intubation. Unclear if he would want HD if in this situation. Given known cognitive impairment as above as well as mood disorders, question if he is able to be decisional and/or what his needs would be if he were to become unable to express his needs. Palliative has been following, however patient continuing to decline discussion regarding end of life cares\"     Chronic anemia, normocytic   Baseline Hgb 9-10. Likely related to ESRD/chronic disease. Iron stores adequate. CBC on admission pending  - Continue DEMAR weekly; resume with outpatient dialysis. Hgb did decrease to 8.0 8/12/24, however patient does " "appear to be retaining some volume and will have dialysis this afternoon, will recheck CBC AM  -Transfuse for HgB <7 if indicated  - Daily CBC (patient has frequently refused labs on prior admissions)     Chronic pain syndrome, stable    Neuropathy, reported to be diabetic  - Cont. Lyrica 25 mg daily              -Cannot increase dose due to ESRD              -Pt felt capsaicin, icyhot was not effective, will discontinue                         Hypertension, uncontrolled  Continues to be hypertensive during hospitalization likely in setting of missed dialysis session.   - Continue PTA amlodipine 10 every day and metoprolol 25 BID     Disposition Planning  Discussed with CM, pt currently homeless and \"living on the streets\", pt reports has  through [facility] working on permanent housing but has not been completed yet, pt is interested in group home or LTC in the future but for immediate disposition planning likely TCU or medical respite care. PT/OT consulted to evaluate patient.  -Pt was evaluated by PT/OT 8/10, both services recommending TCU placement upon discharge due to cognitive impairment and difficulty with ambulation  2/2 deconditioning and neuropathy  -D/W CM, patient has been accepted to TCU at [facility] in [facility], awaiting confirmation documents from [facility]  -Pt would like to be able to  his scooter from his brother's house, we explained this is not likely going to happen upon discharge     L hand/thumb pain  Pt reports he has had pain in his L thumb which has limited his  strength and functional status. On exam, does not appear markedly swollen, he does note pain most significant in 1st dorsal compartment of wrist. He is tender to palpation over distal radial styloid, but will not let examiner do much in terms of palpation or ROM. I suspect most likely de quervain tenosynovitis in setting of overuse injury vs. Intersection syndrome. Could consider further " "evaluation with xray but without traumatic history I am less suspicious of fracture.  -Consider thumb spica cast or brace at discharge  -will require OP follow up, treatment options include corticosteroid injection or OP hand therapy w/OT   -Excedrin scheduled BID per pt request over tylenol  -Capsaican cream can also be applied to his thumb  -Limited medication options due to renal function        Diet: Snacks/Supplements Adult: Other; [place] at HS; Between Meals  Regular Diet Adult  NPO per Anesthesia Guidelines for Procedure/Surgery Except for: Meds    DVT Prophylaxis: Pneumatic Compression Devices  Raphael Catheter: Not present  Fluids: PO  Lines: None     Cardiac Monitoring: None  Code Status: Full Code      Clinically Significant Risk Factors        # Hyperkalemia: Highest K = 6 mmol/L in last 2 days, will monitor as appropriate                     # Overweight: Estimated body mass index is 28.71 kg/m  as calculated from the following:    Height as of 7/22/24: 1.981 m (6' 6\").    Weight as of this encounter: 112.7 kg (248 lb 7.3 oz)., PRESENT ON ADMISSION     # Financial/Environmental Concerns: none  # Housing Instability: noted in nursing assessment               Disposition Plan     Expected Discharge Date: 08/13/2024    Discharge Delays: Placement - TCU  Destination: other (comment) (unknown at this time)  Discharge Comments: 8/9 Barriers:  - Discharge Plan d/t patient homeless  - Difficult placement if TCU is needed        The patient's care was discussed with the Attending Physician, Dr. Lemus .    Abdifatah King MD  Hospitalist Service  [facility]  Securely message with Mike (more info)  Text page via boosk Paging/Directory   ______________________________________________________________________    Interval History   EDDIEIOANADAISYPhyllis Sorto is doing well this morning, unfortunately our attempts to use topical medicines on his feet have not been effective. He has been accepted to " TCU at [facility] in [facility], pt is agreeable to placement, likely tomorrow pending paperwork from the [facility]. He will likely not be able to get his scooter until after TCU discharge, hoping that as he recovers at TCU the [facility] will have more permanent housing resources for him. Dialysis this afternoon.    Physical Exam   Vital Signs: Temp: 98.4  F (36.9  C) Temp src: Oral BP: (!) 153/88 Pulse: 79   Resp: 20 SpO2: 99 % O2 Device: None (Room air)    Weight: 248 lbs 7.33 oz    Constitutional:       General: He is not in acute distress. Rocking back and forth in his chair, pleasant during out conversation again today     Appearance: Normal appearance. He is not ill-appearing or diaphoretic.   HENT:      Mouth/Throat:      Mouth: Mucous membranes are moist.      Pharynx: Oropharynx is clear.   Eyes:      Extraocular Movements: Extraocular movements intact.      Conjunctiva/sclera: Conjunctivae normal.      Pupils: Pupils are equal, round, and reactive to light.   Cardiovascular:      Rate and Rhythm: Normal rate and regular rhythm.      Pulses: Normal pulses.      Heart sounds: Normal heart sounds.   Pulmonary:      Effort: Pulmonary effort is normal.      Breath sounds: Normal breath sounds.   Abdominal:      General: Bowel sounds are normal.      Palpations: Abdomen is soft.   Musculoskeletal:      Right lower leg: No edema.      Left lower leg: No edema.   Lower extremities very sensitive to touch on exam, pt reports pain with light touch, did not examine further  Skin:     Capillary Refill: Capillary refill takes less than 2 seconds.   Neurological:      General: No focal deficit present.      Mental Status: He is alert and oriented to person, place, and time. Mental status is at baseline.   Psychiatric:         Mood and Affect: Mood normal.         Behavior: Behavior normal.         Thought Content: Thought content normal.         Judgment: Judgment normal.          Data     I have personally  reviewed the following data over the past 24 hrs:    7.9  \   8.0 (L)   / 244     137 98 N/A /  N/A   6.0 (H) 23 N/A \       Imaging results reviewed over the past 24 hrs:   No results found for this or any previous visit (from the past 24 hour(s)).    Abdifatah King MD  PGY-2  [facility]  [facility]  August 12, 2024

## 2024-08-12 NOTE — PLAN OF CARE
Goal Outcome Evaluation:      Plan of Care Reviewed With: patient    Overall Patient Progress: improvingOverall Patient Progress: improving     Patient relatively cooperative again this shift. Allowed vitals to be taken at 1750. Took Lokelma at that time as well. Continues to refuse the calcium acetate and lidocaine patches. Gave PRN Excedrin x1.

## 2024-08-12 NOTE — PLAN OF CARE
"  Problem: Chronic Kidney Disease  Goal: Optimal Coping with Chronic Illness  Outcome: Progressing  Intervention: Support Psychosocial Response  Recent Flowsheet Documentation  Taken 8/12/2024 0930 by Abdifatah Farooq, RN  Supportive Measures:   active listening utilized   decision-making supported   goal-setting facilitated   positive reinforcement provided   relaxation techniques promoted   self-care encouraged   self-responsibility promoted   verbalization of feelings encouraged     Problem: Chronic Kidney Disease  Goal: Electrolyte Balance  Outcome: Progressing     Problem: Pain Acute  Goal: Optimal Pain Control and Function  Intervention: Develop Pain Management Plan  Recent Flowsheet Documentation  Taken 8/12/2024 0930 by Abdifatah Farooq, RN  Pain Management Interventions:   medication (see MAR)   breathing exercises   care clustered   distraction   emotional support   food   pillow support provided   quiet environment facilitated   relaxation techniques promoted   repositioned   rest   Goal Outcome Evaluation:       VSS on RA. A&Ox4. Intermittently cooperative, still refusing numerous safety measures & IV access. Generalized pain managed w/ PRN excedrin, rest, & repo. Up independently- slightly unsteady gait but refusing assistance & safety alarms. Was agreeable to HD today, took off 2.5L. Repeated discussion about fistulagram tomorrow- when asked if he's willing to have it done, he says \"We'll see when it gets here\". NPO at 0130 for procedure, will get IV access before.   "

## 2024-08-13 ENCOUNTER — APPOINTMENT (OUTPATIENT)
Dept: INTERVENTIONAL RADIOLOGY/VASCULAR | Facility: HOSPITAL | Age: 44
DRG: 628 | End: 2024-08-13
Attending: NURSE PRACTITIONER
Payer: MEDICARE

## 2024-08-13 PROCEDURE — 272N000500 HC NEEDLE CR2

## 2024-08-13 PROCEDURE — 99232 SBSQ HOSP IP/OBS MODERATE 35: CPT | Mod: GC

## 2024-08-13 PROCEDURE — 272N000566 HC SHEATH CR3

## 2024-08-13 PROCEDURE — 250N000013 HC RX MED GY IP 250 OP 250 PS 637: Performed by: INTERNAL MEDICINE

## 2024-08-13 PROCEDURE — 057F3ZZ DILATION OF LEFT CEPHALIC VEIN, PERCUTANEOUS APPROACH: ICD-10-PCS | Performed by: RADIOLOGY

## 2024-08-13 PROCEDURE — 99232 SBSQ HOSP IP/OBS MODERATE 35: CPT | Performed by: PHYSICIAN ASSISTANT

## 2024-08-13 PROCEDURE — 250N000011 HC RX IP 250 OP 636: Performed by: RADIOLOGY

## 2024-08-13 PROCEDURE — 99152 MOD SED SAME PHYS/QHP 5/>YRS: CPT

## 2024-08-13 PROCEDURE — 250N000009 HC RX 250

## 2024-08-13 PROCEDURE — 255N000002 HC RX 255 OP 636: Performed by: RADIOLOGY

## 2024-08-13 PROCEDURE — 272N000117 HC CATH CR2

## 2024-08-13 PROCEDURE — 120N000001 HC R&B MED SURG/OB

## 2024-08-13 PROCEDURE — 250N000011 HC RX IP 250 OP 636: Performed by: NURSE PRACTITIONER

## 2024-08-13 PROCEDURE — 250N000013 HC RX MED GY IP 250 OP 250 PS 637

## 2024-08-13 PROCEDURE — C1769 GUIDE WIRE: HCPCS

## 2024-08-13 PROCEDURE — C1725 CATH, TRANSLUMIN NON-LASER: HCPCS

## 2024-08-13 RX ORDER — HYDROMORPHONE HYDROCHLORIDE 1 MG/ML
.2-.4 INJECTION, SOLUTION INTRAMUSCULAR; INTRAVENOUS; SUBCUTANEOUS ONCE
Status: COMPLETED | OUTPATIENT
Start: 2024-08-13 | End: 2024-08-13

## 2024-08-13 RX ORDER — IODIXANOL 320 MG/ML
100 INJECTION, SOLUTION INTRAVASCULAR ONCE
Status: COMPLETED | OUTPATIENT
Start: 2024-08-13 | End: 2024-08-13

## 2024-08-13 RX ORDER — FLUMAZENIL 0.1 MG/ML
0.2 INJECTION, SOLUTION INTRAVENOUS
Status: DISCONTINUED | OUTPATIENT
Start: 2024-08-13 | End: 2024-08-13 | Stop reason: HOSPADM

## 2024-08-13 RX ORDER — RISPERIDONE 1 MG/1
1 TABLET ORAL AT BEDTIME
Qty: 30 TABLET | Refills: 0 | Status: CANCELLED | OUTPATIENT
Start: 2024-08-13

## 2024-08-13 RX ORDER — RISPERIDONE 0.5 MG/1
0.5 TABLET ORAL EVERY MORNING
Qty: 30 TABLET | Refills: 0 | Status: CANCELLED | OUTPATIENT
Start: 2024-08-14

## 2024-08-13 RX ORDER — ONDANSETRON 2 MG/ML
4 INJECTION INTRAMUSCULAR; INTRAVENOUS
Status: DISCONTINUED | OUTPATIENT
Start: 2024-08-13 | End: 2024-08-13 | Stop reason: HOSPADM

## 2024-08-13 RX ADMIN — AMLODIPINE BESYLATE 10 MG: 5 TABLET ORAL at 08:52

## 2024-08-13 RX ADMIN — METOPROLOL SUCCINATE 25 MG: 25 TABLET, EXTENDED RELEASE ORAL at 08:52

## 2024-08-13 RX ADMIN — IODIXANOL 40 ML: 320 INJECTION, SOLUTION INTRAVASCULAR at 10:15

## 2024-08-13 RX ADMIN — SODIUM ZIRCONIUM CYCLOSILICATE 10 G: 10 POWDER, FOR SUSPENSION ORAL at 15:03

## 2024-08-13 RX ADMIN — MIDAZOLAM HYDROCHLORIDE 0.5 MG: 1 INJECTION, SOLUTION INTRAMUSCULAR; INTRAVENOUS at 09:55

## 2024-08-13 RX ADMIN — METOPROLOL SUCCINATE 25 MG: 25 TABLET, EXTENDED RELEASE ORAL at 21:58

## 2024-08-13 RX ADMIN — PREGABALIN 25 MG: 25 CAPSULE ORAL at 21:58

## 2024-08-13 RX ADMIN — LIDOCAINE HYDROCHLORIDE 0.5 ML: 10 INJECTION, SOLUTION INFILTRATION; PERINEURAL at 09:55

## 2024-08-13 RX ADMIN — HYDROMORPHONE HYDROCHLORIDE 0.2 MG: 1 INJECTION, SOLUTION INTRAMUSCULAR; INTRAVENOUS; SUBCUTANEOUS at 09:44

## 2024-08-13 RX ADMIN — HYDROMORPHONE HYDROCHLORIDE 0.2 MG: 1 INJECTION, SOLUTION INTRAMUSCULAR; INTRAVENOUS; SUBCUTANEOUS at 09:59

## 2024-08-13 RX ADMIN — RISPERIDONE 1 MG: 1 TABLET, FILM COATED ORAL at 21:58

## 2024-08-13 RX ADMIN — ACETAMINOPHEN, ASPIRIN, CAFFEINE 2 TABLET: 250; 65; 250 TABLET, FILM COATED ORAL at 23:43

## 2024-08-13 RX ADMIN — MIDAZOLAM HYDROCHLORIDE 1 MG: 1 INJECTION, SOLUTION INTRAMUSCULAR; INTRAVENOUS at 09:42

## 2024-08-13 RX ADMIN — ACETAMINOPHEN, ASPIRIN, CAFFEINE 2 TABLET: 250; 65; 250 TABLET, FILM COATED ORAL at 19:32

## 2024-08-13 RX ADMIN — RISPERIDONE 0.5 MG: 0.5 TABLET, FILM COATED ORAL at 08:52

## 2024-08-13 ASSESSMENT — ACTIVITIES OF DAILY LIVING (ADL)
ADLS_ACUITY_SCORE: 30
ADLS_ACUITY_SCORE: 29
ADLS_ACUITY_SCORE: 30
ADLS_ACUITY_SCORE: 29
ADLS_ACUITY_SCORE: 30
ADLS_ACUITY_SCORE: 30

## 2024-08-13 NOTE — PROGRESS NOTES
[facility]    Progress Note - Hospitalist Service       Date of Admission:  8/8/2024    Assessment & Plan   Macario Delatorre is a 44 year old male admitted on 8/8/2024. He has a history of ESRD on HD MWF w/associated nephrology, cognitive disability, mental health complex hx (PTSD, bipolar? Agitation) HTN, gout, HLD and is admitted for recurrent chest pains, upper back pain found to have hyperkalemia (K 6.7), slightly elevated troponin at 72 w/no EKG changes, delta trop pending. Very complex social history including active homelessness and poor medication adherence in the past, has doctored inconsistently.  CM continues to work on TCU placement, hopefully 8/14 pending documents from [facility]     #ESRD on HD MWF  #Hyperkalemia, severe  Patient has had recent previous similar admissions, notably 7/22-8/2/24 for hyperkalemia and dialysis in which patient left AMA. Also was admitted 7/10-7/18 for syncopal episode and chest pains, hyperkalemia and ESRD with HD. Per patient had dialysis run 8/7/24 but was stopped early due to him c/o extremities being cold. Have had issues in the past with continuing dialysis, however patient is understanding this admission of the importance of receiving his regularly scheduled treatments. K 6.7 this admission, s/p insulin and dextrose in ED, recheck pending. Cr. 17.2, BUN 72, GFR 3. Unclear etiology of ESRD, suspect 2/2 chronic poorly controlled HTN, last A1C 7/10/24 4.4, is ot on any medications for diabetes currently and BG wnl. Appears has not had prior workup.  -Nephrology consulted, recs appreciated  -Emergent dialysis initiated 8/8/24, will resume regular HD schedule MWF 8/9 per nephrology              -K 4.4 8/12 after dialysis  -Cont. Dialysis MWF  -Fistulogram performed 8/13 w/IR, L arm fistulogram demonstrated high grade stenosis of cephalic vein arch, treated successfully w/angioplasty  -Renal diet              -Per nephro, ok to resume regular  "diet if pt agrees to take K binders              -s/p Lokelma given 8/10  -Consider re-consultation of palliative care gvmilan prior refusal of HD, however patient has been adherent and understanding this admission  -Resume home renal multivitamins, calcitriol, calcium acetate     #Chest Pain  #ACS r/out   Patient presented with chest pain, diffuse tenderness midsternal radiating to both L and R side of his chest, reproducible, has had chest pain recurrence on previous admissions. Denies SOB currently, initial EKG on admission not demonstrating ischemic changes, initial troponin 72, delta trop downtrending slightly. CXR demonstrating Lung volumes are low. No focal airspace opacities, pleural effusions, or pneumothorax. Normal pulmonary vascularity. Stable cardiomediastinal silhouette. I do not suspect cardiac etiology at this time.  -Cardiac telemetry, no events detected, discontinued 8/10  -Pt requested excedrin for pain, ok with CKD  -Hold scheduled tylenol due to above     #Back Pain  #Fall, previous encounter  Pt endorsing upper back pain most notably tender over cervical paraspinal muscles, appears that he had workup done 8/6 at [facility] when he \"collapsed\" and hurt his back, denied LOC. Imaging performed at this time included CT lumbar, thoracic, and cervical spine, all demonstrating no evidence for fracture or dislocation, no significant spinal canal or neuroforaminal stenosis. Mild multilevel cervical spondylosis. CT head demonstrating no intracranial abnormality.     -Lidocaine patches PRN  -Excedrin BID PRN is available to patient as this is his preference (pt requested instead of tylenol)  -Would be valuable to have PT's input for upper MSK back pain, recs appreciated     Cognitive impairment  History of mental health problems with hallucinations  Bipolar disorder  Agitation   Suicidal ideation  Patient has a history of refusing cares and can easily get agitated. Patient reports history of bipolar and " "schizophrenia. Does not endorse routine psychiatric follow up or regular medication use. In [facility] system was started on risperidone 1 mg at bedtime though has not taken consistently. Stated he can escalate quickly if upset. 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 7/30 started expressing SI with a plan to jump out of the window in setting of extreme frustration and refusal to undergo HD without his exact circumstances, patient himself requesting that his psych meds be increased.  - Psychiatric consulted last admission, could consider reconsultation if elevating risk of agitation              - Psych evaluated, patient felt to have capacity                          - per psych NP, patient is not holdable and has capacity to make decisions for himself                          - per , [facility] worker has stated that so long as psychiatry has deemed him decisional, he is not holdable  -Risperidone 0.5 mg AM, 1 mg bedtime     Goals of care  Note from Dr. Cuenca from prior admission:  \"Patient has previously reiterated that he will not undergo HD for his ESRD unless he gets his specific environment situated (preferred Rns only, demanding HD only in recliner and refusing HD in a bed, only wants HD in his room and away from others). Has continued to decline HD, seems to understand that cardiac arrest is a risk, though continues to express that he \"will not die\" due to this anytime soon. However, continues to reiterate that he would like full CPR and resuscitative efforts at this time, including intubation. Unclear if he would want HD if in this situation. Given known cognitive impairment as above as well as mood disorders, question if he is able to be decisional and/or what his needs would be if he were to become unable to express his needs. Palliative has been following, however patient continuing to decline discussion regarding end of life cares\"     Chronic " "anemia, normocytic   Baseline Hgb 9-10. Likely related to ESRD/chronic disease. Iron stores adequate. CBC on admission pending  - Continue DEMAR weekly; resume with outpatient dialysis.  -Transfuse for HgB <7 if indicated  - Daily CBC (patient has frequently refused labs on prior admissions)     Chronic pain syndrome, stable    Neuropathy, reported to be diabetic  - Cont. Lyrica 25 mg daily              -Cannot increase dose due to ESRD              -Pt felt capsaicin, icyhot was not effective, will discontinue                         Hypertension, uncontrolled  Continues to be hypertensive during hospitalization likely in setting of missed dialysis session.   - Continue PTA amlodipine 10 every day and metoprolol 25 BID     Disposition Planning  Discussed with CM, pt currently homeless and \"living on the streets\", pt reports has  through [facility] working on permanent housing but has not been completed yet, pt is interested in group home or LTC in the future but for immediate disposition planning likely TCU or medical respite care. PT/OT consulted to evaluate patient.  -Pt was evaluated by PT/OT 8/10, both services recommending TCU placement upon discharge due to cognitive impairment and difficulty with ambulation  2/2 deconditioning and neuropathy  -D/W CM, patient has been accepted to TCU at [facility] in [facility], awaiting confirmation documents from [facility], need to be delivered via hard copy and per [facility] needs to call patient today before he can be placed.     L hand/thumb pain  Pt reports he has had pain in his L thumb which has limited his  strength and functional status. On exam, does not appear markedly swollen, he does note pain most significant in 1st dorsal compartment of wrist. He is tender to palpation over distal radial styloid, but will not let examiner do much in terms of palpation or ROM. I suspect most likely de quervain tenosynovitis in setting of overuse " "injury vs. Intersection syndrome. Could consider further evaluation with xray but without traumatic history I am less suspicious of fracture.  -Consider thumb spica cast or brace at discharge  -will require OP follow up, treatment options include corticosteroid injection or OP hand therapy w/OT   -Excedrin scheduled BID per pt request over tylenol  -Capsaican cream can also be applied to his thumb  -Limited medication options due to renal function           Diet: Snacks/Supplements Adult: Other; [place] at HS; Between Meals  Regular Diet Adult    DVT Prophylaxis: Pneumatic Compression Devices  Raphael Catheter: Not present  Fluids: PO  Lines: None     Cardiac Monitoring: None  Code Status: Full Code      Clinically Significant Risk Factors        # Hyperkalemia: Highest K = 6 mmol/L in last 2 days, will monitor as appropriate                     # Overweight: Estimated body mass index is 28.71 kg/m  as calculated from the following:    Height as of 7/22/24: 1.981 m (6' 6\").    Weight as of this encounter: 112.7 kg (248 lb 7.3 oz).      # Financial/Environmental Concerns: none  # Housing Instability: noted in nursing assessment               Disposition Plan     Expected Discharge Date: 08/13/2024    Discharge Delays: Placement - TCU  Destination: other (comment) (unknown at this time)  Discharge Comments: TCU        The patient's care was discussed with the Attending Physician, Dr. Lemus .    Abdifatah King MD  Hospitalist Service  [facility]  Securely message with [employer] (more info)  Text page via [employer] Paging/Directory   ______________________________________________________________________    Interval History   Patient tired after procedure w/IR, did not want to talk today. Following labs and reports. NAEO. Still working on TCU placement with CM.    Physical Exam   Vital Signs: Temp: 97.6  F (36.4  C) Temp src: Oral BP: (!) (P) 147/83 Pulse: (P) 85   Resp: (P) 18 SpO2: (P) 98 % O2 " Device: (P) None (Room air)    Weight: 248 lbs 7.33 oz        Constitutional:       General: He is not in acute distress. Resting comfortably after procedure w/IR, did not want to speak to the writer this AM and wanted to sleep.     Appearance: Normal appearance. He is not ill-appearing or diaphoretic.   HENT:      Mouth/Throat:      Mouth: Mucous membranes are moist.      Pharynx: Oropharynx is clear.   Eyes:      Extraocular Movements: Extraocular movements intact.      Conjunctiva/sclera: Conjunctivae normal.      Pupils: Pupils are equal, round, and reactive to light.   Cardiovascular:      Rate and Rhythm: Normal rate and regular rhythm.      Pulses: Normal pulses.      Heart sounds: Normal heart sounds.   Pulmonary:      Effort: Pulmonary effort is normal.      Breath sounds: Normal breath sounds.   Abdominal:      General: Bowel sounds are normal.      Palpations: Abdomen is soft.   Musculoskeletal:      Right lower leg: No edema.      Left lower leg: No edema.   Lower extremities very sensitive to touch on exam, pt reports pain with light touch, did not examine further. L arm bandaged from fistulogram site, CDI  Skin:     Capillary Refill: Capillary refill takes less than 2 seconds.   Neurological:      General: No focal deficit present.      Mental Status: He is alert and oriented to person, place, and time. Mental status is at baseline.   Psychiatric:         Mood and Affect: Mood normal.         Behavior: Behavior normal.         Thought Content: Thought content normal.         Judgment: Judgment normal.           Data     I have personally reviewed the following data over the past 24 hrs:    N/A  \   N/A   / N/A     N/A N/A N/A /  N/A   4.4 N/A N/A \       Imaging results reviewed over the past 24 hrs:   Recent Results (from the past 24 hour(s))   IR Dialysis Fistulogram Left    Narrative    [facility]  LOCATION: [facility]  DATE: 8/13/2024    PROCEDURE: AV DIALYSIS  FISTULOGRAM AND PTA VENOUS:  1. AV DIALYSIS FISTULOGRAM  2. PTA VENOUS    INTERVENTIONAL RADIOLOGIST: Charly Rosenberg MD.    INDICATION: Prolonged bleeding after dialysis from left arm fistula..    CONSENT: The risks, benefits and alternatives of left arm fistulogram with possible intervention were discussed with the patient  in detail. All questions were answered. Informed consent was given to proceed with the procedure.    MODERATE SEDATION: Versed 1.5 mg IV; 0.4 mg of Dilaudid IV.  Under physician supervision, Versed and fentanyl were administered for moderate sedation. Pulse oximetry, heart rate and blood pressure were continuously monitored by an independent trained   observer. The physician spent 15 minutes of face-to-face sedation time with the patient.    CONTRAST: 40 cc of Omni  ANTIBIOTICS: None.  ADDITIONAL MEDICATIONS: None.    FLUOROSCOPIC TIME: 3.8 minutes.  RADIATION DOSE: Air Kerma: 124 mGy.    COMPLICATIONS: No immediate complications.    STERILE BARRIER TECHNIQUE: Maximum sterile barrier technique was used. Cutaneous antisepsis was performed at the operative site with application of 2% chlorhexidine and large sterile drape. Prior to the procedure, the  and assistant performed   hand hygiene and wore hat, mask, sterile gown, and sterile gloves during the entire procedure.    PROCEDURE:   The left arm was prepped and draped in the usual sterile fashion followed by local anesthesia with 1% Xylocaine. Access to the fistula was achieved using a micropuncture system. Diagnostic fistulography was performed from the arterial inflow to the right   atrium. Following the diagnostic study, angioplasty was performed using a 10 mm balloon at cephalic vein arch. Completion fistulography was performed. The sheath was removed and compression applied to the puncture site until hemostasis was achieved.   There was a palpable thrill in the fistula at the completion of the procedure.    FINDINGS:  AV DIALYSIS  FISTULOGRAM: Focal high-grade stenosis at the cephalic vein arch successfully treated with angioplasty using a 10 mm balloon. The remainder of the central veins are patent. The remainder of the cephalic outflow vein is patent..      Impression    IMPRESSION:    1.  Left arm fistulogram demonstrates focal high-grade stenosis of the cephalic vein arch successfully treated with angioplasty using a 10 mm balloon.       Abdifatah King MD  PGY-2  [facility]  [facility]   August 13, 2024

## 2024-08-13 NOTE — PLAN OF CARE
"  Problem: Adult Inpatient Plan of Care  Goal: Plan of Care Review  Description: The Plan of Care Review/Shift note should be completed every shift.  The Outcome Evaluation is a brief statement about your assessment that the patient is improving, declining, or no change.  This information will be displayed automatically on your shift  note.  8/13/2024 0527 by Vanessa Mancia RN  Outcome: Progressing  Flowsheets (Taken 8/13/2024 0527)  Plan of Care Reviewed With: patient  8/12/2024 2207 by Vanessa Mancia RN  Outcome: Progressing  Flowsheets (Taken 8/12/2024 2046)  Plan of Care Reviewed With: patient  8/12/2024 2048 by Vanessa Mancia RN  Outcome: Progressing  Flowsheets (Taken 8/12/2024 2046)  Plan of Care Reviewed With: patient  Goal: Patient-Specific Goal (Individualized)  Description: You can add care plan individualizations to a care plan. Examples of Individualization might be:  \"Parent requests to be called daily at 9am for status\", \"I have a hard time hearing out of my right ear\", or \"Do not touch me to wake me up as it startles  me\".  Outcome: Progressing  Goal: Absence of Hospital-Acquired Illness or Injury  8/12/2024 2207 by Vanessa Mancia RN  Outcome: Progressing  8/12/2024 2048 by Vanessa Mancia RN  Outcome: Progressing  Intervention: Identify and Manage Fall Risk  Recent Flowsheet Documentation  Taken 8/13/2024 0027 by Vanessa Mancia RN  Safety Promotion/Fall Prevention: activity supervised  Taken 8/12/2024 2000 by Vanessa Mancia RN  Safety Promotion/Fall Prevention: activity supervised  Intervention: Prevent Skin Injury  Recent Flowsheet Documentation  Taken 8/13/2024 0027 by Vanessa Mancia RN  Body Position: position changed independently  Taken 8/12/2024 2000 by Vanessa Mancia RN  Body Position: position changed independently  Goal: Optimal Comfort and Wellbeing  8/13/2024 0527 by Vanessa Mancia RN  Outcome: Progressing  8/12/2024 2207 by " [facility] - Labor & Delivery  Obstetrics  History & Physical    Patient Name: Zo Villanueva  MRN: [id number]  Admission Date: 2022  Primary Care Provider: Margarita Talley MD    Subjective:     Principal Problem:Gestational hypertension, third trimester    History of Present Illness:   35w0d here for 2nd dose of BMZ      Obstetric HPI:  Patient reports None contractions, active fetal movement, No vaginal bleeding , No loss of fluid     This pregnancy has been complicated by GHTN, sickle cell trait, FGR, GDM.    OB History    Para Term  AB Living   5 2 2 0 2 2   SAB IAB Ectopic Multiple Live Births   2 0 0 0 2      # Outcome Date GA Lbr Bradley/2nd Weight Sex Delivery Anes PTL Lv   5 Current            4 Term 19   3.175 kg (7 lb) M Vag-Spont EPI  GRICELDA   3 Term 18   3.629 kg (8 lb) M Vag-Spont EPI  GRICELDA   2 2016 14w0d   U   Y FD      Birth Comments: trisomy 13   1 2015 9w0d            Past Medical History:   Diagnosis Date    Anemia     currently takes iron pills antepartum     Diabetes mellitus     prediabetic in july     Pre-diabetes     Sickle cell anemia     trait     Past Surgical History:   Procedure Laterality Date    DILATION AND CURETTAGE OF UTERUS      TONSILLECTOMY         PTA Medications   Medication Sig    blood sugar diagnostic Strp To check BG 4 times daily, to use with insurance preferred meter    blood-glucose meter kit To check BG 4 times daily, to use with insurance preferred meter    ferrous sulfate 325 (65 FE) MG EC tablet Take 1 tablet (325 mg total) by mouth 2 (two) times daily.    lancets Misc To check BG 4 times daily, to use with insurance preferred meter    metFORMIN (GLUCOPHAGE) 500 MG tablet Take 1 tablet (500 mg total) by mouth daily with dinner or evening meal.    prenatal vit-iron fum-folic ac 27 mg iron- 0.8 mg Tab Take 1 tablet by mouth once daily.    TRUE METRIX GLUCOSE METER Misc USE TO CHECK BLOOD GLUCOSE FOUR TIMES DAILY     TRUEPLUS LANCETS 30 gauge Misc 4 (four) times daily.       Review of patient's allergies indicates:  No Known Allergies     Family History       Problem Relation (Age of Onset)    Diabetes Mother    Hypertension Mother    Miscarriages / Stillbirths Mother          Tobacco Use    Smoking status: Never Smoker    Smokeless tobacco: Never Used   Substance and Sexual Activity    Alcohol use: Not Currently    Drug use: Never    Sexual activity: Yes     Partners: Male     Birth control/protection: None     Review of Systems   Eyes:  Negative for visual disturbance.   Gastrointestinal:  Negative for abdominal pain.   Genitourinary:  Negative for vaginal bleeding and vaginal discharge.   Neurological:  Negative for headaches.   All other systems reviewed and are negative.   Objective:     Vital Signs (Most Recent):  Temp: 98.2 °F (36.8 °C) (04/01/22 2022)  Pulse: 110 (04/01/22 2022)  BP: 138/88 (04/01/22 2022)  SpO2: 98 % (04/01/22 2022) Vital Signs (24h Range):  Temp:  [97.6 °F (36.4 °C)-98.2 °F (36.8 °C)] 98.2 °F (36.8 °C)  Pulse:  [] 110  SpO2:  [98 %] 98 %  BP: (113-176)/(71-93) 138/88        There is no height or weight on file to calculate BMI.    FHT: Cat 1 (reassuring)  TOCO:  None     Physical Exam:   Constitutional: She is oriented to person, place, and time. She appears well-developed and well-nourished.    HENT:   Head: Normocephalic and atraumatic.    Eyes: Conjunctivae and EOM are normal.     Cardiovascular:  Normal rate.             Pulmonary/Chest: Effort normal. No respiratory distress.        Abdominal: Soft. She exhibits no distension. There is no abdominal tenderness.     Genitourinary:    Vagina and uterus normal.             Musculoskeletal: Normal range of motion and moves all extremeties.       Neurological: She is alert and oriented to person, place, and time.    Skin: Skin is warm and dry.    Psychiatric: She has a normal mood and affect. Her behavior is normal. Judgment and thought  Vanessa Mancia RN  Outcome: Progressing  Goal: Readiness for Transition of Care  Outcome: Progressing     Problem: Skin Injury Risk Increased  Goal: Skin Health and Integrity  8/13/2024 0527 by Vanessa Mancia RN  Outcome: Progressing  8/12/2024 2048 by Vanessa Mancia RN  Outcome: Progressing  Intervention: Plan: Nurse Driven Intervention: Moisture Management  Recent Flowsheet Documentation  Taken 8/13/2024 0027 by Vanessa Mancia RN  Moisture Interventions: Encourage regular toileting  Taken 8/12/2024 2200 by Vanessa Mancia RN  Skin Appearance: Normal (no redness or breakdown)  Plan: Moisture Management: encourage regular toileting  Taken 8/12/2024 2000 by Vanessa Mancia RN  Moisture Interventions: Encourage regular toileting  Intervention: Optimize Skin Protection  Recent Flowsheet Documentation  Taken 8/13/2024 0027 by Vanessa Mancia RN  Activity Management: back to bed  Head of Bed (HOB) Positioning: HOB at 30-45 degrees  Taken 8/12/2024 2000 by Vanessa Mancia RN  Activity Management: back to bed  Head of Bed (HOB) Positioning: HOB at 30-45 degrees     Problem: Hemodialysis  Goal: Safe, Effective Therapy Delivery  8/13/2024 0527 by Vanessa Mancia RN  Outcome: Progressing  8/12/2024 2048 by Vanessa Mancia RN  Outcome: Progressing  Goal: Effective Tissue Perfusion  8/13/2024 0527 by Vanessa Mancia RN  Outcome: Progressing  8/12/2024 2048 by Vanessa Mancia RN  Outcome: Progressing  Goal: Absence of Infection Signs and Symptoms  8/13/2024 0527 by Vanessa Mancia RN  Outcome: Progressing  8/12/2024 2207 by Vanessa Mancia RN  Outcome: Progressing     Problem: Fall Injury Risk  Goal: Absence of Fall and Fall-Related Injury  8/13/2024 0527 by Vanessa Mancia RN  Outcome: Progressing  8/12/2024 2207 by Vanessa Mancia RN  Outcome: Progressing  8/12/2024 2048 by Vanessa Mancia RN  Outcome: Progressing  Intervention: Promote  Injury-Free Environment  Recent Flowsheet Documentation  Taken 8/13/2024 0027 by Vanessa Mancia, RN  Safety Promotion/Fall Prevention: activity supervised  Taken 8/12/2024 2000 by Vanessa Mancia RN  Safety Promotion/Fall Prevention: activity supervised     Problem: Chronic Kidney Disease  Goal: Optimal Coping with Chronic Illness  8/13/2024 0527 by Vanessa Mancia, RN  Outcome: Progressing  8/12/2024 2207 by Vanessa Mancia RN  Outcome: Progressing  Goal: Absence of Anemia Signs and Symptoms  Outcome: Progressing  Goal: Minimize Renal Failure Effects  Outcome: Progressing   Goal Outcome Evaluation:      Plan of Care Reviewed With: patient    Patient alert and orient x4.Calm and co-operative.Patient appear slept throughout the night.No s/s of respiratory distress noted.             content normal.     Cervix:  Dilation:  Deferred      Significant Labs:  Lab Results   Component Value Date    GROUPTRH A POS 10/06/2021    HEPBSAG Negative 10/06/2021       I have personallly reviewed all pertinent lab results from the last 24 hours.    Assessment/Plan:     36 y.o. female  at 35w0d for:    * Gestational hypertension, third trimester  BMZ administered  NST reactive  BP stable  Denies CNS symptoms of pre-e. Precautions stressed. Patient plans to work from home until her appointment on Wednesday.        Roxi Chávez CNM  Obstetrics  [facility] - Labor & Delivery

## 2024-08-13 NOTE — PLAN OF CARE
Problem: Chronic Kidney Disease  Goal: Optimal Coping with Chronic Illness  Outcome: Progressing   Goal Outcome Evaluation:  Patient returned from IR.  Drowsy but arousable to voice.  Fistula site intact with band aid.  Thrill and bruit present.  Palpable pulses.  Refused Phos lo.  Says he never takes it because it gives him diarrhea.

## 2024-08-13 NOTE — PROGRESS NOTES
Patient was reminded to start NPO has of midnight.Patient refused stating that you guys you are harassing me I didn't made up my mind yet leave lone me charge was updated.

## 2024-08-13 NOTE — PROGRESS NOTES
Attempted to stop in and chat with patient as he has expressed concern about having to leave hospital tonight. Patient asleep, will re-attempt to talk to patient later this shift.

## 2024-08-13 NOTE — PROGRESS NOTES
"    RENAL PROGRESS NOTE    PLAN:  Completed fistulagram today, stenosis noted and was treated.   Pt with 2 hr run HD yesterday due to staffing. Today with current staffing, will plan on normal MWF tomorrow.   Lokelma dosed today, pt has also been NPO most of day.      ASSESSMENT:  ESRD:  IHD MWF at the [facility]  RX:  , Typically high volume UF 3-5kg per pt report.  (he frequently cuts tx short) /, K 2   ACCESS: L AVF  H/o poor compliance, short tx etc   Recurrent issues with HyperK  S/P fistulagram 8/13  On MWF, last ran yesterday, plan for run tomorrow       HyperKalemia:  As above, urgent dialysis 8/8 and regular dialysis on 8/9  Lokelma 10g on non dialysis days, may increase to daily if persistent and access functional   Take today  I asked Macario if he would be open to taking lokelma when he is discharged \"if I have somewhere to take it I would.\"     HTN/volume:  Mostly controlled when he allows it to be taken (complaint of severe pain with BP cuff) and compliant with meds   Typically quite volume overloaded , high volume UF , educated on FR   PTA Amlod, Metop, continue     CKD/MBD:  On calcitriol, Phoslo as binder, continue     Anemia:  ACD, on DEMAR and venofer as needed outpt,  Last hgb in the 8's     Bi-polar:  On Risperdal, h/o oppositional behavior tendencies, poor medical compliance , some paranoid behaviour noted in the ED during my intake   Reported cognitive impairment, with recurrent conflicts with prior staff, transport team, working on psych TCU placement and housing per       Homelessness:  Pt reports that he doesn't not have stable living  situation, and gets to/from dialysis via scooter   Keeps clothing in a storage locker     Chest pain:  Recurrent, prior ACS w/up reportedly unremarkable.    SUBJECTIVE:    Seen in room this afternoon  He is rocking back and forth in bed  Says he just woke up  Reviewed his fistulagram results with him, he did in fact have a " stenosis and this was explained to him.   No acute concerns at present   Reviewed Lokelma, and its role  He is on board for dialysis tomorrow.       OBJECTIVE:  Physical Exam   Temp: 97.6  F (36.4  C) Temp src: Oral BP: (!) (P) 147/83 Pulse: (P) 85   Resp: (P) 18 SpO2: (P) 98 % O2 Device: (P) None (Room air)    Vitals:    24 1950 24   Weight: 104.3 kg (229 lb 15 oz) 112.7 kg (248 lb 7.3 oz)     Vital Signs with Ranges  Temp:  [97.6  F (36.4  C)-98.2  F (36.8  C)] 97.6  F (36.4  C)  Pulse:  [] (P) 85  Resp:  [7-80] (P) 18  BP: (127-200)/() (P) 147/83  SpO2:  [90 %-100 %] (P) 98 %  I/O last 3 completed shifts:  In: -   Out: 2500 [Other:2500]    Temp (24hrs), Av.4  F (36.9  C), Min:98.4  F (36.9  C), Max:98.4  F (36.9  C)      No data found.    Intake/Output Summary (Last 24 hours) at 2024 1408  Last data filed at 2024 2150  Gross per 24 hour   Intake --   Output 200 ml   Net -200 ml       PHYSICAL EXAM:  General - Alert and oriented x3, appears comfortable, NAD, pleasant with me   Cardiovascular - BP escalated, rate controlled   Respiratory -on RA   Abd: obese   Extremities -trace lower extremity edema bilaterally, no overt pitting   Skin: dry, intact, no rash, good turgor  Neuro:  Grossly intact, no focal deficits  MSK:  Grossly intact  Psych:  Normal affect, has been argumentative here with some staff at hospital    LABORATORY STUDIES:     Recent Labs   Lab 24  0705 08/10/24  1138 24  1813 24  0910 24  1902   WBC 7.9 5.8 5.4 6.3 6.5   RBC 2.90* 3.46* 3.24* 3.14* 3.46*   HGB 8.0* 9.4* 9.0* 8.6* 9.5*   HCT 25.7* 30.2* 28.4* 27.0* 29.1*    274 209 229 256       Basic Metabolic Panel:  Recent Labs   Lab 24  1553 24  0705 08/10/24  1155 24  1813 24  1004 24  0910 24  1349   NA  --  137 141 138  --  138 139   POTASSIUM 4.4 6.0* 5.4* 4.5  --  5.2 6.7*   CHLORIDE  --  98 98 97*  --  96* 99   CO2  --    --   26 23   BUN  --   --  38.7* 26.6*  --  49.5* 72.1*   CR  --   --  11.76* 8.84*  --  14.04* 17.22*   GLC  --   --  125* 124* 88 104* 92   LOVELY  --   --  9.6 8.9  --  8.8 8.6*       INRNo lab results found in last 7 days.     Recent Labs   Lab Test 08/12/24  0705 08/10/24  1138   WBC 7.9 5.8   HGB 8.0* 9.4*    274       Personally reviewed current labs      Judith ZAYAS  [facility]  [phone]

## 2024-08-13 NOTE — SIGNIFICANT EVENT
Significant Event Note    Time of event: 3:51 PM August 13, 2024    Description of event:  Paged by care management regarding placement issues  As a reminder, patient is a 44 year old with a history of ESRD on HD MWF w/associated nephrology, cognitive disability, mental health complex hx (PTSD, bipolar? Agitation) HTN, gout, HLD and is admitted for recurrent chest pains, upper back pain found to have hyperkalemia (K 6.7), slightly elevated troponin at 72 w/no EKG changes, delta trop pending. Very complex social history including active homelessness and poor medication adherence in the past, has doctored inconsistently.   Is currently medically stable for discharge    Per care management, TCU is accepting of patient and willing to take him around 5pm today  Patient adamantly declining TCU placement because he does not have his electric scooter  Despite our offers to order him a new scooter, he vehemently declines as he is concerned that the price of the new scooter would be too expensive for him   His current scooter is at his disabled brother's house  Offered to have voluntary services transport scooter to his new TCU, patient adamantly declining    Despite our best efforts to find safe dispo for this patient, we are at odds  Per discussion with care management, patient is able to appeal his discharge at this time  Patient ok with filling out paperwork to go through this process  Will place orders, start appeal process  CM to follow along        Discussed with: supervising physician, Romero Lemus and Ira, and care management    Kristyn Cuenca MD

## 2024-08-13 NOTE — SEDATION DOCUMENTATION
Interventional Radiology Intra-procedural Nursing Note  Patient Name: Macario Delatorre  Medical Record Number: [id number]  Today's Date: August 13, 2024    Procedure: left upper extremity fistula  Start time: 0950  End time: 1004  Sedation time: 14 minutes    Administered medication totals:  Versed 1.5 mg IVP  Fentanyl 0 mcg IVP  Dilaudid 0.4mg IVP    Last dose of sedation administered at 0959.   Procedure well tolerated by patient without complications. Procedure end with debrief by Dr. Rosenberg.   Report given to Kelly JACOBO RN

## 2024-08-13 NOTE — PROGRESS NOTES
"Care Management Follow Up    Length of Stay (days): 4    Expected Discharge Date: 08/13/2024    Anticipated Discharge Plan:  Dialysis Services, Other (Comments) (unknown at this time)    Transportation: Anticipate medical transport    PT Recommendations: Transitional Care Facility  OT Recommendations:  Transitional Care Facility     Barriers to Discharge: placement    Prior Living Situation: homeless, other (see comments) (\"I live on the streets or sometimes I can stay at my brother's house. Today when I came to the ER I was on the streets and called 911\".) with other (see comments) (\"I live on the streets or sometimes I can stay at my brother's house\".)    Advanced Directive on File: no concerns identified (\"I don't have a written advanced directive\".)     Patient/Spokesperson Updated: Yes. Who? Patient    Additional Information:  ANDREW received call from [facility] worker Lolly Vazquez ([phone]) regarding OBRA Level II assessment. Lolly will call Pt to complete assessment today.     ANDREW received copy of completed OBRA Level II assessment from Lolly via email. Pt is approved to go to TCU. Copy of assessment placed in Pt's hard chart. ANDREW contacted Jose at [facility] to discuss OBRA Level II assessment. ANDREW explained new assessment that was completed today. Jose will give the new assessment to the facility director to see if it is suffice for them to take Pt. ANDREW faxed assessment to [facility] and [facility] at [phone]. Jose will update CM.    The OBRA Level II assessment that was completed in July during Pt's last admission was triggered for disability. ANDREW completed new PAS during Pt's current admission that triggered for mental health due to Pt's suicidal ideation and MH diagnosis. This required a new assessment to be completed by [facility].     ANDREW informed by Jose that [facility] is able to take Pt tonight and have scheduled transportation for Pt to go to dialysis " tomorrow. MD confirmed that Pt is medically ready to discharge. SW spoke with Pt regarding discharge plan. Pt is refusing to discharge due to him not being able to have his scooter at TCU; Pt reports he is not ready to discharge. Pt reports his scooter is at his brother's house, and no one but himself is able to get the scooter as his brother doesn't want anyone on his property. [facility] and [facility] report they can have Pt reassessed for a new scooter through Medicaid; Pt refuses this. Pt reports his scooter is not broken. ANDREW discussed Medicare discharge appeal with Pt and explained that there is a discharge plan in place. Pt reports he is agreeable to Medicare appeal and that he wants to go to a different hospital. ASHISH provided Pt with IMM. ANDREW explained that Pt needs to call Medicare to appeal or else he may have to private pay for his hospital stay. Pt signed IMM and called Medicare appeal number. ANDREW updated MD and [facility] and [facility] regarding Medicare appeal.     JUNE JohnstonW

## 2024-08-13 NOTE — PLAN OF CARE
Problem: Adult Inpatient Plan of Care  Goal: Plan of Care Review  Description: The Plan of Care Review/Shift note should be completed every shift.  The Outcome Evaluation is a brief statement about your assessment that the patient is improving, declining, or no change.  This information will be displayed automatically on your shift  note.  Outcome: Progressing  Goal: Absence of Hospital-Acquired Illness or Injury  Outcome: Progressing  Intervention: Identify and Manage Fall Risk  Recent Flowsheet Documentation  Taken 8/12/2024 2000 by Vanessa Mancia RN  Safety Promotion/Fall Prevention: activity supervised  Intervention: Prevent Skin Injury  Recent Flowsheet Documentation  Taken 8/12/2024 2000 by Vanessa Mancia RN  Body Position: position changed independently     Problem: Skin Injury Risk Increased  Goal: Skin Health and Integrity  Outcome: Progressing  Intervention: Plan: Nurse Driven Intervention: Moisture Management  Recent Flowsheet Documentation  Taken 8/12/2024 2000 by Vanessa Mancia RN  Moisture Interventions: Encourage regular toileting  Intervention: Optimize Skin Protection  Recent Flowsheet Documentation  Taken 8/12/2024 2000 by Vanessa Mancia RN  Activity Management: back to bed  Head of Bed (HOB) Positioning: HOB at 30-45 degrees     Problem: Hemodialysis  Goal: Safe, Effective Therapy Delivery  Outcome: Progressing  Goal: Effective Tissue Perfusion  Outcome: Progressing     Problem: Fall Injury Risk  Goal: Absence of Fall and Fall-Related Injury  Outcome: Progressing  Intervention: Promote Injury-Free Environment  Recent Flowsheet Documentation  Taken 8/12/2024 2000 by Vanessa Mancia RN  Safety Promotion/Fall Prevention: activity supervised     Problem: Chronic Kidney Disease  Goal: Minimize Renal Failure Effects  Outcome: Progressing   Goal Outcome Evaluation:      Plan of Care Reviewed With: (P) patient      Patient alert and orient x4.Calm and co-operative endorse  pain 10/10 prn Excedrin and scheduled lyica was given with relief refuse lidocaine patch.Patient is aware will start NPO at midnight.No concerns noted.

## 2024-08-13 NOTE — PROGRESS NOTES
Interventional Radiology - Pre-Procedure Note:  [facility] [[email or web address]] - [facility]  08/13/2024     Procedure Requested: fistulogram  Requested by: Dr. Gonzales    Brief HPI: Macario Delatorre is a 44 year old male old homeless male with ESRD on HD via LUE fistula; admitted with hyperkalemia. Nephrology now reports high K and prolonged bleeding, favor doing this on Tuesday, he does better when things are scheduled ahead of time and a prolonged NPO status on Monday will not go well. Patient initially not agreeable to IR procedure. After further discussion with nephrology and further explanation patient is agreeable to IR procedure this AM. Patient is concerned about getting released post-procedure to an unsafe environment. Patient also notes new right jaw swelling this AM.      Surgeon: Dr. Chao  Dialysis: MWF; last run yesterday; next run tomorrow.      IMAGING:  None prior    NPO: 0100  ANTICOAGULANTS: aspirin  ANTIBIOTICS: none for IR    ALLERGIES  Allergies   Allergen Reactions    Bee Venom      Bee stings swelling and short of breath    Pcn [Penicillins]      Noted in 8/20/08 ER,hives,headaches,throat irritation    Trazodone      Abdominal pain    Ibuprofen GI Disturbance     Noted in 8/20/08 ER  Headaches and abdominal pain    Tylenol [Acetaminophen] GI Disturbance     Noted in 8/20/08 ER         LABS:  INR   Date Value Ref Range Status   03/27/2014 1.0  Final      Hemoglobin   Date Value Ref Range Status   08/12/2024 8.0 (L) 13.3 - 17.7 g/dL Final   03/27/2014 13.8 13.3 - 17.7 g/dL Final     Platelet Count   Date Value Ref Range Status   08/12/2024 244 150 - 450 10e3/uL Final     Creatinine   Date Value Ref Range Status   08/10/2024 11.76 (H) 0.67 - 1.17 mg/dL Final   10/17/2014 1.6 (H) 0.8 - 1.5 mg/dL Final     Potassium   Date Value Ref Range Status   08/12/2024 4.4 3.4 - 5.3 mmol/L Final   10/17/2014 4.5 3.4 - 5.3 mmol/L Final         EXAM:  BP (!) 154/99 (BP Location: Right arm)   Pulse 93    Temp 98  F (36.7  C) (Oral)   Resp 18   Wt 112.7 kg (248 lb 7.3 oz)   SpO2 98%   BMI 28.71 kg/m    General: Stable. In no acute distress.    Neuro: Alert and oriented x 3. No focal deficits.  Psych: Appropriate mood and affect. Linear/coherent thought process.   Resp: Normal respirations. Lungs clear to auscultation bilaterally.  Cardio: S1S2, regular rate and rhythm, without murmur, clicks or rubs  Vascular: LUE fistula with bruit and thrill; site marked.   Skin: Warm and dry. Without excoriations, ecchymosis, erythema, lesions or open sores on LUE.      Pre-Sedation Assessment:  Mallampati Airway Classification:  III - Only soft palate is visible. Intubation is predicted to be difficult  Previous reaction to anesthesia/sedation:  No  Sedation plan based on assessment: Moderate (conscious) sedation  ASA Classification: Class 3 - SEVERE SYSTEMIC DISEASE, DEFINITE FUNCTIONAL LIMITATIONS.   Code Status: Full Code         ASSESSMENT/PLAN:   Case discussed with nephrology NP Beryl.   Keep NPO  Will defer to primary team regarding discharge plans and right jaw swelling.     Left upper extremity fistulogram with possible angioplasty, stent, lysis, and/or dialysis catheter placement.     Patient refusing use of fentanyl (due to bad outcomes on the street; per patient) and ok with dilaudid.     Procedural education reviewed with patient in detail including, but not limited to risks, benefits and alternatives with understanding verbalized by patient.    Total time spent on the date of the encounter: 55 minutes.      MILAN Sauer CNP  Interventional Radiology

## 2024-08-13 NOTE — PROCEDURES
[facility]    Procedure: IR Procedure Note    Date/Time: 8/13/2024 10:09 AM    Performed by: Charly Rosenberg MD  Authorized by: Charly Rosenberg MD      UNIVERSAL PROTOCOL   Site Marked: NA  Prior Images Obtained and Reviewed:  Yes  Required items: Required blood products, implants, devices and special equipment available    Patient identity confirmed:  Verbally with patient, arm band, provided demographic data and hospital-assigned identification number  Patient was reevaluated immediately before administering moderate or deep sedation or anesthesia  Confirmation Checklist:  Patient's identity using two indicators, relevant allergies, procedure was appropriate and matched the consent or emergent situation and correct equipment/implants were available  Time out: Immediately prior to the procedure a time out was called    Universal Protocol: the Joint Commission Universal Protocol was followed    Preparation: Patient was prepped and draped in usual sterile fashion    ESBL (mL):  0     ANESTHESIA    Anesthesia:  Local infiltration  Local Anesthetic:  Lidocaine 1% without epinephrine  Anesthetic Total (mL):  5    Fluoroscopy Time: 2 minute(s)  See dictated procedure note for full details.  Findings: Left arm fistulogram and angioplasty of cephalic vein.  OK to use fistula for dialysis.  Bedrest 1 hour.    Specimens: none    Complications: None    Condition: Stable    Plan: Left arm fistulogram and angioplasty of cephalic vein.  OK to use fistula for dialysis.  Bedrest 1 hour.      PROCEDURE  Describe Procedure: Left arm fistulogram and angioplasty of cephalic vein.  OK to use fistula for dialysis.  Bedrest 1 hour.  Patient Tolerance:  Patient tolerated the procedure well with no immediate complications  Length of time physician/provider present for 1:1 monitoring during sedation: 30

## 2024-08-13 NOTE — PRE-PROCEDURE
GENERAL PRE-PROCEDURE:   Procedure:  LUE fistulogram  Date/Time:  8/13/2024 8:55 AM    Written consent obtained?: Yes    Risks and benefits: Risks, benefits and alternatives were discussed    Consent given by:  Patient  Patient states understanding of procedure being performed: Yes    Patient's understanding of procedure matches consent: Yes    Procedure consent matches procedure scheduled: Yes    Expected level of sedation:  Moderate  Appropriately NPO:  Yes  ASA Class:  3  Mallampati  :  Grade 3- soft palate visible, posterior pharyngeal wall not visible  Lungs:  Lungs clear with good breath sounds bilaterally  Heart:  Normal heart sounds and rate  History & Physical reviewed:  History and physical reviewed and no updates needed  Statement of review:  I have reviewed the lab findings, diagnostic data, medications, and the plan for sedation

## 2024-08-14 ENCOUNTER — APPOINTMENT (OUTPATIENT)
Dept: ULTRASOUND IMAGING | Facility: HOSPITAL | Age: 44
DRG: 628 | End: 2024-08-14
Payer: MEDICARE

## 2024-08-14 PROCEDURE — 99232 SBSQ HOSP IP/OBS MODERATE 35: CPT | Mod: GC

## 2024-08-14 PROCEDURE — 120N000001 HC R&B MED SURG/OB

## 2024-08-14 PROCEDURE — 90935 HEMODIALYSIS ONE EVALUATION: CPT

## 2024-08-14 PROCEDURE — 76536 US EXAM OF HEAD AND NECK: CPT

## 2024-08-14 PROCEDURE — 90935 HEMODIALYSIS ONE EVALUATION: CPT | Performed by: PHYSICIAN ASSISTANT

## 2024-08-14 PROCEDURE — 250N000013 HC RX MED GY IP 250 OP 250 PS 637

## 2024-08-14 RX ORDER — AMOXICILLIN AND CLAVULANATE POTASSIUM 500; 125 MG/1; MG/1
1 TABLET, FILM COATED ORAL 2 TIMES DAILY
Status: DISCONTINUED | OUTPATIENT
Start: 2024-08-14 | End: 2024-08-19 | Stop reason: HOSPADM

## 2024-08-14 RX ORDER — CEFAZOLIN SODIUM 1 G/50ML
2000 SOLUTION INTRAVENOUS ONCE
Status: DISCONTINUED | OUTPATIENT
Start: 2024-08-14 | End: 2024-08-14

## 2024-08-14 RX ORDER — METRONIDAZOLE 500 MG/1
500 TABLET ORAL 3 TIMES DAILY
Status: DISCONTINUED | OUTPATIENT
Start: 2024-08-14 | End: 2024-08-14

## 2024-08-14 RX ORDER — LEVOFLOXACIN 500 MG/1
500 TABLET, FILM COATED ORAL
Status: DISCONTINUED | OUTPATIENT
Start: 2024-08-16 | End: 2024-08-14

## 2024-08-14 RX ORDER — LEVOFLOXACIN 750 MG/1
750 TABLET, FILM COATED ORAL DAILY
Status: DISCONTINUED | OUTPATIENT
Start: 2024-08-14 | End: 2024-08-14

## 2024-08-14 RX ORDER — PIPERACILLIN SODIUM, TAZOBACTAM SODIUM 3; .375 G/15ML; G/15ML
3.38 INJECTION, POWDER, LYOPHILIZED, FOR SOLUTION INTRAVENOUS EVERY 12 HOURS
Status: DISCONTINUED | OUTPATIENT
Start: 2024-08-14 | End: 2024-08-14

## 2024-08-14 RX ORDER — OXYCODONE HYDROCHLORIDE 5 MG/1
5 TABLET ORAL ONCE
Status: COMPLETED | OUTPATIENT
Start: 2024-08-14 | End: 2024-08-14

## 2024-08-14 RX ORDER — PIPERACILLIN SODIUM, TAZOBACTAM SODIUM 3; .375 G/15ML; G/15ML
3.38 INJECTION, POWDER, LYOPHILIZED, FOR SOLUTION INTRAVENOUS ONCE
Status: DISCONTINUED | OUTPATIENT
Start: 2024-08-14 | End: 2024-08-14

## 2024-08-14 RX ORDER — LEVOFLOXACIN 750 MG/1
750 TABLET, FILM COATED ORAL ONCE
Status: DISCONTINUED | OUTPATIENT
Start: 2024-08-14 | End: 2024-08-14

## 2024-08-14 RX ADMIN — CALCIUM ACETATE 1334 MG: 667 CAPSULE ORAL at 09:27

## 2024-08-14 RX ADMIN — ACETAMINOPHEN, ASPIRIN, CAFFEINE 2 TABLET: 250; 65; 250 TABLET, FILM COATED ORAL at 04:45

## 2024-08-14 RX ADMIN — PREGABALIN 25 MG: 25 CAPSULE ORAL at 21:16

## 2024-08-14 RX ADMIN — RISPERIDONE 0.5 MG: 0.5 TABLET, FILM COATED ORAL at 09:27

## 2024-08-14 RX ADMIN — Medication 1 TABLET: at 16:59

## 2024-08-14 RX ADMIN — OXYCODONE HYDROCHLORIDE 5 MG: 5 TABLET ORAL at 10:18

## 2024-08-14 RX ADMIN — RISPERIDONE 1 MG: 1 TABLET, FILM COATED ORAL at 21:16

## 2024-08-14 RX ADMIN — AMOXICILLIN AND CLAVULANATE POTASSIUM 1 TABLET: 500; 125 TABLET, FILM COATED ORAL at 21:16

## 2024-08-14 RX ADMIN — AMOXICILLIN AND CLAVULANATE POTASSIUM 1 TABLET: 500; 125 TABLET, FILM COATED ORAL at 16:58

## 2024-08-14 RX ADMIN — METOPROLOL SUCCINATE 25 MG: 25 TABLET, EXTENDED RELEASE ORAL at 09:27

## 2024-08-14 RX ADMIN — AMLODIPINE BESYLATE 10 MG: 5 TABLET ORAL at 09:27

## 2024-08-14 RX ADMIN — CALCITRIOL CAPSULES 0.25 MCG 0.25 MCG: 0.25 CAPSULE ORAL at 09:27

## 2024-08-14 RX ADMIN — METOPROLOL SUCCINATE 25 MG: 25 TABLET, EXTENDED RELEASE ORAL at 21:15

## 2024-08-14 RX ADMIN — ACETAMINOPHEN, ASPIRIN, CAFFEINE 2 TABLET: 250; 65; 250 TABLET, FILM COATED ORAL at 22:00

## 2024-08-14 RX ADMIN — PROCHLORPERAZINE MALEATE 10 MG: 10 TABLET ORAL at 10:21

## 2024-08-14 ASSESSMENT — ACTIVITIES OF DAILY LIVING (ADL)
ADLS_ACUITY_SCORE: 30
ADLS_ACUITY_SCORE: 29
ADLS_ACUITY_SCORE: 31
ADLS_ACUITY_SCORE: 29
ADLS_ACUITY_SCORE: 31
ADLS_ACUITY_SCORE: 29
ADLS_ACUITY_SCORE: 30
ADLS_ACUITY_SCORE: 29
ADLS_ACUITY_SCORE: 29
ADLS_ACUITY_SCORE: 31
ADLS_ACUITY_SCORE: 30
ADLS_ACUITY_SCORE: 31
ADLS_ACUITY_SCORE: 30
ADLS_ACUITY_SCORE: 29
ADLS_ACUITY_SCORE: 31
ADLS_ACUITY_SCORE: 31
ADLS_ACUITY_SCORE: 30
ADLS_ACUITY_SCORE: 30

## 2024-08-14 NOTE — PLAN OF CARE
"Pt up in chair throughout the night, declines assistance with cares or assessment.  PRN excedrin given for generalized back, feet and neck pain and states that it is \"always a 10\"  Reports frustration with discharge process and states that he signed a piece of paper and is working with social workers.  Allowed writer to assess fistula site at midnight rounds, but declined to have head-toe assessment completed.    Gisela Nguyen RN                          "

## 2024-08-14 NOTE — PROCEDURES
Potassium   Date Value Ref Range Status   08/12/2024 4.4 3.4 - 5.3 mmol/L Final   10/17/2014 4.5 3.4 - 5.3 mmol/L Final     Hemoglobin   Date Value Ref Range Status   08/12/2024 8.0 (L) 13.3 - 17.7 g/dL Final   03/27/2014 13.8 13.3 - 17.7 g/dL Final     Creatinine   Date Value Ref Range Status   08/10/2024 11.76 (H) 0.67 - 1.17 mg/dL Final   10/17/2014 1.6 (H) 0.8 - 1.5 mg/dL Final     Urea Nitrogen   Date Value Ref Range Status   08/10/2024 38.7 (H) 6.0 - 20.0 mg/dL Final   10/17/2014 20.0 5.0 - 24.0 mg/dL Final     Sodium   Date Value Ref Range Status   08/12/2024 137 135 - 145 mmol/L Final   10/17/2014 147.0 (H) 133.0 - 144.0 mmol/L Final     INR   Date Value Ref Range Status   03/27/2014 1.0  Final       DIALYSIS PROCEDURE NOTE  Hepatitis status of previous patient on machine log was checked and verified ok to use with this patients hepatitis status.  Patient dialyzed for 3 hrs. on a K2 bath with a net fluid removal of  3L.  A BFR of 400 ml/min was obtained via a AVF using 15 gauge needles.      The treatment plan was discussed with Dr. Mancilla during the treatment.    Total heparin received during the treatment: 0 units.   Needle cannulation sites held x 10 min.       Meds  given: none   Complications: tolerated tx well      Person educated: patient. Knowledge base fair. Barriers to learning: none. Educated on procedure via verbal mode.      ICEBOAT? Timeout performed pre-treatment  I: Patient was identified using 2 identifiers  C:  Consent Signed Yes  E: Equipment preventative maintenance is current and dialysis delivery system OK to use  B: Hepatitis B Surface Antigen: negative; Draw Date: 7/23/24      Hepatitis B Surface Antibody: immune; Draw Date: 7/23/24  O: Dialysis orders present and complete prior to treatment  A: Vascular access verified and assessed prior to treatment  T: Treatment was performed at a clinically appropriate time  ?: Patient was allowed to ask questions and address concerns prior to  treatment  See Adult Hemodialysis flowsheet in [facility] for further details and post assessment.  Machine water alarm in place and functioning. Transducer pods intact and checked every 15min.   Pt returned via bed.  Chlorine/Chloramine water system checked every 4 hours.  Outpatient Dialysis at [facility]    Patient repositioned every 2 hours during the treatment.  Post treatment report given to   Gisela Garcia RN regarding 3L of fluid removed,

## 2024-08-14 NOTE — PROGRESS NOTES
Pt refused assessments and education. Writer was able to administer some medications, although pt did decline Phoslo and Lidocaine patches. Care ongoing. Pt is not in acute distress.

## 2024-08-14 NOTE — PLAN OF CARE
Problem: Adult Inpatient Plan of Care  Goal: Absence of Hospital-Acquired Illness or Injury  Outcome: Progressing  Intervention: Identify and Manage Fall Risk  Recent Flowsheet Documentation  Taken 8/14/2024 0922 by Gisela Marley RN  Safety Promotion/Fall Prevention:   room near nurse's station   room organization consistent   safety round/check completed  Intervention: Prevent Skin Injury  Recent Flowsheet Documentation  Taken 8/14/2024 0922 by Gisela Marley RN  Body Position: position changed independently  Goal: Optimal Comfort and Wellbeing  Outcome: Progressing  Intervention: Monitor Pain and Promote Comfort  Recent Flowsheet Documentation  Taken 8/14/2024 1018 by Gisela Marley RN  Pain Management Interventions:   medication (see MAR)   cold applied  Taken 8/14/2024 0922 by Gisela Marley RN  Pain Management Interventions: (MD stated he would order pain meds) cold applied     Problem: Fall Injury Risk  Goal: Absence of Fall and Fall-Related Injury  Outcome: Progressing  Intervention: Identify and Manage Contributors  Recent Flowsheet Documentation  Taken 8/14/2024 0922 by Gisela Marley RN  Medication Review/Management: medications reviewed  Intervention: Promote Injury-Free Environment  Recent Flowsheet Documentation  Taken 8/14/2024 0922 by Gisela Marley RN  Safety Promotion/Fall Prevention:   room near nurse's station   room organization consistent   safety round/check completed   Goal Outcome Evaluation: Patient down at dialysis now (since noon). Was alert and oriented this am. C/O severe pain and swelling noted R side of face. One time dose of oxycodone given with minimal relief. Patient refused IV placement for IV abx.

## 2024-08-14 NOTE — PROGRESS NOTES
"    RENAL PROGRESS NOTE    PLAN:  HD run today, 3 L removed       ASSESSMENT:  ESRD:  IHD MWF at the [facility]  RX:  , Typically high volume UF 3-5kg per pt report.  (he frequently cuts tx short) /, K 2   ACCESS: L AVF  S/P fistulagram 8/13  H/o poor compliance, short tx etc   Recurrent issues with HyperK  On MWF, ran today without reported issue      HyperKalemia:  As above, urgent dialysis 8/8 and regular dialysis on 8/9  Lokelma 10g on non dialysis days, may increase to daily if persistent and access functional   I asked Macario if he would be open to taking lokelma when he is discharged \"if I have somewhere to take it I would.\"     HTN/volume:  Mostly controlled when he allows it to be taken (complaint of severe pain with BP cuff) and compliant with meds   Typically quite volume overloaded , high volume UF , educated on FR   PTA Amlod, Metop, continue     CKD/MBD:  On calcitriol, Phoslo as binder, continue     Anemia:  ACD, on DEMAR and venofer as needed outpt,  Last hgb in the 8's     Bi-polar:  On Risperdal, h/o oppositional behavior tendencies, poor medical compliance , some paranoid behaviour noted in the ED during my intake   Reported cognitive impairment, with recurrent conflicts with prior staff, transport team, working on psych TCU placement and housing per       Homelessness:  Pt reports that he doesn't not have stable living  situation, and gets to/from dialysis via scooter   Keeps clothing in a storage locker     Chest pain:  Recurrent, prior ACS w/up reportedly unremarkable.    Facial Swelling  Ultrasound today  Hospital working up  Now on Abx    SUBJECTIVE:    Seen on HD, friendly  Endo of treatment  He looked sleepy  Shares his pain meds wearing off and his back hurting, his jaw and feet  Not yet eaten today, says was sleeping mos day, will plan to get something when he is back up n room  No other acute concerns raised.  No issues on HD      OBJECTIVE:  Physical Exam "   Temp: 97.4  F (36.3  C) Temp src: Temporal BP: (!) 174/102 Pulse: 84   Resp: 17 SpO2: 98 % O2 Device: None (Room air)    Vitals:    24 1950 24   Weight: 104.3 kg (229 lb 15 oz) 112.7 kg (248 lb 7.3 oz)     Vital Signs with Ranges  Temp:  [97.4  F (36.3  C)-98.2  F (36.8  C)] 97.4  F (36.3  C)  Pulse:  [83-86] 84  Resp:  [16-18] 17  BP: (158-180)/() 174/102  SpO2:  [97 %-98 %] 98 %  I/O last 3 completed shifts:  In: 1200 [P.O.:1200]  Out: 750 [Urine:750]    Temp (24hrs), Av.4  F (36.9  C), Min:98.4  F (36.9  C), Max:98.4  F (36.9  C)      No data found.    Intake/Output Summary (Last 24 hours) at 2024 1408  Last data filed at 2024 2150  Gross per 24 hour   Intake --   Output 200 ml   Net -200 ml       PHYSICAL EXAM:  General - Alert and oriented x3, appears comfortable, NAD, finishing HD  Right cheek swelling  Cardiovascular - BP escalated, rate controlled   Respiratory -on RA   Abd: obese   Extremities -trace lower extremity edema bilaterally, no overt pitting   Skin: dry, intact, no rash, good turgor  Neuro:  Grossly intact, no focal deficits  MSK:  Grossly intact  Psych:  Normal affect, has been argumentative here with some staff at hospital    LABORATORY STUDIES:     Recent Labs   Lab 24  0705 08/10/24  1138 24  1813 24  0910 24  1902   WBC 7.9 5.8 5.4 6.3 6.5   RBC 2.90* 3.46* 3.24* 3.14* 3.46*   HGB 8.0* 9.4* 9.0* 8.6* 9.5*   HCT 25.7* 30.2* 28.4* 27.0* 29.1*    274 209 229 256       Basic Metabolic Panel:  Recent Labs   Lab 24  1553 24  0705 08/10/24  1155 24  1813 24  1004 24  0910 24  1349   NA  --  137 141 138  --  138 139   POTASSIUM 4.4 6.0* 5.4* 4.5  --  5.2 6.7*   CHLORIDE  --  98 98 97*  --  96* 99   CO2  --  23 28 27  --  26 23   BUN  --   --  38.7* 26.6*  --  49.5* 72.1*   CR  --   --  11.76* 8.84*  --  14.04* 17.22*   GLC  --   --  125* 124* 88 104* 92   LOVELY  --   --  9.6 8.9  --  8.8 8.6*        INRNo lab results found in last 7 days.     Recent Labs   Lab Test 08/12/24  0705 08/10/24  1138   WBC 7.9 5.8   HGB 8.0* 9.4*    274       Personally reviewed current labs      Judith ZAYAS  [facility]  [phone]

## 2024-08-14 NOTE — PROGRESS NOTES
"Care Management Follow Up    Length of Stay (days): 5    Expected Discharge Date: 08/14/2024    Anticipated Discharge Plan:  Dialysis Services, Other (Comments) (unknown at this time)    Transportation: Anticipate medical transport    PT Recommendations: Transitional Care Facility  OT Recommendations:  Transitional Care Facility     Barriers to Discharge: medical stability, placement    Prior Living Situation: homeless, other (see comments) (\"I live on the streets or sometimes I can stay at my brother's house. Today when I came to the ER I was on the streets and called 911\".) with other (see comments) (\"I live on the streets or sometimes I can stay at my brother's house\".)    Advanced Directive on File: no concerns identified (\"I don't have a written advanced directive\".)     Patient/Spokesperson Updated: Yes. Who? Patient    Additional Information:  ANDREW met with Pt at bedside to discuss [facility] appeal. Pt reports he called last night and started the appeal process with [employer]. Per previous note from RNLISA, [facility] reports Pt has not filed an appeal of discharge.     ANDREW spoke with Jose from admissions at [facility]. Jose reports he discussed the appeal with the facility director, and the director has indicated they are now declining Pt for TCU. ANDREW sent TCU referral to Zabrina at [facility] ([phone]) via manual fax due to Pt previously being declined at other facilities and globally declined at [facility].     ANDREW received update from MD that Pt is now not medically ready for discharge at this time due to new swelling in Pt's face/jaw. CM to continue following Pt's medical progression and looking for TCU placement.     12:03 PM  ANDREW received call from Zabrina at [facility]. Pt has been declined by [facility] facilities. ANDREW sent referral to [facility], currently pending.     3:14 PM  ANDREW left voicemail for Page (CHECO) at [facility] (admissions coordinator is currently on vacation) at " [phone] inquiring about status of referral. SW attempted to update Pt regarding TCU status but Pt has been at dialysis all afternoon.     JUNE JohnstonW

## 2024-08-14 NOTE — PROGRESS NOTES
Per notes, patient had received the IMM and stated intent to call [facility] to appeal his discharge on 8/13/2024. However, no request for information has been received from [facility] at this time. [facility] is open 9 AM to 5 PM M-F. Writer left a message for [facility] at [phone] to request a return call to verify whether patient filed an appeal of discharge or not.  10:44 AM:  Received a call from [facility] stating patient HAS NOT filed an appeal of discharge. Will update manager and SW CM.     Kimi Felix RN

## 2024-08-14 NOTE — PHARMACY-VANCOMYCIN DOSING SERVICE
"Pharmacy Vancomycin Initial Note  Date of Service 2024  Patient's  1980  44 year old, male    Indication: Abscess and Skin and Soft Tissue Infection    Current estimated CrCl = Estimated Creatinine Clearance: 11.4 mL/min (A) (based on SCr of 11.76 mg/dL (H)).    Creatinine for last 3 days  No results found for requested labs within last 3 days.    Recent Vancomycin Level(s) for last 3 days  No results found for requested labs within last 3 days.      Vancomycin IV Administrations (past 72 hours)        No vancomycin orders with administrations in past 72 hours.                    Nephrotoxins and other renal medications (From now, onward)      Start     Dose/Rate Route Frequency Ordered Stop    24 1700  vancomycin (VANCOCIN) 2,000 mg in sodium chloride 0.9 % 500 mL intermittent infusion         2,000 mg  over 2 Hours Intravenous ONCE 24 1014      24 1630  piperacillin-tazobactam (ZOSYN) 3.375 g vial to attach to  mL bag        Note to Pharmacy: For [facility], [facility] and [facility]: For Zosyn-naive patients, use the \"Zosyn initial dose + extended infusion\" order panel.    3.375 g  over 240 Minutes Intravenous EVERY 12 HOURS 24 1000      24 1030  piperacillin-tazobactam (ZOSYN) 3.375 g vial to attach to  mL bag         3.375 g  over 30 Minutes Intravenous ONCE 24 1002      24 1014  vancomycin place hinds - receiving intermittent dosing         1 each Intravenous SEE ADMIN INSTRUCTIONS 24 1015              Contrast Orders - past 72 hours (72h ago, onward)      Start     Dose/Rate Route Frequency Stop    24 1030  iodixanol (VISIPAQUE 320) injection 100 mL         100 mL Arterial ONCE 24 1015                  Plan:  Start vancomycin  2000 mg IV once, start intermittent dosing.   Vancomycin monitoring method: Renal Replacement Therapy  Vancomycin therapeutic monitoring goal: 15-20 mg/L  Pharmacy will check vancomycin levels as appropriate in 1-3 " Days.    Serum creatinine levels will be ordered daily for the first week of therapy and at least twice weekly for subsequent weeks.      CHANNING WHITE RPH

## 2024-08-14 NOTE — PROGRESS NOTES
[facility]    Progress Note - Hospitalist Service       Date of Admission:  8/8/2024    Assessment & Plan   Macario Delatorre is a 44 year old male admitted on 8/8/2024. He has a history of ESRD on HD MWF w/associated nephrology, cognitive disability, mental health complex hx (PTSD, bipolar? Agitation) HTN, gout, HLD and is admitted for recurrent chest pains, upper back pain found to have hyperkalemia (K 6.7), slightly elevated troponin at 72 w/no EKG changes, delta trop pending. Very complex social history including active homelessness and poor medication adherence in the past, has doctored inconsistently.  CM continues to work on placement, has been declined from all TCU's at this point.     #ESRD on HD MWF  #Hyperkalemia, severe  Patient has had recent previous similar admissions, notably 7/22-8/2/24 for hyperkalemia and dialysis in which patient left AMA. Also was admitted 7/10-7/18 for syncopal episode and chest pains, hyperkalemia and ESRD with HD. Per patient had dialysis run 8/7/24 but was stopped early due to him c/o extremities being cold. Have had issues in the past with continuing dialysis, however patient is understanding this admission of the importance of receiving his regularly scheduled treatments. K 6.7 this admission, s/p insulin and dextrose in ED, recheck pending. Cr. 17.2, BUN 72, GFR 3. Unclear etiology of ESRD, suspect 2/2 chronic poorly controlled HTN, last A1C 7/10/24 4.4, is ot on any medications for diabetes currently and BG wnl. Appears has not had prior workup.  -Nephrology consulted, recs appreciated  -Emergent dialysis initiated 8/8/24, will resume regular HD schedule MWF 8/9 per nephrology              -K 4.4 8/12 after dialysis  -Cont. Dialysis MWF  -Fistulogram performed 8/13 w/IR, L arm fistulogram demonstrated high grade stenosis of cephalic vein arch, treated successfully w/angioplasty  -Renal diet              -Per nephro, ok to resume regular diet if  pt agrees to take K binders              -s/p Lokelma given 8/10  -Consider re-consultation of palliative care gvmilan prior refusal of HD, however patient has been adherent and understanding this admission  -Resume home renal multivitamins, calcitriol, calcium acetate    #R Facial Pain  #Facial Swelling  -Pt noted 8/13 started having swelling R side of his face with significant 10/10 pain and tenderness to palaption. On exam primarily tender over zygomatic process, however also appears to be some involvement of R parotid gland. D/w radiology, recommended initial eval with ultrasound head and neck soft tissue, if nonconclusive would consider noncon CT (given ESRD on HD likely would not tolerate IV contrast). Pt does have history of poor dentition which is noted on exam, appears to have abrasion on the inner side of his cheek as well. Initial concern suppurative parotitis vs sialolithiasis vs likely dental infection. Currently afebrile, no updated CBC today due to pt refusing lab draws  -R facial soft tissue US demonstrating parotid gland wnl, zygomatic bone/cheek with no swelling appreciated on exam  -Pt does not have IV access and is declining another IV placed     -Augmentin 500-125 q8h x 7 days for suspected tooth/gingival infection   -One time PO oxycodone given for pain management   -Has PRN excedrin   -Ice the area as needed   -US Head/neck soft tissue ordered 8/14     #Chest Pain  #ACS r/out   Patient presented with chest pain, diffuse tenderness midsternal radiating to both L and R side of his chest, reproducible, has had chest pain recurrence on previous admissions. Denies SOB currently, initial EKG on admission not demonstrating ischemic changes, initial troponin 72, delta trop downtrending slightly. CXR demonstrating Lung volumes are low. No focal airspace opacities, pleural effusions, or pneumothorax. Normal pulmonary vascularity. Stable cardiomediastinal silhouette. I do not suspect cardiac etiology at  "this time.  -Cardiac telemetry, no events detected, discontinued 8/10  -Pt requested excedrin for pain, ok with CKD  -Hold scheduled tylenol due to above     #Back Pain  #Fall, previous encounter  Pt endorsing upper back pain most notably tender over cervical paraspinal muscles, appears that he had [place] [facility] [place] \"[place]apsed\" and hurt his back, denied LOC. Imaging performed at this time included CT lumbar, thoracic, and cervical spine, all demonstrating no evidence for fracture or dislocation, no significant spinal canal or neuroforaminal stenosis. Mild multilevel cervical spondylosis. CT head demonstrating no intracranial abnormality.     -Lidocaine patches PRN  -Excedrin BID PRN is available to patient as this is his preference (pt requested instead of tylenol)  -Would be valuable to have PT's input for upper MSK back pain, recs appreciated     Cognitive impairment  History of mental health problems with hallucinations  Bipolar disorder  Agitation   Suicidal ideation  Patient has a history of refusing cares and can easily get agitated. Patient reports history of bipolar and schizophrenia. Does not endorse routine psychiatric follow up or regular medication use. In [facility] system was started on risperidone 1 mg at bedtime though has not taken consistently. Stated he can escalate quickly if upset. 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 7/30 started expressing SI with a plan to jump out of the window in setting of extreme frustration and refusal to undergo HD without his exact circumstances, patient himself requesting that his psych meds be increased.  - Psychiatric consulted last admission, could consider reconsultation if elevating risk of agitation              - Psych evaluated, patient felt to have capacity                          - per psych NP, patient is not holdable and has capacity to make decisions for himself                       " "   - per [facility] worker has stated that so long as psychiatry has deemed him decisional, he is not holdable  -Risperidone 0.5 mg AM, 1 mg bedtime     Goals of care  Note from Dr. Cuenca from prior admission:  \"Patient has previously reiterated that he will not undergo HD for his ESRD unless he gets his specific environment situated (preferred Rns only, demanding HD only in recliner and refusing HD in a bed, only wants HD in his room and away from others). Has continued to decline HD, seems to understand that cardiac arrest is a risk, though continues to express that he \"will not die\" due to this anytime soon. However, continues to reiterate that he would like full CPR and resuscitative efforts at this time, including intubation. Unclear if he would want HD if in this situation. Given known cognitive impairment as above as well as mood disorders, question if he is able to be decisional and/or what his needs would be if he were to become unable to express his needs. Palliative has been following, however patient continuing to decline discussion regarding end of life cares\"     Chronic anemia, normocytic   Baseline Hgb 9-10. Likely related to ESRD/chronic disease. Iron stores adequate. CBC on admission pending  - Continue DEMAR weekly; resume with outpatient dialysis.  -Transfuse for HgB <7 if indicated  - Daily CBC (patient has frequently refused labs on prior admissions)     Chronic pain syndrome, stable    Neuropathy, reported to be diabetic  - Cont. Lyrica 25 mg daily              -Cannot increase dose due to ESRD              -Pt felt capsaicin, icyhot was not effective, will discontinue                         Hypertension, uncontrolled  Continues to be hypertensive during hospitalization likely in setting of missed dialysis session.   - Continue PTA amlodipine 10 every day and metoprolol 25 BID     Disposition Planning  Discussed with CM, pt currently homeless and \"living on the streets\", pt reports has "  through [employer] working on permanent housing but has not been completed yet, pt is interested in group home or LTC in the future but for immediate disposition planning likely TCU or medical respite care. PT/OT consulted to evaluate patient.  -Pt was evaluated by PT/OT 8/10, both services recommending TCU placement upon discharge due to cognitive impairment and difficulty with ambulation  2/2 deconditioning and neuropathy  -D/W CM, patient has declined from all TCU placement at this point due to appealing his discharge. Patient may only be able to return to live with family upon discharge. Pt was initially accepted to TCU but declined due to not having his electric scooter, offered to order a new scooter but pt declined due to cost. Offered voluntary services transport scooter, pt declined this as well.     L hand/thumb pain  Pt reports he has had pain in his L thumb which has limited his  strength and functional status. On exam, does not appear markedly swollen, he does note pain most significant in 1st dorsal compartment of wrist. He is tender to palpation over distal radial styloid, but will not let examiner do much in terms of palpation or ROM. I suspect most likely de quervain tenosynovitis in setting of overuse injury vs. Intersection syndrome. Could consider further evaluation with xray but without traumatic history I am less suspicious of fracture.  -Consider thumb spica cast or brace at discharge  -will require OP follow up, treatment options include corticosteroid injection or OP hand therapy w/OT   -Excedrin scheduled BID per pt request over tylenol  -Capsaican cream can also be applied to his thumb  -Limited medication options due to renal function              Diet: Snacks/Supplements Adult: Other; [place] at HS; Between Meals  Regular Diet Adult  Diet    DVT Prophylaxis: Pneumatic Compression Devices  Raphael Catheter: Not present  Fluids: None  Lines: None     Cardiac Monitoring:  "None  Code Status: Full Code      Clinically Significant Risk Factors                            # Overweight: Estimated body mass index is 28.71 kg/m  as calculated from the following:    Height as of 7/22/24: 1.981 m (6' 6\").    Weight as of this encounter: 112.7 kg (248 lb 7.3 oz).      # Financial/Environmental Concerns: none  # Housing Instability: noted in nursing assessment               Disposition Plan      Expected Discharge Date: 08/15/2024    Discharge Delays: Placement - TCU  Destination: other (comment) (unknown at this time)  Discharge Comments: TCU        The patient's care was discussed with the Attending Physician, Dr. Lemus .    Abdifatah King MD  Hospitalist Service  [facility]  Securely message with [employer] (more info)  Text page via [employer] Paging/Directory   ______________________________________________________________________    Interval History   Patient refused TCU placement yesterday, primary team attempted to discharge. Now pt c/o R sided facial pain and swelling, started yesterday worsening today. Denies pain w/swallowing, vision changes, N/V, SOB, etc. Severely tender to palpation.     Physical Exam   Vital Signs: Temp: 98.2  F (36.8  C) Temp src: Axillary BP: (!) 170/104 Pulse: 86   Resp: 16 SpO2: 97 % O2 Device: None (Room air)    Weight: 248 lbs 7.33 oz    Physical Exam  Constitutional:       General: He is not in acute distress.     Appearance: Normal appearance. He is not ill-appearing or diaphoretic.   HENT:      Head:      Jaw: Swelling present.      Salivary Glands: Right salivary gland is diffusely enlarged and tender.      Mouth/Throat:      Mouth: Mucous membranes are moist.      Pharynx: Oropharynx is clear.      Comments: Dentition is poor, abrasion noted inner R cheek, unable to fully visualize due to discomfort with jaw opening  Cardiovascular:      Rate and Rhythm: Normal rate and regular rhythm.      Pulses: Normal pulses.      Heart " sounds: Normal heart sounds.   Pulmonary:      Effort: Pulmonary effort is normal.      Breath sounds: Normal breath sounds.   Abdominal:      General: Bowel sounds are normal.      Palpations: Abdomen is soft.   Musculoskeletal:      Cervical back: No rigidity or tenderness.      Right lower leg: No edema.      Left lower leg: No edema.   Lymphadenopathy:      Cervical: No cervical adenopathy.   Skin:     Capillary Refill: Capillary refill takes less than 2 seconds.   Neurological:      General: No focal deficit present.      Mental Status: He is alert and oriented to person, place, and time. Mental status is at baseline.   Psychiatric:         Mood and Affect: Mood normal.         Behavior: Behavior normal.            Data         Imaging results reviewed over the past 24 hrs:   No results found for this or any previous visit (from the past 24 hour(s)).    Abdifatah King MD  PGY-2  [facility]  [facility]   August 14, 2024

## 2024-08-14 NOTE — PROVIDER NOTIFICATION
Pt would not allow writer to do a head to toe assessment because the pt feels that the hospital is not giving him what he needs and so he will not allow writer or staff to do with they need to do.  When writer inquires what pt needs further he said that he will be fine with the water and cranberry juice writer brought him.

## 2024-08-15 ENCOUNTER — APPOINTMENT (OUTPATIENT)
Dept: PHYSICAL THERAPY | Facility: HOSPITAL | Age: 44
DRG: 628 | End: 2024-08-15
Payer: MEDICARE

## 2024-08-15 PROCEDURE — 250N000013 HC RX MED GY IP 250 OP 250 PS 637: Performed by: INTERNAL MEDICINE

## 2024-08-15 PROCEDURE — 120N000001 HC R&B MED SURG/OB

## 2024-08-15 PROCEDURE — 99232 SBSQ HOSP IP/OBS MODERATE 35: CPT | Mod: GC

## 2024-08-15 PROCEDURE — 250N000013 HC RX MED GY IP 250 OP 250 PS 637

## 2024-08-15 PROCEDURE — 99232 SBSQ HOSP IP/OBS MODERATE 35: CPT | Performed by: PHYSICIAN ASSISTANT

## 2024-08-15 PROCEDURE — 97110 THERAPEUTIC EXERCISES: CPT | Mod: GP | Performed by: PHYSICAL THERAPIST

## 2024-08-15 RX ORDER — TOPIRAMATE SPINKLE 15 MG/1
15 CAPSULE ORAL EVERY 12 HOURS SCHEDULED
Status: DISCONTINUED | OUTPATIENT
Start: 2024-08-15 | End: 2024-08-19 | Stop reason: HOSPADM

## 2024-08-15 RX ADMIN — METOPROLOL SUCCINATE 25 MG: 25 TABLET, EXTENDED RELEASE ORAL at 20:53

## 2024-08-15 RX ADMIN — CALCITRIOL CAPSULES 0.25 MCG 0.25 MCG: 0.25 CAPSULE ORAL at 10:38

## 2024-08-15 RX ADMIN — Medication 1 TABLET: at 10:40

## 2024-08-15 RX ADMIN — RISPERIDONE 0.5 MG: 0.5 TABLET, FILM COATED ORAL at 10:41

## 2024-08-15 RX ADMIN — ACETAMINOPHEN, ASPIRIN, CAFFEINE 2 TABLET: 250; 65; 250 TABLET, FILM COATED ORAL at 11:07

## 2024-08-15 RX ADMIN — METOPROLOL SUCCINATE 25 MG: 25 TABLET, EXTENDED RELEASE ORAL at 10:39

## 2024-08-15 RX ADMIN — SODIUM ZIRCONIUM CYCLOSILICATE 10 G: 10 POWDER, FOR SUSPENSION ORAL at 13:16

## 2024-08-15 RX ADMIN — ACETAMINOPHEN, ASPIRIN, CAFFEINE 2 TABLET: 250; 65; 250 TABLET, FILM COATED ORAL at 17:10

## 2024-08-15 RX ADMIN — RISPERIDONE 1 MG: 1 TABLET, FILM COATED ORAL at 21:01

## 2024-08-15 RX ADMIN — AMOXICILLIN AND CLAVULANATE POTASSIUM 1 TABLET: 500; 125 TABLET, FILM COATED ORAL at 10:37

## 2024-08-15 RX ADMIN — TOPIRAMATE 15 MG: 15 CAPSULE, COATED PELLETS ORAL at 21:01

## 2024-08-15 RX ADMIN — PREGABALIN 25 MG: 25 CAPSULE ORAL at 20:53

## 2024-08-15 RX ADMIN — AMOXICILLIN AND CLAVULANATE POTASSIUM 1 TABLET: 500; 125 TABLET, FILM COATED ORAL at 21:00

## 2024-08-15 RX ADMIN — AMLODIPINE BESYLATE 10 MG: 5 TABLET ORAL at 10:36

## 2024-08-15 ASSESSMENT — ACTIVITIES OF DAILY LIVING (ADL)
ADLS_ACUITY_SCORE: 27
ADLS_ACUITY_SCORE: 27
ADLS_ACUITY_SCORE: 31
ADLS_ACUITY_SCORE: 31
ADLS_ACUITY_SCORE: 27
ADLS_ACUITY_SCORE: 31
ADLS_ACUITY_SCORE: 27
ADLS_ACUITY_SCORE: 26
ADLS_ACUITY_SCORE: 27
ADLS_ACUITY_SCORE: 31
ADLS_ACUITY_SCORE: 31
ADLS_ACUITY_SCORE: 27
ADLS_ACUITY_SCORE: 31
ADLS_ACUITY_SCORE: 26
ADLS_ACUITY_SCORE: 27
ADLS_ACUITY_SCORE: 31
ADLS_ACUITY_SCORE: 27
ADLS_ACUITY_SCORE: 27
ADLS_ACUITY_SCORE: 31
ADLS_ACUITY_SCORE: 31
ADLS_ACUITY_SCORE: 26

## 2024-08-15 NOTE — PROGRESS NOTES
[facility]    Progress Note - Hospitalist Service       Date of Admission:  8/8/2024    Assessment & Plan   Macario Delatorre is a 44 year old male admitted on 8/8/2024. He has a history of ESRD on HD MWF w/associated nephrology, cognitive disability, mental health complex hx (PTSD, bipolar? Agitation) HTN, gout, HLD and is admitted for recurrent chest pains, upper back pain found to have hyperkalemia (K 6.7), slightly elevated troponin at 72 w/no EKG changes, delta trop pending. Very complex social history including active homelessness and poor medication adherence in the past, has doctored inconsistently.  CM continues to work on placement, has been declined from all TCU's at this point. Unclear at this time what a safe disposition plan would be given he is homeless and cannot stay with his brother in the short term.      #ESRD on HD MWF  #Hyperkalemia, severe  Patient has had recent previous similar admissions, notably 7/22-8/2/24 for hyperkalemia and dialysis in which patient left AMA. Also was admitted 7/10-7/18 for syncopal episode and chest pains, hyperkalemia and ESRD with HD. Per patient had dialysis run 8/7/24 but was stopped early due to him c/o extremities being cold. Have had issues in the past with continuing dialysis, however patient is understanding this admission of the importance of receiving his regularly scheduled treatments. K 6.7 this admission, s/p insulin and dextrose in ED, recheck pending. Cr. 17.2, BUN 72, GFR 3. Unclear etiology of ESRD, suspect 2/2 chronic poorly controlled HTN, last A1C 7/10/24 4.4, is ot on any medications for diabetes currently and BG wnl. Appears has not had prior workup.  -Nephrology consulted, recs appreciated  -Emergent dialysis initiated 8/8/24, will resume regular HD schedule MWF 8/9 per nephrology              -K 4.4 8/12 after dialysis  -Cont. Dialysis MWF  -Fistulogram performed 8/13 w/IR, L arm fistulogram demonstrated high grade  stenosis of cephalic vein arch, treated successfully w/angioplasty  -Renal diet              -Per nephro, ok to resume regular diet if pt agrees to take K binders              -s/p Lokelma given 8/10  -Consider re-consultation of palliative care gvmilan prior refusal of HD, however patient has been adherent and understanding this admission  -Resume home renal multivitamins, calcitriol, calcium acetate     #R Facial Pain  #Facial Swelling  -Pt noted 8/13 started having swelling R side of his face with significant 10/10 pain and tenderness to palaption. On exam primarily tender over zygomatic process, however also appears to be some involvement of R parotid gland. D/w radiology, recommended initial eval with ultrasound head and neck soft tissue, if nonconclusive would consider noncon CT (given ESRD on HD likely would not tolerate IV contrast). Pt does have history of poor dentition which is noted on exam, appears to have abrasion on the inner side of his cheek as well. Initial concern suppurative parotitis vs sialolithiasis vs likely dental infection. Currently afebrile, no updated CBC today due to pt refusing lab draws  -R facial soft tissue US demonstrating parotid gland wnl, zygomatic bone/cheek with no swelling appreciated on exam  -Pt does not have IV access and is declining another IV placed.                  -Augmentin 500-125 q8h x 7 days for suspected tooth/gingival infection              -One time PO oxycodone given for pain management, ok to repeat x1 dose if persistent severe pain but would not recommend continuing on discharge   -Would consider further imaging with CT scan 8/16 if still not improving              -Has PRN excedrin              -Ice the area as needed              -US Head/neck soft tissue ordered 8/14     #Chest Pain  #ACS r/out   Patient presented with chest pain, diffuse tenderness midsternal radiating to both L and R side of his chest, reproducible, has had chest pain recurrence on  "previous admissions. Denies SOB currently, initial EKG on admission not demonstrating ischemic changes, initial troponin 72, delta trop downtrending slightly. CXR demonstrating Lung volumes are low. No focal airspace opacities, pleural effusions, or pneumothorax. Normal pulmonary vascularity. Stable cardiomediastinal silhouette. I do not suspect cardiac etiology at this time.  -Cardiac telemetry, no events detected, discontinued 8/10  -Pt requested excedrin for pain, ok with CKD  -Hold scheduled tylenol due to above     #Back Pain  #Fall, previous encounter  Pt endorsing upper back pain most notably tender over cervical paraspinal muscles, appears that he had workup done 8/6 at [facility] when he \"collapsed\" and hurt his back, denied LOC. Imaging performed at this time included CT lumbar, thoracic, and cervical spine, all demonstrating no evidence for fracture or dislocation, no significant spinal canal or neuroforaminal stenosis. Mild multilevel cervical spondylosis. CT head demonstrating no intracranial abnormality.     -Lidocaine patches PRN  -Excedrin BID PRN is available to patient as this is his preference (pt requested instead of tylenol)  -Would be valuable to have PT's input for upper MSK back pain, recs appreciated     Cognitive impairment  History of mental health problems with hallucinations  Bipolar disorder  Agitation   Suicidal ideation  Patient has a history of refusing cares and can easily get agitated. Patient reports history of bipolar and schizophrenia. Does not endorse routine psychiatric follow up or regular medication use. In [facility] system was started on risperidone 1 mg at bedtime though has not taken consistently. Stated he can escalate quickly if upset. 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 7/30 started expressing SI with a plan to jump out of the window in setting of extreme frustration and refusal to undergo HD without his exact " "circumstances, patient himself requesting that his psych meds be increased.  - Psychiatric consulted last admission, could consider reconsultation if elevating risk of agitation              - Psych evaluated, patient felt to have capacity                          - per psych NP, patient is not holdable and has capacity to make decisions for himself                          - per SW, [facility] worker has stated that so long as psychiatry has deemed him decisional, he is not holdable  -Risperidone 0.5 mg AM, 1 mg bedtime     Goals of care  Note from Dr. Cuenca from prior admission:  \"Patient has previously reiterated that he will not undergo HD for his ESRD unless he gets his specific environment situated (preferred Rns only, demanding HD only in recliner and refusing HD in a bed, only wants HD in his room and away from others). Has continued to decline HD, seems to understand that cardiac arrest is a risk, though continues to express that he \"will not die\" due to this anytime soon. However, continues to reiterate that he would like full CPR and resuscitative efforts at this time, including intubation. Unclear if he would want HD if in this situation. Given known cognitive impairment as above as well as mood disorders, question if he is able to be decisional and/or what his needs would be if he were to become unable to express his needs. Palliative has been following, however patient continuing to decline discussion regarding end of life cares\"     Chronic anemia, normocytic   Baseline Hgb 9-10. Likely related to ESRD/chronic disease. Iron stores adequate. CBC on admission pending  - Continue DEMAR weekly; resume with outpatient dialysis.  -Transfuse for HgB <7 if indicated  - Daily CBC (patient has frequently refused labs on prior admissions)     Chronic pain syndrome, stable    Neuropathy, reported to be diabetic  - Cont. Lyrica 25 mg daily              -Cannot increase dose due to ESRD              -Pt felt " "capsaicin, icyhot was not effective, will discontinue  -Will trial topamax today to see if effective                         Hypertension, uncontrolled  Continues to be hypertensive during hospitalization likely in setting of missed dialysis session.   - Continue PTA amlodipine 10 every day and metoprolol 25 BID     Disposition Planning  Discussed with CM, pt currently homeless and \"living on the streets\", pt reports has  through [employer] working on permanent housing but has not been completed yet, pt is interested in group home or LTC in the future but for immediate disposition planning likely TCU or medical respite care. PT/OT consulted to evaluate patient.  -Pt was evaluated by PT/OT 8/10, both services recommending TCU placement upon discharge due to cognitive impairment and difficulty with ambulation  2/2 deconditioning and neuropathy  -D/W CM, patient has declined from all TCU placement at this point. Patient may only be able to return to live with family upon discharge or will need transportation if more permanent housing is able to be secured.     L hand/thumb pain  Pt reports he has had pain in his L thumb which has limited his  strength and functional status. On exam, does not appear markedly swollen, he does note pain most significant in 1st dorsal compartment of wrist. He is tender to palpation over distal radial styloid, but will not let examiner do much in terms of palpation or ROM. I suspect most likely de quervain tenosynovitis in setting of overuse injury vs. Intersection syndrome. Could consider further evaluation with xray but without traumatic history I am less suspicious of fracture.  -Consider thumb spica cast or brace at discharge  -will require OP follow up, treatment options include corticosteroid injection or OP hand therapy w/OT   -Excedrin scheduled BID per pt request over tylenol  -Capsaican cream can also be applied to his thumb  -Limited medication options due to " "renal function        Diet: Snacks/Supplements Adult: Other; [place] at HS; Between Meals  Regular Diet Adult  Diet  Room Service    DVT Prophylaxis: Pneumatic Compression Devices  Raphael Catheter: Not present  Fluids: None  Lines: None     Cardiac Monitoring: None  Code Status: Full Code      Clinically Significant Risk Factors                            # Overweight: Estimated body mass index is 28.71 kg/m  as calculated from the following:    Height as of 7/22/24: 1.981 m (6' 6\").    Weight as of this encounter: 112.7 kg (248 lb 7.3 oz).      # Financial/Environmental Concerns: none  # Housing Instability: noted in nursing assessment               Disposition Plan      Expected Discharge Date: 08/16/2024    Discharge Delays: Placement - TCU  Complex Discharge  *Medically Ready for Discharge  Destination: other (comment) (unknown at this time)  Discharge Comments: Referrals sent to TCU        The patient's care was discussed with the Attending Physician, Dr. Lemus .    Abdifatah King MD  Hospitalist Service  [facility]  Securely message with [employer] (more info)  Text page via [employer] Paging/Directory   ______________________________________________________________________    Interval History   Macario is very pleasant this morning, still c/o facial swellling and pain however reports slightly improved, responded well to 1x dose oxycodone. At this point would consider further workup if not improving tomorrow with CT. Aside from this, Macario has a very difficult disposition due to denials from TCU and inability to stay with his brother. Essentially patient would return to being homeless which concerns me given his dialysis requirements.     Physical Exam   Vital Signs: Temp: 97.9  F (36.6  C) Temp src: Oral BP: 133/88 Pulse: 73   Resp: 20 SpO2: 100 % O2 Device: None (Room air)    Weight: 248 lbs 7.33 oz    Physical Exam  Constitutional:       General: He is not in acute distress.     " Appearance: Normal appearance. He is not ill-appearing or diaphoretic.   HENT:      Head:      Jaw: Swelling present.      Salivary Glands: Right salivary gland is diffusely enlarged and tender.      Mouth/Throat:      Mouth: Mucous membranes are moist.      Pharynx: Oropharynx is clear.      Comments: Dentition is poor, abrasion noted inner R cheek, unable to fully visualize due to discomfort with jaw opening, no clear infectious process in gums or oral cavity identified but limited exam due to swelling.   Cardiovascular:      Rate and Rhythm: Normal rate and regular rhythm.      Pulses: Normal pulses.      Heart sounds: Normal heart sounds.   Pulmonary:      Effort: Pulmonary effort is normal.      Breath sounds: Normal breath sounds.   Abdominal:      General: Bowel sounds are normal.      Palpations: Abdomen is soft.   Musculoskeletal:      Cervical back: No rigidity or tenderness.      Right lower leg: No edema.      Left lower leg: No edema.   Lymphadenopathy:      Cervical: No cervical adenopathy.   Skin:     Capillary Refill: Capillary refill takes less than 2 seconds.   Neurological:      General: No focal deficit present.      Mental Status: He is alert and oriented to person, place, and time. Mental status is at baseline.   Psychiatric:         Mood and Affect: Mood normal.         Behavior: Behavior normal.                Data         Imaging results reviewed over the past 24 hrs:   No results found for this or any previous visit (from the past 24 hour(s)).    Abdifatah King MD  PGY-2  [facility] Medicine Residency  [facility]  August 15, 2024

## 2024-08-15 NOTE — PLAN OF CARE
"  Problem: Adult Inpatient Plan of Care  Goal: Plan of Care Review  Description: The Plan of Care Review/Shift note should be completed every shift.  The Outcome Evaluation is a brief statement about your assessment that the patient is improving, declining, or no change.  This information will be displayed automatically on your shift  note.  Outcome: Progressing   Goal Outcome Evaluation:       Plan to discharge to TCU if he agrees. Upset he can not have his power chair at the facility.  Problem: Adult Inpatient Plan of Care  Goal: Patient-Specific Goal (Individualized)  Description: You can add care plan individualizations to a care plan. Examples of Individualization might be:  \"Parent requests to be called daily at 9am for status\", \"I have a hard time hearing out of my right ear\", or \"Do not touch me to wake me up as it startles  me\".  Outcome: Progressing     \" I won't go if I can't have my power chair.\"      Problem: Adult Inpatient Plan of Care  Goal: Absence of Hospital-Acquired Illness or Injury  Intervention: Identify and Manage Fall Risk  Recent Flowsheet Documentation  Taken 8/15/2024 0930 by Jade Anand RN  Safety Promotion/Fall Prevention:   clutter free environment maintained   activity supervised   assistive device/personal items within reach   nonskid shoes/slippers when out of bed   room door open   room near nurse's station     Uses call light appropriately. Gait is unsteady and weak.  Problem: Hemodialysis  Goal: Absence of Infection Signs and Symptoms  Outcome: Progressing  Intervention: Prevent or Manage Infection  Recent Flowsheet Documentation  Taken 8/15/2024 0930 by Jade Anand RN  Infection Prevention: hand hygiene promoted   Right side of face around jaw line very swollen and tender.  Problem: Chronic Kidney Disease  Goal: Fluid Balance  Outcome: Progressing   Has had a good intake and using urinal at bedside.         "

## 2024-08-15 NOTE — PROGRESS NOTES
"Care Management Follow Up    Length of Stay (days): 6    Expected Discharge Date: 08/16/2024    Anticipated Discharge Plan:  Dialysis Services, Other (Comments) (unknown at this time)    Transportation: Anticipate medical transport    PT Recommendations: Transitional Care Facility  OT Recommendations:  Transitional Care Facility     Barriers to Discharge: medical stability, placement    Prior Living Situation: homeless, other (see comments) (\"I live on the streets or sometimes I can stay at my brother's house. Today when I came to the ER I was on the streets and called 911\".) with other (see comments) (\"I live on the streets or sometimes I can stay at my brother's house\".)    Advanced Directive on File: no concerns identified (\"I don't have a written advanced directive\".)     Patient/Spokesperson Updated: No    Additional Information:  Discussed with MD. Medically ready for discharge. Chart reviewed. Additional TCU referrals placed.    CM will continue to follow care progression and aide in discharge planning as needed.     11:57 AM - Discussed with CM leadership, referral submitted to [facility].    1:16 PM - Declined from [facility] due to TCU recommendations, unable to meet needs.     2:12 PM - RNCM received voicemail from [facility] to send referral to FAX: [phone]; sent via communications tab.     Cande Morales RN     "

## 2024-08-15 NOTE — PLAN OF CARE
Patient is calm but irritable. Patient refusing most of the head to toe assessment but agreeable to have the nurse auscultate lung and heart sounds and assess fistula. Patient took scheduled medications tonight and PRN Excedrin.     Problem: Adult Inpatient Plan of Care  Goal: Plan of Care Review  Description: The Plan of Care Review/Shift note should be completed every shift.  The Outcome Evaluation is a brief statement about your assessment that the patient is improving, declining, or no change.  This information will be displayed automatically on your shift  note.  Outcome: Progressing     Problem: Hemodialysis  Goal: Absence of Infection Signs and Symptoms  Outcome: Progressing  Intervention: Prevent or Manage Infection  Recent Flowsheet Documentation  Taken 8/14/2024 2115 by Domitila Conklin RN  Infection Prevention:   rest/sleep promoted   single patient room provided   hand hygiene promoted     Problem: Fall Injury Risk  Goal: Absence of Fall and Fall-Related Injury  Outcome: Progressing  Intervention: Identify and Manage Contributors  Recent Flowsheet Documentation  Taken 8/14/2024 2115 by Domitila Conklin, RN  Medication Review/Management: medications reviewed  Intervention: Promote Injury-Free Environment  Recent Flowsheet Documentation  Taken 8/14/2024 2115 by Domitila Conklin, RN  Safety Promotion/Fall Prevention:   clutter free environment maintained   increased rounding and observation   lighting adjusted   room door open   room near nurse's station

## 2024-08-15 NOTE — PROGRESS NOTES
"    RENAL PROGRESS NOTE    PLAN:  HD MWF      ASSESSMENT:  ESRD:  IHD MWF at the [facility]  RX:  , Typically high volume UF 3-5kg per pt report.  (he frequently cuts tx short) /, K 2   ACCESS: L AVF  S/P fistulagram 8/13  H/o poor compliance, short tx etc   Recurrent issues with HyperK  On MWF, ran yesterday        HyperKalemia:  As above, urgent dialysis 8/8 and regular dialysis on 8/9  Lokelma 10g on non dialysis days, may increase to daily if persistent and access functional   I asked Macario if he would be open to taking lokelma when he is discharged \"if I have somewhere to take it I would.\"     HTN/volume:  Mostly controlled when he allows it to be taken (complaint of severe pain with BP cuff) and compliant with meds   Typically quite volume overloaded , high volume UF , educated on FR   PTA Amlod, Metop, continue     CKD/MBD:  On calcitriol, Phoslo as binder, continue     Anemia:  ACD, on DEMAR and venofer as needed outpt,  Last hgb in the 8's     Bi-polar:  On Risperdal, h/o oppositional behavior tendencies, poor medical compliance , some paranoid behaviour noted in the ED during my intake   Reported cognitive impairment, with recurrent conflicts with prior staff, transport team, working on psych TCU placement and housing per       Homelessness:  Pt reports that he doesn't not have stable living  situation, and gets to/from dialysis via scooter   Keeps clothing in a storage locker     Chest pain:  Recurrent, prior ACS w/up reportedly unremarkable.    Facial Swelling  Ultrasound yesterday  Hospital working up  Now on Abx    SUBJECTIVE:    Seen in room, quite pleasant this AM  Shares he was woken up and looking to have breakfast  Dizzy this morning \"everything in a blue haze\"  Feet hurt, throbbing (no edema noted)   Face still quite swollen and painful   No issues with urination  No NV, breathing       OBJECTIVE:  Physical Exam   Temp: 97.9  F (36.6  C) Temp src: Oral BP: 133/88 " Pulse: 73   Resp: 20 SpO2: 100 % O2 Device: None (Room air)    Vitals:    24 1950 24   Weight: 104.3 kg (229 lb 15 oz) 112.7 kg (248 lb 7.3 oz)     Vital Signs with Ranges  Temp:  [97.4  F (36.3  C)-97.9  F (36.6  C)] 97.9  F (36.6  C)  Pulse:  [] 73  Resp:  [15-22] 20  BP: (133-227)/() 133/88  SpO2:  [96 %-100 %] 100 %  I/O last 3 completed shifts:  In: -   Out: 3000 [Other:3000]    Temp (24hrs), Av.4  F (36.9  C), Min:98.4  F (36.9  C), Max:98.4  F (36.9  C)      No data found.    Intake/Output Summary (Last 24 hours) at 2024 1408  Last data filed at 2024 2150  Gross per 24 hour   Intake --   Output 200 ml   Net -200 ml       PHYSICAL EXAM:  General - Alert and oriented x3  Right cheek swelling notable   Cardiovascular - BP escalated, rate controlled   Respiratory -on RA   Abd: obese   Extremities -no edema on LL, tender to palpation   Skin: dry, intact, no rash, good turgor  Neuro:  Grossly intact, no focal deficits  MSK:  Grossly intact  Psych:  Normal affect, has been argumentative here with some staff at hospital    LABORATORY STUDIES:     Recent Labs   Lab 24  0705 08/10/24  1138 24  1813 24  0910 24  1902   WBC 7.9 5.8 5.4 6.3 6.5   RBC 2.90* 3.46* 3.24* 3.14* 3.46*   HGB 8.0* 9.4* 9.0* 8.6* 9.5*   HCT 25.7* 30.2* 28.4* 27.0* 29.1*    274 209 229 256       Basic Metabolic Panel:  Recent Labs   Lab 24  1553 24  0705 08/10/24  1155 24  1813 24  1004 24  0910 24  1349   NA  --  137 141 138  --  138 139   POTASSIUM 4.4 6.0* 5.4* 4.5  --  5.2 6.7*   CHLORIDE  --  98 98 97*  --  96* 99   CO2  --  23 28 27  --  26 23   BUN  --   --  38.7* 26.6*  --  49.5* 72.1*   CR  --   --  11.76* 8.84*  --  14.04* 17.22*   GLC  --   --  125* 124* 88 104* 92   LOVELY  --   --  9.6 8.9  --  8.8 8.6*       INRNo lab results found in last 7 days.     Recent Labs   Lab Test 24  0705 08/10/24  1138   WBC 7.9 5.8   HGB 8.0*  9.4*    274       Personally reviewed current labs      Judith ZAYAS  [facility]  [phone]

## 2024-08-16 LAB
ANION GAP SERPL CALCULATED.3IONS-SCNC: 17 MMOL/L (ref 7–15)
BUN SERPL-MCNC: 84.9 MG/DL (ref 6–20)
CALCIUM SERPL-MCNC: 8.7 MG/DL (ref 8.8–10.4)
CHLORIDE SERPL-SCNC: 97 MMOL/L (ref 98–107)
CREAT SERPL-MCNC: 15.98 MG/DL (ref 0.67–1.17)
EGFRCR SERPLBLD CKD-EPI 2021: 3 ML/MIN/1.73M2
GLUCOSE SERPL-MCNC: 147 MG/DL (ref 70–99)
HCO3 SERPL-SCNC: 23 MMOL/L (ref 22–29)
HOLD SPECIMEN: NORMAL
PHOSPHATE SERPL-MCNC: 10.7 MG/DL (ref 2.5–4.5)
POTASSIUM SERPL-SCNC: 6 MMOL/L (ref 3.4–5.3)
SODIUM SERPL-SCNC: 137 MMOL/L (ref 135–145)

## 2024-08-16 PROCEDURE — 120N000001 HC R&B MED SURG/OB

## 2024-08-16 PROCEDURE — 90937 HEMODIALYSIS REPEATED EVAL: CPT

## 2024-08-16 PROCEDURE — 99232 SBSQ HOSP IP/OBS MODERATE 35: CPT | Performed by: PHYSICIAN ASSISTANT

## 2024-08-16 PROCEDURE — 80048 BASIC METABOLIC PNL TOTAL CA: CPT

## 2024-08-16 PROCEDURE — 36415 COLL VENOUS BLD VENIPUNCTURE: CPT

## 2024-08-16 PROCEDURE — 99232 SBSQ HOSP IP/OBS MODERATE 35: CPT | Mod: GC

## 2024-08-16 PROCEDURE — 250N000013 HC RX MED GY IP 250 OP 250 PS 637

## 2024-08-16 PROCEDURE — 84100 ASSAY OF PHOSPHORUS: CPT | Performed by: PHYSICIAN ASSISTANT

## 2024-08-16 RX ORDER — RISPERIDONE 1 MG/1
1 TABLET ORAL EVERY MORNING
Status: DISCONTINUED | OUTPATIENT
Start: 2024-08-17 | End: 2024-08-19 | Stop reason: HOSPADM

## 2024-08-16 RX ADMIN — Medication 5 MG: at 23:58

## 2024-08-16 RX ADMIN — ACETAMINOPHEN, ASPIRIN, CAFFEINE 2 TABLET: 250; 65; 250 TABLET, FILM COATED ORAL at 23:59

## 2024-08-16 ASSESSMENT — ACTIVITIES OF DAILY LIVING (ADL)
ADLS_ACUITY_SCORE: 27
ADLS_ACUITY_SCORE: 26
ADLS_ACUITY_SCORE: 27
ADLS_ACUITY_SCORE: 26
ADLS_ACUITY_SCORE: 27
ADLS_ACUITY_SCORE: 26
ADLS_ACUITY_SCORE: 26
ADLS_ACUITY_SCORE: 27
ADLS_ACUITY_SCORE: 26
ADLS_ACUITY_SCORE: 27
ADLS_ACUITY_SCORE: 27
ADLS_ACUITY_SCORE: 26
ADLS_ACUITY_SCORE: 27
ADLS_ACUITY_SCORE: 27

## 2024-08-16 NOTE — PLAN OF CARE
"  Problem: Adult Inpatient Plan of Care  Goal: Plan of Care Review  Description: The Plan of Care Review/Shift note should be completed every shift.  The Outcome Evaluation is a brief statement about your assessment that the patient is improving, declining, or no change.  This information will be displayed automatically on your shift  note.  Outcome: Progressing   Goal Outcome Evaluation:       Plan for dialysis today. Care management is working on discharge destination for him when medically ready.  Problem: Adult Inpatient Plan of Care  Goal: Patient-Specific Goal (Individualized)  Description: You can add care plan individualizations to a care plan. Examples of Individualization might be:  \"Parent requests to be called daily at 9am for status\", \"I have a hard time hearing out of my right ear\", or \"Do not touch me to wake me up as it startles  me\".  Outcome: Progressing     \" Nereida just leave me so I can sleep.\"  Problem: Adult Inpatient Plan of Care  Goal: Readiness for Transition of Care  Outcome: Progressing       Awake all night requesting snacks. Sleeping in chair with no clothes on this morning. Refusing all medications and irritated with assesments done. Dietary came to deliver breakfast tray he just ordered.           "

## 2024-08-16 NOTE — PROGRESS NOTES
Transferred to dialysis per w/c, patient request, due to bed not fitting on elevator with extender. Continued to voice his frustrations about having to go to dialysis and not have it in his room. Unable to stop him from using profanity loudly during the transfer. Security called by another RN. When dialysis refused him in the room with other patients present. Dialysis nurse did come and speak with him when Security had no affect. Agreed to watch his language as best he could.   Upset he was awakened for transfer. Had been allowed to sleep all morning with minimal interruptions.he had ordered and ate breakfast. Still refused medications.

## 2024-08-16 NOTE — PROGRESS NOTES
"    RENAL PROGRESS NOTE    PLAN:  HD MWF    - High K, updating phos.   Pt is on reg diet, if phos also high anticipate change to renal diet.     ASSESSMENT:  ESRD:  [facility]HD MWF at the [facility]  RX:  , Typically high volume UF 3-5kg per pt report.  (he frequently cuts tx short) /, K 2   ACCESS: L AVF  S/P fistulagram 8/13  H/o poor compliance, short tx etc   Recurrent issues with HyperK  On MWF, seen today as getting set up for HD       HyperKalemia:  As above, urgent dialysis 8/8 and regular dialysis on 8/9  Lokelma 10g on non dialysis days, may increase to daily if persistent and access functional   I asked Macario if he would be open to taking lokelma when he is discharged \"if I have somewhere to take it I would.\"    This admit on regular diet, avoidance of aggressive behevior with diet restriction.        HTN/volume:  Mostly controlled when he allows it to be taken (complaint of severe pain with BP cuff) and compliant with meds   Typically quite volume overloaded , high volume UF , educated on FR   PTA Amlod, Metop, continue    High BP today, declined his AM meds today     CKD/MBD:  On calcitriol, Phoslo as binder, continue  Pt has been refusing phos binder  Updating phos and if sustained high change to renal diet      Anemia:  ACD, on DEMAR and venofer as needed outpt,  Last hgb in the 8's     Bi-polar:  On Risperdal, h/o oppositional behavior tendencies, poor medical compliance , some paranoid behaviour noted in the ED during my intake   Reported cognitive impairment, with recurrent conflicts with prior staff, transport team, working on psych TCU placement and housing per       Homelessness:  Pt reports that he doesn't not have stable living  situation, and gets to/from dialysis via scooter   Keeps clothing in a storage locker     Chest pain:  Recurrent, prior ACS w/up reportedly unremarkable.    Facial Swelling  Ultrasound, Hospital working up  Now on Abx  Pt is ESRD and is on HD. " No concerns for CT contrast study used. Ideal if can dialyze afterwards.   Swelling appears improved    SUBJECTIVE:    Seen as being set up on HD machine  Reportedly, shortly before I arrived security called due to him with bad language.   When see, he is with nurse Ruby having very good rapport and no aggressive behaviours.   Tolerates cannulation   Doing overall OK, pleased face swelling is subsiding   Reports hasn't eaten much, aside from potato chips (high K)  Is is friendly and conversing when I see him.  I let him know Dr Mancilla will see him tomorrow.      OBJECTIVE:  Physical Exam   Temp: 97.8  F (36.6  C) Temp src: Axillary BP: 119/71 Pulse: 78   Resp: 18 SpO2: 97 % O2 Device: None (Room air)    Vitals:    24 1950 24   Weight: 104.3 kg (229 lb 15 oz) 112.7 kg (248 lb 7.3 oz)     Vital Signs with Ranges  Temp:  [97.8  F (36.6  C)] 97.8  F (36.6  C)  Pulse:  [78-82] 78  Resp:  [18-20] 18  BP: (119-153)/(71-80) 119/71  SpO2:  [93 %-97 %] 97 %  I/O last 3 completed shifts:  In: 840 [P.O.:840]  Out: 100 [Urine:100]    Temp (24hrs), Av.4  F (36.9  C), Min:98.4  F (36.9  C), Max:98.4  F (36.9  C)      No data found.    Intake/Output Summary (Last 24 hours) at 2024 1408  Last data filed at 2024 2150  Gross per 24 hour   Intake --   Output 200 ml   Net -200 ml       PHYSICAL EXAM:  General - Alert and oriented x3  Right cheek swelling present, although improved compared to yesterday  Cardiovascular - BP escalated, pt did not take AM meds   Respiratory -on RA   Abd: obese   Extremities -no edema on LL, tender to palpation   Skin: dry, intact, no rash, good turgor  Neuro:  Grossly intact, no focal deficits  MSK:  Grossly intact  Psych:  Normal affect, has been argumentative here with some staff at hospital    LABORATORY STUDIES:     Recent Labs   Lab 24  0705 08/10/24  1138 24  1813   WBC 7.9 5.8 5.4   RBC 2.90* 3.46* 3.24*   HGB 8.0* 9.4* 9.0*   HCT 25.7* 30.2* 28.4*   PLT  244 274 209       Basic Metabolic Panel:  Recent Labs   Lab 08/12/24  1553 08/12/24  0705 08/10/24  1155 08/09/24  1813   NA  --  137 141 138   POTASSIUM 4.4 6.0* 5.4* 4.5   CHLORIDE  --  98 98 97*   CO2  --  23 28 27   BUN  --   --  38.7* 26.6*   CR  --   --  11.76* 8.84*   GLC  --   --  125* 124*   LOVELY  --   --  9.6 8.9       INRNo lab results found in last 7 days.     Recent Labs   Lab Test 08/12/24  0705 08/10/24  1138   WBC 7.9 5.8   HGB 8.0* 9.4*    274       Personally reviewed current labs      Judith ZAYAS  [facility]  [phone]

## 2024-08-16 NOTE — PLAN OF CARE
"  Problem: Adult Inpatient Plan of Care  Goal: Optimal Comfort and Wellbeing  Intervention: Monitor Pain and Promote Comfort  Recent Flowsheet Documentation  Taken 8/15/2024 1742 by Cande Mistry RN  Pain Management Interventions:   emotional support   pain management plan reviewed with patient/caregiver  Taken 8/15/2024 1710 by Cande Mistry RN  Pain Management Interventions:   medication (see MAR)   cold applied   Goal Outcome Evaluation:      Plan of Care Reviewed With: patient      Patient complaining of pain in foot and back. Does not want most of the medication we are offering due to \"not strong enough\" per patient. Up in chair, pleasant and cooperative. Good appetite. Urinal within reach. No IV access.            "

## 2024-08-16 NOTE — PROGRESS NOTES
[facility]    Progress Note - Hospitalist Service       Date of Admission:  8/8/2024    Assessment & Plan   Macario Delatorre is a 44 year old male admitted on 8/8/2024. He has a history of ESRD on HD MWF w/associated nephrology, cognitive disability, mental health complex hx (PTSD, bipolar? Agitation) HTN, gout, HLD and is admitted for recurrent chest pains, upper back pain found to have hyperkalemia (K 6.7), slightly elevated troponin at 72 w/no EKG changes, delta trop pending. Very complex social history including active homelessness and poor medication adherence in the past, has doctored inconsistently.  CM continues to work on placement, has been declined from all TCU's at this point, awaiting information from [facility] and [facility]'s, will attempt to use [employer] services for transporting patient and his scooter once he has placement. He does not have an alternative safe discharge plan at this point, needs to continue working with [facility] to help him secure long term housing. Macario is agreeable to TCU placement at this time.     #ESRD on HD MWF  #Hyperkalemia, severe  Patient has had recent previous similar admissions, notably 7/22-8/2/24 for hyperkalemia and dialysis in which patient left AMA. Also was admitted 7/10-7/18 for syncopal episode and chest pains, hyperkalemia and ESRD with HD. Per patient had dialysis run 8/7/24 but was stopped early due to him c/o extremities being cold. Have had issues in the past with continuing dialysis, however patient is understanding this admission of the importance of receiving his regularly scheduled treatments. K 6.7 this admission, s/p insulin and dextrose in ED, recheck pending. Cr. 17.2, BUN 72, GFR 3. Unclear etiology of ESRD, suspect 2/2 chronic poorly controlled HTN, last A1C 7/10/24 4.4, is ot on any medications for diabetes currently and BG wnl. Appears has not had prior workup.  -Nephrology consulted, recs appreciated  -Emergent  dialysis initiated 8/8/24, will resume regular HD schedule MWF 8/9 per nephrology              -K 4.4 8/12 after dialysis  -Cont. Dialysis MWF  -Fistulogram performed 8/13 w/IR, L arm fistulogram demonstrated high grade stenosis of cephalic vein arch, treated successfully w/angioplasty  -Renal diet              -Per nephro, ok to resume regular diet if pt agrees to take K binders              -s/p Lokelma given 8/10  -Consider re-consultation of palliative care dionisio prior refusal of HD, however patient has been adherent and understanding this admission  -Resume home renal multivitamins, calcitriol, calcium acetate  -Scheduled labs for noon daily BMP and CBC since he was refusing AM labs   -pt agreeable to this plan   -Passed on to RN that he would like his bed readjusted after dialysis so he is not uncomfortable     #R Facial Pain  #Facial Swelling  -Pt noted 8/13 started having swelling R side of his face with significant 10/10 pain and tenderness to palaption. On exam primarily tender over zygomatic process, however also appears to be some involvement of R parotid gland. D/w radiology, recommended initial eval with ultrasound head and neck soft tissue, if nonconclusive would consider noncon CT (given ESRD on HD likely would not tolerate IV contrast). Pt does have history of poor dentition which is noted on exam, appears to have abrasion on the inner side of his cheek as well. Initial concern suppurative parotitis vs sialolithiasis vs likely dental infection. Currently afebrile, no updated CBC today due to pt refusing lab draws  -R facial soft tissue US demonstrating parotid gland wnl, zygomatic bone/cheek with no swelling appreciated on exam  -Pt does not have IV access and is declining another IV placed.   Swelling and pain significantly improved on exam 8/16. Will need dental follow up outpatient.                 -Augmentin 500-125 q8h x 7 days for suspected tooth/gingival infection              -One time PO  "oxycodone given for pain management, ok to repeat x1 dose if persistent severe pain but would not recommend continuing on discharge              -Has PRN excedrin              -Ice the area as needed              -US Head/neck soft tissue ordered 8/14     #Chest Pain  #ACS r/out   Patient presented with chest pain, diffuse tenderness midsternal radiating to both L and R side of his chest, reproducible, has had chest pain recurrence on previous admissions. Denies SOB currently, initial EKG on admission not demonstrating ischemic changes, initial troponin 72, delta trop downtrending slightly. CXR demonstrating Lung volumes are low. No focal airspace opacities, pleural effusions, or pneumothorax. Normal pulmonary vascularity. Stable cardiomediastinal silhouette. I do not suspect cardiac etiology at this time.  -Cardiac telemetry, no events detected, discontinued 8/10  -Pt requested excedrin for pain, ok with CKD  -Hold scheduled tylenol due to above     #Back Pain  #Fall, previous encounter  Pt endorsing upper back pain most notably tender over cervical paraspinal muscles, appears that he had workup done 8/6 at [facility] when he \"collapsed\" and hurt his back, denied LOC. Imaging performed at this time included CT lumbar, thoracic, and cervical spine, all demonstrating no evidence for fracture or dislocation, no significant spinal canal or neuroforaminal stenosis. Mild multilevel cervical spondylosis. CT head demonstrating no intracranial abnormality.     -Lidocaine patches PRN  -Excedrin BID PRN is available to patient as this is his preference (pt requested instead of tylenol)  -Would be valuable to have PT's input for upper MSK back pain, recs appreciated     Cognitive impairment  History of mental health problems with hallucinations  Bipolar disorder  Agitation   Suicidal ideation  Patient has a history of refusing cares and can easily get agitated. Patient reports history of bipolar and schizophrenia. Does not endorse " "routine psychiatric follow up or regular medication use. In [facility] system was started on risperidone 1 mg at bedtime though has not taken consistently. Stated he can escalate quickly if upset. 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 7/30 started expressing SI with a plan to jump out of the window in setting of extreme frustration and refusal to undergo HD without his exact circumstances, patient himself requesting that his psych meds be increased.  - Psychiatric consulted last admission, could consider reconsultation if elevating risk of agitation              - Psych evaluated, patient felt to have capacity                          - per psych NP, patient is not holdable and has capacity to make decisions for himself                          - per , [facility] worker has stated that so long as psychiatry has deemed him decisional, he is not holdable  -Adjusted Risperidone 1 mg AM, 1 mg bedtime     Goals of care  Note from Dr. Cuenca from prior admission:  \"Patient has previously reiterated that he will not undergo HD for his ESRD unless he gets his specific environment situated (preferred Rns only, demanding HD only in recliner and refusing HD in a bed, only wants HD in his room and away from others). Has continued to decline HD, seems to understand that cardiac arrest is a risk, though continues to express that he \"will not die\" due to this anytime soon. However, continues to reiterate that he would like full CPR and resuscitative efforts at this time, including intubation. Unclear if he would want HD if in this situation. Given known cognitive impairment as above as well as mood disorders, question if he is able to be decisional and/or what his needs would be if he were to become unable to express his needs. Palliative has been following, however patient continuing to decline discussion regarding end of life cares\"     Chronic anemia, normocytic " "  Baseline Hgb 9-10. Likely related to ESRD/chronic disease. Iron stores adequate. CBC on admission pending  - Continue DEMAR weekly; resume with outpatient dialysis.  -Transfuse for HgB <7 if indicated  - Daily CBC (patient has frequently refused labs on prior admissions)     Chronic pain syndrome, stable    Neuropathy, reported to be diabetic  - Cont. Lyrica 25 mg daily              -Cannot increase dose due to ESRD              -Pt felt capsaicin, icyhot was not effective, will discontinue  -Will trial topamax today to see if effective                         Hypertension, uncontrolled  Continues to be hypertensive during hospitalization likely in setting of missed dialysis session.   - Continue PTA amlodipine 10 every day and metoprolol 25 BID     Disposition Planning  Discussed with CM, pt currently homeless and \"living on the streets\", pt reports has  through [employer] working on permanent housing but has not been completed yet, pt is interested in group home or LTC in the future but for immediate disposition planning likely TCU or medical respite care. PT/OT consulted to evaluate patient.  -Pt was evaluated by PT/OT 8/10, both services recommending TCU placement upon discharge due to cognitive impairment and difficulty with ambulation  2/2 deconditioning and neuropathy  -D/W CM, patient has declined from all TCU placement at this point. Patient may only be able to return to live with family upon discharge or will need transportation if more permanent housing is able to be secured.     L hand/thumb pain  Pt reports he has had pain in his L thumb which has limited his  strength and functional status. On exam, does not appear markedly swollen, he does note pain most significant in 1st dorsal compartment of wrist. He is tender to palpation over distal radial styloid, but will not let examiner do much in terms of palpation or ROM. I suspect most likely de quervain tenosynovitis in setting of " "overuse injury vs. Intersection syndrome. Could consider further evaluation with xray but without traumatic history I am less suspicious of fracture.  -Consider thumb spica cast or brace at discharge  -will require OP follow up, treatment options include corticosteroid injection or OP hand therapy w/OT   -Excedrin scheduled BID per pt request over tylenol  -Capsaican cream can also be applied to his thumb  -Limited medication options due to renal function        Diet: Snacks/Supplements Adult: Other; [place] at HS; Between Meals  Regular Diet Adult  Diet  Room Service    DVT Prophylaxis: Pneumatic Compression Devices  Raphael Catheter: Not present  Fluids: None  Lines: None     Cardiac Monitoring: None  Code Status: Full Code      Clinically Significant Risk Factors                            # Overweight: Estimated body mass index is 28.71 kg/m  as calculated from the following:    Height as of 7/22/24: 1.981 m (6' 6\").    Weight as of this encounter: 112.7 kg (248 lb 7.3 oz).      # Financial/Environmental Concerns: none  # Housing Instability: noted in nursing assessment               Disposition Plan     Expected Discharge Date: 08/16/2024    Discharge Delays: Placement - TCU  Complex Discharge  *Medically Ready for Discharge  Destination: other (comment) (unknown at this time)  Discharge Comments: Referrals sent to TCU        The patient's care was discussed with the Attending Physician, Dr. Lemus .    Abdifatah King MD  Hospitalist Service  [facility]  Securely message with [employer] (more info)  Text page via [facility] Paging/Directory   ______________________________________________________________________    Interval History   NAEO, plan for HD today at regular schedule. Patient endorses frustration with the system regarding his trauma and past experiences with racial discrimination which myself and Dr. Lemus were sympathetic to. We continue to discuss disposition planning " with patient and CM to obtain TCU placement.     Physical Exam   Vital Signs:     BP: (!) 153/80 (rn notified) Pulse: 82   Resp: 20 SpO2: 93 % O2 Device: None (Room air)    Weight: 248 lbs 7.33 oz    Constitutional:       General: He is not in acute distress.     Appearance: Normal appearance. He is not ill-appearing or diaphoretic.   HENT:      Head:      Jaw: Swelling present, improved from prior exams       Mouth/Throat:      Mouth: Mucous membranes are moist.      Pharynx: Oropharynx is clear.      Comments: Dentition is poor, abrasion noted inner R cheek, cookie clear infectious process in gums or oral cavity identified but limited exam due to swelling/pain.   Cardiovascular:      Rate and Rhythm: Normal rate and regular rhythm.      Pulses: Normal pulses.      Heart sounds: Normal heart sounds.   Pulmonary:      Effort: Pulmonary effort is normal.      Breath sounds: Normal breath sounds.   Abdominal:      General: Bowel sounds are normal.      Palpations: Abdomen is soft.   Musculoskeletal:      Cervical back: No rigidity or tenderness.      Right lower leg: No edema.      Left lower leg: No edema.   Lymphadenopathy:      Cervical: No cervical adenopathy.   Skin:     Capillary Refill: Capillary refill takes less than 2 seconds.   Neurological:      General: No focal deficit present.      Mental Status: He is alert and oriented to person, place, and time. Mental status is at baseline.   Psychiatric:         Mood and Affect: Mood normal.         Behavior: Behavior normal.             Data         Imaging results reviewed over the past 24 hrs:   No results found for this or any previous visit (from the past 24 hour(s)).    Abdifatah King MD  PGY-2  [facility] Medicine Residency  [facility]  August 16, 2024

## 2024-08-16 NOTE — PROCEDURES
Potassium   Date Value Ref Range Status   08/16/2024 6.0 (H) 3.4 - 5.3 mmol/L Final   10/17/2014 4.5 3.4 - 5.3 mmol/L Final     Hemoglobin   Date Value Ref Range Status   08/12/2024 8.0 (L) 13.3 - 17.7 g/dL Final   03/27/2014 13.8 13.3 - 17.7 g/dL Final     Creatinine   Date Value Ref Range Status   08/16/2024 15.98 (H) 0.67 - 1.17 mg/dL Final   10/17/2014 1.6 (H) 0.8 - 1.5 mg/dL Final     Urea Nitrogen   Date Value Ref Range Status   08/16/2024 84.9 (H) 6.0 - 20.0 mg/dL Final   10/17/2014 20.0 5.0 - 24.0 mg/dL Final     Sodium   Date Value Ref Range Status   08/16/2024 137 135 - 145 mmol/L Final   10/17/2014 147.0 (H) 133.0 - 144.0 mmol/L Final     INR   Date Value Ref Range Status   03/27/2014 1.0  Final       DIALYSIS PROCEDURE NOTE  Hepatitis status of previous patient on machine log was checked and verified ok to use with this patients hepatitis status.  Patient dialyzed for 3 hrs. on a K2 bath with a net fluid removal of  4L.  A BFR of 450 ml/min was obtained via a AVF using 15 gauge needles.      The treatment plan was discussed with Dr. Mancilla during the treatment.    Total heparin received during the treatment: 0 units.   Needle cannulation sites held x 10 min.       Meds  given: none   Complications: tolerated tx well      Person educated: patient. Knowledge base good. Barriers to learning: none. Educated on procedure via verbal mode.      ICEBOAT? Timeout performed pre-treatment  I: Patient was identified using 2 identifiers  C:  Consent Signed Yes  E: Equipment preventative maintenance is current and dialysis delivery system OK to use  B: Hepatitis B Surface Antigen: negative; Draw Date: 7/23/24      Hepatitis B Surface Antibody: immune; Draw Date: 7/23/24  O: Dialysis orders present and complete prior to treatment  A: Vascular access verified and assessed prior to treatment  T: Treatment was performed at a clinically appropriate time  ?: Patient was allowed to ask questions and address concerns prior to  treatment  See Adult Hemodialysis flowsheet in [facility] for further details and post assessment.  Machine water alarm in place and functioning. Transducer pods intact and checked every 15min.   Pt returned via bed.  Chlorine/Chloramine water system checked every 4 hours.  Outpatient Dialysis at [facility]    Patient repositioned every 2 hours during the treatment.  Post treatment report given to Steffanie GRACIA RN regarding 4L of fluid removed, last BP of 135/88, and patient pain rating of 10/10.

## 2024-08-16 NOTE — PROGRESS NOTES
Pt should be ready to discharge in the next day or two. Pt has been declined by several TCUs and only one pending TCU is [facility] and they are still reviewing. Pt is requesting his scooter be picked up on his way to TCU or sent here to the hospital. His scooter is currently at his brother home. CM tried looking into a  services that could support this but was unsuccessful. CM also tried to look into the  through the [facility] who is helping the pt with housing and was unsuccessful with this as well.

## 2024-08-16 NOTE — PROGRESS NOTES
CLINICAL NUTRITION SERVICES    Reviewed nutrition risk factors due to LOS. Pt is tolerating diet, eating well per nursing documentation.     Patient is on a regular diet (per MD) as he refuses the renal diet.    Potassium and phosphorus are elevated but patient refuses the nutrition restrictions that would help control these labs.  RD will follow via rounds at this time, unless consulted.

## 2024-08-16 NOTE — PLAN OF CARE
Problem: Skin Injury Risk Increased  Goal: Skin Health and Integrity  Outcome: Progressing  Intervention: Plan: Nurse Driven Intervention: Moisture Management  Recent Flowsheet Documentation  Taken 8/16/2024 0000 by Mayi Cervantes RN  Moisture Interventions:   Encourage regular toileting   No brief in bed  Intervention: Plan: Nurse Driven Intervention: Friction and Shear  Recent Flowsheet Documentation  Taken 8/16/2024 0000 by Mayi Cervantes RN  Friction/Shear Interventions: HOB 30 degrees or less  Intervention: Optimize Skin Protection  Recent Flowsheet Documentation  Taken 8/16/2024 0000 by Mayi Cervantes RN  Activity Management: activity adjusted per tolerance     Problem: Hemodialysis  Goal: Safe, Effective Therapy Delivery  Outcome: Progressing  Intervention: Optimize Device Care and Function  Recent Flowsheet Documentation  Taken 8/16/2024 0000 by Mayi Cervantes RN  Medication Review/Management: medications reviewed  Goal: Absence of Infection Signs and Symptoms  Outcome: Progressing  Intervention: Prevent or Manage Infection  Recent Flowsheet Documentation  Taken 8/16/2024 0000 by Mayi Cervantes RN  Infection Prevention: hand hygiene promoted     Problem: Pain Acute  Goal: Optimal Pain Control and Function  Outcome: Progressing  Intervention: Develop Pain Management Plan  Recent Flowsheet Documentation  Taken 8/16/2024 0000 by Mayi Cervantes RN  Pain Management Interventions:   emotional support   pain management plan reviewed with patient/caregiver   rest  Intervention: Prevent or Manage Pain  Recent Flowsheet Documentation  Taken 8/16/2024 0000 by Mayi Cervantes RN  Sleep/Rest Enhancement:   awakenings minimized   noise level reduced  Medication Review/Management: medications reviewed  Intervention: Optimize Psychosocial Wellbeing  Recent Flowsheet Documentation  Taken 8/16/2024 0000 by Mayi Cervantes RN  Supportive Measures:   active listening utilized   goal-setting facilitated     Problem:  Comorbidity Management  Goal: Maintenance of Behavioral Health Symptom Control  Outcome: Progressing  Intervention: Maintain Behavioral Health Symptom Control  Recent Flowsheet Documentation  Taken 8/16/2024 0000 by Mayi Cervantes RN  Medication Review/Management: medications reviewed   Goal Outcome Evaluation:         A/O, SBA. Pt has ongoing back pain. Denies any chest pain. Non pharmacological interventions offered. Refused vital signs. On RA. Reg diet. Good PO intake and appetite. HD MWF. LUE fistula WNL. No PIV access; pt refused. Pending placement for pt.

## 2024-08-17 LAB
ANION GAP SERPL CALCULATED.3IONS-SCNC: 18 MMOL/L (ref 7–15)
BUN SERPL-MCNC: 67.3 MG/DL (ref 6–20)
CALCIUM SERPL-MCNC: 9.3 MG/DL (ref 8.8–10.4)
CHLORIDE SERPL-SCNC: 99 MMOL/L (ref 98–107)
CREAT SERPL-MCNC: 12.75 MG/DL (ref 0.67–1.17)
EGFRCR SERPLBLD CKD-EPI 2021: 5 ML/MIN/1.73M2
ERYTHROCYTE [DISTWIDTH] IN BLOOD BY AUTOMATED COUNT: 15.9 % (ref 10–15)
GLUCOSE SERPL-MCNC: 110 MG/DL (ref 70–99)
HCO3 SERPL-SCNC: 23 MMOL/L (ref 22–29)
HCT VFR BLD AUTO: 27.5 % (ref 40–53)
HGB BLD-MCNC: 8.7 G/DL (ref 13.3–17.7)
MCH RBC QN AUTO: 27.4 PG (ref 26.5–33)
MCHC RBC AUTO-ENTMCNC: 31.6 G/DL (ref 31.5–36.5)
MCV RBC AUTO: 87 FL (ref 78–100)
PLATELET # BLD AUTO: 237 10E3/UL (ref 150–450)
POTASSIUM SERPL-SCNC: 5.6 MMOL/L (ref 3.4–5.3)
RBC # BLD AUTO: 3.18 10E6/UL (ref 4.4–5.9)
SODIUM SERPL-SCNC: 140 MMOL/L (ref 135–145)
WBC # BLD AUTO: 5.1 10E3/UL (ref 4–11)

## 2024-08-17 PROCEDURE — 82374 ASSAY BLOOD CARBON DIOXIDE: CPT

## 2024-08-17 PROCEDURE — 99232 SBSQ HOSP IP/OBS MODERATE 35: CPT | Mod: GC

## 2024-08-17 PROCEDURE — 85027 COMPLETE CBC AUTOMATED: CPT

## 2024-08-17 PROCEDURE — 36415 COLL VENOUS BLD VENIPUNCTURE: CPT

## 2024-08-17 PROCEDURE — 99232 SBSQ HOSP IP/OBS MODERATE 35: CPT | Performed by: INTERNAL MEDICINE

## 2024-08-17 PROCEDURE — 250N000013 HC RX MED GY IP 250 OP 250 PS 637

## 2024-08-17 PROCEDURE — 120N000001 HC R&B MED SURG/OB

## 2024-08-17 RX ADMIN — CALCITRIOL CAPSULES 0.25 MCG 0.25 MCG: 0.25 CAPSULE ORAL at 09:01

## 2024-08-17 RX ADMIN — AMOXICILLIN AND CLAVULANATE POTASSIUM 1 TABLET: 500; 125 TABLET, FILM COATED ORAL at 21:17

## 2024-08-17 RX ADMIN — METOPROLOL SUCCINATE 25 MG: 25 TABLET, EXTENDED RELEASE ORAL at 21:16

## 2024-08-17 RX ADMIN — TOPIRAMATE 15 MG: 15 CAPSULE, COATED PELLETS ORAL at 09:00

## 2024-08-17 RX ADMIN — RISPERIDONE 1 MG: 1 TABLET, FILM COATED ORAL at 21:17

## 2024-08-17 RX ADMIN — ACETAMINOPHEN, ASPIRIN, CAFFEINE 2 TABLET: 250; 65; 250 TABLET, FILM COATED ORAL at 21:45

## 2024-08-17 RX ADMIN — Medication 1 TABLET: at 09:01

## 2024-08-17 RX ADMIN — TOPIRAMATE 15 MG: 15 CAPSULE, COATED PELLETS ORAL at 00:13

## 2024-08-17 RX ADMIN — PREGABALIN 25 MG: 25 CAPSULE ORAL at 21:16

## 2024-08-17 RX ADMIN — METOPROLOL SUCCINATE 25 MG: 25 TABLET, EXTENDED RELEASE ORAL at 00:11

## 2024-08-17 RX ADMIN — AMLODIPINE BESYLATE 10 MG: 5 TABLET ORAL at 08:58

## 2024-08-17 RX ADMIN — PREGABALIN 25 MG: 25 CAPSULE ORAL at 00:12

## 2024-08-17 RX ADMIN — TOPIRAMATE 15 MG: 15 CAPSULE, COATED PELLETS ORAL at 21:16

## 2024-08-17 RX ADMIN — AMOXICILLIN AND CLAVULANATE POTASSIUM 1 TABLET: 500; 125 TABLET, FILM COATED ORAL at 00:14

## 2024-08-17 RX ADMIN — Medication 5 MG: at 21:22

## 2024-08-17 RX ADMIN — RISPERIDONE 1 MG: 1 TABLET, FILM COATED ORAL at 08:59

## 2024-08-17 RX ADMIN — METOPROLOL SUCCINATE 25 MG: 25 TABLET, EXTENDED RELEASE ORAL at 08:59

## 2024-08-17 RX ADMIN — RISPERIDONE 1 MG: 1 TABLET, FILM COATED ORAL at 00:17

## 2024-08-17 ASSESSMENT — ACTIVITIES OF DAILY LIVING (ADL)
ADLS_ACUITY_SCORE: 27
ADLS_ACUITY_SCORE: 25
ADLS_ACUITY_SCORE: 27
ADLS_ACUITY_SCORE: 25
ADLS_ACUITY_SCORE: 26
ADLS_ACUITY_SCORE: 27
ADLS_ACUITY_SCORE: 25
ADLS_ACUITY_SCORE: 27
ADLS_ACUITY_SCORE: 26
ADLS_ACUITY_SCORE: 27
ADLS_ACUITY_SCORE: 25
ADLS_ACUITY_SCORE: 27
ADLS_ACUITY_SCORE: 27
ADLS_ACUITY_SCORE: 25

## 2024-08-17 NOTE — PLAN OF CARE
Problem: Adult Inpatient Plan of Care  Goal: Optimal Comfort and Wellbeing  8/17/2024 0541 by Sindy Castellanos, RN  Outcome: Progressing  8/17/2024 0538 by Sindy Castellanos RN  Outcome: Progressing   Goal Outcome Evaluation:       Alert/oriented, prn for back pain with relief. Requested for bed time meds, was cooperative with cares. Call light within reach.

## 2024-08-17 NOTE — PLAN OF CARE
Patient has been uncooperative, and verbally insulting nursing staff. Patient had dialysis, while going down he was using profanity loudly in transfer and security was called by another RN. Patient was able to get dialysis and came back in a wheelchair. Patient became upset in room and refused all his medications this shift. Patient requested minimal interactions with nursing staff.     Sukhdev Keith RN      Problem: Adult Inpatient Plan of Care  Goal: Plan of Care Review  Description: The Plan of Care Review/Shift note should be completed every shift.  The Outcome Evaluation is a brief statement about your assessment that the patient is improving, declining, or no change.  This information will be displayed automatically on your shift  note.  Outcome: Progressing   Goal Outcome Evaluation:

## 2024-08-17 NOTE — PLAN OF CARE
Problem: Adult Inpatient Plan of Care  Goal: Plan of Care Review  Description: The Plan of Care Review/Shift note should be completed every shift.  The Outcome Evaluation is a brief statement about your assessment that the patient is improving, declining, or no change.  This information will be displayed automatically on your shift  note.  Outcome: Progressing   Goal Outcome Evaluation:       Plan for discharge to TCU when bed is available. Patient is not happy with this plan unless he is allowed to have his power chair.  is talking with facilities.  Problem: Adult Inpatient Plan of Care  Goal: Optimal Comfort and Wellbeing  Outcome: Petra     Has no c/o pain unless asked. Prefers to sleep all day if allowed. Becomes irritated when awakened. Not a morning person. Moves self about in bed and to chair without problems.  Problem: Chronic Kidney Disease  Goal: Optimal Oral Intake  Outcome: Petra       Has a very good appetite. Orders all his own meals. Uses urinal at bedside.

## 2024-08-17 NOTE — PROGRESS NOTES
[facility]    Medicine Progress Note - Hospitalist Service    Date of Admission:  8/8/2024    Assessment & Plan   Macario Delatorre is a 44 year old male admitted on 8/8/2024. He has a history of ESRD on HD MWF w/associated nephrology, cognitive disability, mental health complex hx (PTSD, bipolar? Agitation) HTN, gout, HLD and is admitted for recurrent chest pains, upper back pain found to have hyperkalemia (K 6.7), slightly elevated troponin at 72 w/no EKG changes, delta trop pending. Very complex social history including active homelessness and poor medication adherence in the past, has doctored inconsistently.  CM continues to work on placement, has been declined from all TCU's at this point, awaiting information from [facility], will attempt to use [employer]on services for transporting patient and his scooter once he has placement. He does not have an alternative safe discharge plan at this point, needs to [facility] working with [facility] to help him secure long term housing. Macario is agreeable to TCU placement at this time.     #ESRD on HD MWF  #Hyperkalemia, severe  Patient has had recent previous similar admissions, notably 7/22-8/2/24 for hyperkalemia and dialysis in which patient left AMA. Also was admitted 7/10-7/18 for syncopal episode and chest pains, hyperkalemia and ESRD with HD. Per patient had dialysis run 8/7/24 but was stopped early due to him c/o extremities being cold. Have had issues in the past with continuing dialysis, however patient is understanding this admission of the importance of receiving his regularly scheduled treatments. K 6.7 this admission, s/p insulin and dextrose in ED, recheck pending. Cr. 17.2, BUN 72, GFR 3. Unclear etiology of ESRD, suspect 2/2 chronic poorly controlled HTN, last A1C 7/10/24 4.4, is ot on any medications for diabetes currently and BG wnl. Appears has not had prior workup.  -Nephrology consulted, recs appreciated  -Emergent  dialysis initiated 8/8/24, will resume regular HD schedule MWF 8/9 per nephrology              -K 4.4 8/12 after dialysis  -Cont. Dialysis MWF  -Fistulogram performed 8/13 w/IR, L arm fistulogram demonstrated high grade stenosis of cephalic vein arch, treated successfully w/angioplasty  -Renal diet              -Per nephro, ok to resume regular diet if pt agrees to take K binders              -s/p Lokelma given 8/10  -Consider re-consultation of palliative care dionisio prior refusal of HD, however patient has been adherent and understanding this admission  -Resume home renal multivitamins, calcitriol, calcium acetate  -Scheduled labs for noon daily BMP and CBC since he was refusing AM labs     #R Facial Pain  #Facial Swelling  -Pt noted 8/13 started having swelling R side of his face with significant 10/10 pain and tenderness to palaption. On exam primarily tender over zygomatic process, however also appears to be some involvement of R parotid gland. D/w radiology, recommended initial eval with ultrasound head and neck soft tissue, if nonconclusive would consider noncon CT (given ESRD on HD likely would not tolerate IV contrast). Pt does have history of poor dentition which is noted on exam, appears to have abrasion on the inner side of his cheek as well. Initial concern suppurative parotitis vs sialolithiasis vs likely dental infection. Currently afebrile, no updated CBC today due to pt refusing lab draws  -R facial soft tissue US demonstrating parotid gland wnl, zygomatic bone/cheek with no swelling appreciated on exam  -Pt does not have IV access and is declining another IV placed.   Swelling and pain significantly improved on exam since 8/16. Will need dental follow up outpatient.              -Augmentin 500-125 q8h x 7 days for suspected tooth/gingival infection (last dose 8/20)              -One time PO oxycodone given for pain management, ok to repeat x1 dose if persistent severe pain but would not recommend  "continuing on discharge              -Has PRN excedrin              -Ice the area as needed              -US Head/neck soft tissue ordered 8/14     #Chest Pain  #ACS r/out   Patient presented with chest pain, diffuse tenderness midsternal radiating to both L and R side of his chest, reproducible, has had chest pain recurrence on previous admissions. Denies SOB currently, initial EKG on admission not demonstrating ischemic changes, initial troponin 72, delta trop downtrending slightly. CXR demonstrating Lung volumes are low. No focal airspace opacities, pleural effusions, or pneumothorax. Normal pulmonary vascularity. Stable cardiomediastinal silhouette. I do not suspect cardiac etiology at this time.  -Cardiac telemetry, no events detected, discontinued 8/10  -Pt requested excedrin for pain, ok with CKD  -Hold scheduled tylenol due to above     #Back Pain  #Fall, previous encounter  Pt endorsing upper back pain most notably tender over cervical paraspinal muscles, appears that he had workup done 8/6 at [facility] when he \"collapsed\" and hurt his back, denied LOC. Imaging performed at this time included CT lumbar, thoracic, and cervical spine, all demonstrating no evidence for fracture or dislocation, no significant spinal canal or neuroforaminal stenosis. Mild multilevel cervical spondylosis. CT head demonstrating no intracranial abnormality.     -Lidocaine patches PRN  -Excedrin BID PRN is available to patient as this is his preference (pt requested instead of tylenol)  -Would be valuable to have PT's input for upper MSK back pain, recs appreciated     Cognitive impairment  History of mental health problems with hallucinations  Bipolar disorder  Agitation   Suicidal ideation  Patient has a history of refusing cares and can easily get agitated. Patient reports history of bipolar and schizophrenia. Does not endorse routine psychiatric follow up or regular medication use. In [facility] system was started on risperidone 1 mg at " "bedtime though has not taken consistently. Stated he can escalate quickly if upset. 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 7/30 started expressing SI with a plan to jump out of the window in setting of extreme frustration and refusal to undergo HD without his exact circumstances, patient himself requesting that his psych meds be increased.  - Psychiatric consulted last admission, could consider reconsultation if elevating risk of agitation              - Psych evaluated, patient felt to have capacity                          - per psych NP, patient is not holdable and has capacity to make decisions for himself                          - per , [facility] worker has stated that so long as psychiatry has deemed him decisional, he is not holdable  -Adjusted Risperidone 1 mg AM, 1 mg bedtime     Goals of care  Note from Dr. Cuenca from prior admission:  \"Patient has previously reiterated that he will not undergo HD for his ESRD unless he gets his specific environment situated (preferred Rns only, demanding HD only in recliner and refusing HD in a bed, only wants HD in his room and away from others). Has continued to decline HD, seems to understand that cardiac arrest is a risk, though continues to express that he \"will not die\" due to this anytime soon. However, continues to reiterate that he would like full CPR and resuscitative efforts at this time, including intubation. Unclear if he would want HD if in this situation. Given known cognitive impairment as above as well as mood disorders, question if he is able to be decisional and/or what his needs would be if he were to become unable to express his needs. Palliative has been following, however patient continuing to decline discussion regarding end of life cares\"     Chronic anemia, normocytic   Baseline Hgb 9-10. Likely related to ESRD/chronic disease. Iron stores adequate. CBC on admission pending  - " "Continue DEMAR weekly; resume with outpatient dialysis.  -Transfuse for HgB <7 if indicated  - Daily CBC (patient has frequently refused labs on prior admissions)     Chronic pain syndrome, stable    Neuropathy, reported to be diabetic  - Cont. Lyrica 25 mg daily              -Cannot increase dose due to ESRD              -Pt felt capsaicin, icyhot was not effective, will discontinue  -Will trial topamax today to see if effective                         Hypertension, uncontrolled  Continues to be hypertensive during hospitalization likely in setting of missed dialysis session.   - Continue PTA amlodipine 10 every day and metoprolol 25 BID     Disposition Planning  Discussed with CM, pt currently homeless and \"living on the streets\", pt reports has  through [employer] working on permanent housing but has not been completed yet, pt is interested in group home or LTC in the future but for immediate disposition planning likely TCU or medical respite care. PT/OT consulted to evaluate patient.  -Pt was evaluated by PT/OT 8/10, both services recommending TCU placement upon discharge due to cognitive impairment and difficulty with ambulation 2/2 deconditioning and neuropathy  -D/W CM, patient has declined from all TCU placement at this point. Patient may only be able to return to live with family upon discharge or will need transportation if more permanent housing is able to be secured.     L hand/thumb pain  Pt reports he has had pain in his L thumb which has limited his  strength and functional status. On exam, does not appear markedly swollen, he does note pain most significant in 1st dorsal compartment of wrist. He is tender to palpation over distal radial styloid, but will not let examiner do much in terms of palpation or ROM. I suspect most likely de quervain tenosynovitis in setting of overuse injury vs. Intersection syndrome. Could consider further evaluation with xray but without traumatic history " "I am less suspicious of fracture.  -Consider thumb spica cast or brace at discharge  -will require OP follow up, treatment options include corticosteroid injection or OP hand therapy w/OT   -Excedrin scheduled BID per pt request over tylenol  -Capsaican cream can also be applied to his thumb  -Limited medication options due to renal function          Diet: Snacks/Supplements Adult: Other; [place] at HS; Between Meals  Regular Diet Adult  Diet  Room Service    DVT Prophylaxis: Pneumatic Compression Devices  Raphael Catheter: Not present  Lines: None     Cardiac Monitoring: None  Code Status: Full Code      Clinically Significant Risk Factors        # Hyperkalemia: Highest K = 6 mmol/L in last 2 days, will monitor as appropriate                     # Overweight: Estimated body mass index is 28.71 kg/m  as calculated from the following:    Height as of 7/22/24: 1.981 m (6' 6\").    Weight as of this encounter: 112.7 kg (248 lb 7.3 oz).      # Financial/Environmental Concerns: none  # Housing Instability: noted in nursing assessment           Disposition Plan     Medically Ready for Discharge: Ready Now         The patient's care was discussed with the Attending Physician, Dr. Rosenstein .    Terrance Watkins MD  Hospitalist Service  [facility]  Securely message with [employer] (more info)  Text page via [employer] Paging/Directory   ______________________________________________________________________    Interval History   Patient resting comfortable on my arrival to bedside, did not want to talk and instead continue to sleep. Able to make his needs known. Continuing to wait on safe discharge plan.    Physical Exam   Vital Signs: Temp: 97.6  F (36.4  C) Temp src: Temporal BP: (!) 153/110 Pulse: 87   Resp: 16 SpO2: (!) 87 % O2 Device: None (Room air)    Weight: 248 lbs 7.33 oz    Constitutional: Sleeping comfortably in bed, NAD.  Respiratory: Breathing comfortably on RA.   Cardiovascular: Normal apical " impulse, regular rate and rhythm, normal S1 and S2, no S3 or S4, and no murmur noted  GI: No scars, normal bowel sounds, soft, non-distended, non-tender, no masses palpated, no hepatosplenomegally  Musculoskeletal: There is no redness, warmth, or swelling of the joints.    Neurologic: Unable to assess  Neuropsychiatric: Unable to assess    Data   ------------------------- PAST 24 HR DATA REVIEWED -----------------------------------------------    I have personally reviewed the following data over the past 24 hrs:    5.1  \   8.7 (L)   / 237     140 99 67.3 (H) /  110 (H)   5.6 (H) 23 12.75 (H) \       Imaging results reviewed over the past 24 hrs:   No results found for this or any previous visit (from the past 24 hour(s)).

## 2024-08-18 LAB
ANION GAP SERPL CALCULATED.3IONS-SCNC: 21 MMOL/L (ref 7–15)
BUN SERPL-MCNC: 50.5 MG/DL (ref 6–20)
BUN SERPL-MCNC: 77.6 MG/DL (ref 6–20)
CALCIUM SERPL-MCNC: 8.9 MG/DL (ref 8.8–10.4)
CHLORIDE SERPL-SCNC: 97 MMOL/L (ref 98–107)
CREAT SERPL-MCNC: 13.54 MG/DL (ref 0.67–1.17)
EGFRCR SERPLBLD CKD-EPI 2021: 4 ML/MIN/1.73M2
ERYTHROCYTE [DISTWIDTH] IN BLOOD BY AUTOMATED COUNT: 15.8 % (ref 10–15)
GLUCOSE SERPL-MCNC: 187 MG/DL (ref 70–99)
HCO3 SERPL-SCNC: 20 MMOL/L (ref 22–29)
HCT VFR BLD AUTO: 25.4 % (ref 40–53)
HGB BLD-MCNC: 8 G/DL (ref 13.3–17.7)
MCH RBC QN AUTO: 26.8 PG (ref 26.5–33)
MCHC RBC AUTO-ENTMCNC: 31.5 G/DL (ref 31.5–36.5)
MCV RBC AUTO: 85 FL (ref 78–100)
PLATELET # BLD AUTO: 242 10E3/UL (ref 150–450)
POTASSIUM SERPL-SCNC: 5.1 MMOL/L (ref 3.4–5.3)
RBC # BLD AUTO: 2.98 10E6/UL (ref 4.4–5.9)
SODIUM SERPL-SCNC: 138 MMOL/L (ref 135–145)
WBC # BLD AUTO: 5.3 10E3/UL (ref 4–11)

## 2024-08-18 PROCEDURE — 90935 HEMODIALYSIS ONE EVALUATION: CPT

## 2024-08-18 PROCEDURE — 90937 HEMODIALYSIS REPEATED EVAL: CPT

## 2024-08-18 PROCEDURE — 250N000013 HC RX MED GY IP 250 OP 250 PS 637

## 2024-08-18 PROCEDURE — 36415 COLL VENOUS BLD VENIPUNCTURE: CPT | Performed by: INTERNAL MEDICINE

## 2024-08-18 PROCEDURE — 120N000001 HC R&B MED SURG/OB

## 2024-08-18 PROCEDURE — 85027 COMPLETE CBC AUTOMATED: CPT

## 2024-08-18 PROCEDURE — 84520 ASSAY OF UREA NITROGEN: CPT | Performed by: INTERNAL MEDICINE

## 2024-08-18 PROCEDURE — 99232 SBSQ HOSP IP/OBS MODERATE 35: CPT | Mod: GC

## 2024-08-18 PROCEDURE — 80048 BASIC METABOLIC PNL TOTAL CA: CPT

## 2024-08-18 PROCEDURE — 99232 SBSQ HOSP IP/OBS MODERATE 35: CPT | Performed by: INTERNAL MEDICINE

## 2024-08-18 RX ADMIN — ACETAMINOPHEN, ASPIRIN, CAFFEINE 2 TABLET: 250; 65; 250 TABLET, FILM COATED ORAL at 21:08

## 2024-08-18 RX ADMIN — RISPERIDONE 1 MG: 1 TABLET, FILM COATED ORAL at 15:46

## 2024-08-18 RX ADMIN — TOPIRAMATE 15 MG: 15 CAPSULE, COATED PELLETS ORAL at 15:45

## 2024-08-18 RX ADMIN — CALCITRIOL CAPSULES 0.25 MCG 0.25 MCG: 0.25 CAPSULE ORAL at 15:48

## 2024-08-18 RX ADMIN — AMOXICILLIN AND CLAVULANATE POTASSIUM 1 TABLET: 500; 125 TABLET, FILM COATED ORAL at 15:49

## 2024-08-18 RX ADMIN — METOPROLOL SUCCINATE 25 MG: 25 TABLET, EXTENDED RELEASE ORAL at 20:46

## 2024-08-18 RX ADMIN — RISPERIDONE 1 MG: 1 TABLET, FILM COATED ORAL at 20:44

## 2024-08-18 RX ADMIN — PREGABALIN 25 MG: 25 CAPSULE ORAL at 20:44

## 2024-08-18 RX ADMIN — Medication 1 TABLET: at 15:47

## 2024-08-18 RX ADMIN — TOPIRAMATE 15 MG: 15 CAPSULE, COATED PELLETS ORAL at 20:43

## 2024-08-18 RX ADMIN — METOPROLOL SUCCINATE 25 MG: 25 TABLET, EXTENDED RELEASE ORAL at 15:47

## 2024-08-18 RX ADMIN — AMLODIPINE BESYLATE 10 MG: 5 TABLET ORAL at 15:49

## 2024-08-18 RX ADMIN — AMOXICILLIN AND CLAVULANATE POTASSIUM 1 TABLET: 500; 125 TABLET, FILM COATED ORAL at 20:43

## 2024-08-18 RX ADMIN — ACETAMINOPHEN, ASPIRIN, CAFFEINE 2 TABLET: 250; 65; 250 TABLET, FILM COATED ORAL at 15:58

## 2024-08-18 ASSESSMENT — ACTIVITIES OF DAILY LIVING (ADL)
ADLS_ACUITY_SCORE: 26
ADLS_ACUITY_SCORE: 25
ADLS_ACUITY_SCORE: 26
ADLS_ACUITY_SCORE: 26
ADLS_ACUITY_SCORE: 25
ADLS_ACUITY_SCORE: 25
ADLS_ACUITY_SCORE: 26
ADLS_ACUITY_SCORE: 25
ADLS_ACUITY_SCORE: 26
ADLS_ACUITY_SCORE: 25
ADLS_ACUITY_SCORE: 26
ADLS_ACUITY_SCORE: 26
ADLS_ACUITY_SCORE: 25
ADLS_ACUITY_SCORE: 25
ADLS_ACUITY_SCORE: 26
ADLS_ACUITY_SCORE: 25
ADLS_ACUITY_SCORE: 26
ADLS_ACUITY_SCORE: 24
ADLS_ACUITY_SCORE: 25
ADLS_ACUITY_SCORE: 25

## 2024-08-18 NOTE — PLAN OF CARE
Problem: Pain Acute  Goal: Optimal Pain Control and Function  Outcome: Progressing     Problem: Comorbidity Management  Goal: Maintenance of Behavioral Health Symptom Control  Outcome: Progressing   Goal Outcome Evaluation:  No events this shift. Macario cooperative, fairly pleasant. No changes this shift. Refused lidocaine patches. Asked for sandwiches before bed. Ambulating to bathroom by himself. Encouraged to call for help if he needed it and he agreed. Requested Excedrin before bed. No telemetry. No IV access. Room air.

## 2024-08-18 NOTE — PLAN OF CARE
Problem: Chronic Kidney Disease  Goal: Optimal Coping with Chronic Illness  Outcome: Progressing   Goal Outcome Evaluation:       Denies pain, no acute changes this shift, restful night.Call light within reach.

## 2024-08-18 NOTE — PLAN OF CARE
Problem: Adult Inpatient Plan of Care  Goal: Plan of Care Review  Description: The Plan of Care Review/Shift note should be completed every shift.  The Outcome Evaluation is a brief statement about your assessment that the patient is improving, declining, or no change.  This information will be displayed automatically on your shift  note.  Outcome: Progressing   Goal Outcome Evaluation:       Dialysis today. Continued search for safe discharge. Keeping Macario updated with plans

## 2024-08-18 NOTE — PROGRESS NOTES
RENAL PROGRESS NOTE      ASSESSMENT:  ESRD:  IHD MWF at the [facility]  RX:  , Typically high volume UF 3-5kg per pt report.  (he frequently cuts tx short) /, K 2   ACCESS: L AVF, bruit present on exam  S/P fistulagram   H/o poor compliance, short tx etc   Recurrent issues with HyperK  Additional HD today for K 5.6 yesterday.  Continue  dialysis schedule per outpatient       HyperKalemia:  He will dialyze after hyperkalemia episode.  Renal diet  Will need lokelma if persist.   Check pre and post BUN to check adequacy.        HTN/volume:  Mostly controlled when he allows it to be taken (complaint of severe pain with BP cuff) and compliant with meds   Typically quite volume overloaded , high volume UF , educated on FR   PTA Amlod, Metop, continue  /88 today.      CKD/MBD:  On calcitriol, Phoslo as binder, continue  Pt has been refusing phos binder  Updating phos and if sustained high change to renal diet      Anemia:  ACD, on DEMAR and venofer as needed outpt,  Last hgb in the 's        Homelessness:  Pt reports that he doesn't not have stable living  situation, and gets to/from dialysis via scooter   Keeps clothing in a storage locker       SUBJECTIVE:    Patient seen and examined at bedside.  Denies any chest pain, palpitation, nausea. Agree with HD today.       OBJECTIVE:  Physical Exam       BP: (!) 141/88 Pulse: 73   Resp: 18 SpO2: 98 % O2 Device: None (Room air)    Vitals:    24 1950 24   Weight: 104.3 kg (229 lb 15 oz) 112.7 kg (248 lb 7.3 oz)     Vital Signs with Ranges  Pulse:  [73-87] 73  Resp:  [16-18] 18  BP: (133-153)/() 141/88  SpO2:  [87 %-98 %] 98 %  I/O last 3 completed shifts:  In: 300 [P.O.:300]  Out: 100 [Urine:100]    Temp (24hrs), Av.4  F (36.9  C), Min:98.4  F (36.9  C), Max:98.4  F (36.9  C)      No data found.    Intake/Output Summary (Last 24 hours) at 2024 1408  Last data filed at 2024  2150  Gross per 24 hour   Intake --   Output 200 ml   Net -200 ml       PHYSICAL EXAM:  General - Alert and oriented x3  Right cheek swelling present, although improved compared to yesterday  Cardiovascular - BP escalated, pt did not take AM meds   Respiratory -on RA   Abd: obese   Extremities -no edema on LL, tender to palpation   Skin: dry, intact, no rash, good turgor  Neuro:  Grossly intact, no focal deficits  MSK:  Grossly intact  Psych:  Normal affect, has been argumentative here with some staff at hospital    LABORATORY STUDIES:     Recent Labs   Lab 08/17/24  1302 08/12/24  0705   WBC 5.1 7.9   RBC 3.18* 2.90*   HGB 8.7* 8.0*   HCT 27.5* 25.7*    244       Basic Metabolic Panel:  Recent Labs   Lab 08/18/24  1040 08/17/24  1301 08/16/24  1204 08/12/24  1553 08/12/24  0705    140 137  --  137   POTASSIUM 5.1 5.6* 6.0* 4.4 6.0*   CHLORIDE 97* 99 97*  --  98   CO2 20* 23 23  --  23   BUN 77.6* 67.3* 84.9*  --   --    CR 13.54* 12.75* 15.98*  --   --    * 110* 147*  --   --    LOVELY 8.9 9.3 8.7*  --   --        INRNo lab results found in last 7 days.     Recent Labs   Lab Test 08/17/24  1302 08/12/24  0705   WBC 5.1 7.9   HGB 8.7* 8.0*    244       Personally reviewed current labs      Gerardo Mancilla MD   [facility]  [phone]

## 2024-08-18 NOTE — PROCEDURES
"Potassium   Date Value Ref Range Status   08/18/2024 5.1 3.4 - 5.3 mmol/L Final   10/17/2014 4.5 3.4 - 5.3 mmol/L Final     Hemoglobin   Date Value Ref Range Status   08/18/2024 8.0 (L) 13.3 - 17.7 g/dL Final   03/27/2014 13.8 13.3 - 17.7 g/dL Final     Creatinine   Date Value Ref Range Status   08/18/2024 13.54 (H) 0.67 - 1.17 mg/dL Final   10/17/2014 1.6 (H) 0.8 - 1.5 mg/dL Final     Urea Nitrogen   Date Value Ref Range Status   08/18/2024 77.6 (H) 6.0 - 20.0 mg/dL Final   10/17/2014 20.0 5.0 - 24.0 mg/dL Final     Sodium   Date Value Ref Range Status   08/18/2024 138 135 - 145 mmol/L Final   10/17/2014 147.0 (H) 133.0 - 144.0 mmol/L Final     INR   Date Value Ref Range Status   03/27/2014 1.0  Final       DIALYSIS PROCEDURE NOTE  Hepatitis status of previous patient on machine log was checked and verified ok to use with this patients hepatitis status.  Patient dialyzed for 2.25 hrs. on a K2 bath with a net fluid removal of  500mL.  A BFR of 350 ml/min was obtained 15 gauge needles.      The treatment plan was discussed with Dr. Mancilla during the treatment.    Needle cannulation sites held x 10 min.     Meds  given: Saline bolus, total of 400ml  Complications: Symptomatic hypotension with a drop in SBP > 20. Pt. Reported lightheadedness/dizziness and had an emesis of about 200ml. Treatment terminated 45 minutes early, d/t vomiting. Pt. Reported \"feeling better\" after termination of treatment.     Person educated: Patient. Knowledge base moderate. Barriers to learning: none. Educated on procedure, needle pressure via explanation.      ICEBOAT? Timeout performed pre-treatment  I: Patient was identified using 2 identifiers  C:  Consent Signed Yes  E: Equipment preventative maintenance is current and dialysis delivery system OK to use  B: Hepatitis B Surface Antigen: Negative; Draw Date: 7/23/24      Hepatitis B Surface Antibody: Immune; Draw Date: 7/23/24  O: Dialysis orders present and complete prior to treatment  A: " Vascular access verified and assessed prior to treatment  T: Treatment was performed at a clinically appropriate time  ?: Patient was allowed to ask questions and address concerns prior to treatment  See Adult Hemodialysis flowsheet in EPIC for further details and post assessment.  Machine water alarm in place and functioning. Transducer pods intact and checked every 15min.   Pt returned via bed.  Chlorine/Chloramine water system checked every 4 hours.    Patient repositioned every 2 hours during the treatment.  Post treatment report given to ARMANDO Anand RN regarding 500mL of fluid removed, and last BP of 184/116.

## 2024-08-18 NOTE — PROGRESS NOTES
Returned from dialysis per bed. Pleasant demeanor. Would like to be left to rest. Not interested in anything by mouth at this time. Had a large emesis downstairs during dialysis. Will await his call and give overdue medications at that time. B/P 124/75.

## 2024-08-18 NOTE — PROGRESS NOTES
[facility]    Medicine Progress Note - Hospitalist Service    Date of Admission:  8/8/2024    Assessment & Plan   Macario Delatorre is a 44 year old male admitted on 8/8/2024. He has a history of ESRD on HD MWF w/associated nephrology, cognitive disability, mental health complex hx (PTSD, bipolar? Agitation) HTN, gout, HLD and is admitted for recurrent chest pains, upper back pain found to have hyperkalemia (K 6.7), slightly elevated troponin at 72 w/no EKG changes, delta trop pending. Very complex social history including active homelessness and poor medication adherence in the past, has doctored inconsistently.  CM continues to work on placement, has been declined from all TCU's at this point, awaiting information from [facility], will attempt to use [employer] services for transporting patient and his scooter once he has placement. He does not have an alternative safe discharge plan at this point, needs to continue working with [facility] to help him secure long term housing. Macario is agreeable to TCU placement at this time.     #ESRD on HD MWF  #Hyperkalemia, severe  Patient has had recent previous similar admissions, notably 7/22-8/2/24 for hyperkalemia and dialysis in which patient left AMA. Also was admitted 7/10-7/18 for syncopal episode and chest pains, hyperkalemia and ESRD with HD. Per patient had dialysis run 8/7/24 but was stopped early due to him c/o extremities being cold. Have had issues in the past with continuing dialysis, however patient is understanding this admission of the importance of receiving his regularly scheduled treatments. K 6.7 this admission, s/p insulin and dextrose in ED, recheck pending. Cr. 17.2, BUN 72, GFR 3. Unclear etiology of ESRD, suspect 2/2 chronic poorly controlled HTN, last A1C 7/10/24 4.4, is ot on any medications for diabetes currently and BG wnl. Appears has not had prior workup.  -Nephrology consulted, recs appreciated  -Emergent  dialysis initiated 8/8/24, will resume regular HD schedule MWF 8/9 per nephrology              -K 4.4 8/12 after dialysis  -Cont. Dialysis MWF  -Fistulogram performed 8/13 w/IR, L arm fistulogram demonstrated high grade stenosis of cephalic vein arch, treated successfully w/angioplasty  -Renal diet              -Per nephro, ok to resume regular diet if pt agrees to take K binders              -s/p Lokelma given 8/10  -Consider re-consultation of palliative care dionisio prior refusal of HD, however patient has been adherent and understanding this admission  -Resume home renal multivitamins, calcitriol, calcium acetate  -Scheduled labs for noon daily BMP and CBC since he was refusing AM labs     #R Facial Pain  #Facial Swelling  -Pt noted 8/13 started having swelling R side of his face with significant 10/10 pain and tenderness to palaption. On exam primarily tender over zygomatic process, however also appears to be some involvement of R parotid gland. D/w radiology, recommended initial eval with ultrasound head and neck soft tissue, if nonconclusive would consider noncon CT (given ESRD on HD likely would not tolerate IV contrast). Pt does have history of poor dentition which is noted on exam, appears to have abrasion on the inner side of his cheek as well. Initial concern suppurative parotitis vs sialolithiasis vs likely dental infection. Currently afebrile, no updated CBC today due to pt refusing lab draws  -R facial soft tissue US demonstrating parotid gland wnl, zygomatic bone/cheek with no swelling appreciated on exam  -Pt does not have IV access and is declining another IV placed.   Swelling and pain significantly improved on exam since 8/16. Will need dental follow up outpatient.              -Augmentin 500-125 q8h x 7 days for suspected tooth/gingival infection (last dose 8/20)              -One time PO oxycodone given for pain management, ok to repeat x1 dose if persistent severe pain but would not recommend  "continuing on discharge              -Has PRN excedrin              -Ice the area as needed              -US Head/neck soft tissue ordered 8/14     #Chest Pain  #ACS r/out   Patient presented with chest pain, diffuse tenderness midsternal radiating to both L and R side of his chest, reproducible, has had chest pain recurrence on previous admissions. Denies SOB currently, initial EKG on admission not demonstrating ischemic changes, initial troponin 72, delta trop downtrending slightly. CXR demonstrating Lung volumes are low. No focal airspace opacities, pleural effusions, or pneumothorax. Normal pulmonary vascularity. Stable cardiomediastinal silhouette. I do not suspect cardiac etiology at this time.  -Cardiac telemetry, no events detected, discontinued 8/10  -Pt requested excedrin for pain, ok with CKD  -Hold scheduled tylenol due to above     #Back Pain  #Fall, previous encounter  Pt endorsing upper back pain most notably tender over cervical paraspinal muscles, appears that he had workup done 8/6 at [facility] when he \"collapsed\" and hurt his back, denied LOC. Imaging performed at this time included CT lumbar, thoracic, and cervical spine, all demonstrating no evidence for fracture or dislocation, no significant spinal canal or neuroforaminal stenosis. Mild multilevel cervical spondylosis. CT head demonstrating no intracranial abnormality.     -Lidocaine patches PRN  -Excedrin BID PRN is available to patient as this is his preference (pt requested instead of tylenol)  -Would be valuable to have PT's input for upper MSK back pain, recs appreciated     Cognitive impairment  History of mental health problems with hallucinations  Bipolar disorder  Agitation   Suicidal ideation  Patient has a history of refusing cares and can easily get agitated. Patient reports history of bipolar and schizophrenia. Does not endorse routine psychiatric follow up or regular medication use. In [facility] system was started on risperidone 1 mg at " "bedtime though has not taken consistently. Stated he can escalate quickly if upset. 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 7/30 started expressing SI with a plan to jump out of the window in setting of extreme frustration and refusal to undergo HD without his exact circumstances, patient himself requesting that his psych meds be increased.  - Psychiatric consulted last admission, could consider reconsultation if elevating risk of agitation              - Psych evaluated, patient felt to have capacity                          - per psych NP, patient is not holdable and has capacity to make decisions for himself                          - per , [facility] worker has stated that so long as psychiatry has deemed him decisional, he is not holdable  -Adjusted Risperidone 1 mg AM, 1 mg bedtime     Goals of care  Note from Dr. Cuenca from prior admission:  \"Patient has previously reiterated that he will not undergo HD for his ESRD unless he gets his specific environment situated (preferred Rns only, demanding HD only in recliner and refusing HD in a bed, only wants HD in his room and away from others). Has continued to decline HD, seems to understand that cardiac arrest is a risk, though continues to express that he \"will not die\" due to this anytime soon. However, continues to reiterate that he would like full CPR and resuscitative efforts at this time, including intubation. Unclear if he would want HD if in this situation. Given known cognitive impairment as above as well as mood disorders, question if he is able to be decisional and/or what his needs would be if he were to become unable to express his needs. Palliative has been following, however patient continuing to decline discussion regarding end of life cares\"     Chronic anemia, normocytic   Baseline Hgb 9-10. Likely related to ESRD/chronic disease. Iron stores adequate. CBC on admission pending  - " "Continue DEMAR weekly; resume with outpatient dialysis.  -Transfuse for HgB <7 if indicated  - Daily CBC (patient has frequently refused labs on prior admissions)     Chronic pain syndrome, stable    Neuropathy, reported to be diabetic  - Cont. Lyrica 25 mg daily              -Cannot increase dose due to ESRD              -Pt felt capsaicin, icyhot was not effective, will discontinue  -Will trial topamax today to see if effective                         Hypertension, uncontrolled  Continues to be hypertensive during hospitalization likely in setting of missed dialysis session.   - Continue PTA amlodipine 10 every day and metoprolol 25 BID     Disposition Planning  Discussed with CM, pt currently homeless and \"living on the streets\", pt reports has  through [employer] working on permanent housing but has not been completed yet, pt is interested in group home or LTC in the future but for immediate disposition planning likely TCU or medical respite care. PT/OT consulted to evaluate patient.  -Pt was evaluated by PT/OT 8/10, both services recommending TCU placement upon discharge due to cognitive impairment and difficulty with ambulation 2/2 deconditioning and neuropathy  -D/W CM, patient has declined from all TCU placement at this point. Patient may only be able to return to live with family upon discharge or will need transportation if more permanent housing is able to be secured.     L hand/thumb pain  Pt reports he has had pain in his L thumb which has limited his  strength and functional status. On exam, does not appear markedly swollen, he does note pain most significant in 1st dorsal compartment of wrist. He is tender to palpation over distal radial styloid, but will not let examiner do much in terms of palpation or ROM. I suspect most likely de quervain tenosynovitis in setting of overuse injury vs. Intersection syndrome. Could consider further evaluation with xray but without traumatic history " "I am less suspicious of fracture.  -Consider thumb spica cast or brace at discharge  -will require OP follow up, treatment options include corticosteroid injection or OP hand therapy w/OT   -Excedrin scheduled BID per pt request over tylenol  -Capsaican cream can also be applied to his thumb  -Limited medication options due to renal function          Diet: Snacks/Supplements Adult: Other; [place] at HS; Between Meals  Regular Diet Adult  Diet  Room Service    DVT Prophylaxis: Pneumatic Compression Devices  Raphael Catheter: Not present  Lines: None     Cardiac Monitoring: None  Code Status: Full Code      Clinically Significant Risk Factors        # Hyperkalemia: Highest K = 6 mmol/L in last 2 days, will monitor as appropriate                     # Overweight: Estimated body mass index is 28.71 kg/m  as calculated from the following:    Height as of 7/22/24: 1.981 m (6' 6\").    Weight as of this encounter: 112.7 kg (248 lb 7.3 oz).        # Financial/Environmental Concerns: none  # Housing Instability: noted in nursing assessment           Disposition Plan     Medically Ready for Discharge: Ready Now         The patient's care was discussed with the Attending Physician, Dr. Rosenstein .    Nelida Damico MD  Hospitalist Service  [facility]  Securely message with [employer] ([place]) (more info)  Text page via [employer] Paging/Directory   ______________________________________________________________________    Interval History   HD run this morning.  Reports dizziness that has been ongoing and states \"I did not get my medication medication yet today. I will feel better when I get them\". He does not want to explain further.      Physical Exam   Vital Signs:     BP: (!) 141/88 Pulse: 73   Resp: 18 SpO2: 98 % O2 Device: None (Room air)    Weight: 248 lbs 7.33 oz    General: Appears alert and oriented, NAD  Respiratory: No signs of increased work of breathing.   Cardiac: Declined  Abdomen: Declined     Data "   ------------------------- PAST 24 HR DATA REVIEWED -----------------------------------------------    I have personally reviewed the following data over the past 24 hrs:    5.1  \   8.7 (L)   / 237     140 99 67.3 (H) /  110 (H)   5.6 (H) 23 12.75 (H) \       Imaging results reviewed over the past 24 hrs:   No results found for this or any previous visit (from the past 24 hour(s)).

## 2024-08-18 NOTE — TREATMENT PLAN
Dialysis nurse here to speak with him about treatment today. Macario refused to go down at this time. CNA and Dialysis nurse guaranteed they would make sure he was kept warm during his run. Macario complained of being so cold his ' toes felt numb.' Still refused saying he would order breakfast.    Continues to c/o rudeness of this nurse awakening him every morning just to give him his medications. When explained it was my job as his nurse I was accused of not caring and the hospital was trying to kill him every time he comes here. Attempts at trying to calm him and understand his frustration was futile. Left the room and CNA, whom he has a good attitude with, continued a conversation. Dialysis nurse left.

## 2024-08-19 VITALS
BODY MASS INDEX: 28.71 KG/M2 | WEIGHT: 248.46 LBS | OXYGEN SATURATION: 100 % | DIASTOLIC BLOOD PRESSURE: 80 MMHG | HEART RATE: 93 BPM | SYSTOLIC BLOOD PRESSURE: 136 MMHG | RESPIRATION RATE: 19 BRPM | TEMPERATURE: 97.8 F

## 2024-08-19 PROCEDURE — 99238 HOSP IP/OBS DSCHRG MGMT 30/<: CPT | Mod: GC

## 2024-08-19 RX ORDER — CALCIUM ACETATE 667 MG/1
1334 CAPSULE ORAL
Qty: 180 CAPSULE | Refills: 0 | Status: SHIPPED | OUTPATIENT
Start: 2024-08-19

## 2024-08-19 RX ORDER — TOPIRAMATE SPINKLE 15 MG/1
15 CAPSULE ORAL EVERY 12 HOURS
Qty: 60 CAPSULE | Refills: 0 | Status: SHIPPED | OUTPATIENT
Start: 2024-08-19

## 2024-08-19 RX ORDER — RISPERIDONE 1 MG/1
1 TABLET ORAL EVERY MORNING
Qty: 30 TABLET | Refills: 0 | Status: SHIPPED | OUTPATIENT
Start: 2024-08-19

## 2024-08-19 RX ORDER — AMOXICILLIN AND CLAVULANATE POTASSIUM 500; 125 MG/1; MG/1
1 TABLET, FILM COATED ORAL 2 TIMES DAILY
Qty: 3 TABLET | Refills: 0 | Status: SHIPPED | OUTPATIENT
Start: 2024-08-19

## 2024-08-19 ASSESSMENT — ACTIVITIES OF DAILY LIVING (ADL)
ADLS_ACUITY_SCORE: 26

## 2024-08-19 NOTE — PLAN OF CARE
Problem: Adult Inpatient Plan of Care  Goal: Absence of Hospital-Acquired Illness or Injury  Outcome: Progressing  Intervention: Prevent Skin Injury  Recent Flowsheet Documentation  Taken 8/18/2024 2100 by Orville Cervantes, RN  Body Position: position changed independently     Problem: Pain Acute  Goal: Optimal Pain Control and Function  Outcome: Progressing  Intervention: Develop Pain Management Plan  Recent Flowsheet Documentation  Taken 8/18/2024 2108 by Orville Cervantes, RN  Pain Management Interventions: medication (see MAR)   Goal Outcome Evaluation:       A&Ox4. Vitals stable on room air. Has 10/10 headache, gave Excedrin. Using urinal at bedside. L AV fistula, bruit and thrill present.

## 2024-08-19 NOTE — PLAN OF CARE
Physical Therapy Discharge Summary    Reason for therapy discharge:    Pt left AMA.    Progress towards therapy goal(s). See goals on Care Plan in [facility] electronic health record for goal details.  Goals not met.  Barriers to achieving goals:   discharge from facility.    Therapy recommendation(s):    No further therapy is recommended.      Emily Hooks, PT, DPT  8/19/2024

## 2024-08-19 NOTE — SIGNIFICANT EVENT
Pt in the chair sleeping. Attempted to do assessment and pt declined.  Declined to take medications until he eats. Declined to order meal at this time. Appears alert and oriented. Speech is clear. Vitals declined. Labs declined.  Have checked on pt q1 hour. To have dialysis at 1230 today.

## 2024-08-19 NOTE — PLAN OF CARE
Occupational Therapy Discharge Summary    Reason for therapy discharge:    Discharged to Pt left AMA    Progress towards therapy goal(s). See goals on Care Plan in [facility] electronic health record for goal details.  Goals not met.  Barriers to achieving goals:   Pt left AMA.    Therapy recommendation(s):    OT recommended TCU for further therapy

## 2024-08-19 NOTE — DISCHARGE SUMMARY
"[facility]  Discharge Summary - Medicine & Pediatrics       Date of Admission:  8/8/2024  Date of Discharge:  8/19/2024  Discharging Provider: Emily Swenson MD and Dr Roth  Discharge Service: Hospitalist Service    Discharge Diagnoses   Principal Problem:    Hyperkalemia    Date Noted: 7/10/2024  Active Problems:    Back pain    Date Noted: 5/1/2013    ESRD (end stage renal disease) on dialysis (H)    Date Noted: 7/10/2024    Noncompliance of patient with renal dialysis (H24)    Date Noted: 7/10/2024    Chest pain, unspecified type    Date Noted: 7/10/2024    Mood disorder (H24)    Date Noted: 8/8/2024    Learning disability    Date Noted: 8/8/2024    Elevated troponin    Date Noted: 8/8/2024    Clinically Significant Risk Factors     # Overweight: Estimated body mass index is 28.71 kg/m  as calculated from the following:    Height as of 7/22/24: 1.981 m (6' 6\").    Weight as of this encounter: 112.7 kg (248 lb 7.3 oz).       Follow-ups Needed After Discharge   Follow-up Appointments     Follow-up and recommended labs and tests       Follow up with primary care provider, Physician No Ref-Primary, within 7   days for hospital follow- up.  The following labs/tests are recommended:   BMP.            Unresulted Labs Ordered in the Past 30 Days of this Admission       No orders found from 7/9/2024 to 8/9/2024.        These results will be followed up by N/A    Discharge Disposition   Discharged to home  Condition at discharge: Stable    Hospital Course   Macario Delatorre was admitted on 8/8/2024 for 8/8/24. He has a history of ESRD on HD MWF w/associated nephrology, cognitive disability, mental health complex hx (PTSD, bipolar), HTN, gout, HLD and is admitted for recurrent chest pains, upper back pain found to have hyperkalemia (K 6.7). Very complex social history including active homelessness and poor medication adherence in the past, has doctored inconsistently. It was recommended that " patient discharge to TCU; however, he elected to discharge to his brother's home. The following problems were addressed during his hospitalization:    #Discharge AMA  Plan was for patient to discharge to TCU initially, but there were significant barriers to finding accepting TCUs. Patient elected to discharge to his brother's home. The plan was for him to discharge 8/19 afternoon after dialysis. Patient declined dialysis and left against medical advice.      ESRD on HD MWF  Hyperkalemia, severe  Patient has had recent previous similar admissions, notably 7/22-8/2/24 for hyperkalemia and dialysis in which patient left AMA. Also was admitted 7/10-7/18 for syncopal episode and chest pains, hyperkalemia and ESRD with HD. Have had issues in the past with continuing dialysis, however patient is understanding this admission of the importance of receiving his regularly scheduled treatments. Hyperkalemic on admission and intermittently since when patient refuses dialysis. Unclear etiology of ESRD, suspect 2/2 chronic poorly controlled hypertension. Will continue outpatient dialysis.   -Continue outpatient dialysis MWF at [facility]  -Fistulogram performed 8/13 w/IR, L arm fistulogram demonstrated high grade stenosis of cephalic vein arch, treated successfully w/angioplasty  -Resume home renal multivitamins, calcitriol, calcium acetate  -Follow-up with nephrology      R Facial Pain and Swelling  Pt noted 8/13 started having swelling R side of his face with significant 10/10 pain and tenderness to palaption. On exam primarily tender over zygomatic process, however also appears to be some involvement of R parotid gland. D/w radiology, recommended initial eval with ultrasound head and neck soft tissue, if nonconclusive would consider noncon CT (given ESRD on HD likely would not tolerate IV contrast). Pt does have history of poor dentition which is noted on exam, appears to have abrasion on the inner side of his cheek as  "well. Initial concern suppurative parotitis vs sialolithiasis vs likely dental infection. Currently afebrile, no updated CBC today due to pt refusing lab draws. Clinically improving significantly.   - Continue Augmentin BID x 7 days (end date 8/20)     Chest Pain  ACS r/out   Patient presented with chest pain, diffuse tenderness midsternal radiating to both L and R side of his chest, reproducible, has had chest pain recurrence on previous admissions. Denies SOB currently, initial EKG on admission not demonstrating ischemic changes, initial troponin 72, delta trop downtrending slightly. CXR demonstrating Lung volumes are low. No focal airspace opacities, pleural effusions, or pneumothorax. Normal pulmonary vascularity. Stable cardiomediastinal silhouette. Normal telemetry for first 48 hours of admission, then discontinued. Unlikely to be cardiac etiology of chest pain. Now resolved.      Back Pain  Fall, previous encounter  Pt endorsing upper back pain most notably tender over cervical paraspinal muscles, appears that he had workup done 8/6 at [facility] when he \"collapsed\" and hurt his back, denied LOC. Imaging performed at this time included CT lumbar, thoracic, and cervical spine, all demonstrating no evidence for fracture or dislocation, no significant spinal canal or neuroforaminal stenosis. Mild multilevel cervical spondylosis. CT head demonstrating no intracranial abnormality.  -Lidocaine patches PRN  -Excedrin BID PRN is available to patient as this is his preference (pt requested instead of tylenol)     Cognitive impairment  History of mental health problems with hallucinations  Bipolar disorder  Agitation   Suicidal ideation  Patient has a history of refusing cares and can easily get agitated. Patient reports history of bipolar and schizophrenia. Does not endorse routine psychiatric follow up or regular medication use. In [facility] system was started on risperidone 1 mg at bedtime though has not taken consistently. " "Stated he can escalate quickly if upset. 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 7/30 started expressing SI with a plan to jump out of the window in setting of extreme frustration and refusal to undergo HD without his exact circumstances, patient himself requesting that his psych meds be increased.  - Psychiatric consulted last admission, could consider reconsultation if elevating risk of agitation              - Psych evaluated, patient felt to have capacity                          - per psych NP, patient is not holdable and has capacity to make decisions for himself                          - per SW, [facility] worker has stated that so long as psychiatry has deemed him decisional, he is not holdable  -Increased Risperidone to 1 mg BID     Goals of care  Note from Dr. Cuenca from prior admission:  \"Patient has previously reiterated that he will not undergo HD for his ESRD unless he gets his specific environment situated (preferred Rns only, demanding HD only in recliner and refusing HD in a bed, only wants HD in his room and away from others). Has continued to decline HD, seems to understand that cardiac arrest is a risk, though continues to express that he \"will not die\" due to this anytime soon. However, continues to reiterate that he would like full CPR and resuscitative efforts at this time, including intubation. Unclear if he would want HD if in this situation. Given known cognitive impairment as above as well as mood disorders, question if he is able to be decisional and/or what his needs would be if he were to become unable to express his needs. Palliative has been following, however patient continuing to decline discussion regarding end of life cares\"     Chronic anemia, normocytic   Baseline Hgb 9-10. Likely related to ESRD/chronic disease. Iron stores adequate.   - Continue DEMAR weekly; resume with outpatient dialysis.     Chronic pain syndrome, " stable    Neuropathy, reported to be diabetic  - Cont. Lyrica 25 mg daily              -Cannot increase dose due to ESRD  - Started Topamax which seemed to be helpful; continue                         Hypertension, uncontrolled  Continues to be hypertensive during hospitalization likely in setting of missed dialysis session.   - Continue PTA amlodipine 10 and metoprolol 25 BID      Consultations This Hospital Stay   NEPHROLOGY IP CONSULT  PHYSICAL THERAPY ADULT IP CONSULT  OCCUPATIONAL THERAPY ADULT IP CONSULT  CARE MANAGEMENT / SOCIAL WORK IP CONSULT  INTERVENTIONAL RADIOLOGY ADULT/PEDS IP CONSULT  PHARMACY TO DOSE VANCO    Code Status   Full Code       The patient was discussed with Dr. Ira Swenson MD  [address] [address]  Phone: [phone]  Fax: [phone]  ______________________________________________________________________    Physical Exam   Vital Signs: Temp: 97.8  F (36.6  C) Temp src: Temporal BP: 136/80 Pulse: 93   Resp: 19 SpO2: 100 %      Weight: 248 lbs 7.33 oz  General: Appears alert and oriented, NAD  Respiratory: No signs of increased work of breathing.   Cardiac: patient declined  Abdomen: patient declined       Primary Care Physician   Physician No Ref-Primary    Discharge Orders      Activity - Up with nursing assistance     Follow-up and recommended labs and tests     Follow up with primary care provider, Physician No Ref-Primary, within 7 days for hospital follow- up.  The following labs/tests are recommended: BMP.     Brief Discharge Instructions    Fistulogram Discharge Instructions:  You had a fistulogram (evaluation of your fistula/graft). A puncture was made into your fistula/graft and your fistula/graft and blood vessels were evaluated for narrowing and clot. Please follow these instructions as you recover:    Care Instructions:   - If you received sedation for your procedure, do not drive or operate  heavy machinery for the rest of the day.  - Resume your normal activities as you tolerate.  - Remove dressing tomorrow.   - You may shower tomorrow.     Follow Up:  - Follow up with your nephrologists/dialysis unit for your dialysis plan.    Please seek medical evaluation for:  - Uncontrolled bleeding from puncture site.   - Fever (greater than 101 F (38.3C)).  - Purulent (yellow/green/foul smelling) drainage from puncture site.   - Increasing pain at puncture site.  - Increasing redness at puncture site.     Reason for your hospital stay    You were hospitalized for your chest and back pain. You should remain on 3x weekly dialysis to prevent further episodes.     Diet    Follow this diet upon discharge: Orders Placed This Encounter      Snacks/Supplements Adult: Other; [place] at HS; Between Meals      Regular Diet Adult       Significant Results and Procedures   Most Recent 3 CBC's:  Recent Labs   Lab Test 08/18/24  1040 08/17/24  1302 08/12/24  0705   WBC 5.3 5.1 7.9   HGB 8.0* 8.7* 8.0*   MCV 85 87 89    237 244     Most Recent 3 BMP's:  Recent Labs   Lab Test 08/18/24  1406 08/18/24  1040 08/17/24  1301 08/16/24  1204   NA  --  138 140 137   POTASSIUM  --  5.1 5.6* 6.0*   CHLORIDE  --  97* 99 97*   CO2  --  20* 23 23   BUN 50.5* 77.6* 67.3* 84.9*   CR  --  13.54* 12.75* 15.98*   ANIONGAP  --  21* 18* 17*   LOVELY  --  8.9 9.3 8.7*   GLC  --  187* 110* 147*   ,   Results for orders placed or performed during the hospital encounter of 08/08/24   XR Chest 2 Views    Narrative    EXAM: XR CHEST 2 VIEWS  LOCATION: [facility]  DATE: 8/8/2024    INDICATION: Chest pain.   COMPARISON: Chest radiograph 7/10/2024.       Impression    IMPRESSION:     Lung volumes are low. No focal airspace opacities, pleural effusions, or pneumothorax. Normal pulmonary vascularity.    Stable cardiomediastinal silhouette.   IR Dialysis Fistulogram Left    Narrative    [place] [facility]  LOCATION: [facility]  [facility]  DATE: 8/13/2024    PROCEDURE: AV DIALYSIS FISTULOGRAM AND PTA VENOUS:  1. AV DIALYSIS FISTULOGRAM  2. PTA VENOUS    INTERVENTIONAL RADIOLOGIST: Charly Rosenberg MD.    INDICATION: Prolonged bleeding after dialysis from left arm fistula..    CONSENT: The risks, benefits and alternatives of left arm fistulogram with possible intervention were discussed with the patient  in detail. All questions were answered. Informed consent was given to proceed with the procedure.    MODERATE SEDATION: Versed 1.5 mg IV; 0.4 mg of Dilaudid IV.  Under physician supervision, Versed and fentanyl were administered for moderate sedation. Pulse oximetry, heart rate and blood pressure were continuously monitored by an independent trained   observer. The physician spent 15 minutes of face-to-face sedation time with the patient.    CONTRAST: 40 cc of Omni  ANTIBIOTICS: None.  ADDITIONAL MEDICATIONS: None.    FLUOROSCOPIC TIME: 3.8 minutes.  RADIATION DOSE: Air Kerma: 124 mGy.    COMPLICATIONS: No immediate complications.    STERILE BARRIER TECHNIQUE: Maximum sterile barrier technique was used. Cutaneous antisepsis was performed at the operative site with application of 2% chlorhexidine and large sterile drape. Prior to the procedure, the  and assistant performed   hand hygiene and wore hat, mask, sterile gown, and sterile gloves during the entire procedure.    PROCEDURE:   The left arm was prepped and draped in the usual sterile fashion followed by local anesthesia with 1% Xylocaine. Access to the fistula was achieved using a micropuncture system. Diagnostic fistulography was performed from the arterial inflow to the right   atrium. Following the diagnostic study, angioplasty was performed using a 10 mm balloon at cephalic vein arch. Completion fistulography was performed. The sheath was removed and compression applied to the puncture site until hemostasis was achieved.   There was a palpable thrill in the  fistula at the completion of the procedure.    FINDINGS:  AV DIALYSIS FISTULOGRAM: Focal high-grade stenosis at the cephalic vein arch successfully treated with angioplasty using a 10 mm balloon. The remainder of the central veins are patent. The remainder of the cephalic outflow vein is patent..      Impression    IMPRESSION:    1.  Left arm fistulogram demonstrates focal high-grade stenosis of the cephalic vein arch successfully treated with angioplasty using a 10 mm balloon.     US Head Neck Soft Tissue    Narrative    EXAM: US HEAD NECK SOFT TISSUE  LOCATION: [facility]  DATE: 8/14/2024    INDICATION: pain and swelling over R zygomatic bone w supsected involvement of parotid gland. Please evaluate for parotitis vs. sialolisthesis  COMPARISON: None.  TECHNIQUE: Routine.    FINDINGS AND     Impression    IMPRESSION: No focal ultrasound findings in the area of concern near the right parotid gland and the right cheek.       Discharge Medications   Current Discharge Medication List        START taking these medications    Details   amoxicillin-clavulanate (AUGMENTIN) 500-125 MG tablet Take 1 tablet by mouth 2 times daily  Qty: 3 tablet, Refills: 0    Associated Diagnoses: Tooth infection      aspirin-acetaminophen-caffeine (EXCEDRIN MIGRAINE) 250-250-65 MG tablet Take 2 tablets by mouth 2 times daily as needed for headaches or moderate pain  Qty: 30 tablet, Refills: 0    Associated Diagnoses: Bilateral thoracic back pain, unspecified chronicity      topiramate (TOPAMAX) 15 MG capsule Take 1 capsule (15 mg) by mouth every 12 hours  Qty: 60 capsule, Refills: 0    Associated Diagnoses: Bipolar 2 disorder (H)           CONTINUE these medications which have CHANGED    Details   calcium acetate (PHOSLO) 667 MG CAPS capsule Take 2 capsules (1,334 mg) by mouth 3 times daily (with meals)  Qty: 180 capsule, Refills: 0    Associated Diagnoses: Secondary hyperparathyroidism of renal origin (H24)      !!  risperiDONE (RISPERDAL) 1 MG tablet Take 1 tablet (1 mg) by mouth every morning  Qty: 30 tablet, Refills: 0    Associated Diagnoses: Bipolar 2 disorder (H)       !! - Potential duplicate medications found. Please discuss with provider.        CONTINUE these medications which have NOT CHANGED    Details   amLODIPine (NORVASC) 10 MG tablet Take 10 mg by mouth daily      metoprolol succinate ER (TOPROL XL) 25 MG 24 hr tablet Take 1 tablet (25 mg) by mouth 2 times daily  Qty: 30 tablet, Refills: 0    Associated Diagnoses: Primary hypertension      multivitamin RENAL (TRIPHROCAPS) 1 capsule capsule Take 1 capsule by mouth daily      pregabalin (LYRICA) 25 MG capsule Take 1 capsule (25 mg) by mouth daily  Qty: 30 capsule, Refills: 0    Associated Diagnoses: Diabetic polyneuropathy associated with type 2 diabetes mellitus (H)      prochlorperazine (COMPAZINE) 10 MG tablet Take 10 mg by mouth every 6 hours as needed for nausea or vomiting      !! risperiDONE (RISPERDAL) 1 MG tablet Take 1 mg by mouth at bedtime       !! - Potential duplicate medications found. Please discuss with provider.        STOP taking these medications       calcitRIOL (ROCALTROL) 0.25 MCG capsule Comments:   Reason for Stopping:             Allergies   Allergies   Allergen Reactions    Bee Venom      Bee stings swelling and short of breath    Pcn [Penicillins]      Noted in 8/20/08 ER,hives,headaches,throat irritation  Tolerated Augmentin 8/2024    Trazodone      Abdominal pain    Ibuprofen GI Disturbance     Noted in 8/20/08 ER  Headaches and abdominal pain    Tylenol [Acetaminophen] GI Disturbance     Noted in 8/20/08 ER

## 2024-08-19 NOTE — PROGRESS NOTES
Care Management Discharge Note    Discharge Date: 08/20/2024       Discharge Disposition: Dialysis Services, Other (Comments) (unknown at this time)    Discharge Services:  n/a  Discharge DME:  n/a  Discharge Transportation: agency    Education Provided on the Discharge Plan:  yes  Persons Notified of Discharge Plans: yes  Patient/Family in Agreement with the Plan:  yes    Handoff Referral Completed: Yes    Additional Information:  ANDREW spoke with Page RAMOS) from [facility] who stated that admissions is back in office and to call and discuss referral. SW placed call to admissions and left message requesting call to be returned ([phone]). Care management following for placement.  9:09 AM    SW received call from admissions from [place]; declined due to past behavior. Pt declined from all facilities that referrals have been sent to at this time; SW met with pt to discuss alternative options. Pt stated that he refuses to discharge to a medical respite or shelter as they are no able to assist with needs that he would like to address. SW offered home care services to pt to receive if able to stay with family or friends; pt declined. Pt stated that his scooter is at his brothers house and the he needs said scooter to get to and from dialysis. Agreeable to transportation being arranged by ANDREW after dialysis. [employer] ride set for 1730 with a  window between [phone]. All parties aware and agreeable to discharge plan.  10:50 AM    Per bedside RN, pt left AMA. SW placed call and canceled transportation.  12:32 PM    Brenna Kjellberg, BSW

## 2024-08-19 NOTE — PROGRESS NOTES
RENAL PROGRESS NOTE    CC:  ESRD on HD, hyperkalemia.     ASSESSMENT & PLAN:     ESRD:IHD MWF at the [facility] (will continue MWF HD while here). RX:  , Typically high volume UF 3-5kg per pt report.  (he frequently cuts tx short) /, K 2. ACCESS: L AVF, bruit present on exam. S/P fistulagram 8/13. H/o poor compliance, short tx etc. Recurrent issues with HyperK  Additional HD Sunday for hyperkalemia.       Hyperkalemia: Extra HD Saturday. Renal diet. Will need PO lokelma if persist. Check pre and post BUN to check adequacy.      HTN/volume: Mostly controlled when he allows it to be taken (complaint of severe pain with BP cuff) and compliant with meds. Hystorically Hypervolemic , high volume UF , educated on FR. PTA Amlod, Metop, continue  VSS today.      CKD/MBD:On calcitriol, Phoslo as binder, continue. Pt has been refusing phos binder  Updating phos and if sustained high change to renal diet.      Anemia: 2/2 ACD, on DEMAR and venofer as needed outpt, Last hgb in the 8's, follow need for DEMAR here.         Homelessness: Pt reports that he doesn't not have stable living  situation, and gets to/from dialysis via scooter   Keeps clothing in a storage locker       SUBJECTIVE:   Pt refused dialysis today and left hospital AMA before I was able to see him in person  Discussed with HD RN, and primary medical provider  Called OP HD [facility] unit and gave report.     OBJECTIVE:  Physical Exam   Temp: 97.8  F (36.6  C) Temp src: Temporal BP: 136/80 Pulse: 93   Resp: 19 SpO2: 100 %      Vitals:    08/08/24 1950 08/09/24 2007   Weight: 104.3 kg (229 lb 15 oz) 112.7 kg (248 lb 7.3 oz)     Vital Signs with Ranges  Temp:  [97.8  F (36.6  C)] 97.8  F (36.6  C)  Pulse:  [85-99] 93  Resp:  [18-38] 19  BP: (124-236)/() 136/80  SpO2:  [100 %] 100 %  I/O last 3 completed shifts:  In: 1220 [P.O.:720; Other:500]  Out: 700 [Urine:200; Other:500]        No data found.  Intake/Output Summary (Last  24 hours) at 8/19/2024 1106  Last data filed at 8/18/2024 1300  Gross per 24 hour   Intake 500 ml   Output 500 ml   Net 0 ml       PHYSICAL EXAM:  GEN: NAD  CV: RRR without murmur or rub  Lung: clear and equal; no extra sounds  Ab: soft and NT; not distended; normal bs  Ext: + edema and well perfused  Skin: no rash      LABORATORY STUDIES:     Recent Labs   Lab 08/18/24  1040 08/17/24  1302   WBC 5.3 5.1   RBC 2.98* 3.18*   HGB 8.0* 8.7*   HCT 25.4* 27.5*    237       Basic Metabolic Panel:  Recent Labs   Lab 08/18/24  1406 08/18/24  1040 08/17/24  1301 08/16/24  1204 08/12/24  1553   NA  --  138 140 137  --    POTASSIUM  --  5.1 5.6* 6.0* 4.4   CHLORIDE  --  97* 99 97*  --    CO2  --  20* 23 23  --    BUN 50.5* 77.6* 67.3* 84.9*  --    CR  --  13.54* 12.75* 15.98*  --    GLC  --  187* 110* 147*  --    LOVELY  --  8.9 9.3 8.7*  --        INRNo lab results found in last 7 days.     Recent Labs   Lab Test 08/18/24  1040 08/17/24  1302   WBC 5.3 5.1   HGB 8.0* 8.7*    237       Personally reviewed current labs    Anita ROCK-BC  [facility]  [phone]

## 2024-08-19 NOTE — SIGNIFICANT EVENT
At 1100 pt was not in his room.  Pt had talked to MD and was making statements about leaving AMA.  At noon  called and pt was at the front entrance asking to talk to the nurse.  Checked on pt at the front. When asked why he was there with  his things. He states he was going to leave but now he feels dizzy. Told pt he had to be in his room then I could check his vital signs that he refused earlier today.  Pt agreed. When entering the room the dialysis RN was calling for him to go to his run.  Pt was getting upset. VSS.  Asked pt if he would be willing to go dialysis he starting getting upset with me telling me that I had to wheel him in the wheelchair and push the bed down.   called and states he ride was out front. Pt upset yelling at staff and saying he wanted to leave. Pt then was brought to the front entrance and he left AMA

## 2024-10-11 NOTE — DISCHARGE SUMMARY
"[facility]  Discharge Summary - Medicine & Pediatrics       Date of Admission:  7/22/2024  Date of Discharge:  8/2/2024 12:11 PM  Discharging Provider: Dr. Cuenca,   Discharge Service: Hospitalist Service    Discharge Diagnoses   Hyperkalemia, severe  ESRD on MWF dialysis  Chest pain  Cognitive impairment  History of mental health problems with hallucinations  Bipolar disorder  Agitation   Suicidal ideation  R wrist edema  R wrist pain  Normocytic anemia  Chronic pain syndrome  Neuropathy, reported to be diabetic  HTN    Clinically Significant Risk Factors     # Overweight: Estimated body mass index is 27.62 kg/m  as calculated from the following:    Height as of this encounter: 1.981 m (6' 6\").    Weight as of this encounter: 108.4 kg (239 lb).       Follow-ups Needed After Discharge       Unresulted Labs Ordered in the Past 30 Days of this Admission       No orders found from 6/22/2024 to 7/23/2024.            Discharge Disposition   Patient left AMA  Condition at discharge: Stable    Hospital Course   Macario Delatorre was admitted on 7/22/2024. He has a PMH of ESR on M/W/F HD, chronic pain syndrome, paroxysmal SVT, chronic gout, hypertension, hyperlipidemia, CAD, intellectual disability who was admitted on 7/22/24 with severe hyperkalemia with possible presyncope in setting of missed dialysis. Improved with dialysis. Planned for discharge to TCU pending [facility] assessment, however unfortunately patient left AMA after declining several rounds of dialysis. The following problems were addressed during his hospitalization:    Hyperkalemia, severe  ESRD on MWF dialysis  K of 8.1 on presentation to ED with missed dialysis. Follows with [facility] and [facility], though has since decided he does not want to have Dr. Boucher or Dr. Lua see him, now having [facility] see him. Patient has history of poor compliance with dialysis due to conflicts with living facility and transportation. " Last outpt dialysis was 7/17. Had emergent dialysis on 7/23 due to severe hyperkalemia. Last dialysis 7/29 with shortened run. Left AMA and declined dialysis continuously. Recommended he follow up with nephrology consistently on his schedule.  -Nephrology following   - return to [facility] dialysis schedule  - discussed dangers of missed dialysis including death discussed  - guarded prognosis due to poor compliance with dialysis and life threatening hyperkalemia  - Palliative following              - wants to continue life prolonging care with hemodialysis  - appears to have appreciation of his condition and can clearly communicate his wishes     Chest pain, resolved  Thought to be secondary to high potassium and hypervolemia from missed dialysis. Initial troponin 72, stable from prior though in setting of ESRD. EKG with mild T wave prominence in leads II and III. Resolved with emergent dialysis and electrolyte correction.      Cognitive impairment  History of mental health problems with hallucinations  Bipolar disorder  Agitation   Suicidal ideation  Patient refusing cares and can easily get agitated. Patient reports history of bipolar and schizophrenia. Does not endorse routine psychiatric follow up or regular medication use. In [facility] system was started on risperidone 1 mg at bedtime though has not taken consistently. Stated he can escalate quickly if upset. 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 7/30 started expressing SI with a plan to jump out of the window in setting of extreme frustration and refusal to undergo HD without his exact circumstances, patient himself requesting that his psych meds be increased. Left AMA without medication prescription.  - Psychiatric consult, appreciate input especially regarding capacity              - Psych evaluated, patient felt to have capacity              - reconsulted 7/30 for SI with plan                          - per  "psych NP, patient is not holdable and has capacity to make decisions for himself                          - per [facility] worker has stated that so long as psychiatry has deemed him decisional, he is not holdable     Goals of care  Patient has reiterated that he will not undergo HD for his ESRD unless he gets his specific environment situated (preferred Rns only, demanding HD only in recliner and refusing HD in a bed, only wants HD in his room and away from others). Has continued to decline HD, seems to understand that cardiac arrest is a risk, though continues to express that he \"will not die\" due to this anytime soon. However, continues to reiterate that he would like full CPR and resuscitative efforts at this time, including intubation. Unclear if he would want HD if in this situation. Given known cognitive impairment as above as well as mood disorders, question if he is able to be decisional and/or what his needs would be if he were to become unable to express his needs. Palliative has been following, however patient continuing to decline discussion regarding end of life cares. Ethics consult placed, please see their note from 8/1/2024 for further details. Left AMA before we could discuss informed non-consent. Will have to assume full code and emergent dialysis if unable to express wishes.    Chronic anemia, normocytic   Baseline Hgb 9-10. Likely related to ESRD/chronic disease. Iron stores adequate.      Chronic pain syndrome, stable    Neuropathy, reported to be diabetic  - Lyrica 25 mg daily     Hypertension, uncontrolled  Continues to be hypertensive during hospitalization likely in setting of missed dialysis session.   - Continue PTA amlodipine 10 every day and metoprolol 25 BID       Consultations This Hospital Stay   NEPHROLOGY IP CONSULT  SOCIAL WORK IP CONSULT  PSYCHIATRY IP CONSULT  PALLIATIVE CARE ADULT IP CONSULT  PHYSICAL THERAPY ADULT IP CONSULT  OCCUPATIONAL THERAPY ADULT IP " CONSULT  PSYCHIATRY IP CONSULT  PSYCHIATRY IP CONSULT  NEPHROLOGY IP CONSULT  NEPHROLOGY IP CONSULT  ETHICS IP CONSULT    Code Status   Full Code       The patient was discussed with Dr. Kunal Cuenca MD  [address] [address]  Phone: [phone]  Fax: [phone]  ______________________________________________________________________    Physical Exam   Vital Signs:                    Weight: 239 lbs 0 oz    General: Alert, irritated  Skin: clean, dry, and intact  Resp: normal work of breathing, no wheezing  Cardio: RRR, S1 and S2 present  Extremities: R wrist significantly more swollen than L, TTP along scaphoid and along forearm, palpable pulses, strength 4/5 upper extremities L>R, sensation intact  Psych: angry, irrational thought process         Primary Care Physician   Physician No Ref-Primary    Discharge Orders   No discharge procedures on file.    Significant Results and Procedures      Results for orders placed or performed during the hospital encounter of 07/22/24   XR Lumbar Spine 2/3 Views    Narrative    EXAM: XR LUMBAR SPINE 2/3 VIEWS, XR THORACIC SPINE 3 VIEWS  LOCATION: [facility]  DATE: 7/22/2024    INDICATION: low back pain  COMPARISON: CT abdomen and pelvis 1/16/2024      Impression    IMPRESSION:   The exam are significantly limited due to patient body habitus.    Thoracic spine: There is moderate upper thoracic levoscoliosis with a Redman angle of approximately 22 degrees. Corresponding lower thoracic dextroscoliosis. Lateral alignment is preserved. Disc spaces are preserved without definite acute fracture or   subluxation..    Lumbar spine:  5 lumbar type vertebra. Vertebral body height and alignment is preserved. There is mild levoscoliosis. Disc spaces are preserved. No acute fracture or subluxation.   XR Thoracic Spine 3 Views    Narrative    EXAM: XR LUMBAR SPINE 2/3 VIEWS, XR THORACIC SPINE 3  VIEWS  LOCATION: [facility]  DATE: 7/22/2024    INDICATION: low back pain  COMPARISON: CT abdomen and pelvis 1/16/2024      Impression    IMPRESSION:   The exam are significantly limited due to patient body habitus.    Thoracic spine: There is moderate upper thoracic levoscoliosis with a Redman angle of approximately 22 degrees. Corresponding lower thoracic dextroscoliosis. Lateral alignment is preserved. Disc spaces are preserved without definite acute fracture or   subluxation..    Lumbar spine:  5 lumbar type vertebra. Vertebral body height and alignment is preserved. There is mild levoscoliosis. Disc spaces are preserved. No acute fracture or subluxation.   US Upper Extremity Venous Duplex Right    Narrative    EXAM: US UPPER EXTREMITY VENOUS DUPLEX RIGHT  LOCATION: [facility]  DATE: 8/1/2024    INDICATION: Swelling rule out DVT.  COMPARISON: None.  TECHNIQUE: Venous duplex ultrasound of the right upper extremity with (when possible) and without compression, augmentation, and duplex. Color flow and spectral Doppler with waveform analysis performed.    FINDINGS: Ultrasound includes evaluation of the internal jugular vein, innominate vein, subclavian vein, axillary vein, and brachial vein. The superficial cephalic and basilic veins were also evaluated where seen.     RIGHT: No deep venous thrombosis. No superficial thrombophlebitis.      Impression    IMPRESSION: No deep venous thrombosis in the right upper extremity.   XR Wrist Right G/E 3 Views    Narrative    EXAM: XR WRIST RIGHT G/E 3 VIEWS  LOCATION: [facility]  DATE: 8/1/2024    INDICATION: Wrist pain  COMPARISON: 8/12/2016 radiographs.      Impression    IMPRESSION: Negative ulnar variance again noted. Mild hypertrophic changes have developed between the distal ulna and radius consistent with ulnar impingement. Old healed fifth metacarpal fracture. Normal otherwise.       Discharge  Medications   Discharge Medication List as of 8/2/2024 12:11 PM        CONTINUE these medications which have NOT CHANGED    Details   amLODIPine (NORVASC) 10 MG tablet Take 10 mg by mouth daily, Historical      metoprolol succinate ER (TOPROL XL) 25 MG 24 hr tablet Take 1 tablet (25 mg) by mouth 2 times daily, Disp-30 tablet, R-0, Local Print      multivitamin RENAL (TRIPHROCAPS) 1 capsule capsule Take 1 capsule by mouth daily, Historical      pregabalin (LYRICA) 25 MG capsule Take 1 capsule (25 mg) by mouth daily, Disp-30 capsule, R-0, Local Print      risperiDONE (RISPERDAL) 1 MG tablet Take 1 mg by mouth at bedtime, Historical      calcitRIOL (ROCALTROL) 0.25 MCG capsule Take 0.25 mcg by mouth daily, Historical      calcium acetate (PHOSLO) 667 MG CAPS capsule Take 1,334 mg by mouth 3 times daily (with meals), Historical      ondansetron (ZOFRAN) 4 MG tablet Take 1 tablet (4 mg) by mouth every 8 hours as needed for nausea, Disp-12 tablet, R-0, Local Print           Allergies   Allergies   Allergen Reactions    Bee Venom      Bee stings swelling and short of breath    Pcn [Penicillins]      Noted in 8/20/08 ER,hives,headaches,throat irritation    Trazodone      Abdominal pain    Ibuprofen GI Disturbance     Noted in 8/20/08 ER  Headaches and abdominal pain    Tylenol [Acetaminophen] GI Disturbance     Noted in 8/20/08 ER      (1) More than 48 hours/None

## (undated) RX ORDER — LIDOCAINE HYDROCHLORIDE 10 MG/ML
INJECTION, SOLUTION INFILTRATION; PERINEURAL
Status: DISPENSED
Start: 2024-08-13